# Patient Record
Sex: MALE | Race: WHITE | NOT HISPANIC OR LATINO | Employment: FULL TIME | URBAN - METROPOLITAN AREA
[De-identification: names, ages, dates, MRNs, and addresses within clinical notes are randomized per-mention and may not be internally consistent; named-entity substitution may affect disease eponyms.]

---

## 2017-01-04 DIAGNOSIS — F41.1 GENERALIZED ANXIETY DISORDER: Primary | ICD-10-CM

## 2017-01-04 RX ORDER — CITALOPRAM HYDROBROMIDE 40 MG/1
40 TABLET ORAL DAILY
Qty: 90 TABLET | Refills: 0 | Status: SHIPPED | OUTPATIENT
Start: 2017-01-04 | End: 2017-04-10

## 2017-01-04 NOTE — TELEPHONE ENCOUNTER
Citalopram     Last Written Prescription Date: 07/05/16  Last Fill Quantity: 90, # refills: 1  Last Office Visit with G primary care provider:  01/04/17        Last PHQ-9 score on record=   PHQ-9 SCORE 11/2/2016   Total Score -   Total Score 3           Thank you!  Kenzie Jonas PhT  [facility]  On behalf of [facility]

## 2017-01-04 NOTE — TELEPHONE ENCOUNTER
Due for office visit in May. Last seen 11/2/16.       Melissa Barrera, Pharm.D.  on behalf of [facility] Pharmacist   [email or web address]

## 2017-01-27 ENCOUNTER — OFFICE VISIT (OUTPATIENT)
Dept: FAMILY MEDICINE | Facility: CLINIC | Age: 47
End: 2017-01-27
Payer: OTHER MISCELLANEOUS

## 2017-01-27 ENCOUNTER — MYC MEDICAL ADVICE (OUTPATIENT)
Dept: FAMILY MEDICINE | Facility: CLINIC | Age: 47
End: 2017-01-27

## 2017-01-27 ENCOUNTER — OFFICE VISIT (OUTPATIENT)
Dept: FAMILY MEDICINE | Facility: CLINIC | Age: 47
End: 2017-01-27
Payer: COMMERCIAL

## 2017-01-27 VITALS
TEMPERATURE: 98.3 F | DIASTOLIC BLOOD PRESSURE: 84 MMHG | WEIGHT: 211 LBS | SYSTOLIC BLOOD PRESSURE: 146 MMHG | OXYGEN SATURATION: 97 % | BODY MASS INDEX: 27.08 KG/M2 | HEART RATE: 72 BPM | RESPIRATION RATE: 12 BRPM

## 2017-01-27 VITALS
HEART RATE: 72 BPM | SYSTOLIC BLOOD PRESSURE: 146 MMHG | BODY MASS INDEX: 27.08 KG/M2 | RESPIRATION RATE: 12 BRPM | WEIGHT: 211 LBS | TEMPERATURE: 98.3 F | DIASTOLIC BLOOD PRESSURE: 84 MMHG | OXYGEN SATURATION: 97 %

## 2017-01-27 DIAGNOSIS — R35.0 URINARY FREQUENCY: Primary | ICD-10-CM

## 2017-01-27 DIAGNOSIS — M54.2 NECK PAIN: Primary | ICD-10-CM

## 2017-01-27 DIAGNOSIS — R10.32 ABDOMINAL PAIN, LEFT LOWER QUADRANT: Primary | ICD-10-CM

## 2017-01-27 PROCEDURE — 99213 OFFICE O/P EST LOW 20 MIN: CPT | Performed by: NURSE PRACTITIONER

## 2017-01-27 RX ORDER — TRAMADOL HYDROCHLORIDE 50 MG/1
50-100 TABLET ORAL EVERY 6 HOURS PRN
Qty: 56 TABLET | Refills: 0 | Status: SHIPPED | OUTPATIENT
Start: 2017-01-27 | End: 2017-01-27

## 2017-01-27 RX ORDER — TRAMADOL HYDROCHLORIDE 50 MG/1
50-100 TABLET ORAL EVERY 6 HOURS PRN
Qty: 56 TABLET | Refills: 0 | Status: SHIPPED | OUTPATIENT
Start: 2017-02-24 | End: 2017-01-27

## 2017-01-27 RX ORDER — HYDROCODONE BITARTRATE AND ACETAMINOPHEN 5; 325 MG/1; MG/1
1 TABLET ORAL EVERY 6 HOURS PRN
Qty: 28 TABLET | Refills: 0 | Status: SHIPPED | OUTPATIENT
Start: 2017-02-24 | End: 2017-01-27

## 2017-01-27 RX ORDER — HYDROCODONE BITARTRATE AND ACETAMINOPHEN 5; 325 MG/1; MG/1
1 TABLET ORAL EVERY 6 HOURS PRN
Qty: 28 TABLET | Refills: 0 | Status: SHIPPED | OUTPATIENT
Start: 2017-03-24 | End: 2017-04-21

## 2017-01-27 RX ORDER — HYDROCODONE BITARTRATE AND ACETAMINOPHEN 5; 325 MG/1; MG/1
1 TABLET ORAL EVERY 6 HOURS PRN
Qty: 28 TABLET | Refills: 0 | Status: SHIPPED | OUTPATIENT
Start: 2017-01-27 | End: 2017-01-27

## 2017-01-27 RX ORDER — HYDROCODONE BITARTRATE AND ACETAMINOPHEN 5; 325 MG/1; MG/1
1 TABLET ORAL EVERY 6 HOURS PRN
Qty: 56 TABLET | Refills: 0 | Status: SHIPPED | OUTPATIENT
Start: 2017-01-27 | End: 2017-01-27

## 2017-01-27 RX ORDER — TRAMADOL HYDROCHLORIDE 50 MG/1
50-100 TABLET ORAL EVERY 6 HOURS PRN
Qty: 56 TABLET | Refills: 0 | Status: SHIPPED | OUTPATIENT
Start: 2017-03-24 | End: 2017-04-21

## 2017-01-27 RX ORDER — OXYBUTYNIN CHLORIDE 5 MG/1
5 TABLET, EXTENDED RELEASE ORAL DAILY
Qty: 30 TABLET | Refills: 1 | Status: SHIPPED | OUTPATIENT
Start: 2017-01-27 | End: 2017-05-01

## 2017-01-27 NOTE — TELEPHONE ENCOUNTER
Called patient and scheduled for 4 PM today  Per patient the refills are on a [employer] account so will need two appointments as the hernia would be on private account  Per Lola CLIFTON to schedule both visits for this afternoon.  LOREE Monae RN

## 2017-01-27 NOTE — TELEPHONE ENCOUNTER
Ditropan XL 5mg      Last Written Prescription Date: 11/2/16  Last Fill Quantity: 30,  # refills: 1   Last Office Visit with [facility] prescribing provider: 11/2/16                                         Next 5 appointments (look out 90 days)     Jan 27, 2017  4:00 PM   [facility]   [facility]    [address] [place] [id number] [phone]            Jan 27, 2017  4:20 PM   SHORT with RAFAEL Robles CNP   [facility]    [address] [place] [address] [phone]                      Thank you,  Radhika Casillas CPhT  On behalf of [facility]

## 2017-01-27 NOTE — PATIENT INSTRUCTIONS
1.  i agree symptoms sound like hernia.  US to confirm, if negative will consider CT.  If fever, bloody diarrhea, or severe pain go into ER.  I there is a hernia will need follow up with general surgery.

## 2017-01-27 NOTE — PROGRESS NOTES
SUBJECTIVE:                                                    Jas Patel is a 46 year old male who presents to clinic today for the following health issues:      Chronic Pain Follow-Up       Type / Location of Pain: Neck/ back  Analgesia/pain control:       Recent changes:  same      Overall control: Tolerable with discomfort  Activity level/function:      Daily activities:  Able to do all daily activities    Work:  Pain does not limit any  work  Adverse effects:  No  Adherance    Taking medication as directed?  Yes    Participating in other treatments: yes  Risk Factors:    Sleep:  Poor    Mood/anxiety:  controlled    Recent family or social stressors:  none noted    Other aggravating factors: repetitive activities - reaching, bending, lifting  PHQ-9 SCORE 10/28/2015 4/11/2016 11/2/2016   Total Score - - -   Total Score 2 5 3     DONTE-7 SCORE 10/26/2011 10/28/2015 4/11/2016   Total Score 17 - -   Total Score - 5 9     Encounter-Level CSA - 10/28/15:               Controlled Substance Agreement - Scan on 10/29/2015 11:24 AM : CONTROLLED SUBSTANCE AGREEMENT (below)               Norco at night for chronic neck/back pain as below. Taking ibuprofen 600mg twice a day.  Occasional constipation, resolves with colace. No altered mental status, drowsiness.     Pt injured neck while lifting a pt 9/2011. Feels neck pain is stable on current medication regimen which includes 800mg ibuprofen TID, tramadol BID, and norco usually about 1/d. Has done PT in the past, still doing exercise at home which are helpful. Also seeing chiropractor. Has been told he is not a surgical candidate. Working currently in day surgery pre/postop as a nurse    On amitriptyline for sleep/chronic pain.  Mild dry mouth, no daytime drowsiness or dizziness.      Patient Active Problem List   Diagnosis     Low back pain     CARDIOVASCULAR SCREENING; LDL GOAL LESS THAN 160     Moderate major depression (H)     Generalized anxiety disorder      Gastroesophageal reflux disease without esophagitis     BPH (benign prostatic hyperplasia)     Chronic pain syndrome     Benign essential tremor     Past Surgical History   Procedure Laterality Date     Drain skin abscess complic       left thigh I and D       Social History   Substance Use Topics     Smoking status: Former Smoker -- 10 years     Types: Cigarettes     Start date: 01/28/2015     Smokeless tobacco: Never Used     Alcohol Use: Yes      Comment: 1 drink a day     Family History   Problem Relation Age of Onset     C.A.D. Maternal Grandfather      CEREBROVASCULAR DISEASE Other      great uncle     DIABETES Paternal Uncle      type 2     DIABETES Other      great aunt     Hypertension Father      Hypertension Maternal Grandfather      Prostate Cancer Other      great uncle moms side     Alzheimer Disease Other      great uncle moms side         Current Outpatient Prescriptions   Medication Sig Dispense Refill     oxybutynin (DITROPAN XL) 5 MG 24 hr tablet Take 1 tablet (5 mg) by mouth daily 30 tablet 1     [START ON 3/24/2017] HYDROcodone-acetaminophen (NORCO) 5-325 MG per tablet Take 1 tablet by mouth every 6 hours as needed for pain #28 pills/4 weeks 28 tablet 0     [START ON 3/24/2017] traMADol (ULTRAM) 50 MG tablet Take 1-2 tablets ( mg) by mouth every 6 hours as needed for pain #56 pills/4 weeks 56 tablet 0     citalopram (CELEXA) 40 MG tablet Take 1 tablet (40 mg) by mouth daily 90 tablet 0     Cholecalciferol (VITAMIN D3 PO) Take 5,000 Units by mouth every other day       tamsulosin (FLOMAX) 0.4 MG 24 hr capsule Take 1 capsule (0.4 mg) by mouth daily 90 capsule 1     omeprazole (PRILOSEC) 20 MG capsule Take 1 capsule (20 mg) by mouth daily 90 capsule 3     amitriptyline (ELAVIL) 25 MG tablet Take 1 tablet (25 mg) by mouth At Bedtime 90 tablet 1     propranolol (INDERAL) 10 MG tablet Take 1 tablet (10 mg) by mouth 2 times daily as needed For tremor 90 tablet 1     ibuprofen (ADVIL,MOTRIN) 800  MG tablet Take 1 tablet (800 mg) by mouth 3 times daily 90 tablet 0     loratadine (CLARITIN) 10 MG tablet Take 10 mg by mouth daily. 1 tab daily       MULTI-VITAMIN OR TABS one daily  0       ROS:  MSK, Neuro as above, otherwise negative       OBJECTIVE:                                                    /84 mmHg  Pulse 72  Temp(Src) 98.3  F (36.8  C) (Tympanic)  Resp 12  Wt 211 lb (95.709 kg)  SpO2 97%   GENERAL APPEARANCE: healthy, alert and no distress  EYES: Eyes grossly normal to inspection and conjunctivae and sclerae normal  RESP: respirations nonlabored  PSYCH: mentation appears normal and affect normal/bright        ASSESSMENT/PLAN:                                                    (M54.2) Neck pain  (primary encounter diagnosis)  Comment: controlled on current regimen   Plan: HYDROcodone-acetaminophen (NORCO) 5-325 MG per         tablet, traMADol (ULTRAM) 50 MG tablet,         DISCONTINUED: HYDROcodone-acetaminophen (NORCO)        5-325 MG per tablet, DISCONTINUED: traMADol         (ULTRAM) 50 MG tablet, DISCONTINUED:         HYDROcodone-acetaminophen (NORCO) 5-325 MG per         tablet, DISCONTINUED: traMADol (ULTRAM) 50 MG         tablet, DISCONTINUED: HYDROcodone-acetaminophen        (NORCO) 5-325 MG per tablet        Refilled Rx x 3 months, okay for evisit for next round refills, f2f visit every 6 months          See Patient Instructions    Lola Lamb [facility]    There are no Patient Instructions on file for this visit.

## 2017-01-27 NOTE — TELEPHONE ENCOUNTER
Prescription approved per [facility] Refill Protocol.    Edda Scott, PharmD  [facility]  On behalf of [facility]

## 2017-01-27 NOTE — PROGRESS NOTES
SUBJECTIVE:                                                    Jas Patel is a 46 year old male who presents to clinic today for the following health issues:      Abdominal Pain      Duration: 3 days    Description (location/character/radiation): lower left quadrant, sharp ,crampy       Associated flank pain: starting    Intensity:  moderate    Accompanying signs and symptoms:        Fever/Chills: no        Gas/Bloating: YES       Nausea/vomitting: YES- nausea       Diarrhea: no        Dysuria or Hematuria: no     History (previous similar pain/trauma/previous testing): none    Precipitating or alleviating factors:       Pain worse with eating/BM/urination: worse upon using abdominal muscles, coughing, pulling legs up       Pain relieved by BM: no     Therapies tried and outcome: None    LMP:  not applicable       Soreness 1 week ago, more prominent and constant the last 3 days.  Specific location to LLQ.  Tender to palpation.  Pain worse with use of abd muscles, sitting, coughing.  Constant pain today.  Characterized as dull ache, but certain movements cause it to be sharp pain.  Mild nausea today after lunch.  No diarrhea or constipation.  No fevers, chills, body aches.  Appetite is down today.  No blood in stool or dark tarry stool.  No prior history of hernia.  No hx abd surgery.  Though at some point he may have noticed a bulge but not sure.      Started oxybutinin to help with bladder spasms, it has improved.  Improved urgency and frequency.  Also using flomax.      Patient Active Problem List   Diagnosis     Low back pain     CARDIOVASCULAR SCREENING; LDL GOAL LESS THAN 160     Moderate major depression (H)     Generalized anxiety disorder     Gastroesophageal reflux disease without esophagitis     BPH (benign prostatic hyperplasia)     Chronic pain syndrome     Benign essential tremor     Past Surgical History   Procedure Laterality Date     Drain skin abscess complic       left thigh I and D        Social History   Substance Use Topics     Smoking status: Former Smoker -- 10 years     Types: Cigarettes     Start date: 01/28/2015     Smokeless tobacco: Never Used     Alcohol Use: Yes      Comment: 1 drink a day     Family History   Problem Relation Age of Onset     C.A.D. Maternal Grandfather      CEREBROVASCULAR DISEASE Other      great uncle     DIABETES Paternal Uncle      type 2     DIABETES Other      great aunt     Hypertension Father      Hypertension Maternal Grandfather      Prostate Cancer Other      great uncle moms side     Alzheimer Disease Other      great uncle moms side         Current Outpatient Prescriptions   Medication Sig Dispense Refill     oxybutynin (DITROPAN XL) 5 MG 24 hr tablet Take 1 tablet (5 mg) by mouth daily 30 tablet 1     citalopram (CELEXA) 40 MG tablet Take 1 tablet (40 mg) by mouth daily 90 tablet 0     Cholecalciferol (VITAMIN D3 PO) Take 5,000 Units by mouth every other day       tamsulosin (FLOMAX) 0.4 MG 24 hr capsule Take 1 capsule (0.4 mg) by mouth daily 90 capsule 1     omeprazole (PRILOSEC) 20 MG capsule Take 1 capsule (20 mg) by mouth daily 90 capsule 3     amitriptyline (ELAVIL) 25 MG tablet Take 1 tablet (25 mg) by mouth At Bedtime 90 tablet 1     propranolol (INDERAL) 10 MG tablet Take 1 tablet (10 mg) by mouth 2 times daily as needed For tremor 90 tablet 1     ibuprofen (ADVIL,MOTRIN) 800 MG tablet Take 1 tablet (800 mg) by mouth 3 times daily 90 tablet 0     loratadine (CLARITIN) 10 MG tablet Take 10 mg by mouth daily. 1 tab daily       MULTI-VITAMIN OR TABS one daily  0     [START ON 3/24/2017] HYDROcodone-acetaminophen (NORCO) 5-325 MG per tablet Take 1 tablet by mouth every 6 hours as needed for pain #28 pills/4 weeks 28 tablet 0     [START ON 3/24/2017] traMADol (ULTRAM) 50 MG tablet Take 1-2 tablets ( mg) by mouth every 6 hours as needed for pain #56 pills/4 weeks 56 tablet 0       ROS:  Const, GI, MSK as above, otherwise negative       OBJECTIVE:                                                     /84 mmHg  Pulse 72  Temp(Src) 98.3  F (36.8  C) (Tympanic)  Resp 12  Wt 211 lb (95.709 kg)  SpO2 97%   GENERAL APPEARANCE: healthy, alert and no distress  EYES: Eyes grossly normal to inspection and conjunctivae and sclerae normal  RESP: respirations nonlabored   ABDOMEN: bowel sounds normal.  Pain with palpation of specific area in LLQ, pain worse with flexion of abd wall.  No palpable mass or bulge.  remainder of the abd is nontender, no guarding or rebound tenderness.  PSYCH: mentation appears normal and affect normal/bright          ASSESSMENT/PLAN:                                                    (R10.32) Abdominal pain, left lower quadrant  (primary encounter diagnosis)  Comment: suspicious for hernia given worse with movement, coughing, ad abd wall flexion, no palpable bulge on exam.  No fevers, diarrhea, and pinpoint pain not consistent with diverticulitis   Plan: US Abdomen Limited        US scheduled tomorrow to eval for hernia, if neg will consider CT scan.  If sevre pain, fever, or bloody diarrhea pt will go into ER.          See Patient Instructions    Lola Lamb, HEATHER  [facility]    Patient Instructions   1.  i agree symptoms sound like hernia.  US to confirm, if negative will consider CT.  If fever, bloody diarrhea, or severe pain go into ER.  I there is a hernia will need follow up with general surgery.

## 2017-01-27 NOTE — NURSING NOTE
"Chief Complaint   Patient presents with     Abdominal Pain       Initial /84 mmHg  Pulse 72  Temp(Src) 98.3  F (36.8  C) (Tympanic)  Resp 12  Wt 211 lb (95.709 kg)  SpO2 97% Estimated body mass index is 27.08 kg/(m^2) as calculated from the following:    Height as of 6/9/15: 6' 2\" (1.88 m).    Weight as of this encounter: 211 lb (95.709 kg).  BP completed using cuff size: gerri Zapata CMA    "

## 2017-01-27 NOTE — MR AVS SNAPSHOT
After Visit Summary   1/27/2017    Jas Patel    MRN: [id number]           Patient Information     Date Of Birth          1970        Visit Information        Provider Department      1/27/2017 4:20 PM Lola Lamb APRN CNP [facility]        Today's Diagnoses     Neck pain    -  1        Follow-ups after your visit        Your next 10 appointments already scheduled     Jan 28, 2017  9:00 AM   US ABDOMEN LIMITED with UCUS3   [facility] ([facility])    [address] [address] [phone]           Please bring a list of your medicines (including vitamins, minerals and over-the-counter drugs). Also, tell your doctor about any allergies you may have. Wear comfortable clothes and leave your valuables at home.  Adults: No eating or drinking for 8 hours before the exam. You may take medicine with a small sip of water.  Children: - Children 6+ years: No food or drink for 6 hours before exam. - Children 1-5 years: No food or drink for 4 hours before exam. - Infants, breast-fed: may have breast milk up to 2 hours before exam. - Infants, formula: may have bottle until 4 hours before exam.  Please call the Imaging Department at your exam site with any questions.              Future tests that were ordered for you today     Open Future Orders        Priority Expected Expires Ordered    US Abdomen Limited Routine  1/27/2018 1/27/2017            Who to contact     If you have questions or need follow up information about today's clinic visit or your schedule please contact [facility] directly at [phone].  Normal or non-critical lab and imaging results will be communicated to you by MyChart, letter or phone within 4 business days after the clinic has received the results. If you do not hear from us within 7 days, please contact the clinic through MyChart or phone. If you have a critical  or abnormal lab result, we will notify you by phone as soon as possible.  Submit refill requests through [employer] or call your pharmacy and they will forward the refill request to us. Please allow 3 business days for your refill to be completed.          Additional Information About Your Visit        [employer] Information     [employer] gives you secure access to your electronic health record. If you see a primary care provider, you can also send messages to your care team and make appointments. If you have questions, please call your primary care clinic.  If you do not have a primary care provider, please call [phone] and they will assist you.        Care EveryWhere ID     This is your Care EveryWhere ID. This could be used by other organizations to access your [facility] medical records  [id number]        Your Vitals Were     Pulse Temperature Respirations Pulse Oximetry          72 98.3  F (36.8  C) (Tympanic) 12 97%         Blood Pressure from Last 3 Encounters:   01/27/17 146/84   01/27/17 146/84   11/02/16 122/68    Weight from Last 3 Encounters:   01/27/17 211 lb (95.709 kg)   01/27/17 211 lb (95.709 kg)   11/02/16 209 lb (94.802 kg)              Today, you had the following     No orders found for display         Where to get your medicines      These medications were sent to [facility] [place], [place] - [address]  [address] [facility] [address]     Phone:  [phone]    - oxybutynin 5 MG 24 hr tablet       Primary Care Provider Office Phone # Fax #    LolaRAFAEL [facility] [phone]       [facility] [address]  [facility] [address]        Thank you!     Thank you for choosing [facility]  for your care. Our goal is always to provide you with excellent care. Hearing back from our patients is one way we can continue to improve our services. Please take a few minutes to complete the written survey that you may receive in  the mail after your visit with us. Thank you!             Your Updated Medication List - Protect others around you: Learn how to safely use, store and throw away your medicines at www.disposemymeds.org.          This list is accurate as of: 1/27/17  4:28 PM.  Always use your most recent med list.                   Brand Name Dispense Instructions for use    amitriptyline 25 MG tablet    ELAVIL    90 tablet    Take 1 tablet (25 mg) by mouth At Bedtime       citalopram 40 MG tablet    celeXA    90 tablet    Take 1 tablet (40 mg) by mouth daily       CLARITIN 10 MG tablet   Generic drug:  loratadine      Take 10 mg by mouth daily. 1 tab daily       HYDROcodone-acetaminophen 5-325 MG per tablet    NORCO    28 tablet    Take 1 tablet by mouth every 6 hours as needed for pain #28 pills/4 weeks       ibuprofen 800 MG tablet    ADVIL/MOTRIN    90 tablet    Take 1 tablet (800 mg) by mouth 3 times daily       Multi-vitamin Tabs tablet   Generic drug:  multivitamin, therapeutic with minerals      one daily       omeprazole 20 MG CR capsule    priLOSEC    90 capsule    Take 1 capsule (20 mg) by mouth daily       oxybutynin 5 MG 24 hr tablet    DITROPAN XL    30 tablet    Take 1 tablet (5 mg) by mouth daily       propranolol 10 MG tablet    INDERAL    90 tablet    Take 1 tablet (10 mg) by mouth 2 times daily as needed For tremor       tamsulosin 0.4 MG capsule    FLOMAX    90 capsule    Take 1 capsule (0.4 mg) by mouth daily       traMADol 50 MG tablet    ULTRAM    56 tablet    Take 1-2 tablets ( mg) by mouth every 6 hours as needed for pain #56 pills/4 weeks       VITAMIN D3 PO      Take 5,000 Units by mouth every other day

## 2017-01-27 NOTE — MR AVS SNAPSHOT
After Visit Summary   1/27/2017    Jas Patel    MRN: [id number]           Patient Information     Date Of Birth          1970        Visit Information        Provider Department      1/27/2017 4:00 PM Lola Lamb APRN CNP [facility]        Today's Diagnoses     Abdominal pain, left lower quadrant    -  1       Care Instructions    1.  i agree symptoms sound like hernia.  US to confirm, if negative will consider CT.  If fever, bloody diarrhea, or severe pain go into ER.  I there is a hernia will need follow up with general surgery.        Follow-ups after your visit        Your next 10 appointments already scheduled     Jan 28, 2017  9:00 AM   US ABDOMEN LIMITED with UCUS3   [facility] ([facility])    [address] [address] [phone]           Please bring a list of your medicines (including vitamins, minerals and over-the-counter drugs). Also, tell your doctor about any allergies you may have. Wear comfortable clothes and leave your valuables at home.  Adults: No eating or drinking for 8 hours before the exam. You may take medicine with a small sip of water.  Children: - Children 6+ years: No food or drink for 6 hours before exam. - Children 1-5 years: No food or drink for 4 hours before exam. - Infants, breast-fed: may have breast milk up to 2 hours before exam. - Infants, formula: may have bottle until 4 hours before exam.  Please call the Imaging Department at your exam site with any questions.              Future tests that were ordered for you today     Open Future Orders        Priority Expected Expires Ordered    US Abdomen Limited Routine  1/27/2018 1/27/2017            Who to contact     If you have questions or need follow up information about today's clinic visit or your schedule please contact [facility] directly at [phone].  Normal or non-critical lab  and imaging results will be communicated to you by Smart GPS Backpackhart, letter or phone within 4 business days after the clinic has received the results. If you do not hear from us within 7 days, please contact the clinic through AdultSpace or phone. If you have a critical or abnormal lab result, we will notify you by phone as soon as possible.  Submit refill requests through AdultSpace or call your pharmacy and they will forward the refill request to us. Please allow 3 business days for your refill to be completed.          Additional Information About Your Visit        Smart GPS BackpackharViewpoints Information     AdultSpace gives you secure access to your electronic health record. If you see a primary care provider, you can also send messages to your care team and make appointments. If you have questions, please call your primary care clinic.  If you do not have a primary care provider, please call [phone] and they will assist you.        Care EveryWhere ID     This is your Care EveryWhere ID. This could be used by other organizations to access your [place] medical records  [id number]        Your Vitals Were     Pulse Temperature Respirations Pulse Oximetry          72 98.3  F (36.8  C) (Tympanic) 12 97%         Blood Pressure from Last 3 Encounters:   01/27/17 146/84   01/27/17 146/84   11/02/16 122/68    Weight from Last 3 Encounters:   01/27/17 211 lb (95.709 kg)   01/27/17 211 lb (95.709 kg)   11/02/16 209 lb (94.802 kg)                 Today's Medication Changes          These changes are accurate as of: 1/27/17  4:35 PM.  If you have any questions, ask your nurse or doctor.               Start taking these medicines.        Dose/Directions    HYDROcodone-acetaminophen 5-325 MG per tablet   Commonly known as:  NORCO   Used for:  Neck pain   Started by:  Lola Lamb APRN CNP        Dose:  1 tablet   Start taking on:  3/24/2017   Take 1 tablet by mouth every 6 hours as needed for pain #28 pills/4 weeks   Quantity:  28 tablet    Refills:  0       traMADol 50 MG tablet   Commonly known as:  ULTRAM   Used for:  Neck pain   Started by:  Lola Lamb APRN CNP        Dose:   mg   Start taking on:  3/24/2017   Take 1-2 tablets ( mg) by mouth every 6 hours as needed for pain #56 pills/4 weeks   Quantity:  56 tablet   Refills:  0            Where to get your medicines      These medications were sent to [place] [id number]  [address] [facility] [address]     Phone:  [phone]    - oxybutynin 5 MG 24 hr tablet      Some of these will need a paper prescription and others can be bought over the counter.  Ask your nurse if you have questions.     Bring a paper prescription for each of these medications    - HYDROcodone-acetaminophen 5-325 MG per tablet  - traMADol 50 MG tablet             Primary Care Provider Office Phone # Fax #    RAFAEL Robles [phone]       [facility] [address]  [facility] [address]        Thank you!     Thank you for choosing [facility]  for your care. Our goal is always to provide you with excellent care. Hearing back from our patients is one way we can continue to improve our services. Please take a few minutes to complete the written survey that you may receive in the mail after your visit with us. Thank you!             Your Updated Medication List - Protect others around you: Learn how to safely use, store and throw away your medicines at www.disposemymeds.org.          This list is accurate as of: 1/27/17  4:35 PM.  Always use your most recent med list.                   Brand Name Dispense Instructions for use    amitriptyline 25 MG tablet    ELAVIL    90 tablet    Take 1 tablet (25 mg) by mouth At Bedtime       citalopram 40 MG tablet    celeXA    90 tablet    Take 1 tablet (40 mg) by mouth daily       CLARITIN 10 MG tablet   Generic drug:  loratadine      Take 10 mg by mouth  daily. 1 tab daily       HYDROcodone-acetaminophen 5-325 MG per tablet   Start taking on:  3/24/2017    NORCO    28 tablet    Take 1 tablet by mouth every 6 hours as needed for pain #28 pills/4 weeks       ibuprofen 800 MG tablet    ADVIL/MOTRIN    90 tablet    Take 1 tablet (800 mg) by mouth 3 times daily       Multi-vitamin Tabs tablet   Generic drug:  multivitamin, therapeutic with minerals      one daily       omeprazole 20 MG CR capsule    priLOSEC    90 capsule    Take 1 capsule (20 mg) by mouth daily       oxybutynin 5 MG 24 hr tablet    DITROPAN XL    30 tablet    Take 1 tablet (5 mg) by mouth daily       propranolol 10 MG tablet    INDERAL    90 tablet    Take 1 tablet (10 mg) by mouth 2 times daily as needed For tremor       tamsulosin 0.4 MG capsule    FLOMAX    90 capsule    Take 1 capsule (0.4 mg) by mouth daily       traMADol 50 MG tablet   Start taking on:  3/24/2017    ULTRAM    56 tablet    Take 1-2 tablets ( mg) by mouth every 6 hours as needed for pain #56 pills/4 weeks       VITAMIN D3 PO      Take 5,000 Units by mouth every other day

## 2017-02-08 ENCOUNTER — MYC MEDICAL ADVICE (OUTPATIENT)
Dept: FAMILY MEDICINE | Facility: CLINIC | Age: 47
End: 2017-02-08

## 2017-02-08 NOTE — TELEPHONE ENCOUNTER
Routing to provider - Lilroz - please review and advise as appropriate  1. Patient update:  Symptoms improved    Thank you,  Ragini Cheung RN

## 2017-02-21 DIAGNOSIS — F41.1 GENERALIZED ANXIETY DISORDER: ICD-10-CM

## 2017-02-21 NOTE — TELEPHONE ENCOUNTER
Routing refill request to provider for review/approval because:  Drug not on the [facility] refill protocol for this indication    Griselda Roman, PharmD  [facility]  On behalf of [facility]

## 2017-02-21 NOTE — TELEPHONE ENCOUNTER
Propranolol 10mg      Last Written Prescription Date: 9/21/16  Last Fill Quantity: 90, # refills: 1    Last Office Visit with [facility], P or [facility] prescribing provider:  1/27/17   Future Office Visit:        BP Readings from Last 3 Encounters:   01/27/17 146/84   01/27/17 146/84   11/02/16 122/68       Thank you,  Radhika Casillas CPhT  On behalf of [facility] [place]

## 2017-02-22 RX ORDER — PROPRANOLOL HYDROCHLORIDE 10 MG/1
10 TABLET ORAL 2 TIMES DAILY PRN
Qty: 90 TABLET | Refills: 1 | Status: SHIPPED | OUTPATIENT
Start: 2017-02-22 | End: 2017-07-31

## 2017-04-10 DIAGNOSIS — F41.1 GENERALIZED ANXIETY DISORDER: ICD-10-CM

## 2017-04-11 RX ORDER — CITALOPRAM HYDROBROMIDE 40 MG/1
TABLET ORAL
Qty: 90 TABLET | Refills: 0 | Status: SHIPPED | OUTPATIENT
Start: 2017-04-11 | End: 2017-07-17

## 2017-04-11 NOTE — TELEPHONE ENCOUNTER
Medication Detail      Disp Refills Start End KENDALL   citalopram (CELEXA) 40 MG tablet 90 tablet 0 1/4/2017  No   Sig: Take 1 tablet (40 mg) by mouth daily       Last Office Visit with [facility] primary care provider:  1/27/17        Last PHQ-9 score on record=   PHQ-9 SCORE 11/2/2016   Total Score -   Total Score 3

## 2017-04-20 ENCOUNTER — MYC MEDICAL ADVICE (OUTPATIENT)
Dept: FAMILY MEDICINE | Facility: CLINIC | Age: 47
End: 2017-04-20

## 2017-04-21 ENCOUNTER — E-VISIT (OUTPATIENT)
Dept: FAMILY MEDICINE | Facility: CLINIC | Age: 47
End: 2017-04-21
Payer: COMMERCIAL

## 2017-04-21 ENCOUNTER — MYC MEDICAL ADVICE (OUTPATIENT)
Dept: FAMILY MEDICINE | Facility: CLINIC | Age: 47
End: 2017-04-21

## 2017-04-21 DIAGNOSIS — M54.2 NECK PAIN: ICD-10-CM

## 2017-04-21 PROCEDURE — 98969 ZZC NONPHYSICIAN ONLINE ASSESSMENT AND MANAGEMENT: CPT | Performed by: NURSE PRACTITIONER

## 2017-04-21 RX ORDER — HYDROCODONE BITARTRATE AND ACETAMINOPHEN 5; 325 MG/1; MG/1
1 TABLET ORAL EVERY 6 HOURS PRN
Qty: 28 TABLET | Refills: 0 | Status: SHIPPED | OUTPATIENT
Start: 2017-05-19 | End: 2017-04-21

## 2017-04-21 RX ORDER — TRAMADOL HYDROCHLORIDE 50 MG/1
50-100 TABLET ORAL EVERY 6 HOURS PRN
Qty: 56 TABLET | Refills: 0 | Status: SHIPPED | OUTPATIENT
Start: 2017-04-21 | End: 2017-04-21

## 2017-04-21 RX ORDER — TRAMADOL HYDROCHLORIDE 50 MG/1
50-100 TABLET ORAL EVERY 6 HOURS PRN
Qty: 56 TABLET | Refills: 0 | Status: SHIPPED | OUTPATIENT
Start: 2017-05-19 | End: 2017-04-21

## 2017-04-21 RX ORDER — HYDROCODONE BITARTRATE AND ACETAMINOPHEN 5; 325 MG/1; MG/1
1 TABLET ORAL EVERY 6 HOURS PRN
Qty: 28 TABLET | Refills: 0 | Status: SHIPPED | OUTPATIENT
Start: 2017-04-21 | End: 2017-04-21

## 2017-04-21 RX ORDER — TRAMADOL HYDROCHLORIDE 50 MG/1
50-100 TABLET ORAL EVERY 6 HOURS PRN
Qty: 56 TABLET | Refills: 0 | Status: SHIPPED | OUTPATIENT
Start: 2017-06-16 | End: 2017-07-17

## 2017-04-21 RX ORDER — HYDROCODONE BITARTRATE AND ACETAMINOPHEN 5; 325 MG/1; MG/1
1 TABLET ORAL EVERY 6 HOURS PRN
Qty: 28 TABLET | Refills: 0 | Status: SHIPPED | OUTPATIENT
Start: 2017-06-16 | End: 2017-07-17

## 2017-04-21 NOTE — TELEPHONE ENCOUNTER
Sent patient mychart message as per below - advising E-visit and discuss refill policy    Ragini Cheung RN

## 2017-05-01 DIAGNOSIS — R35.0 URINARY FREQUENCY: ICD-10-CM

## 2017-05-01 DIAGNOSIS — N40.1 BENIGN NON-NODULAR PROSTATIC HYPERPLASIA WITH LOWER URINARY TRACT SYMPTOMS: ICD-10-CM

## 2017-05-02 NOTE — TELEPHONE ENCOUNTER
Medication Detail      Disp Refills Start End KENDALL   tamsulosin (FLOMAX) 0.4 MG 24 hr capsule 90 capsule 1 10/28/2016  No   Sig: Take 1 capsule (0.4 mg) by mouth daily       [place] [facility] [place] [facility] or [facility] prescribing provider:  1/27/17   Future Office Visit:      BP Readings from Last 3 Encounters:   01/27/17 146/84   01/27/17 146/84   11/02/16 122/68     Medication Detail      Disp Refills Start End KENDALL   oxybutynin (DITROPAN XL) 5 MG 24 hr tablet 30 tablet 1 1/27/2017  No   Sig: Take 1 tablet (5 mg) by mouth daily       Last Office Visit with [facility] or [facility] prescribing provider: 1/27/17

## 2017-05-03 RX ORDER — OXYBUTYNIN CHLORIDE 5 MG/1
TABLET, EXTENDED RELEASE ORAL
Qty: 90 TABLET | Refills: 2 | Status: SHIPPED | OUTPATIENT
Start: 2017-05-03 | End: 2017-12-26

## 2017-05-03 RX ORDER — TAMSULOSIN HYDROCHLORIDE 0.4 MG/1
CAPSULE ORAL
Qty: 90 CAPSULE | Refills: 1 | Status: SHIPPED | OUTPATIENT
Start: 2017-05-03 | End: 2017-10-30

## 2017-07-13 NOTE — PROGRESS NOTES
SUBJECTIVE:                                                    Jas Patel is a 47 year old male who presents to clinic today for the following health issues:      Chronic Pain Follow-Up       Type / Location of Pain: neck pain, herniated cervical disc  Analgesia/pain control:       Recent changes:  Worse, more intense and constant      Overall control: Tolerable with discomfort  Activity level/function:      Daily activities:  Can do most things most days, with some rest    Work:  Pain does not limit any  work  Adverse effects:  No  Adherance    Taking medication as directed?  Yes    Participating in other treatments: yes  Risk Factors:    Sleep:  Fair    Mood/anxiety:  Controlled, fair-poor    Recent family or social stressors:  conflict between family members    Other aggravating factors: none  PHQ-9 SCORE 4/11/2016 11/2/2016 7/17/2017   Total Score - - -   Total Score 5 3 11     DONTE-7 SCORE 10/28/2015 4/11/2016 7/17/2017   Total Score - - -   Total Score 5 9 7     Encounter-Level CSA - 10/28/2015:                 Controlled Substance Agreement - Scan on 10/29/2015 11:24 AM : CONTROLLED SUBSTANCE AGREEMENT (below)              Pt injured neck while lifting a pt 9/2011. Feels neck pain is stable on current medication regimen which includes 800mg ibuprofen TID, tramadol BID, and norco usually about 1/d. Has done PT in the past, still doing exercise at home which are helpful. Also seeing chiropractor. Has been told he is not a surgical candidate. Working currently in day surgery pre/postop as a nurse.  Had trigger point injections and BOY injection which helped with numbness/tingling.  Last MRI done 2011 [facility].       pain has been a little more intense the last few weeks, related to being more active this summer.    Patient Active Problem List   Diagnosis     Low back pain     CARDIOVASCULAR SCREENING; LDL GOAL LESS THAN 160     Moderate major depression (H)     Generalized anxiety disorder      Gastroesophageal reflux disease without esophagitis     BPH (benign prostatic hyperplasia)     Chronic pain syndrome     Benign essential tremor     Past Surgical History:   Procedure Laterality Date     DRAIN SKIN ABSCESS COMPLIC      left thigh I and D       Social History   Substance Use Topics     Smoking status: Former Smoker     Years: 10.00     Types: Cigarettes     Start date: 1/28/2015     Smokeless tobacco: Never Used     Alcohol use Yes      Comment: 1 drink a day     Family History   Problem Relation Age of Onset     Hypertension Father      C.A.D. Maternal Grandfather      Hypertension Maternal Grandfather      CEREBROVASCULAR DISEASE Other      great uncle     DIABETES Paternal Uncle      type 2     DIABETES Other      great aunt     Prostate Cancer Other      great uncle moms side     Alzheimer Disease Other      great uncle moms side         Current Outpatient Prescriptions   Medication Sig Dispense Refill     [START ON 9/11/2017] HYDROcodone-acetaminophen (NORCO) 5-325 MG per tablet Take 1 tablet by mouth every 6 hours as needed for pain #28 pills/4 weeks 28 tablet 0     [START ON 9/11/2017] traMADol (ULTRAM) 50 MG tablet Take 1-2 tablets ( mg) by mouth every 6 hours as needed for pain #56 pills/4 weeks 56 tablet 0     ibuprofen (ADVIL/MOTRIN) 800 MG tablet Take 1 tablet (800 mg) by mouth 3 times daily 90 tablet 0     tamsulosin (FLOMAX) 0.4 MG capsule TAKE ONE CAPSULE BY MOUTH DAILY 90 capsule 1     oxybutynin (DITROPAN-XL) 5 MG 24 hr tablet TAKE ONE TABLET BY MOUTH DAILY 90 tablet 2     amitriptyline (ELAVIL) 25 MG tablet TAKE ONE TABLET BY MOUTH AT BEDTIME 90 tablet 2     [DISCONTINUED] citalopram (CELEXA) 40 MG tablet TAKE ONE TABLET BY MOUTH EVERY DAY (DUE FOR OFFICE VISIT IN APRIL OR MAY) 90 tablet 0     propranolol (INDERAL) 10 MG tablet Take 1 tablet (10 mg) by mouth 2 times daily as needed For tremor 90 tablet 1     Cholecalciferol (VITAMIN D3 PO) Take 5,000 Units by mouth every other  day       omeprazole (PRILOSEC) 20 MG capsule Take 1 capsule (20 mg) by mouth daily 90 capsule 3     loratadine (CLARITIN) 10 MG tablet Take 10 mg by mouth daily. 1 tab daily       MULTI-VITAMIN OR TABS one daily  0     citalopram (CELEXA) 40 MG tablet TAKE ONE TABLET BY MOUTH EVERY DAY (DUE FOR OFFICE VISIT IN APRIL OR MAY) 90 tablet 3       ROS:  MSK as above, otherwise negative       OBJECTIVE:                                                    /76 (BP Location: Right arm, Patient Position: Chair, Cuff Size: Adult Regular)  Pulse 85  Temp 98.5  F (36.9  C) (Tympanic)  Resp 16  Wt 210 lb (95.3 kg)  SpO2 98%  BMI 26.96 kg/m2   GENERAL APPEARANCE: healthy, alert and no distress  EYES: Eyes grossly normal to inspection and conjunctivae and sclerae normal  RESP: respirations nonlabored  PSYCH: mentation appears normal and affect normal/bright        ASSESSMENT/PLAN:                                                    (M54.2) Neck pain  Comment: slighlty worse due to increased activity this summer  Plan: HYDROcodone-acetaminophen (NORCO) 5-325 MG per         tablet, traMADol (ULTRAM) 50 MG tablet,         ibuprofen (ADVIL/MOTRIN) 800 MG tablet,         DISCONTINUED: HYDROcodone-acetaminophen (NORCO)        5-325 MG per tablet, DISCONTINUED:         HYDROcodone-acetaminophen (NORCO) 5-325 MG per         tablet, DISCONTINUED: traMADol (ULTRAM) 50 MG         tablet, DISCONTINUED: traMADol (ULTRAM) 50 MG         tablet        Discussed consideration of opioid holiday v. Repeat imaging and follow up with medical spine.  Will cont current dose tramadol and norco and consider this follow up in the future       See Patient Instructions    Lola Perez, [facility]    There are no Patient Instructions on file for this visit.

## 2017-07-17 ENCOUNTER — OFFICE VISIT (OUTPATIENT)
Dept: FAMILY MEDICINE | Facility: CLINIC | Age: 47
End: 2017-07-17
Payer: OTHER MISCELLANEOUS

## 2017-07-17 ENCOUNTER — OFFICE VISIT (OUTPATIENT)
Dept: FAMILY MEDICINE | Facility: CLINIC | Age: 47
End: 2017-07-17
Payer: COMMERCIAL

## 2017-07-17 VITALS
HEART RATE: 85 BPM | SYSTOLIC BLOOD PRESSURE: 124 MMHG | RESPIRATION RATE: 16 BRPM | OXYGEN SATURATION: 98 % | WEIGHT: 210 LBS | DIASTOLIC BLOOD PRESSURE: 76 MMHG | TEMPERATURE: 98.5 F | BODY MASS INDEX: 26.96 KG/M2

## 2017-07-17 DIAGNOSIS — F41.1 GENERALIZED ANXIETY DISORDER: ICD-10-CM

## 2017-07-17 DIAGNOSIS — N40.1 BENIGN PROSTATIC HYPERPLASIA WITH URINARY FREQUENCY: ICD-10-CM

## 2017-07-17 DIAGNOSIS — G25.0 BENIGN ESSENTIAL TREMOR: ICD-10-CM

## 2017-07-17 DIAGNOSIS — F32.1 MODERATE MAJOR DEPRESSION (H): Primary | ICD-10-CM

## 2017-07-17 DIAGNOSIS — R35.0 BENIGN PROSTATIC HYPERPLASIA WITH URINARY FREQUENCY: ICD-10-CM

## 2017-07-17 DIAGNOSIS — M54.2 NECK PAIN: ICD-10-CM

## 2017-07-17 PROCEDURE — 99213 OFFICE O/P EST LOW 20 MIN: CPT | Performed by: NURSE PRACTITIONER

## 2017-07-17 RX ORDER — HYDROCODONE BITARTRATE AND ACETAMINOPHEN 5; 325 MG/1; MG/1
1 TABLET ORAL EVERY 6 HOURS PRN
Qty: 28 TABLET | Refills: 0 | Status: SHIPPED | OUTPATIENT
Start: 2017-07-17 | End: 2017-07-17

## 2017-07-17 RX ORDER — IBUPROFEN 800 MG/1
800 TABLET, FILM COATED ORAL 3 TIMES DAILY
Qty: 90 TABLET | Refills: 0 | Status: SHIPPED | OUTPATIENT
Start: 2017-07-17 | End: 2017-12-18

## 2017-07-17 RX ORDER — TRAMADOL HYDROCHLORIDE 50 MG/1
50-100 TABLET ORAL EVERY 6 HOURS PRN
Qty: 56 TABLET | Refills: 0 | Status: SHIPPED | OUTPATIENT
Start: 2017-09-11 | End: 2017-10-09

## 2017-07-17 RX ORDER — TRAMADOL HYDROCHLORIDE 50 MG/1
50-100 TABLET ORAL EVERY 6 HOURS PRN
Qty: 56 TABLET | Refills: 0 | Status: SHIPPED | OUTPATIENT
Start: 2017-08-14 | End: 2017-07-17

## 2017-07-17 RX ORDER — CITALOPRAM HYDROBROMIDE 40 MG/1
TABLET ORAL
Qty: 90 TABLET | Refills: 3 | Status: SHIPPED | OUTPATIENT
Start: 2017-07-17 | End: 2018-07-16

## 2017-07-17 RX ORDER — HYDROCODONE BITARTRATE AND ACETAMINOPHEN 5; 325 MG/1; MG/1
1 TABLET ORAL EVERY 6 HOURS PRN
Qty: 28 TABLET | Refills: 0 | Status: SHIPPED | OUTPATIENT
Start: 2017-09-11 | End: 2017-10-09

## 2017-07-17 RX ORDER — TRAMADOL HYDROCHLORIDE 50 MG/1
50-100 TABLET ORAL EVERY 6 HOURS PRN
Qty: 56 TABLET | Refills: 0 | Status: SHIPPED | OUTPATIENT
Start: 2017-07-17 | End: 2017-07-17

## 2017-07-17 RX ORDER — HYDROCODONE BITARTRATE AND ACETAMINOPHEN 5; 325 MG/1; MG/1
1 TABLET ORAL EVERY 6 HOURS PRN
Qty: 28 TABLET | Refills: 0 | Status: SHIPPED | OUTPATIENT
Start: 2017-08-14 | End: 2017-07-17

## 2017-07-17 ASSESSMENT — PATIENT HEALTH QUESTIONNAIRE - PHQ9: 5. POOR APPETITE OR OVEREATING: SEVERAL DAYS

## 2017-07-17 ASSESSMENT — ANXIETY QUESTIONNAIRES
7. FEELING AFRAID AS IF SOMETHING AWFUL MIGHT HAPPEN: SEVERAL DAYS
1. FEELING NERVOUS, ANXIOUS, OR ON EDGE: MORE THAN HALF THE DAYS
6. BECOMING EASILY ANNOYED OR IRRITABLE: SEVERAL DAYS
5. BEING SO RESTLESS THAT IT IS HARD TO SIT STILL: NOT AT ALL
GAD7 TOTAL SCORE: 7
IF YOU CHECKED OFF ANY PROBLEMS ON THIS QUESTIONNAIRE, HOW DIFFICULT HAVE THESE PROBLEMS MADE IT FOR YOU TO DO YOUR WORK, TAKE CARE OF THINGS AT HOME, OR GET ALONG WITH OTHER PEOPLE: SOMEWHAT DIFFICULT
2. NOT BEING ABLE TO STOP OR CONTROL WORRYING: SEVERAL DAYS
3. WORRYING TOO MUCH ABOUT DIFFERENT THINGS: SEVERAL DAYS

## 2017-07-17 NOTE — PATIENT INSTRUCTIONS
1.  Continue tramadol, norco, and ibu.  Consider repeating MRI and follow up with spine specialty again if ongoing worsening symptoms  2.  Cont celexa, could consider adding wellbutrin or changing Cymbalta (SNRI) or zoloft.  3.  Cont oxybutynin and flomax for urinary symptoms.  Propranolol for tremor.    4.  Follow up online in 3 months, 6 months in person.

## 2017-07-17 NOTE — NURSING NOTE
"Chief Complaint   Patient presents with     Work Comp     Pain       Initial /76 (BP Location: Right arm, Patient Position: Chair, Cuff Size: Adult Regular)  Pulse 85  Temp 98.5  F (36.9  C) (Tympanic)  Resp 16  Wt 210 lb (95.3 kg)  SpO2 98%  BMI 26.96 kg/m2 Estimated body mass index is 26.96 kg/(m^2) as calculated from the following:    Height as of 6/9/15: 6' 2\" (1.88 m).    Weight as of this encounter: 210 lb (95.3 kg).  Medication Reconciliation: complete     Christine Zapata, CMA    "

## 2017-07-17 NOTE — MR AVS SNAPSHOT
After Visit Summary   7/17/2017    Jas Patel    MRN: [id number]           Patient Information     Date Of Birth          1970        Visit Information        Provider Department      7/17/2017 2:40 PM Lola Perez APRN CNP [facility]        Today's Diagnoses     Generalized anxiety disorder          Care Instructions    1.  Continue tramadol, norco, and ibu.  Consider repeating MRI and follow up with spine specialty again if ongoing worsening symptoms  2.  Cont celexa, could consider adding wellbutrin or changing Cymbalta (SNRI) or zoloft.  3.  Cont oxybutynin and flomax for urinary symptoms.  Propranolol for tremor.    4.  Follow up online in 3 months, 6 months in person.            Follow-ups after your visit        Who to contact     If you have questions or need follow up information about today's clinic visit or your schedule please contact [facility] directly at [phone].  Normal or non-critical lab and imaging results will be communicated to you by kissnofroghart, letter or phone within 4 business days after the clinic has received the results. If you do not hear from us within 7 days, please contact the clinic through Miaopait or phone. If you have a critical or abnormal lab result, we will notify you by phone as soon as possible.  Submit refill requests through Interactivo or call your pharmacy and they will forward the refill request to us. Please allow 3 business days for your refill to be completed.          Additional Information About Your Visit        MyChart Information     Interactivo gives you secure access to your electronic health record. If you see a primary care provider, you can also send messages to your care team and make appointments. If you have questions, please call your primary care clinic.  If you do not have a primary care provider, please call [phone] and they will assist you.        Care EveryWhere ID     This is your Care  EveryWhere ID. This could be used by other organizations to access your [place] medical records  [id number]         Blood Pressure from Last 3 Encounters:   07/17/17 124/76   01/27/17 146/84   01/27/17 146/84    Weight from Last 3 Encounters:   07/17/17 210 lb (95.3 kg)   01/27/17 211 lb (95.7 kg)   01/27/17 211 lb (95.7 kg)              Today, you had the following     No orders found for display         Today's Medication Changes          These changes are accurate as of: 7/17/17  2:44 PM.  If you have any questions, ask your nurse or doctor.               Start taking these medicines.        Dose/Directions    HYDROcodone-acetaminophen 5-325 MG per tablet   Commonly known as:  NORCO   Used for:  Neck pain   Started by:  Lola Perez APRN CNP        Dose:  1 tablet   Start taking on:  9/11/2017   Take 1 tablet by mouth every 6 hours as needed for pain #28 pills/4 weeks   Quantity:  28 tablet   Refills:  0       traMADol 50 MG tablet   Commonly known as:  ULTRAM   Used for:  Neck pain   Started by:  Lola Perez APRN CNP        Dose:   mg   Start taking on:  9/11/2017   Take 1-2 tablets ( mg) by mouth every 6 hours as needed for pain #56 pills/4 weeks   Quantity:  56 tablet   Refills:  0         These medicines have changed or have updated prescriptions.        Dose/Directions    citalopram 40 MG tablet   Commonly known as:  celeXA   This may have changed:  See the new instructions.   Used for:  Generalized anxiety disorder   Changed by:  Loal Perez APRN CNP        TAKE ONE TABLET BY MOUTH EVERY DAY (DUE FOR OFFICE VISIT IN APRIL OR MAY)   Quantity:  90 tablet   Refills:  3            Where to get your medicines      These medications were sent to [facility] [place], [place] - [address]  [address], [facility] [address]     Phone:  [phone]     citalopram 40 MG tablet    ibuprofen 800 MG tablet         Some of these will need a paper  prescription and others can be bought over the counter.  Ask your nurse if you have questions.     Bring a paper prescription for each of these medications     HYDROcodone-acetaminophen 5-325 MG per tablet    traMADol 50 MG tablet                Primary Care Provider Office Phone # Fax #    RAFAEL Fournier HEATHER [phone]       [facility] [address]  [facility] [address]        Equal Access to Services     GONZÁLEZ GERMAIN : Hadii aad ku hadasho Soomaali, waaxda luqadaha, qaybta kaalmada adeegyada, waxay idiin hayaan adeeg kharash lapriyanka . So [facility] [phone].    ATENCIÓN: Si habla español, tiene a tesfaye disposición servicios gratuitos de asistencia lingüística. Diane al [phone].    We comply with applicable federal civil rights laws and [place] laws. We do not discriminate on the basis of race, color, national origin, age, disability sex, sexual orientation or gender identity.            Thank you!     Thank you for choosing [facility]  for your care. Our goal is always to provide you with excellent care. Hearing back from our patients is one way we can continue to improve our services. Please take a few minutes to complete the written survey that you may receive in the mail after your visit with us. Thank you!             Your Updated Medication List - Protect others around you: Learn how to safely use, store and throw away your medicines at www.disposemymeds.org.          This list is accurate as of: 7/17/17  2:44 PM.  Always use your most recent med list.                   Brand Name Dispense Instructions for use Diagnosis    amitriptyline 25 MG tablet    ELAVIL    90 tablet    TAKE ONE TABLET BY MOUTH AT BEDTIME    Insomnia, unspecified type       citalopram 40 MG tablet    celeXA    90 tablet    TAKE ONE TABLET BY MOUTH EVERY DAY (DUE FOR OFFICE VISIT IN APRIL OR MAY)    Generalized anxiety disorder       CLARITIN 10 MG tablet   Generic drug:   loratadine      Take 10 mg by mouth daily. 1 tab daily    Generalized anxiety disorder       HYDROcodone-acetaminophen 5-325 MG per tablet   Start taking on:  9/11/2017    NORCO    28 tablet    Take 1 tablet by mouth every 6 hours as needed for pain #28 pills/4 weeks    Neck pain       ibuprofen 800 MG tablet    ADVIL/MOTRIN    90 tablet    Take 1 tablet (800 mg) by mouth 3 times daily    Neck pain       Multi-vitamin Tabs tablet   Generic drug:  multivitamin, therapeutic with minerals      one daily    Other specified counseling       omeprazole 20 MG CR capsule    priLOSEC    90 capsule    Take 1 capsule (20 mg) by mouth daily    Gastroesophageal reflux disease with esophagitis       oxybutynin 5 MG 24 hr tablet    DITROPAN-XL    90 tablet    TAKE ONE TABLET BY MOUTH DAILY    Urinary frequency       propranolol 10 MG tablet    INDERAL    90 tablet    Take 1 tablet (10 mg) by mouth 2 times daily as needed For tremor    Generalized anxiety disorder       tamsulosin 0.4 MG capsule    FLOMAX    90 capsule    TAKE ONE CAPSULE BY MOUTH DAILY    Benign non-nodular prostatic hyperplasia with lower urinary tract symptoms       traMADol 50 MG tablet   Start taking on:  9/11/2017    ULTRAM    56 tablet    Take 1-2 tablets ( mg) by mouth every 6 hours as needed for pain #56 pills/4 weeks    Neck pain       VITAMIN D3 PO      Take 5,000 Units by mouth every other day

## 2017-07-17 NOTE — NURSING NOTE
"Chief Complaint   Patient presents with     RECHECK       Initial There were no vitals taken for this visit. Estimated body mass index is 26.96 kg/(m^2) as calculated from the following:    Height as of 6/9/15: 6' 2\" (1.88 m).    Weight as of an earlier encounter on 7/17/17: 210 lb (95.3 kg).  Medication Reconciliation: complete     Christine Zapata, JERRY      "

## 2017-07-17 NOTE — PROGRESS NOTES
SUBJECTIVE:                                                    Jas Patel is a 47 year old male who presents to clinic today for the following health issues:        On amitriptyline for sleep/chronic pain.  Mild dry mouth, no daytime drowsiness or dizziness.      Benign essential tremor - using propranolol as needed, very effective.  Also helping with anxiety.      On celexa for anxiety/depression, feels mood is well controlled, denies side effects.  on prozac [id number], lexapro 2010.   Mood is worse related to stressor with his parents and sister.    Taking flomax and oxybutinyn for BPH.  Symptoms come and go, would like to continue current meds.      Elderly parents, adopted sister and her 4 kids living with them off and on x 14 years.  Doesn't work or help around the house, huge drain, mentally ill, chemically dependent.  They live in [place].      Patient Active Problem List   Diagnosis     Low back pain     CARDIOVASCULAR SCREENING; LDL GOAL LESS THAN 160     Moderate major depression (H)     Generalized anxiety disorder     Gastroesophageal reflux disease without esophagitis     BPH (benign prostatic hyperplasia)     Chronic pain syndrome     Benign essential tremor     Past Surgical History:   Procedure Laterality Date     DRAIN SKIN ABSCESS COMPLIC      left thigh I and D       Social History   Substance Use Topics     Smoking status: Former Smoker     Years: 10.00     Types: Cigarettes     Start date: 1/28/2015     Smokeless tobacco: Never Used     Alcohol use Yes      Comment: 1 drink a day     Family History   Problem Relation Age of Onset     Hypertension Father      C.A.D. Maternal Grandfather      Hypertension Maternal Grandfather      CEREBROVASCULAR DISEASE Other      great uncle     DIABETES Paternal Uncle      type 2     DIABETES Other      great aunt     Prostate Cancer Other      great uncle moms side     Alzheimer Disease Other      great uncle moms side         Current Outpatient  Prescriptions   Medication Sig Dispense Refill     [START ON 9/11/2017] HYDROcodone-acetaminophen (NORCO) 5-325 MG per tablet Take 1 tablet by mouth every 6 hours as needed for pain #28 pills/4 weeks 28 tablet 0     [START ON 9/11/2017] traMADol (ULTRAM) 50 MG tablet Take 1-2 tablets ( mg) by mouth every 6 hours as needed for pain #56 pills/4 weeks 56 tablet 0     ibuprofen (ADVIL/MOTRIN) 800 MG tablet Take 1 tablet (800 mg) by mouth 3 times daily 90 tablet 0     citalopram (CELEXA) 40 MG tablet TAKE ONE TABLET BY MOUTH EVERY DAY (DUE FOR OFFICE VISIT IN APRIL OR MAY) 90 tablet 3     tamsulosin (FLOMAX) 0.4 MG capsule TAKE ONE CAPSULE BY MOUTH DAILY 90 capsule 1     oxybutynin (DITROPAN-XL) 5 MG 24 hr tablet TAKE ONE TABLET BY MOUTH DAILY 90 tablet 2     amitriptyline (ELAVIL) 25 MG tablet TAKE ONE TABLET BY MOUTH AT BEDTIME 90 tablet 2     propranolol (INDERAL) 10 MG tablet Take 1 tablet (10 mg) by mouth 2 times daily as needed For tremor 90 tablet 1     Cholecalciferol (VITAMIN D3 PO) Take 5,000 Units by mouth every other day       omeprazole (PRILOSEC) 20 MG capsule Take 1 capsule (20 mg) by mouth daily 90 capsule 3     loratadine (CLARITIN) 10 MG tablet Take 10 mg by mouth daily. 1 tab daily       MULTI-VITAMIN OR TABS one daily  0     [DISCONTINUED] citalopram (CELEXA) 40 MG tablet TAKE ONE TABLET BY MOUTH EVERY DAY (DUE FOR OFFICE VISIT IN APRIL OR MAY) 90 tablet 0       ROS:  , Psych as above, otherwise negative       OBJECTIVE:                                                    There were no vitals taken for this visit.   GENERAL APPEARANCE: healthy, alert and no distress  EYES: Eyes grossly normal to inspection and conjunctivae and sclerae normal  RESP: respirations nonlabored  PSYCH: mentation appears normal and affect normal/bright          ASSESSMENT/PLAN:                                                    (F32.1) Moderate major depression (H)  (primary encounter diagnosis)  (F41.1) Generalized  anxiety disorder  Comment: slight worsening due to acute stressor with parents/adopted sister.  On lexapro in the past with elevated blood pressure.  prozac caused anxiety  Plan: citalopram (CELEXA) 40 MG tablet        Discussed trial of SNRI v. zooft v. Addition of wellbutrin.  Also discussed counseling.  Pt feels symptoms are very situation and would like to monitor and reevaluate next visit.      (N40.1,  R35.0) Benign prostatic hyperplasia with urinary frequency  Comment: symptoms stable  Plan: no change    (G25.0) Benign essential tremor  Comment: resolved with proranolol  Plan: no change         See Patient Instructions    Lola Perez, CNP  [facility]    Patient Instructions   1.  Continue tramadol, norco, and ibu.  Consider repeating MRI and follow up with spine specialty again if ongoing worsening symptoms  2.  Cont celexa, could consider adding wellbutrin or changing Cymbalta (SNRI) or zoloft.  3.  Cont oxybutynin and flomax for urinary symptoms.  Propranolol for tremor.    4.  Follow up online in 3 months, 6 months in person.

## 2017-07-17 NOTE — MR AVS SNAPSHOT
After Visit Summary   7/17/2017    Jas Patel    MRN: [id number]           Patient Information     Date Of Birth          1970        Visit Information        Provider Department      7/17/2017 2:20 PM Lola Perez APRN CNP [facility]        Today's Diagnoses     Neck pain           Follow-ups after your visit        Who to contact     If you have questions or need follow up information about today's clinic visit or your schedule please contact [facility] directly at [phone].  Normal or non-critical lab and imaging results will be communicated to you by SiphonLabshart, letter or phone within 4 business days after the clinic has received the results. If you do not hear from us within 7 days, please contact the clinic through Slackert or phone. If you have a critical or abnormal lab result, we will notify you by phone as soon as possible.  Submit refill requests through Printed Piece or call your pharmacy and they will forward the refill request to us. Please allow 3 business days for your refill to be completed.          Additional Information About Your Visit        MyChart Information     Printed Piece gives you secure access to your electronic health record. If you see a primary care provider, you can also send messages to your care team and make appointments. If you have questions, please call your primary care clinic.  If you do not have a primary care provider, please call [phone] and they will assist you.        Care EveryWhere ID     This is your Care EveryWhere ID. This could be used by other organizations to access your [facility] medical records  [id number]        Your Vitals Were     Pulse Temperature Respirations Pulse Oximetry BMI (Body Mass Index)       85 98.5  F (36.9  C) (Tympanic) 16 98% 26.96 kg/m2        Blood Pressure from Last 3 Encounters:   07/17/17 124/76   01/27/17 146/84   01/27/17 146/84    Weight from Last 3 Encounters:   07/17/17 210  lb (95.3 kg)   01/27/17 211 lb (95.7 kg)   01/27/17 211 lb (95.7 kg)              Today, you had the following     No orders found for display         Today's Medication Changes          These changes are accurate as of: 7/17/17  3:02 PM.  If you have any questions, ask your nurse or doctor.               Start taking these medicines.        Dose/Directions    HYDROcodone-acetaminophen 5-325 MG per tablet   Commonly known as:  NORCO   Used for:  Neck pain   Started by:  Lola Perez APRN CNP        Dose:  1 tablet   Start taking on:  9/11/2017   Take 1 tablet by mouth every 6 hours as needed for pain #28 pills/4 weeks   Quantity:  28 tablet   Refills:  0       traMADol 50 MG tablet   Commonly known as:  ULTRAM   Used for:  Neck pain   Started by:  Lola Perez APRN CNP        Dose:   mg   Start taking on:  9/11/2017   Take 1-2 tablets ( mg) by mouth every 6 hours as needed for pain #56 pills/4 weeks   Quantity:  56 tablet   Refills:  0         These medicines have changed or have updated prescriptions.        Dose/Directions    citalopram 40 MG tablet   Commonly known as:  celeXA   This may have changed:  See the new instructions.   Used for:  Generalized anxiety disorder   Changed by:  Lola Perez APRN CNP        TAKE ONE TABLET BY MOUTH EVERY DAY (DUE FOR OFFICE VISIT IN APRIL OR MAY)   Quantity:  90 tablet   Refills:  3            Where to get your medicines      These medications were sent to [facility] [address]  [address] [facility] [address]     Phone:  [phone]     citalopram 40 MG tablet    ibuprofen 800 MG tablet         Some of these will need a paper prescription and others can be bought over the counter.  Ask your nurse if you have questions.     Bring a paper prescription for each of these medications     HYDROcodone-acetaminophen 5-325 MG per tablet    traMADol 50 MG tablet                Primary Care  Provider Office Phone # Fax #    RAFAEL Fournier [phone]       [facility] [address]  [facility] [address]        Equal Access to Services     GONZÁLEZ GERMAIN : Hadii bob ku hadbenjieo Soomaali, waaxda luqadaha, qaybta kaalmada adeegyada, waxay idiin haybjn adeshawn gonsalves portia mas. So [facility] [phone].    ATENCIÓN: Si habla español, tiene a tesfaye disposición servicios gratuitos de asistencia lingüística. Llame al [phone].    We comply with applicable federal civil rights laws and [place] laws. We do not discriminate on the basis of race, color, national origin, age, disability sex, sexual orientation or gender identity.            Thank you!     Thank you for choosing [facility]  for your care. Our goal is always to provide you with excellent care. Hearing back from our patients is one way we can continue to improve our services. Please take a few minutes to complete the written survey that you may receive in the mail after your visit with us. Thank you!             Your Updated Medication List - Protect others around you: Learn how to safely use, store and throw away your medicines at www.disposemymeds.org.          This list is accurate as of: 7/17/17  3:02 PM.  Always use your most recent med list.                   Brand Name Dispense Instructions for use Diagnosis    amitriptyline 25 MG tablet    ELAVIL    90 tablet    TAKE ONE TABLET BY MOUTH AT BEDTIME    Insomnia, unspecified type       citalopram 40 MG tablet    celeXA    90 tablet    TAKE ONE TABLET BY MOUTH EVERY DAY (DUE FOR OFFICE VISIT IN APRIL OR MAY)    Generalized anxiety disorder       CLARITIN 10 MG tablet   Generic drug:  loratadine      Take 10 mg by mouth daily. 1 tab daily    Generalized anxiety disorder       HYDROcodone-acetaminophen 5-325 MG per tablet   Start taking on:  9/11/2017    NORCO    28 tablet    Take 1 tablet by mouth every 6 hours as needed for pain #28 pills/4  weeks    Neck pain       ibuprofen 800 MG tablet    ADVIL/MOTRIN    90 tablet    Take 1 tablet (800 mg) by mouth 3 times daily    Neck pain       Multi-vitamin Tabs tablet   Generic drug:  multivitamin, therapeutic with minerals      one daily    Other specified counseling       omeprazole 20 MG CR capsule    priLOSEC    90 capsule    Take 1 capsule (20 mg) by mouth daily    Gastroesophageal reflux disease with esophagitis       oxybutynin 5 MG 24 hr tablet    DITROPAN-XL    90 tablet    TAKE ONE TABLET BY MOUTH DAILY    Urinary frequency       propranolol 10 MG tablet    INDERAL    90 tablet    Take 1 tablet (10 mg) by mouth 2 times daily as needed For tremor    Generalized anxiety disorder       tamsulosin 0.4 MG capsule    FLOMAX    90 capsule    TAKE ONE CAPSULE BY MOUTH DAILY    Benign non-nodular prostatic hyperplasia with lower urinary tract symptoms       traMADol 50 MG tablet   Start taking on:  9/11/2017    ULTRAM    56 tablet    Take 1-2 tablets ( mg) by mouth every 6 hours as needed for pain #56 pills/4 weeks    Neck pain       VITAMIN D3 PO      Take 5,000 Units by mouth every other day

## 2017-07-18 ASSESSMENT — ANXIETY QUESTIONNAIRES: GAD7 TOTAL SCORE: 7

## 2017-07-18 ASSESSMENT — PATIENT HEALTH QUESTIONNAIRE - PHQ9: SUM OF ALL RESPONSES TO PHQ QUESTIONS 1-9: 11

## 2017-07-31 ENCOUNTER — TELEPHONE (OUTPATIENT)
Dept: FAMILY MEDICINE | Facility: CLINIC | Age: 47
End: 2017-07-31

## 2017-07-31 DIAGNOSIS — K21.00 GASTROESOPHAGEAL REFLUX DISEASE WITH ESOPHAGITIS: ICD-10-CM

## 2017-07-31 DIAGNOSIS — F41.1 GENERALIZED ANXIETY DISORDER: ICD-10-CM

## 2017-07-31 NOTE — TELEPHONE ENCOUNTER
PA is needed for more than 90 tablets a year. Would you like to send in a new RX with a different quantity or do a PA?     Teresa Montemayor MA

## 2017-07-31 NOTE — TELEPHONE ENCOUNTER
Rx for 30d quantity sent in.  If not covered please notify pt can be purchased over the counter or we can submit HEIKE Perez, CNP

## 2017-08-02 RX ORDER — PROPRANOLOL HYDROCHLORIDE 10 MG/1
TABLET ORAL
Qty: 90 TABLET | Refills: 1 | Status: SHIPPED | OUTPATIENT
Start: 2017-08-02 | End: 2017-12-26

## 2017-08-02 NOTE — TELEPHONE ENCOUNTER
Routing refill request to provider for review/approval because:  Associated diagnosis of DONTE not on [facility] refill protocol for this medication    Covering providers-please sign if agree.    Thank you!  KATHY MendezN, RN    propranolol (INDERAL) 10 MG tablet      Last Written Prescription Date: 2/22/17  Last Fill Quantity: 90, # refills: 1    Last Office Visit with [facility], [facility] or [facility] prescribing provider:  7/17/17 with DAVION Perez CNP   Future Office Visit:        BP Readings from Last 3 Encounters:   07/17/17 124/76   01/27/17 146/84   01/27/17 146/84

## 2017-08-25 ENCOUNTER — OFFICE VISIT (OUTPATIENT)
Dept: FAMILY MEDICINE | Facility: CLINIC | Age: 47
End: 2017-08-25
Payer: COMMERCIAL

## 2017-08-25 VITALS
BODY MASS INDEX: 26.95 KG/M2 | TEMPERATURE: 98.1 F | SYSTOLIC BLOOD PRESSURE: 112 MMHG | DIASTOLIC BLOOD PRESSURE: 72 MMHG | RESPIRATION RATE: 14 BRPM | HEART RATE: 80 BPM | WEIGHT: 210 LBS | HEIGHT: 74 IN

## 2017-08-25 DIAGNOSIS — H61.21 IMPACTED CERUMEN OF RIGHT EAR: Primary | ICD-10-CM

## 2017-08-25 PROCEDURE — 69210 REMOVE IMPACTED EAR WAX UNI: CPT | Performed by: FAMILY MEDICINE

## 2017-08-25 NOTE — MR AVS SNAPSHOT
"              After Visit Summary   8/25/2017    Jas Patel    MRN: [id number]           Patient Information     Date Of Birth          1970        Visit Information        Provider Department      8/25/2017 3:00 PM Patricia Cervantes MD [facility]        Today's Diagnoses     Impacted cerumen of right ear    -  1       Follow-ups after your visit        Who to contact     If you have questions or need follow up information about today's clinic visit or your schedule please contact [facility] directly at [phone].  Normal or non-critical lab and imaging results will be communicated to you by Delta Plant Technologieshart, letter or phone within 4 business days after the clinic has received the results. If you do not hear from us within 7 days, please contact the clinic through [employer] or phone. If you have a critical or abnormal lab result, we will notify you by phone as soon as possible.  Submit refill requests through EyeJot or call your pharmacy and they will forward the refill request to us. Please allow 3 business days for your refill to be completed.          Additional Information About Your Visit        MyChart Information     EyeJot gives you secure access to your electronic health record. If you see a primary care provider, you can also send messages to your care team and make appointments. If you have questions, please call your primary care clinic.  If you do not have a primary care provider, please call [phone] and they will assist you.        Care EveryWhere ID     This is your Care EveryWhere ID. This could be used by other organizations to access your [facility] medical records  [id number]        Your Vitals Were     Pulse Temperature Respirations Height BMI (Body Mass Index)       80 98.1  F (36.7  C) (Oral) 14 6' 2\" (1.88 m) 26.96 kg/m2        Blood Pressure from Last 3 Encounters:   08/25/17 112/72   07/17/17 124/76   01/27/17 146/84    Weight from Last 3 " Encounters:   08/25/17 210 lb (95.3 kg)   07/17/17 210 lb (95.3 kg)   01/27/17 211 lb (95.7 kg)              We Performed the Following     REMOVE IMPACTED CERUMEN        Primary Care Provider Office Phone # Fax #    RAFAEL Fournier [facility] [phone]       [address]  [facility] [address]        Equal Access to Services     GONZÁLEZ GERMAIN : Hadii aad ku hadasho Soomaali, waaxda luqadaha, qaybta kaalmada adeegyada, waxay idiin hayaan adeeg kharash lapriyanka . So [facility] [phone].    ATENCIÓN: Si habla español, tiene a tesfaye disposición servicios gratuitos de asistencia lingüística. Llame al [phone].    We comply with applicable federal civil rights laws and [place] laws. We do not discriminate on the basis of race, color, national origin, age, disability sex, sexual orientation or gender identity.            Thank you!     Thank you for choosing [facility]  for your care. Our goal is always to provide you with excellent care. Hearing back from our patients is one way we can continue to improve our services. Please take a few minutes to complete the written survey that you may receive in the mail after your visit with us. Thank you!             Your Updated Medication List - Protect others around you: Learn how to safely use, store and throw away your medicines at www.disposemymeds.org.          This list is accurate as of: 8/25/17  3:08 PM.  Always use your most recent med list.                   Brand Name Dispense Instructions for use Diagnosis    amitriptyline 25 MG tablet    ELAVIL    90 tablet    TAKE ONE TABLET BY MOUTH AT BEDTIME    Insomnia, unspecified type       citalopram 40 MG tablet    celeXA    90 tablet    TAKE ONE TABLET BY MOUTH EVERY DAY (DUE FOR OFFICE VISIT IN APRIL OR MAY)    Generalized anxiety disorder       CLARITIN 10 MG tablet   Generic drug:  loratadine      Take 10 mg by mouth daily. 1 tab daily    Generalized anxiety disorder        HYDROcodone-acetaminophen 5-325 MG per tablet   Start taking on:  9/11/2017    NORCO    28 tablet    Take 1 tablet by mouth every 6 hours as needed for pain #28 pills/4 weeks    Neck pain       ibuprofen 800 MG tablet    ADVIL/MOTRIN    90 tablet    Take 1 tablet (800 mg) by mouth 3 times daily    Neck pain       Multi-vitamin Tabs tablet   Generic drug:  multivitamin, therapeutic with minerals      one daily    Other specified counseling       omeprazole 20 MG CR capsule    priLOSEC    30 capsule    Take 1 capsule (20 mg) by mouth daily    Gastroesophageal reflux disease with esophagitis       oxybutynin 5 MG 24 hr tablet    DITROPAN-XL    90 tablet    TAKE ONE TABLET BY MOUTH DAILY    Urinary frequency       propranolol 10 MG tablet    INDERAL    90 tablet    TAKE ONE TABLET BY MOUTH TWICE A DAY AS NEEDED FOR TREMOR    Generalized anxiety disorder       tamsulosin 0.4 MG capsule    FLOMAX    90 capsule    TAKE ONE CAPSULE BY MOUTH DAILY    Benign non-nodular prostatic hyperplasia with lower urinary tract symptoms       traMADol 50 MG tablet   Start taking on:  9/11/2017    ULTRAM    56 tablet    Take 1-2 tablets ( mg) by mouth every 6 hours as needed for pain #56 pills/4 weeks    Neck pain       VITAMIN D3 PO      Take 5,000 Units by mouth every other day

## 2017-08-25 NOTE — PROGRESS NOTES
Chief Complaint   Patient presents with     Ear Problem     Can't hear out of right ear since Sunday.        S: Jas Patel is a 47 year old male who presents to the clinic today for recent difficulty with hearing and a possible cerumen impaction.  A plugged sensation is felt on right. Symptoms have been ongoing for 5 day(s).  The patient reports no pain or injury.    Past Medical History:   Diagnosis Date     Herniated disc     in neck     Moderate major depression (H) 11/22/2010       ALLERGIES:  No known drug allergies        Current Outpatient Prescriptions on File Prior to Visit:  propranolol (INDERAL) 10 MG tablet TAKE ONE TABLET BY MOUTH TWICE A DAY AS NEEDED FOR TREMOR   omeprazole (PRILOSEC) 20 MG CR capsule Take 1 capsule (20 mg) by mouth daily   [START ON 9/11/2017] HYDROcodone-acetaminophen (NORCO) 5-325 MG per tablet Take 1 tablet by mouth every 6 hours as needed for pain #28 pills/4 weeks   [START ON 9/11/2017] traMADol (ULTRAM) 50 MG tablet Take 1-2 tablets ( mg) by mouth every 6 hours as needed for pain #56 pills/4 weeks   ibuprofen (ADVIL/MOTRIN) 800 MG tablet Take 1 tablet (800 mg) by mouth 3 times daily   citalopram (CELEXA) 40 MG tablet TAKE ONE TABLET BY MOUTH EVERY DAY (DUE FOR OFFICE VISIT IN APRIL OR MAY)   tamsulosin (FLOMAX) 0.4 MG capsule TAKE ONE CAPSULE BY MOUTH DAILY   oxybutynin (DITROPAN-XL) 5 MG 24 hr tablet TAKE ONE TABLET BY MOUTH DAILY   amitriptyline (ELAVIL) 25 MG tablet TAKE ONE TABLET BY MOUTH AT BEDTIME   Cholecalciferol (VITAMIN D3 PO) Take 5,000 Units by mouth every other day   loratadine (CLARITIN) 10 MG tablet Take 10 mg by mouth daily. 1 tab daily   MULTI-VITAMIN OR TABS one daily     No current facility-administered medications on file prior to visit.     Social History   Substance Use Topics     Smoking status: Former Smoker     Years: 10.00     Types: Cigarettes     Start date: 1/28/2015     Smokeless tobacco: Never Used     Alcohol use Yes      Comment: 1  "drink a day       Family History   Problem Relation Age of Onset     Hypertension Father      C.A.D. Maternal Grandfather      Hypertension Maternal Grandfather      CEREBROVASCULAR DISEASE Other      great uncle     DIABETES Paternal Uncle      type 2     DIABETES Other      great aunt     Prostate Cancer Other      great uncle moms side     Alzheimer Disease Other      great uncle moms side         ROS:    CONSTITUTIONAL:NEGATIVE for fever, chills, change in weight  INTEGUMENTARY/SKIN: NEGATIVE for worrisome rashes, moles or lesions  EYES: NEGATIVE for vision changes or irritation  RESP:NEGATIVE for significant cough or SOB  GI: NEGATIVE for nausea, abdominal pain, heartburn, or change in bowel habits    O:  /72  Pulse 80  Temp 98.1  F (36.7  C) (Oral)  Resp 14  Ht 6' 2\" (1.88 m)  Wt 210 lb (95.3 kg)  BMI 26.96 kg/m2  General appearance:  Alert, no distress  Eyes:  KATHARINE,  EOMI,  No erythema, no discharge  Ears:   A cerumen impaction is seen on right  Following removal, both tympanic membranes are clear       TM Right with no erythema, purulence   TM Left with no erythema, purulence   Ear canals:  right  with  no swelling, erythema, tenderness       Left with   no swelling, erythema, tenderness  Throat:  No erythema, no swelling  Neck:  No adenopathy, supple, good ROM      ASSESS:  Impacted cerumen of right ear     - REMOVE IMPACTED CERUMEN        OTC  cerumen drops were recommended to reduce the need for future removal of impacted cerumen.       Procedure note:   The cerumen was easily removed in clinic manually with  a curette and with tap water irrigation.     "

## 2017-08-25 NOTE — NURSING NOTE
"Chief Complaint   Patient presents with     Ear Problem     Can't hear out of right ear since Sunday.        Initial /72  Pulse 80  Temp 98.1  F (36.7  C) (Oral)  Resp 14  Ht 6' 2\" (1.88 m)  Wt 210 lb (95.3 kg)  BMI 26.96 kg/m2 Estimated body mass index is 26.96 kg/(m^2) as calculated from the following:    Height as of this encounter: 6' 2\" (1.88 m).    Weight as of this encounter: 210 lb (95.3 kg).  Medication Reconciliation: complete  "

## 2017-10-09 ENCOUNTER — E-VISIT (OUTPATIENT)
Dept: FAMILY MEDICINE | Facility: CLINIC | Age: 47
End: 2017-10-09
Payer: COMMERCIAL

## 2017-10-09 ENCOUNTER — MYC MEDICAL ADVICE (OUTPATIENT)
Dept: FAMILY MEDICINE | Facility: CLINIC | Age: 47
End: 2017-10-09

## 2017-10-09 DIAGNOSIS — M54.2 NECK PAIN: ICD-10-CM

## 2017-10-09 DIAGNOSIS — G89.4 CHRONIC PAIN SYNDROME: Primary | ICD-10-CM

## 2017-10-09 PROCEDURE — 98969 ZZC NONPHYSICIAN ONLINE ASSESSMENT AND MANAGEMENT: CPT | Performed by: NURSE PRACTITIONER

## 2017-10-09 RX ORDER — TRAMADOL HYDROCHLORIDE 50 MG/1
50-100 TABLET ORAL EVERY 6 HOURS PRN
Qty: 56 TABLET | Refills: 0 | Status: SHIPPED | OUTPATIENT
Start: 2017-12-04 | End: 2017-12-26

## 2017-10-09 RX ORDER — TRAMADOL HYDROCHLORIDE 50 MG/1
50-100 TABLET ORAL EVERY 6 HOURS PRN
Qty: 56 TABLET | Refills: 0 | Status: SHIPPED | OUTPATIENT
Start: 2017-10-09 | End: 2017-10-09

## 2017-10-09 RX ORDER — TRAMADOL HYDROCHLORIDE 50 MG/1
50-100 TABLET ORAL EVERY 6 HOURS PRN
Qty: 56 TABLET | Refills: 0 | Status: SHIPPED | OUTPATIENT
Start: 2017-11-06 | End: 2017-10-09

## 2017-10-09 RX ORDER — HYDROCODONE BITARTRATE AND ACETAMINOPHEN 5; 325 MG/1; MG/1
1 TABLET ORAL EVERY 6 HOURS PRN
Qty: 28 TABLET | Refills: 0 | Status: SHIPPED | OUTPATIENT
Start: 2017-11-06 | End: 2017-10-09

## 2017-10-09 RX ORDER — HYDROCODONE BITARTRATE AND ACETAMINOPHEN 5; 325 MG/1; MG/1
1 TABLET ORAL EVERY 6 HOURS PRN
Qty: 28 TABLET | Refills: 0 | Status: SHIPPED | OUTPATIENT
Start: 2017-12-04 | End: 2017-12-26

## 2017-10-09 RX ORDER — HYDROCODONE BITARTRATE AND ACETAMINOPHEN 5; 325 MG/1; MG/1
1 TABLET ORAL EVERY 6 HOURS PRN
Qty: 28 TABLET | Refills: 0 | Status: SHIPPED | OUTPATIENT
Start: 2017-10-09 | End: 2017-10-09

## 2017-10-09 NOTE — MR AVS SNAPSHOT
After Visit Summary   10/9/2017    Jas Patel    MRN: [id number]           Patient Information     Date Of Birth          1970        Visit Information        Provider Department      10/9/2017 8:50 AM Lola Perez APRN CNP [facility]        Today's Diagnoses     Chronic pain syndrome    -  1       Follow-ups after your visit        Who to contact     If you have questions or need follow up information about today's clinic visit or your schedule please contact [facility] directly at [phone].  Normal or non-critical lab and imaging results will be communicated to you by FTRANShart, letter or phone within 4 business days after the clinic has received the results. If you do not hear from us within 7 days, please contact the clinic through FTRANShart or phone. If you have a critical or abnormal lab result, we will notify you by phone as soon as possible.  Submit refill requests through ZanAqua or call your pharmacy and they will forward the refill request to us. Please allow 3 business days for your refill to be completed.          Additional Information About Your Visit        MyChart Information     ZanAqua gives you secure access to your electronic health record. If you see a primary care provider, you can also send messages to your care team and make appointments. If you have questions, please call your primary care clinic.  If you do not have a primary care provider, please call [phone] and they will assist you.        Care EveryWhere ID     This is your Care EveryWhere ID. This could be used by other organizations to access your [facility] medical records  [id number]         Blood Pressure from Last 3 Encounters:   08/25/17 112/72   07/17/17 124/76   01/27/17 146/84    Weight from Last 3 Encounters:   08/25/17 210 lb (95.3 kg)   07/17/17 210 lb (95.3 kg)   01/27/17 211 lb (95.7 kg)              Today, you had the following     No orders found for  display         Today's Medication Changes          These changes are accurate as of: 10/9/17  9:00 AM.  If you have any questions, ask your nurse or doctor.               Start taking these medicines.        Dose/Directions    HYDROcodone-acetaminophen 5-325 MG per tablet   Commonly known as:  NORCO   Used for:  Neck pain   Started by:  Lola Perez APRN CNP        Dose:  1 tablet   Start taking on:  12/4/2017   Take 1 tablet by mouth every 6 hours as needed for pain #28 pills/4 weeks   Quantity:  28 tablet   Refills:  0       traMADol 50 MG tablet   Commonly known as:  ULTRAM   Used for:  Neck pain   Started by:  Lola Perez APRN CNP        Dose:   mg   Start taking on:  12/4/2017   Take 1-2 tablets ( mg) by mouth every 6 hours as needed for pain #56 pills/4 weeks   Quantity:  56 tablet   Refills:  0            Where to get your medicines      Some of these will need a paper prescription and others can be bought over the counter.  Ask your nurse if you have questions.     Bring a paper prescription for each of these medications     HYDROcodone-acetaminophen 5-325 MG per tablet    traMADol 50 MG tablet                Primary Care Provider Office Phone # Fax #    RAFAEL Fournier [phone] [address] [place] [id number]        Equal Access to Services     GONZÁLEZ GERMAIN AH: Hadii obb chin hadasho Soomaali, waaxda luqadaha, qaybta kaalmada adeegyada, josie vigil hayelise mas. So [facility] [phone].    ATENCIÓN: Si habla español, tiene a tesfaye disposición servicios gratuitos de asistencia lingüística. Llame al [phone].    We comply with applicable federal civil rights laws and [place] laws. We do not discriminate on the basis of race, color, national origin, age, disability, sex, sexual orientation, or gender identity.            Thank you!     Thank you for choosing [facility]  for your care. Our goal is always  to provide you with excellent care. Hearing back from our patients is one way we can continue to improve our services. Please take a few minutes to complete the written survey that you may receive in the mail after your visit with us. Thank you!             Your Updated Medication List - Protect others around you: Learn how to safely use, store and throw away your medicines at www.disposemymeds.org.          This list is accurate as of: 10/9/17  9:00 AM.  Always use your most recent med list.                   Brand Name Dispense Instructions for use Diagnosis    amitriptyline 25 MG tablet    ELAVIL    90 tablet    TAKE ONE TABLET BY MOUTH AT BEDTIME    Insomnia, unspecified type       citalopram 40 MG tablet    celeXA    90 tablet    TAKE ONE TABLET BY MOUTH EVERY DAY (DUE FOR OFFICE VISIT IN APRIL OR MAY)    Generalized anxiety disorder       CLARITIN 10 MG tablet   Generic drug:  loratadine      Take 10 mg by mouth daily. 1 tab daily    Generalized anxiety disorder       HYDROcodone-acetaminophen 5-325 MG per tablet   Start taking on:  12/4/2017    NORCO    28 tablet    Take 1 tablet by mouth every 6 hours as needed for pain #28 pills/4 weeks    Neck pain       ibuprofen 800 MG tablet    ADVIL/MOTRIN    90 tablet    Take 1 tablet (800 mg) by mouth 3 times daily    Neck pain       Multi-vitamin Tabs tablet   Generic drug:  multivitamin, therapeutic with minerals      one daily    Other specified counseling       omeprazole 20 MG CR capsule    priLOSEC    30 capsule    Take 1 capsule (20 mg) by mouth daily    Gastroesophageal reflux disease with esophagitis       oxybutynin 5 MG 24 hr tablet    DITROPAN-XL    90 tablet    TAKE ONE TABLET BY MOUTH DAILY    Urinary frequency       propranolol 10 MG tablet    INDERAL    90 tablet    TAKE ONE TABLET BY MOUTH TWICE A DAY AS NEEDED FOR TREMOR    Generalized anxiety disorder       tamsulosin 0.4 MG capsule    FLOMAX    90 capsule    TAKE ONE CAPSULE BY MOUTH DAILY     Benign non-nodular prostatic hyperplasia with lower urinary tract symptoms       traMADol 50 MG tablet   Start taking on:  12/4/2017    ULTRAM    56 tablet    Take 1-2 tablets ( mg) by mouth every 6 hours as needed for pain #56 pills/4 weeks    Neck pain       VITAMIN D3 PO      Take 5,000 Units by mouth every other day

## 2017-10-30 DIAGNOSIS — N40.1 BENIGN NON-NODULAR PROSTATIC HYPERPLASIA WITH LOWER URINARY TRACT SYMPTOMS: ICD-10-CM

## 2017-11-02 RX ORDER — TAMSULOSIN HYDROCHLORIDE 0.4 MG/1
CAPSULE ORAL
Qty: 90 CAPSULE | Refills: 1 | Status: SHIPPED | OUTPATIENT
Start: 2017-11-02 | End: 2018-05-06

## 2017-11-02 NOTE — TELEPHONE ENCOUNTER
BP Readings from Last 3 Encounters:   08/25/17 112/72   07/17/17 124/76   01/27/17 146/84     Prescription approved per [facility].  Thanks! Kenzie Ahn RN

## 2017-12-18 ENCOUNTER — OFFICE VISIT (OUTPATIENT)
Dept: FAMILY MEDICINE | Facility: CLINIC | Age: 47
End: 2017-12-18
Payer: COMMERCIAL

## 2017-12-18 ENCOUNTER — OFFICE VISIT (OUTPATIENT)
Dept: FAMILY MEDICINE | Facility: CLINIC | Age: 47
End: 2017-12-18
Payer: OTHER MISCELLANEOUS

## 2017-12-18 VITALS
TEMPERATURE: 98.1 F | OXYGEN SATURATION: 95 % | HEART RATE: 87 BPM | RESPIRATION RATE: 22 BRPM | DIASTOLIC BLOOD PRESSURE: 95 MMHG | SYSTOLIC BLOOD PRESSURE: 133 MMHG | WEIGHT: 209 LBS | BODY MASS INDEX: 26.83 KG/M2

## 2017-12-18 DIAGNOSIS — S39.012A STRAIN OF LUMBAR REGION, INITIAL ENCOUNTER: Primary | ICD-10-CM

## 2017-12-18 DIAGNOSIS — Z53.9 ERRONEOUS ENCOUNTER--DISREGARD: Primary | ICD-10-CM

## 2017-12-18 PROCEDURE — 99213 OFFICE O/P EST LOW 20 MIN: CPT | Performed by: NURSE PRACTITIONER

## 2017-12-18 NOTE — PATIENT INSTRUCTIONS
1.  For back strain see physical therapy, continue naproxen, ice/heat, gentle exercise.  Meet again in 1 week for recheck.  Send me [place] paperwork if needed

## 2017-12-18 NOTE — MR AVS SNAPSHOT
After Visit Summary   12/18/2017    Jas Patel    MRN: [id number]           Patient Information     Date Of Birth          1970        Visit Information        Provider Department      12/18/2017 4:40 PM Lola Perez APRN CNP [facility]        Today's Diagnoses     ERRONEOUS ENCOUNTER--DISREGARD    -  1       Follow-ups after your visit        Who to contact     If you have questions or need follow up information about today's clinic visit or your schedule please contact [facility] directly at [phone].  Normal or non-critical lab and imaging results will be communicated to you by Tufinhart, letter or phone within 4 business days after the clinic has received the results. If you do not hear from us within 7 days, please contact the clinic through Tufinhart or phone. If you have a critical or abnormal lab result, we will notify you by phone as soon as possible.  Submit refill requests through [facility] or call your pharmacy and they will forward the refill request to us. Please allow 3 business days for your refill to be completed.          Additional Information About Your Visit        MyChart Information     [facility] gives you secure access to your electronic health record. If you see a primary care provider, you can also send messages to your care team and make appointments. If you have questions, please call your primary care clinic.  If you do not have a primary care provider, please call [phone] and they will assist you.        Care EveryWhere ID     This is your Care EveryWhere ID. This could be used by other organizations to access your [facility] medical records  [id number]         Blood Pressure from Last 3 Encounters:   12/18/17 (!) 133/95   08/25/17 112/72   07/17/17 124/76    Weight from Last 3 Encounters:   12/18/17 209 lb (94.8 kg)   08/25/17 210 lb (95.3 kg)   07/17/17 210 lb (95.3 kg)              Today, you had the following     No  orders found for display         Today's Medication Changes          These changes are accurate as of: 12/18/17  4:46 PM.  If you have any questions, ask your nurse or doctor.               Start taking these medicines.        Dose/Directions    tiZANidine 4 MG tablet   Commonly known as:  ZANAFLEX   Used for:  Strain of lumbar region, initial encounter   Started by:  Lola Perez APRN CNP        Dose:  4-8 mg   Take 1-2 tablets (4-8 mg) by mouth 3 times daily as needed for muscle spasms   Quantity:  30 tablet   Refills:  1            Where to get your medicines      These medications were sent to [facility] [place], [place] - [address]  [address], [facility] [address]     Phone:  [phone]     tiZANidine 4 MG tablet                Primary Care Provider Office Phone # Fax #    RAFAEL Fournier [phone]       [address]  [facility] [address]        Equal Access to Services     GONZÁLEZ GERMAIN : Hadii bob ku hadasho Soomaali, waaxda luqadaha, qaybta kaalmada adeegyada, waxay idiin hayelise pearce . So [facility] [phone].    ATENCIÓN: Si habla español, tiene a tesfaye disposición servicios gratuitos de asistencia lingüística. Llame al [phone].    We comply with applicable federal civil rights laws and [place] laws. We do not discriminate on the basis of race, color, national origin, age, disability, sex, sexual orientation, or gender identity.            Thank you!     Thank you for choosing [facility]  for your care. Our goal is always to provide you with excellent care. Hearing back from our patients is one way we can continue to improve our services. Please take a few minutes to complete the written survey that you may receive in the mail after your visit with us. Thank you!             Your Updated Medication List - Protect others around you: Learn how to safely use, store and throw away your medicines at  www.disposemymeds.org.          This list is accurate as of: 12/18/17  4:46 PM.  Always use your most recent med list.                   Brand Name Dispense Instructions for use Diagnosis    amitriptyline 25 MG tablet    ELAVIL    90 tablet    TAKE ONE TABLET BY MOUTH AT BEDTIME    Insomnia, unspecified type       citalopram 40 MG tablet    celeXA    90 tablet    TAKE ONE TABLET BY MOUTH EVERY DAY (DUE FOR OFFICE VISIT IN APRIL OR MAY)    Generalized anxiety disorder       CLARITIN 10 MG tablet   Generic drug:  loratadine      Take 10 mg by mouth daily. 1 tab daily    Generalized anxiety disorder       HYDROcodone-acetaminophen 5-325 MG per tablet    NORCO    28 tablet    Take 1 tablet by mouth every 6 hours as needed for pain #28 pills/4 weeks    Neck pain       Multi-vitamin Tabs tablet   Generic drug:  multivitamin, therapeutic with minerals      one daily    Other specified counseling       NAPROXEN PO      Take 220 mg by mouth 2 times daily as needed for moderate pain        omeprazole 20 MG CR capsule    priLOSEC    30 capsule    Take 1 capsule (20 mg) by mouth daily    Gastroesophageal reflux disease with esophagitis       oxybutynin 5 MG 24 hr tablet    DITROPAN-XL    90 tablet    TAKE ONE TABLET BY MOUTH DAILY    Urinary frequency       propranolol 10 MG tablet    INDERAL    90 tablet    TAKE ONE TABLET BY MOUTH TWICE A DAY AS NEEDED FOR TREMOR    Generalized anxiety disorder       tamsulosin 0.4 MG capsule    FLOMAX    90 capsule    TAKE ONE CAPSULE BY MOUTH DAILY    Benign non-nodular prostatic hyperplasia with lower urinary tract symptoms       tiZANidine 4 MG tablet    ZANAFLEX    30 tablet    Take 1-2 tablets (4-8 mg) by mouth 3 times daily as needed for muscle spasms    Strain of lumbar region, initial encounter       traMADol 50 MG tablet    ULTRAM    56 tablet    Take 1-2 tablets ( mg) by mouth every 6 hours as needed for pain #56 pills/4 weeks    Neck pain       VITAMIN D3 PO      Take 5,000  Units by mouth every other day

## 2017-12-18 NOTE — LETTER
[address] [address]  Phone: [phone]      REPORT OF WORK ABILITY    NOTE TO EMPLOYEE: You must promptly provide a copy of this report to your  employer or worker's compensation insurer, and Qualified Rehabilitation Consultant.    Date: 12/18/2017                     Employee Name: Jas Patel         YOB: 1970  Medical Record Number: [id number]   Soc.Sec.No: [id number]  Employer: None                Date of Injury: 12/8/17  Managed Care Organization / Insurance Company Name: UNKNOWN    Diagnosis: acute lumbar strain   Work Related: yes     MMI: UNKNOWN   Permanent Partial Disability(PPD) likely: UNKNOWN    EMPLOYEE IS ABLE TO WORK: with restrictions from 12/19/17 to 12/27/17 -  Full shift     RESTRICTIONS IF ANY:    Stand:  Occasionally (2-4 hours)  Sit:  Occasionally (2-4 hours)  Walk: Occasionally (2-4 hours)  Kneel/[place]:  Not at all (0 hours)  Lift, carry no more than:  10 lb. or lessOccasionally (2-4 hours)  Push/Pull:  10 lb. or lessOccasionally (2-4 hours)  Bend:  Not at all (0 hours)  Stoop:  Not at all (0 hours)  Twist:  Not at all (0 hours)    OTHER RESTRICTIONS: None    TREATMENT PLAN/NOTES: physical therapy.      Lola Perez, CNP

## 2017-12-18 NOTE — MR AVS SNAPSHOT
After Visit Summary   12/18/2017    Jas Patel    MRN: [id number]           Patient Information     Date Of Birth          1970        Visit Information        Provider Department      12/18/2017 4:20 PM Lola Perez APRN CNP [facility]        Today's Diagnoses     Strain of lumbar region, initial encounter    -  1      Care Instructions    1.  For back strain see physical therapy, continue naproxen, ice/heat, gentle exercise.  Meet again in 1 week for recheck.  Send me FMLA paperwork if needed          Follow-ups after your visit        Additional Services     [facility]FA PT, HAND, AND CHIROPRACTIC REFERRAL       **This order will print in the [facility] Scheduling Office**    Physical Therapy, Hand Therapy and Chiropractic Care are available through:    *[facility]  *[facility]  *[facility]    Call one number to schedule at any of the above locations: [phone].    Your provider has referred you to: Physical Therapy at [facility] or [facility]    Indication/Reason for Referral: low back pain  Onset of Illness: 10 days  Therapy Orders: Evaluate and Treat  Special Programs: None  Special Request: None    Monica Anderson      Additional Comments for the Therapist or Chiropractor:     Please be aware that coverage of these services is subject to the terms and limitations of your health insurance plan.  Call member services at your health plan with any benefit or coverage questions.      Please bring the following to your appointment:    *Your personal calendar for scheduling future appointments  *Comfortable clothing                  Your next 10 appointments already scheduled     Dec 18, 2017  4:40 PM CST   SHORT with RAFAEL Fournier CNP   [facility] ([facility]    [address] [place] [address] [phone]              Who to contact     If you have questions or need  follow up information about today's clinic visit or your schedule please contact [facility] directly at [phone].  Normal or non-critical lab and imaging results will be communicated to you by [employer] Lab[employer], letter or phone within 4 business days after the clinic has received the results. If you do not hear from us within 7 days, please contact the clinic through Zipanot or phone. If you have a critical or abnormal lab result, we will notify you by phone as soon as possible.  Submit refill requests through [employer] or call your pharmacy and they will forward the refill request to us. Please allow 3 business days for your refill to be completed.          Additional Information About Your Visit        [employer] Information     [employer] gives you secure access to your electronic health record. If you see a primary care provider, you can also send messages to your care team and make appointments. If you have questions, please call your primary care clinic.  If you do not have a primary care provider, please call [phone] and they will assist you.        Care EveryWhere ID     This is your Care EveryWhere ID. This could be used by other organizations to access your [facility] medical records  [id number]        Your Vitals Were     Pulse Temperature Respirations Pulse Oximetry BMI (Body Mass Index)       87 98.1  F (36.7  C) (Oral) 22 95% 26.83 kg/m2        Blood Pressure from Last 3 Encounters:   12/18/17 (!) 133/95   08/25/17 112/72   07/17/17 124/76    Weight from Last 3 Encounters:   12/18/17 209 lb (94.8 kg)   08/25/17 210 lb (95.3 kg)   07/17/17 210 lb (95.3 kg)              We Performed the Following     ALFA PT, HAND, AND CHIROPRACTIC REFERRAL          Today's Medication Changes          These changes are accurate as of: 12/18/17  4:37 PM.  If you have any questions, ask your nurse or doctor.               Start taking these medicines.        Dose/Directions    tiZANidine 4 MG tablet   Commonly known  as:  ZANAFLEX   Used for:  Strain of lumbar region, initial encounter   Started by:  Lola Perez APRN CNP        Dose:  4-8 mg   Take 1-2 tablets (4-8 mg) by mouth 3 times daily as needed for muscle spasms   Quantity:  30 tablet   Refills:  1            Where to get your medicines      These medications were sent to [facility] [address]  [address], [facility] [address]     Phone:  [phone]     tiZANidine 4 MG tablet                Primary Care Provider Office Phone # Fax #    RAFAEL oFurnier -[phone]       [address]  [facility] [address]        Equal Access to Services     GONZÁLEZ GERMAIN : Allen damono Sorellali, waaxda luqadaha, qaybta kaalmada adeegyada, josie pearce . So [facility] [phone].    ATENCIÓN: Si habla español, tiene a tesfaye disposición servicios gratuitos de asistencia lingüística. [facility] [phone].    We comply with applicable federal civil rights laws and [place] laws. We do not discriminate on the basis of race, color, national origin, age, disability, sex, sexual orientation, or gender identity.            Thank you!     Thank you for choosing [facility]  for your care. Our goal is always to provide you with excellent care. Hearing back from our patients is one way we can continue to improve our services. Please take a few minutes to complete the written survey that you may receive in the mail after your visit with us. Thank you!             Your Updated Medication List - Protect others around you: Learn how to safely use, store and throw away your medicines at www.disposemymeds.org.          This list is accurate as of: 12/18/17  4:37 PM.  Always use your most recent med list.                   Brand Name Dispense Instructions for use Diagnosis    amitriptyline 25 MG tablet    ELAVIL    90 tablet    TAKE ONE TABLET BY MOUTH AT BEDTIME     Insomnia, unspecified type       citalopram 40 MG tablet    celeXA    90 tablet    TAKE ONE TABLET BY MOUTH EVERY DAY (DUE FOR OFFICE VISIT IN APRIL OR MAY)    Generalized anxiety disorder       CLARITIN 10 MG tablet   Generic drug:  loratadine      Take 10 mg by mouth daily. 1 tab daily    Generalized anxiety disorder       HYDROcodone-acetaminophen 5-325 MG per tablet    NORCO    28 tablet    Take 1 tablet by mouth every 6 hours as needed for pain #28 pills/4 weeks    Neck pain       Multi-vitamin Tabs tablet   Generic drug:  multivitamin, therapeutic with minerals      one daily    Other specified counseling       NAPROXEN PO      Take 220 mg by mouth 2 times daily as needed for moderate pain        omeprazole 20 MG CR capsule    priLOSEC    30 capsule    Take 1 capsule (20 mg) by mouth daily    Gastroesophageal reflux disease with esophagitis       oxybutynin 5 MG 24 hr tablet    DITROPAN-XL    90 tablet    TAKE ONE TABLET BY MOUTH DAILY    Urinary frequency       propranolol 10 MG tablet    INDERAL    90 tablet    TAKE ONE TABLET BY MOUTH TWICE A DAY AS NEEDED FOR TREMOR    Generalized anxiety disorder       tamsulosin 0.4 MG capsule    FLOMAX    90 capsule    TAKE ONE CAPSULE BY MOUTH DAILY    Benign non-nodular prostatic hyperplasia with lower urinary tract symptoms       tiZANidine 4 MG tablet    ZANAFLEX    30 tablet    Take 1-2 tablets (4-8 mg) by mouth 3 times daily as needed for muscle spasms    Strain of lumbar region, initial encounter       traMADol 50 MG tablet    ULTRAM    56 tablet    Take 1-2 tablets ( mg) by mouth every 6 hours as needed for pain #56 pills/4 weeks    Neck pain       VITAMIN D3 PO      Take 5,000 Units by mouth every other day

## 2017-12-18 NOTE — PROGRESS NOTES
SUBJECTIVE:   Jas Patel is a 47 year old male who presents to clinic today for the following health issues:    Back Pain       Duration: X10 DAYS        Specific cause: Helping patient off a cart/gurney patient went down and since Jas was holding him it injured his back.     Description:   Location of pain: low back both  Character of pain: dull ache, gnawing, throbbing and constant. Also hamstring are cramping  Pain radiation: radiates around almost to groin.   New numbness or weakness in legs, not attributed to pain:  no     Intensity: Currently 5-6/10, At its worst 8/10    History:   Pain interferes with job: YES  History of back problems: no prior back problems  Any previous MRI or X-rays: None  Sees a specialist for back pain:  Saw a chiropractor today.  Therapies tried without relief: naproxin and tramadol. Did not help    Alleviating factors:   Improved by: none      Precipitating factors:  Worsened by: Movement    Date of injury 12/8/17.  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      Did not develop pain immediately but did have painain next morning and has been constant since.  Pain midline sacrum, wrapping around belt line, left worse than right.  Pain is constant, worse with prolonged sitting or standing.  hasnt done much walking, stiff at first but improves.  Pain does not radiate down legs.  No change in bowel or bladder habits.    Back strain in the past but nothing serious, no past imaging.  Tramadol and naproxen not helping.  Did not work last Friday.  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      Patient Active Problem List   Diagnosis     Low back pain     CARDIOVASCULAR SCREENING; LDL GOAL LESS THAN 160     Moderate major depression (H)     Generalized anxiety disorder     Gastroesophageal reflux disease without esophagitis     BPH (benign prostatic hyperplasia)     Chronic pain syndrome      Benign essential tremor     Past Surgical History:   Procedure Laterality Date     DRAIN SKIN ABSCESS COMPLIC      left thigh I and D       Social History   Substance Use Topics     Smoking status: Former Smoker     Packs/day: 0.20     Years: 10.00     Types: Cigarettes     Start date: 1/28/2015     Smokeless tobacco: Never Used     Alcohol use Yes      Comment: 1 drink a day     Family History   Problem Relation Age of Onset     Hypertension Father      C.A.D. Maternal Grandfather      Hypertension Maternal Grandfather      CEREBROVASCULAR DISEASE Other      great uncle     DIABETES Paternal Uncle      type 2     DIABETES Other      great aunt     Prostate Cancer Other      great uncle moms side     Alzheimer Disease Other      great uncle moms side         Current Outpatient Prescriptions   Medication Sig Dispense Refill     NAPROXEN PO Take 220 mg by mouth 2 times daily as needed for moderate pain       tiZANidine (ZANAFLEX) 4 MG tablet Take 1-2 tablets (4-8 mg) by mouth 3 times daily as needed for muscle spasms 30 tablet 1     tamsulosin (FLOMAX) 0.4 MG capsule TAKE ONE CAPSULE BY MOUTH DAILY 90 capsule 1     HYDROcodone-acetaminophen (NORCO) 5-325 MG per tablet Take 1 tablet by mouth every 6 hours as needed for pain #28 pills/4 weeks 28 tablet 0     traMADol (ULTRAM) 50 MG tablet Take 1-2 tablets ( mg) by mouth every 6 hours as needed for pain #56 pills/4 weeks 56 tablet 0     propranolol (INDERAL) 10 MG tablet TAKE ONE TABLET BY MOUTH TWICE A DAY AS NEEDED FOR TREMOR 90 tablet 1     omeprazole (PRILOSEC) 20 MG CR capsule Take 1 capsule (20 mg) by mouth daily 30 capsule 11     citalopram (CELEXA) 40 MG tablet TAKE ONE TABLET BY MOUTH EVERY DAY (DUE FOR OFFICE VISIT IN APRIL OR MAY) 90 tablet 3     oxybutynin (DITROPAN-XL) 5 MG 24 hr tablet TAKE ONE TABLET BY MOUTH DAILY 90 tablet 2     amitriptyline (ELAVIL) 25 MG tablet TAKE ONE TABLET BY MOUTH AT BEDTIME 90 tablet 2     Cholecalciferol (VITAMIN D3  PO) Take 5,000 Units by mouth every other day       loratadine (CLARITIN) 10 MG tablet Take 10 mg by mouth daily. 1 tab daily       MULTI-VITAMIN OR TABS one daily  0       ROS:  MSK, Neuro as above, otherwise negative       OBJECTIVE:                                                    BP (!) 133/95  Pulse 87  Temp 98.1  F (36.7  C) (Oral)  Resp 22  Wt 209 lb (94.8 kg)  SpO2 95%  BMI 26.83 kg/m2   GENERAL APPEARANCE: healthy, alert and no distress  EYES: Eyes grossly normal to inspection and conjunctivae and sclerae normal  RESP: respirations nonlabored   ORTHO: Lumber/Thoracic Spine Exam: Tender:  none  Non-tender:  lumbar spinous processes, left para lumbar muscles, right para lumbar muscles  Range of Motion:  Flexion limited by pain, otherwise normal   Strength:  able to heel walk, able to toe walk  Special tests:  negative straight leg raises  PSYCH: mentation appears normal and affect normal/bright        ASSESSMENT/PLAN:                                                    (S39.012A) Strain of lumbar region, initial encounter  (primary encounter diagnosis)  Comment: suspect acute lumbar strain with good prognosis  Plan: [facility] PT, HAND, AND CHIROPRACTIC REFERRAL,         tiZANidine (ZANAFLEX) 4 MG tablet        Recommend conservative mgmt with physical therapy, NSAIDs, heat/ice, rest, and muscle relaxants (reviewed drowsiness).  Letter written for light duty at work x 1 week.  Follow up in 1 week to reevaluate         See Patient Instructions    Lola Perez, [facility]    Patient Instructions   1.  For back strain see physical therapy, continue naproxen, ice/heat, gentle exercise.  Meet again in 1 week for recheck.  Send me [place] paperwork if needed

## 2017-12-21 ENCOUNTER — TELEPHONE (OUTPATIENT)
Dept: FAMILY MEDICINE | Facility: CLINIC | Age: 47
End: 2017-12-21

## 2017-12-21 NOTE — TELEPHONE ENCOUNTER
Please call pt to inform him I have completed FMLA paperwork, looks like he needs to sign front page,  Does he want to  at  tomorrow when he comes in for physical therapy appt?  Placed in MA basket at this time.    Lola Perez, CNP

## 2017-12-22 ENCOUNTER — THERAPY VISIT (OUTPATIENT)
Dept: PHYSICAL THERAPY | Facility: CLINIC | Age: 47
End: 2017-12-22
Payer: OTHER MISCELLANEOUS

## 2017-12-22 DIAGNOSIS — M54.50 LUMBAGO: Primary | ICD-10-CM

## 2017-12-22 PROCEDURE — 97110 THERAPEUTIC EXERCISES: CPT | Mod: GP | Performed by: PHYSICAL THERAPIST

## 2017-12-22 PROCEDURE — 97530 THERAPEUTIC ACTIVITIES: CPT | Mod: GP | Performed by: PHYSICAL THERAPIST

## 2017-12-22 PROCEDURE — 97161 PT EVAL LOW COMPLEX 20 MIN: CPT | Mod: GP | Performed by: PHYSICAL THERAPIST

## 2017-12-22 NOTE — MR AVS SNAPSHOT
After Visit Summary   12/22/2017    Jas Patel    MRN: [id number]           Patient Information     Date Of Birth          1970        Visit Information        Provider Department      12/22/2017 9:00 AM [facility]        Today's Diagnoses     Lumbago    -  1       Follow-ups after your visit        Your next 10 appointments already scheduled     Dec 26, 2017 11:20 AM [facility]   Nimisha Injury Follow Up with RAFAEL Fournier CNP   [facility]    [address] [place] [id number] [phone]              Who to contact     If you have questions or need follow up information about today's clinic visit or your schedule please contact [facility] directly at [phone].  Normal or non-critical lab and imaging results will be communicated to you by MyChart, letter or phone within 4 business days after the clinic has received the results. If you do not hear from us within 7 days, please contact the clinic through [employer] or phone. If you have a critical or abnormal lab result, we will notify you by phone as soon as possible.  Submit refill requests through [employer] or call your pharmacy and they will forward the refill request to us. Please allow 3 business days for your refill to be completed.          Additional Information About Your Visit        MyChart Information     [employer] gives you secure access to your electronic health record. If you see a primary care provider, you can also send messages to your care team and make appointments. If you have questions, please call your primary care clinic.  If you do not have a primary care provider, please call [phone] and they will assist you.        Care EveryWhere ID     This is your Care EveryWhere ID. This could be used by other organizations to access your [place] medical records  [id number]          Blood Pressure from Last 3 Encounters:   12/18/17 (!) 133/95   08/25/17 112/72   07/17/17 124/76    Weight from Last 3 Encounters:   12/18/17 94.8 kg (209 lb)   08/25/17 95.3 kg (210 lb)   07/17/17 95.3 kg (210 lb)              We Performed the Following     [facility] Inital Eval Report     PT Eval, Low Complexity ([id number])     Therapeutic Activities     Therapeutic Exercises        Primary Care Provider Office Phone # Fax #    Lola Meredith Perez, APRN [facility] [phone]       [address]  [facility] [address]        Equal Access to Services     [facility] Coast HospitalEDGARDO : Hadii aad ku hadasho Sorellali, waaxda luqadaha, qaybta kaalmada adeegyada, waxbharat pearce . So [facility] [phone].    ATENCIÓN: Si habla español, tiene a tesfaye disposición servicios gratuitos de asistencia lingüística. Llame al [phone].    We comply with applicable federal civil rights laws and [place] laws. We do not discriminate on the basis of race, color, national origin, age, disability, sex, sexual orientation, or gender identity.            Thank you!     Thank you for choosing [facility]  for your care. Our goal is always to provide you with excellent care. Hearing back from our patients is one way we can continue to improve our services. Please take a few minutes to complete the written survey that you may receive in the mail after your visit with us. Thank you!             Your Updated Medication List - Protect others around you: Learn how to safely use, store and throw away your medicines at www.disposemymeds.org.          This list is accurate as of: 12/22/17 11:37 AM.  Always use your most recent med list.                   Brand Name Dispense Instructions for use Diagnosis    amitriptyline 25 MG tablet    ELAVIL    90 tablet    TAKE ONE TABLET BY MOUTH AT BEDTIME    Insomnia, unspecified type       citalopram 40 MG tablet    celeXA    90 tablet    TAKE ONE TABLET BY MOUTH  EVERY DAY (DUE FOR OFFICE VISIT IN APRIL OR MAY)    Generalized anxiety disorder       CLARITIN 10 MG tablet   Generic drug:  loratadine      Take 10 mg by mouth daily. 1 tab daily    Generalized anxiety disorder       HYDROcodone-acetaminophen 5-325 MG per tablet    NORCO    28 tablet    Take 1 tablet by mouth every 6 hours as needed for pain #28 pills/4 weeks    Neck pain       Multi-vitamin Tabs tablet   Generic drug:  multivitamin, therapeutic with minerals      one daily    Other specified counseling       NAPROXEN PO      Take 220 mg by mouth 2 times daily as needed for moderate pain        omeprazole 20 MG CR capsule    priLOSEC    30 capsule    Take 1 capsule (20 mg) by mouth daily    Gastroesophageal reflux disease with esophagitis       oxybutynin 5 MG 24 hr tablet    DITROPAN-XL    90 tablet    TAKE ONE TABLET BY MOUTH DAILY    Urinary frequency       propranolol 10 MG tablet    INDERAL    90 tablet    TAKE ONE TABLET BY MOUTH TWICE A DAY AS NEEDED FOR TREMOR    Generalized anxiety disorder       tamsulosin 0.4 MG capsule    FLOMAX    90 capsule    TAKE ONE CAPSULE BY MOUTH DAILY    Benign non-nodular prostatic hyperplasia with lower urinary tract symptoms       tiZANidine 4 MG tablet    ZANAFLEX    30 tablet    Take 1-2 tablets (4-8 mg) by mouth 3 times daily as needed for muscle spasms    Strain of lumbar region, initial encounter       traMADol 50 MG tablet    ULTRAM    56 tablet    Take 1-2 tablets ( mg) by mouth every 6 hours as needed for pain #56 pills/4 weeks    Neck pain       VITAMIN D3 PO      Take 5,000 Units by mouth every other day

## 2017-12-22 NOTE — TELEPHONE ENCOUNTER
Looks like Christine Forman MA have already have his form available up front office.     Closing Encounter.     Juanito Ortiz MA

## 2017-12-22 NOTE — PROGRESS NOTES
[facility] Evaluation  December 22, 2017    Jas Patel  is a 47 year old  male referred to physical therapy by Odessa Perez CNP for treatment of LBP.      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  Forced flexion.  No pain immediately, worsening the next day  DOS none  Prior treatment Ice, Rx, chiropractic care.. Effect of prior treatment effective.     Chief Complaint:   Bending forward.   Pain location: Lumbar, lumbosacral junction.  Pain has become more centralized.  Pain is bilateral, equal.  Denies numbness, tingling or changes in sensation.    Quality: stiff  Constant/Intermittent: constant  Time of day: evening  Symptoms have improved since onset.    Current pain 1/10.  Pain at best 1/10.  Pain at worst 8/10.    Symptoms aggravated by bending, sitting (30-60 min)    Symptoms improved with rest.     Social history:  Pt is , 2 kids.  11 and 17 years.  Recreationally pt enjoys hunting, fishing and golf.  Does not go to the gym, no longer lifting weights.  Walking for exercise.      Occupation: RN.  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  Post op care  Patient having difficulty with ADLs: sitting.    Patient's goals are improve pain.    Patient reports general health as good.  Related medical history depression, pain at night/rest.    Surgical History:  Tonsils, adenoids.    Imaging: none.    Medications:  Sleep, anti-inflammatory, pain, muscle relaxants, anti-depressants.       Outcome measure:   vidal Chicas  Return to MD:  12/26/2017      Clinical Impression: Jas presents to [facility] with primary complaint of central lower back pain.  Per clinical examination pt demonstrates symptoms consistent with discogenic pathology.  Pt with hypomobility L4 segmentally, postural dysfunction and decreased lower extremity functional strength.  Pt will  benefit from skilled physical therapy to improve overall pain and function for optimal return to work duties.   See plan of care outlined below.           HPI                    Objective:    Observation:     Gait: normal mechanics, slight trunk stiffness  Squat: slightly decr Base of support.  Mild femoral IR/ER, focus on gluteal stab  Screen: (-) heel to toe walk.    Standing posture:    Fair, slightly rounded shoulders.  Increased lumbar lordosis; L3-4 with slight edema/loss lordosis L5-S1.     AROM:   Flexion: 80% normal lumbopelvic rhythm.  Pain reproduced second repetition.    Extension: 50%, upon repeated motion pt demonstrates no worsening of pain  SB: stretch B end range 75%  Rotation: 50% R pain, 75% left    Strength:   LE WFL seated.  Femoral compensatory strategies with squat, transfers.           System         Lumbar/SI Evaluation  ROM:      Strength: WFL  Lumbar Myotomes:  normal            Lumbar DTR's:  not assessed      Cord Signs:  not assessed    Lumbar Dermtomes:  normal                Neural Tension/Mobility:  Neural tension wnl lumbar: tightness R with SLR, (-) for LBP.      Left side:SLR or SLR w/DF  negative.     Right side:   SLR w/DF or SLR  negative.   Lumbar Palpation:  Palpation (lumbar): (+) L sacrak.    Tenderness not present at Left:    Quadratus Lumborum; Erector Spinae; Piriformis or PSIS    Tenderness not present at Right:  Quadratus Lumborum; Erector Spinae; Piriformis or PSIS    Lumbar Provocation:  Lumbar provocation: (-) hip, SIJ screen.    Left negative with:  PROM hip    Right negative with:  PROM hip  Spinal Segmental Conclusions:     Level: Hypo noted at L4                                        Hip Evaluation    Hip Strength:    Flexion:   Left: 4/5   Pain:  Right: 4/5   Pain:                          Internal Rotation:  Left: 5/5    Pain:Right: 5/5   Pain:  External Rotation:  Left: 4-/5   Pain:  Right: 4-/5   Pain:  Knee Flexion:  Left: 5-/5   Pain:Right: 5-/5    Pain:  Knee Extension:  Left: 5/5   Pain:Right: 5/5    Pain:        Hip Special Testing:      Left hip negative for the following special tests:  Kim or Fadir/Labrum   Right hip positive for the following special tests:  FabreRight hip negative for the following special tests:  Fadir/Labrum                 General     ROS    Assessment/Plan:      Patient is a 47 year old male with lumbar complaints.    Patient has the following significant findings with corresponding treatment plan.                Diagnosis 1:  LBP    Pain -  hot/cold therapy, US, manual therapy, self management, education and home program  Decreased ROM/flexibility - manual therapy and therapeutic exercise  Decreased joint mobility - manual therapy and therapeutic exercise  Decreased strength - therapeutic exercise and therapeutic activities  Impaired balance - neuro re-education and therapeutic activities  Impaired gait - gait training  Impaired muscle performance - neuro re-education  Decreased function - therapeutic activities    Therapy Evaluation Codes:   1) History comprised of:   Personal factors that impact the plan of care:      Past/current experiences and Profession.    Comorbidity factors that impact the plan of care are:      Depression and Pain at night/rest.     Medications impacting care: Anti-depressant, Anti-inflammatory, Muscle relaxant, Pain and Sleep.  2) Examination of Body Systems comprised of:   Body structures and functions that impact the plan of care:      Lumbar spine.   Activity limitations that impact the plan of care are:      Bathing, Bending, Sitting, Squatting/kneeling and Standing.  3) Clinical presentation characteristics are:   Stable/Uncomplicated.  4) Decision-Making    Low complexity using standardized patient assessment instrument and/or measureable assessment of functional outcome.  Cumulative Therapy Evaluation is: Low complexity.    Previous and current functional limitations:  (See Goal Flow Sheet for  this information)    Short term and Long term goals: (See Goal Flow Sheet for this information)     Communication ability:  Patient appears to be able to clearly communicate and understand verbal and written communication and follow directions correctly.  Treatment Explanation - The following has been discussed with the patient:   RX ordered/plan of care  Anticipated outcomes  Possible risks and side effects  This patient would benefit from PT intervention to resume normal activities.   Rehab potential is good.    Frequency:  1 X week, once daily  Duration:  for 4 weeks tapering to 2 X a month over 1 month   Discharge Plan:  Achieve all LTG.  Independent in home treatment program.  Reach maximal therapeutic benefit.    Please refer to the daily flowsheet for treatment today, total treatment time and time spent performing 1:1 timed codes.

## 2017-12-22 NOTE — PROGRESS NOTES
SUBJECTIVE:   Jas Patel is a 47 year old male who presents to clinic today for the following health issues:    Follow up on new low back injury    Since last visit: Much better. PT is helping. Sx include just some stiffness.     Interval history:  Evaluated last week for low back pain following injury at work when moving a patient.  Was advised on NSAIDs, tizanidine, physical therapy. FMLA completed for light duty restriction.    Update today:  Had 1 physical therapy visit last week.  Pt helping a lot.  tizanidine was helpful at night for pain, helps with sleep.  Feeling ready to return to work.    Patient Active Problem List   Diagnosis     Low back pain     CARDIOVASCULAR SCREENING; LDL GOAL LESS THAN 160     Moderate major depression (H)     Generalized anxiety disorder     Gastroesophageal reflux disease without esophagitis     BPH (benign prostatic hyperplasia)     Chronic pain syndrome     Benign essential tremor     Lumbago     Past Surgical History:   Procedure Laterality Date     DRAIN SKIN ABSCESS COMPLIC      left thigh I and D       Social History   Substance Use Topics     Smoking status: Former Smoker     Packs/day: 0.20     Years: 10.00     Types: Cigarettes     Start date: 1/28/2015     Smokeless tobacco: Never Used     Alcohol use Yes      Comment: 1 drink a day     Family History   Problem Relation Age of Onset     Hypertension Father      C.A.D. Maternal Grandfather      Hypertension Maternal Grandfather      CEREBROVASCULAR DISEASE Other      great uncle     DIABETES Paternal Uncle      type 2     DIABETES Other      great aunt     Prostate Cancer Other      great uncle moms side     Alzheimer Disease Other      great uncle moms side         Current Outpatient Prescriptions   Medication Sig Dispense Refill     NAPROXEN PO Take 220 mg by mouth 2 times daily as needed for moderate pain       tiZANidine (ZANAFLEX) 4 MG tablet Take 1-2 tablets (4-8 mg) by mouth 3 times daily as needed for  muscle spasms 30 tablet 1     tamsulosin (FLOMAX) 0.4 MG capsule TAKE ONE CAPSULE BY MOUTH DAILY 90 capsule 1     HYDROcodone-acetaminophen (NORCO) 5-325 MG per tablet Take 1 tablet by mouth every 6 hours as needed for pain #28 pills/4 weeks 28 tablet 0     traMADol (ULTRAM) 50 MG tablet Take 1-2 tablets ( mg) by mouth every 6 hours as needed for pain #56 pills/4 weeks 56 tablet 0     propranolol (INDERAL) 10 MG tablet TAKE ONE TABLET BY MOUTH TWICE A DAY AS NEEDED FOR TREMOR 90 tablet 1     omeprazole (PRILOSEC) 20 MG CR capsule Take 1 capsule (20 mg) by mouth daily 30 capsule 11     citalopram (CELEXA) 40 MG tablet TAKE ONE TABLET BY MOUTH EVERY DAY (DUE FOR OFFICE VISIT IN APRIL OR MAY) 90 tablet 3     oxybutynin (DITROPAN-XL) 5 MG 24 hr tablet TAKE ONE TABLET BY MOUTH DAILY 90 tablet 2     amitriptyline (ELAVIL) 25 MG tablet TAKE ONE TABLET BY MOUTH AT BEDTIME 90 tablet 2     Cholecalciferol (VITAMIN D3 PO) Take 5,000 Units by mouth every other day       loratadine (CLARITIN) 10 MG tablet Take 10 mg by mouth daily. 1 tab daily       MULTI-VITAMIN OR TABS one daily  0       ROS:  MSK as above, otherwise negative       OBJECTIVE:                                                    /90  Pulse 71  Temp 98.2  F (36.8  C) (Oral)  Resp 18  Wt 206 lb 8 oz (93.7 kg)  SpO2 99%  BMI 26.51 kg/m2   GENERAL APPEARANCE: healthy, alert and no distress  EYES: Eyes grossly normal to inspection and conjunctivae and sclerae normal  RESP: respirations nonlabored  PSYCH: mentation appears normal and affect normal/bright        ASSESSMENT/PLAN:                                                    (S33.5XXD) Lumbar sprain, subsequent encounter  (primary encounter diagnosis)  Comment: work related injury, improved with physical therapy and conservative management, ready to go back to work  Plan: letter for work ok to go back tomorrow without restrictions, plan to cont physical therapy and daily home exercise  program        See Patient Instructions    Lola Perez, [facility]    There are no Patient Instructions on file for this visit.

## 2017-12-26 ENCOUNTER — OFFICE VISIT (OUTPATIENT)
Dept: FAMILY MEDICINE | Facility: CLINIC | Age: 47
End: 2017-12-26
Payer: OTHER MISCELLANEOUS

## 2017-12-26 VITALS
WEIGHT: 206.5 LBS | SYSTOLIC BLOOD PRESSURE: 136 MMHG | RESPIRATION RATE: 18 BRPM | BODY MASS INDEX: 26.51 KG/M2 | HEART RATE: 71 BPM | TEMPERATURE: 98.2 F | OXYGEN SATURATION: 99 % | DIASTOLIC BLOOD PRESSURE: 90 MMHG

## 2017-12-26 DIAGNOSIS — M54.2 NECK PAIN: ICD-10-CM

## 2017-12-26 DIAGNOSIS — R35.0 URINARY FREQUENCY: ICD-10-CM

## 2017-12-26 DIAGNOSIS — F41.1 GENERALIZED ANXIETY DISORDER: ICD-10-CM

## 2017-12-26 DIAGNOSIS — S33.5XXD LUMBAR SPRAIN, SUBSEQUENT ENCOUNTER: Primary | ICD-10-CM

## 2017-12-26 PROCEDURE — 99213 OFFICE O/P EST LOW 20 MIN: CPT | Performed by: NURSE PRACTITIONER

## 2017-12-26 RX ORDER — HYDROCODONE BITARTRATE AND ACETAMINOPHEN 5; 325 MG/1; MG/1
1 TABLET ORAL EVERY 6 HOURS PRN
Qty: 28 TABLET | Refills: 0 | Status: SHIPPED | OUTPATIENT
Start: 2017-12-29 | End: 2017-12-26

## 2017-12-26 RX ORDER — PROPRANOLOL HYDROCHLORIDE 10 MG/1
TABLET ORAL
Qty: 180 TABLET | Refills: 3 | Status: SHIPPED | OUTPATIENT
Start: 2017-12-26 | End: 2019-03-05

## 2017-12-26 RX ORDER — HYDROCODONE BITARTRATE AND ACETAMINOPHEN 5; 325 MG/1; MG/1
1 TABLET ORAL EVERY 6 HOURS PRN
Qty: 28 TABLET | Refills: 0 | Status: SHIPPED | OUTPATIENT
Start: 2018-01-29 | End: 2017-12-26

## 2017-12-26 RX ORDER — OXYBUTYNIN CHLORIDE 5 MG/1
5 TABLET, EXTENDED RELEASE ORAL DAILY
Qty: 90 TABLET | Refills: 3 | Status: SHIPPED | OUTPATIENT
Start: 2017-12-26 | End: 2019-03-05

## 2017-12-26 RX ORDER — TRAMADOL HYDROCHLORIDE 50 MG/1
50-100 TABLET ORAL EVERY 6 HOURS PRN
Qty: 56 TABLET | Refills: 0 | Status: SHIPPED | OUTPATIENT
Start: 2018-02-26 | End: 2018-03-28

## 2017-12-26 RX ORDER — TRAMADOL HYDROCHLORIDE 50 MG/1
50-100 TABLET ORAL EVERY 6 HOURS PRN
Qty: 56 TABLET | Refills: 0 | Status: SHIPPED | OUTPATIENT
Start: 2018-01-29 | End: 2017-12-26

## 2017-12-26 RX ORDER — HYDROCODONE BITARTRATE AND ACETAMINOPHEN 5; 325 MG/1; MG/1
1 TABLET ORAL EVERY 6 HOURS PRN
Qty: 28 TABLET | Refills: 0 | Status: SHIPPED | OUTPATIENT
Start: 2018-02-26 | End: 2018-03-28

## 2017-12-26 RX ORDER — TRAMADOL HYDROCHLORIDE 50 MG/1
50-100 TABLET ORAL EVERY 6 HOURS PRN
Qty: 56 TABLET | Refills: 0 | Status: SHIPPED | OUTPATIENT
Start: 2017-12-29 | End: 2017-12-26

## 2017-12-26 NOTE — MR AVS SNAPSHOT
After Visit Summary   12/26/2017    Jas Patel    MRN: [id number]           Patient Information     Date Of Birth          1970        Visit Information        Provider Department      12/26/2017 11:40 AM oLla Perez APRN CNP [facility]        Today's Diagnoses     Neck pain           Follow-ups after your visit        Who to contact     If you have questions or need follow up information about today's clinic visit or your schedule please contact [facility] directly at [phone].  Normal or non-critical lab and imaging results will be communicated to you by LeTVhart, letter or phone within 4 business days after the clinic has received the results. If you do not hear from us within 7 days, please contact the clinic through skyrockitt or phone. If you have a critical or abnormal lab result, we will notify you by phone as soon as possible.  Submit refill requests through Grability or call your pharmacy and they will forward the refill request to us. Please allow 3 business days for your refill to be completed.          Additional Information About Your Visit        MyChart Information     Grability gives you secure access to your electronic health record. If you see a primary care provider, you can also send messages to your care team and make appointments. If you have questions, please call your primary care clinic.  If you do not have a primary care provider, please call [phone] and they will assist you.        Care EveryWhere ID     This is your Care EveryWhere ID. This could be used by other organizations to access your [place] medical records  [id number]         Blood Pressure from Last 3 Encounters:   12/26/17 136/90   12/18/17 (!) 133/95   08/25/17 112/72    Weight from Last 3 Encounters:   12/26/17 206 lb 8 oz (93.7 kg)   12/18/17 209 lb (94.8 kg)   08/25/17 210 lb (95.3 kg)              Today, you had the following     No orders found for  display         Today's Medication Changes          These changes are accurate as of: 12/26/17 11:41 AM.  If you have any questions, ask your nurse or doctor.               Start taking these medicines.        Dose/Directions    HYDROcodone-acetaminophen 5-325 MG per tablet   Commonly known as:  NORCO   Used for:  Neck pain   Started by:  Lola Perez APRN CNP        Dose:  1 tablet   Start taking on:  2/26/2018   Take 1 tablet by mouth every 6 hours as needed for pain #28 pills/4 weeks   Quantity:  28 tablet   Refills:  0       traMADol 50 MG tablet   Commonly known as:  ULTRAM   Used for:  Neck pain   Started by:  Lola Perez APRN CNP        Dose:   mg   Start taking on:  2/26/2018   Take 1-2 tablets ( mg) by mouth every 6 hours as needed for pain #56 pills/4 weeks.   Quantity:  56 tablet   Refills:  0         These medicines have changed or have updated prescriptions.        Dose/Directions    oxybutynin 5 MG 24 hr tablet   Commonly known as:  DITROPAN-XL   This may have changed:  See the new instructions.   Used for:  Urinary frequency   Changed by:  Lola Perez APRN CNP        Dose:  5 mg   Take 1 tablet (5 mg) by mouth daily   Quantity:  90 tablet   Refills:  3       propranolol 10 MG tablet   Commonly known as:  INDERAL   This may have changed:  See the new instructions.   Used for:  Generalized anxiety disorder   Changed by:  Lola Perez APRN CNP        TAKE ONE TABLET BY MOUTH TWICE A DAY AS NEEDED FOR TREMOR   Quantity:  180 tablet   Refills:  3            Where to get your medicines      These medications were sent to [facility] [place], [place] - [address]  [address] [facility] [address]     Phone:  [phone]     oxybutynin 5 MG 24 hr tablet    propranolol 10 MG tablet         Some of these will need a paper prescription and others can be bought over the counter.  Ask your nurse if you have questions.     Bring a  paper prescription for each of these medications     HYDROcodone-acetaminophen 5-325 MG per tablet    traMADol 50 MG tablet                Primary Care Provider Office Phone # Fax #    Lola Perez, APRN [facility] [phone] [address]  [facility] [address]        Equal Access to Services     GONZÁLEZ GERMAIN : Hadii aad ku hadasho Soomaali, waaxda luqadaha, qaybta kaalmada adeegyada, waxay idiin hayaan adeshawn khheavenlycharly lapriyanka mas. So [facility] [phone].    ATENCIÓN: Si habla español, tiene a tesfaye disposición servicios gratuitos de asistencia lingüística. [facility] [phone].    We comply with applicable federal civil rights laws and [place] laws. We do not discriminate on the basis of race, color, national origin, age, disability, sex, sexual orientation, or gender identity.            Thank you!     Thank you for choosing [facility]  for your care. Our goal is always to provide you with excellent care. Hearing back from our patients is one way we can continue to improve our services. Please take a few minutes to complete the written survey that you may receive in the mail after your visit with us. Thank you!             Your Updated Medication List - Protect others around you: Learn how to safely use, store and throw away your medicines at www.disposemymeds.org.          This list is accurate as of: 12/26/17 11:41 AM.  Always use your most recent med list.                   Brand Name Dispense Instructions for use Diagnosis    amitriptyline 25 MG tablet    ELAVIL    90 tablet    TAKE ONE TABLET BY MOUTH AT BEDTIME    Insomnia, unspecified type       citalopram 40 MG tablet    celeXA    90 tablet    TAKE ONE TABLET BY MOUTH EVERY DAY (DUE FOR OFFICE VISIT IN APRIL OR MAY)    Generalized anxiety disorder       CLARITIN 10 MG tablet   Generic drug:  loratadine      Take 10 mg by mouth daily. 1 tab daily    Generalized anxiety disorder       HYDROcodone-acetaminophen 5-325 MG per  tablet   Start taking on:  2/26/2018    NORCO    28 tablet    Take 1 tablet by mouth every 6 hours as needed for pain #28 pills/4 weeks    Neck pain       Multi-vitamin Tabs tablet   Generic drug:  multivitamin, therapeutic with minerals      one daily    Other specified counseling       NAPROXEN PO      Take 220 mg by mouth 2 times daily as needed for moderate pain        omeprazole 20 MG CR capsule    priLOSEC    30 capsule    Take 1 capsule (20 mg) by mouth daily    Gastroesophageal reflux disease with esophagitis       oxybutynin 5 MG 24 hr tablet    DITROPAN-XL    90 tablet    Take 1 tablet (5 mg) by mouth daily    Urinary frequency       propranolol 10 MG tablet    INDERAL    180 tablet    TAKE ONE TABLET BY MOUTH TWICE A DAY AS NEEDED FOR TREMOR    Generalized anxiety disorder       tamsulosin 0.4 MG capsule    FLOMAX    90 capsule    TAKE ONE CAPSULE BY MOUTH DAILY    Benign non-nodular prostatic hyperplasia with lower urinary tract symptoms       tiZANidine 4 MG tablet    ZANAFLEX    30 tablet    Take 1-2 tablets (4-8 mg) by mouth 3 times daily as needed for muscle spasms    Strain of lumbar region, initial encounter       traMADol 50 MG tablet   Start taking on:  2/26/2018    ULTRAM    56 tablet    Take 1-2 tablets ( mg) by mouth every 6 hours as needed for pain #56 pills/4 weeks.    Neck pain       VITAMIN D3 PO      Take 5,000 Units by mouth every other day

## 2017-12-26 NOTE — PROGRESS NOTES
SUBJECTIVE:   Jas Patel is a 47 year old male who presents to clinic today for the following health issues:      Pt injured neck while lifting a pt 9/2011. Feels neck pain is stable on current medication regimen which includes 800mg ibuprofen TID, tramadol BID, and norco usually about 1/d. Has done PT in the past, still doing exercise at home which are helpful. Also seeing chiropractor. Has been told he is not a surgical candidate. Working currently in day surgery pre/postop as a nurse.  Had trigger point injections and BOY injection which helped with numbness/tingling.  Last MRI done 2011 at [facility].    Today states pain continues to be manageable on current medication, no new side effects.    Patient Active Problem List   Diagnosis     Low back pain     CARDIOVASCULAR SCREENING; LDL GOAL LESS THAN 160     Moderate major depression (H)     Generalized anxiety disorder     Gastroesophageal reflux disease without esophagitis     BPH (benign prostatic hyperplasia)     Chronic pain syndrome     Benign essential tremor     Lumbago     Past Surgical History:   Procedure Laterality Date     DRAIN SKIN ABSCESS COMPLIC      left thigh I and D       Social History   Substance Use Topics     Smoking status: Former Smoker     Packs/day: 0.20     Years: 10.00     Types: Cigarettes     Start date: 1/28/2015     Smokeless tobacco: Never Used     Alcohol use Yes      Comment: 1 drink a day     Family History   Problem Relation Age of Onset     Hypertension Father      C.A.D. Maternal Grandfather      Hypertension Maternal Grandfather      CEREBROVASCULAR DISEASE Other      great uncle     DIABETES Paternal Uncle      type 2     DIABETES Other      great aunt     Prostate Cancer Other      great uncle moms side     Alzheimer Disease Other      great uncle moms side         Current Outpatient Prescriptions   Medication Sig Dispense Refill     [START ON 2/26/2018] HYDROcodone-acetaminophen (NORCO) 5-325 MG per tablet Take 1 tablet  by mouth every 6 hours as needed for pain #28 pills/4 weeks 28 tablet 0     [START ON 2/26/2018] traMADol (ULTRAM) 50 MG tablet Take 1-2 tablets ( mg) by mouth every 6 hours as needed for pain #56 pills/4 weeks. 56 tablet 0     propranolol (INDERAL) 10 MG tablet TAKE ONE TABLET BY MOUTH TWICE A DAY AS NEEDED FOR TREMOR 180 tablet 3     oxybutynin (DITROPAN-XL) 5 MG 24 hr tablet Take 1 tablet (5 mg) by mouth daily 90 tablet 3     NAPROXEN PO Take 220 mg by mouth 2 times daily as needed for moderate pain       tiZANidine (ZANAFLEX) 4 MG tablet Take 1-2 tablets (4-8 mg) by mouth 3 times daily as needed for muscle spasms 30 tablet 1     tamsulosin (FLOMAX) 0.4 MG capsule TAKE ONE CAPSULE BY MOUTH DAILY 90 capsule 1     [DISCONTINUED] propranolol (INDERAL) 10 MG tablet TAKE ONE TABLET BY MOUTH TWICE A DAY AS NEEDED FOR TREMOR 90 tablet 1     omeprazole (PRILOSEC) 20 MG CR capsule Take 1 capsule (20 mg) by mouth daily 30 capsule 11     citalopram (CELEXA) 40 MG tablet TAKE ONE TABLET BY MOUTH EVERY DAY (DUE FOR OFFICE VISIT IN APRIL OR MAY) 90 tablet 3     amitriptyline (ELAVIL) 25 MG tablet TAKE ONE TABLET BY MOUTH AT BEDTIME 90 tablet 2     Cholecalciferol (VITAMIN D3 PO) Take 5,000 Units by mouth every other day       loratadine (CLARITIN) 10 MG tablet Take 10 mg by mouth daily. 1 tab daily       MULTI-VITAMIN OR TABS one daily  0       ROS:  MSK, neuro as above, otherwise negative       OBJECTIVE:                                                    There were no vitals taken for this visit.   GENERAL APPEARANCE: healthy, alert and no distress  EYES: Eyes grossly normal to inspection and conjunctivae and sclerae normal  RESP: respirations nonlabored  PSYCH: mentation appears normal and affect normal/bright        ASSESSMENT/PLAN:                                                    (M54.2) Neck pain  Comment: stable on current regimen  Plan: HYDROcodone-acetaminophen (NORCO) 5-325 MG per         tablet, traMADol  (ULTRAM) 50 MG tablet,         DISCONTINUED: HYDROcodone-acetaminophen (NORCO)        5-325 MG per tablet, DISCONTINUED: traMADol         (ULTRAM) 50 MG tablet, DISCONTINUED:         HYDROcodone-acetaminophen (NORCO) 5-325 MG per         tablet, DISCONTINUED: traMADol (ULTRAM) 50 MG         tablet        Refilled x 3 months, follow up in 3 months with evisit.        See Patient Instructions    Lola Perez, [facility]    There are no Patient Instructions on file for this visit.

## 2017-12-26 NOTE — MR AVS SNAPSHOT
After Visit Summary   12/26/2017    Jas Patel    MRN: [id number]           Patient Information     Date Of Birth          1970        Visit Information        Provider Department      12/26/2017 11:20 AM Lola Perez APRN CNP [facility]        Today's Diagnoses     Lumbar sprain, subsequent encounter    -  1       Follow-ups after your visit        Who to contact     If you have questions or need follow up information about today's clinic visit or your schedule please contact [facility] directly at [phone].  Normal or non-critical lab and imaging results will be communicated to you by Gweepi Medicalhart, letter or phone within 4 business days after the clinic has received the results. If you do not hear from us within 7 days, please contact the clinic through Gweepi Medicalhart or phone. If you have a critical or abnormal lab result, we will notify you by phone as soon as possible.  Submit refill requests through GroupPrice or call your pharmacy and they will forward the refill request to us. Please allow 3 business days for your refill to be completed.          Additional Information About Your Visit        MyChart Information     GroupPrice gives you secure access to your electronic health record. If you see a primary care provider, you can also send messages to your care team and make appointments. If you have questions, please call your primary care clinic.  If you do not have a primary care provider, please call [phone] and they will assist you.        Care EveryWhere ID     This is your Care EveryWhere ID. This could be used by other organizations to access your [place] medical records  [id number]        Your Vitals Were     Pulse Temperature Respirations Pulse Oximetry BMI (Body Mass Index)       71 98.2  F (36.8  C) (Oral) 18 99% 26.51 kg/m2        Blood Pressure from Last 3 Encounters:   12/26/17 136/90   12/18/17 (!) 133/95   08/25/17 112/72    Weight from  Last 3 Encounters:   12/26/17 206 lb 8 oz (93.7 kg)   12/18/17 209 lb (94.8 kg)   08/25/17 210 lb (95.3 kg)              Today, you had the following     No orders found for display       Primary Care Provider Office Phone # Fax #    RAFAEL Fournier [facility] [phone]       [address]  [facility] [address]        Equal Access to Services     GONZÁLEZ GERMAIN : Hadii aad ku hadasho Soomaali, waaxda luqadaha, qaybta kaalmada adeegyada, waxay idiin hayaan adeeg kharash la'aan . So [facility] [phone].    ATENCIÓN: Si habla español, tiene a tesfaye disposición servicios gratuitos de asistencia lingüística. [facility] [phone].    We comply with applicable federal civil rights laws and [place] laws. We do not discriminate on the basis of race, color, national origin, age, disability, sex, sexual orientation, or gender identity.            Thank you!     Thank you for choosing [facility]  for your care. Our goal is always to provide you with excellent care. Hearing back from our patients is one way we can continue to improve our services. Please take a few minutes to complete the written survey that you may receive in the mail after your visit with us. Thank you!             Your Updated Medication List - Protect others around you: Learn how to safely use, store and throw away your medicines at www.disposemymeds.org.          This list is accurate as of: 12/26/17 11:30 AM.  Always use your most recent med list.                   Brand Name Dispense Instructions for use Diagnosis    amitriptyline 25 MG tablet    ELAVIL    90 tablet    TAKE ONE TABLET BY MOUTH AT BEDTIME    Insomnia, unspecified type       citalopram 40 MG tablet    celeXA    90 tablet    TAKE ONE TABLET BY MOUTH EVERY DAY (DUE FOR OFFICE VISIT IN APRIL OR MAY)    Generalized anxiety disorder       CLARITIN 10 MG tablet   Generic drug:  loratadine      Take 10 mg by mouth daily. 1 tab daily    Generalized anxiety  disorder       HYDROcodone-acetaminophen 5-325 MG per tablet    NORCO    28 tablet    Take 1 tablet by mouth every 6 hours as needed for pain #28 pills/4 weeks    Neck pain       Multi-vitamin Tabs tablet   Generic drug:  multivitamin, therapeutic with minerals      one daily    Other specified counseling       NAPROXEN PO      Take 220 mg by mouth 2 times daily as needed for moderate pain        omeprazole 20 MG CR capsule    priLOSEC    30 capsule    Take 1 capsule (20 mg) by mouth daily    Gastroesophageal reflux disease with esophagitis       oxybutynin 5 MG 24 hr tablet    DITROPAN-XL    90 tablet    TAKE ONE TABLET BY MOUTH DAILY    Urinary frequency       propranolol 10 MG tablet    INDERAL    90 tablet    TAKE ONE TABLET BY MOUTH TWICE A DAY AS NEEDED FOR TREMOR    Generalized anxiety disorder       tamsulosin 0.4 MG capsule    FLOMAX    90 capsule    TAKE ONE CAPSULE BY MOUTH DAILY    Benign non-nodular prostatic hyperplasia with lower urinary tract symptoms       tiZANidine 4 MG tablet    ZANAFLEX    30 tablet    Take 1-2 tablets (4-8 mg) by mouth 3 times daily as needed for muscle spasms    Strain of lumbar region, initial encounter       traMADol 50 MG tablet    ULTRAM    56 tablet    Take 1-2 tablets ( mg) by mouth every 6 hours as needed for pain #56 pills/4 weeks    Neck pain       VITAMIN D3 PO      Take 5,000 Units by mouth every other day

## 2017-12-26 NOTE — LETTER
[facility]  [address] [facility] [address]  Phone: [phone]    December 26, 2017        Jas Patel  [address] [facility] [address]          To whom it may concern:    RE: Jas EDGARDO Jorge    Patient may return to work 12/27/17 with the following:  No working or lifting restrictions    Please contact me for questions or concerns.      Sincerely,        RAFAEL Fournier CNP

## 2018-01-29 DIAGNOSIS — G47.00 INSOMNIA, UNSPECIFIED TYPE: ICD-10-CM

## 2018-01-29 NOTE — TELEPHONE ENCOUNTER
"Requested Prescriptions   Pending Prescriptions Disp Refills     amitriptyline (ELAVIL) 25 MG tablet [Pharmacy Med Name: AMITRIPTYLINE HCL 25MG TABS]  Last Written Prescription Date:  4/27/2017  Last Fill Quantity: 90 tablet,  # refills: 2   Last Office Visit: 12/26/2017   Future Office Visit:      90 tablet 2     Sig: TAKE ONE TABLET BY MOUTH AT BEDTIME    Tricyclic Antidepressants Protocol Failed    1/29/2018  9:47 AM       Failed - Blood pressure under 140/90    BP Readings from Last 3 Encounters:   12/26/17 136/90   12/18/17 (!) 133/95   08/25/17 112/72          Passed - Recent (12 mo) or future (30 d) visit with authorizing provider's specialty     Patient had office visit in the last year or has a visit in the next 30 days with authorizing provider.  See \"Patient Info\" tab in inbasket, or \"Choose Columns\" in Meds & Orders section of the refill encounter.          Passed - Patient is age 18 or older          "

## 2018-03-26 NOTE — PROGRESS NOTES
SUBJECTIVE:   Jas Patel is a 47 year old male who presents to clinic today for the following health issues:    Chronic Pain Follow-Up       Type / Location of Pain: Neck pain  Analgesia/pain control:       Recent changes:  same      Overall control: Tolerable with discomfort  Activity level/function:      Daily activities:  Able to do all daily activities    Work:  Pain does not limit any  work  Adverse effects:  No  Adherance    Taking medication as directed?  Yes    Participating in other treatments: yes  Risk Factors:    Sleep:  Fair    Mood/anxiety:  controlled    Recent family or social stressors:  Opened new surgery center with work    Other aggravating factors: none  PHQ-9 SCORE 4/11/2016 11/2/2016 7/17/2017   Total Score - - -   Total Score 5 3 11     DONTE-7 SCORE 10/28/2015 4/11/2016 7/17/2017   Total Score - - -   Total Score 5 9 7     Encounter-Level CSA - 10/28/2015:          Controlled Substance Agreement - Scan on 10/29/2015 11:24 AM : CONTROLLED SUBSTANCE AGREEMENT (below)                  Here today for follow up of chronic neck pain.      Pt injured neck while lifting a pt 9/2011. Feels neck pain is stable on current medication regimen which includes 800mg ibuprofen TID, tramadol BID, and norco usually about 1/d. Has done PT in the past, still doing exercise at home which are helpful. Also seeing chiropractor. Has been told he is not a surgical candidate. Working currently in day surgery pre/postop as a nurse.  Had trigger point injections and BOY injection which helped with numbness/tingling.  Last MRI done 2011 at [facility].     Today states pain continues to be manageable on current medication, no new side effects.    Just opened new surgery center, this has been stressful, managing more cases with less staff.      Patient Active Problem List   Diagnosis     Low back pain     CARDIOVASCULAR SCREENING; LDL GOAL LESS THAN 160     Moderate major depression (H)     Generalized anxiety disorder      Gastroesophageal reflux disease without esophagitis     BPH (benign prostatic hyperplasia)     Chronic pain syndrome     Benign essential tremor     Lumbago     Past Surgical History:   Procedure Laterality Date     DRAIN SKIN ABSCESS COMPLIC      left thigh I and D       Social History   Substance Use Topics     Smoking status: Former Smoker     Packs/day: 0.20     Years: 10.00     Types: Cigarettes     Start date: 1/28/2015     Smokeless tobacco: Never Used     Alcohol use Yes      Comment: 1 drink a day     Family History   Problem Relation Age of Onset     Hypertension Father      C.A.D. Maternal Grandfather      Hypertension Maternal Grandfather      CEREBROVASCULAR DISEASE Other      great uncle     DIABETES Paternal Uncle      type 2     DIABETES Other      great aunt     Prostate Cancer Other      great uncle moms side     Alzheimer Disease Other      great uncle moms side         Current Outpatient Prescriptions   Medication Sig Dispense Refill     MELATONIN PO Take 5 mg by mouth At Bedtime       [START ON 5/23/2018] HYDROcodone-acetaminophen (NORCO) 5-325 MG per tablet Take 1 tablet by mouth every 6 hours as needed for pain #28 pills/4 weeks 28 tablet 0     [START ON 5/23/2018] traMADol (ULTRAM) 50 MG tablet Take 1-2 tablets ( mg) by mouth every 6 hours as needed for pain #56 pills/4 weeks. 56 tablet 0     amitriptyline (ELAVIL) 25 MG tablet TAKE ONE TABLET BY MOUTH AT BEDTIME 90 tablet 2     propranolol (INDERAL) 10 MG tablet TAKE ONE TABLET BY MOUTH TWICE A DAY AS NEEDED FOR TREMOR 180 tablet 3     oxybutynin (DITROPAN-XL) 5 MG 24 hr tablet Take 1 tablet (5 mg) by mouth daily 90 tablet 3     NAPROXEN PO Take 220 mg by mouth 2 times daily as needed for moderate pain       tiZANidine (ZANAFLEX) 4 MG tablet Take 1-2 tablets (4-8 mg) by mouth 3 times daily as needed for muscle spasms 30 tablet 1     tamsulosin (FLOMAX) 0.4 MG capsule TAKE ONE CAPSULE BY MOUTH DAILY 90 capsule 1     omeprazole  (PRILOSEC) 20 MG CR capsule Take 1 capsule (20 mg) by mouth daily 30 capsule 11     citalopram (CELEXA) 40 MG tablet TAKE ONE TABLET BY MOUTH EVERY DAY (DUE FOR OFFICE VISIT IN APRIL OR MAY) 90 tablet 3     Cholecalciferol (VITAMIN D3 PO) Take 5,000 Units by mouth every other day       loratadine (CLARITIN) 10 MG tablet Take 10 mg by mouth daily. 1 tab daily       MULTI-VITAMIN OR TABS one daily  0       ROS:  MSK, neuro as above, otherwise negative       OBJECTIVE:                                                    /77 (BP Location: Right arm, Patient Position: Chair, Cuff Size: Adult Regular)  Pulse 83  Temp 98.1  F (36.7  C) (Oral)  Resp 12  Wt 205 lb (93 kg)  SpO2 98%  BMI 26.32 kg/m2   GENERAL APPEARANCE: healthy, alert and no distress  EYES: Eyes grossly normal to inspection and conjunctivae and sclerae normal  RESP: respirations nonlabored  PSYCH: mentation appears normal and affect normal/bright        ASSESSMENT/PLAN:                                                    (M54.2) Neck pain  Comment: symptoms stable on current regimen  Plan: MELATONIN PO, DEPRESSION ACTION PLAN (DAP),         HYDROcodone-acetaminophen (NORCO) 5-325 MG per         tablet, traMADol (ULTRAM) 50 MG tablet, Drug         Abuse Screen Panel 13, Urine (Pain Care         Package), DISCONTINUED:         HYDROcodone-acetaminophen (NORCO) 5-325 MG per         tablet, DISCONTINUED: traMADol (ULTRAM) 50 MG         tablet, DISCONTINUED: HYDROcodone-acetaminophen        (NORCO) 5-325 MG per tablet, DISCONTINUED:         traMADol (ULTRAM) 50 MG tablet        Renewed CSA today, due for urine screen.  Refilled x 3mo, follow up in 3 months with evisit and face to face every 6 months        See Patient Instructions    Lola Perez, [facility]    There are no Patient Instructions on file for this visit.

## 2018-03-28 ENCOUNTER — OFFICE VISIT (OUTPATIENT)
Dept: FAMILY MEDICINE | Facility: CLINIC | Age: 48
End: 2018-03-28
Payer: OTHER MISCELLANEOUS

## 2018-03-28 VITALS
BODY MASS INDEX: 26.32 KG/M2 | DIASTOLIC BLOOD PRESSURE: 77 MMHG | WEIGHT: 205 LBS | RESPIRATION RATE: 12 BRPM | TEMPERATURE: 98.1 F | SYSTOLIC BLOOD PRESSURE: 113 MMHG | HEART RATE: 83 BPM | OXYGEN SATURATION: 98 %

## 2018-03-28 DIAGNOSIS — M54.2 NECK PAIN: ICD-10-CM

## 2018-03-28 PROCEDURE — 99213 OFFICE O/P EST LOW 20 MIN: CPT | Performed by: NURSE PRACTITIONER

## 2018-03-28 RX ORDER — HYDROCODONE BITARTRATE AND ACETAMINOPHEN 5; 325 MG/1; MG/1
1 TABLET ORAL EVERY 6 HOURS PRN
Qty: 28 TABLET | Refills: 0 | Status: SHIPPED | OUTPATIENT
Start: 2018-04-25 | End: 2018-03-28

## 2018-03-28 RX ORDER — HYDROCODONE BITARTRATE AND ACETAMINOPHEN 5; 325 MG/1; MG/1
1 TABLET ORAL EVERY 6 HOURS PRN
Qty: 28 TABLET | Refills: 0 | Status: SHIPPED | OUTPATIENT
Start: 2018-03-28 | End: 2018-03-28

## 2018-03-28 RX ORDER — TRAMADOL HYDROCHLORIDE 50 MG/1
50-100 TABLET ORAL EVERY 6 HOURS PRN
Qty: 56 TABLET | Refills: 0 | Status: SHIPPED | OUTPATIENT
Start: 2018-05-23 | End: 2018-06-20

## 2018-03-28 RX ORDER — HYDROCODONE BITARTRATE AND ACETAMINOPHEN 5; 325 MG/1; MG/1
1 TABLET ORAL EVERY 6 HOURS PRN
Qty: 28 TABLET | Refills: 0 | Status: SHIPPED | OUTPATIENT
Start: 2018-05-23 | End: 2018-06-20

## 2018-03-28 RX ORDER — TRAMADOL HYDROCHLORIDE 50 MG/1
50-100 TABLET ORAL EVERY 6 HOURS PRN
Qty: 56 TABLET | Refills: 0 | Status: SHIPPED | OUTPATIENT
Start: 2018-03-28 | End: 2018-03-28

## 2018-03-28 RX ORDER — TRAMADOL HYDROCHLORIDE 50 MG/1
50-100 TABLET ORAL EVERY 6 HOURS PRN
Qty: 56 TABLET | Refills: 0 | Status: SHIPPED | OUTPATIENT
Start: 2018-04-25 | End: 2018-03-28

## 2018-03-28 ASSESSMENT — ANXIETY QUESTIONNAIRES
1. FEELING NERVOUS, ANXIOUS, OR ON EDGE: SEVERAL DAYS
6. BECOMING EASILY ANNOYED OR IRRITABLE: SEVERAL DAYS
GAD7 TOTAL SCORE: 4
3. WORRYING TOO MUCH ABOUT DIFFERENT THINGS: NOT AT ALL
IF YOU CHECKED OFF ANY PROBLEMS ON THIS QUESTIONNAIRE, HOW DIFFICULT HAVE THESE PROBLEMS MADE IT FOR YOU TO DO YOUR WORK, TAKE CARE OF THINGS AT HOME, OR GET ALONG WITH OTHER PEOPLE: NOT DIFFICULT AT ALL
2. NOT BEING ABLE TO STOP OR CONTROL WORRYING: NOT AT ALL
7. FEELING AFRAID AS IF SOMETHING AWFUL MIGHT HAPPEN: SEVERAL DAYS
5. BEING SO RESTLESS THAT IT IS HARD TO SIT STILL: NOT AT ALL

## 2018-03-28 ASSESSMENT — PATIENT HEALTH QUESTIONNAIRE - PHQ9: 5. POOR APPETITE OR OVEREATING: SEVERAL DAYS

## 2018-03-28 NOTE — MR AVS SNAPSHOT
After Visit Summary   3/28/2018    Jas Patel    MRN: [id number]           Patient Information     Date Of Birth          1970        Visit Information        Provider Department      3/28/2018 3:20 PM Lola Perez APRN CNP [facility]        Today's Diagnoses     Neck pain           Follow-ups after your visit        Who to contact     If you have questions or need follow up information about today's clinic visit or your schedule please contact [facility] directly at [phone].  Normal or non-critical lab and imaging results will be communicated to you by "[employer]hart, letter or phone within 4 business days after the clinic has received the results. If you do not hear from us within 7 days, please contact the clinic through [employer] or phone. If you have a critical or abnormal lab result, we will notify you by phone as soon as possible.  Submit refill requests through [employer] or call your pharmacy and they will forward the refill request to us. Please allow 3 business days for your refill to be completed.          Additional Information About Your Visit        MyChart Information     [employer] gives you secure access to your electronic health record. If you see a primary care provider, you can also send messages to your care team and make appointments. If you have questions, please call your primary care clinic.  If you do not have a primary care provider, please call [phone] and they will assist you.        Care EveryWhere ID     This is your Care EveryWhere ID. This could be used by other organizations to access your [place] medical records  [id number]        Your Vitals Were     Pulse Temperature Respirations Pulse Oximetry BMI (Body Mass Index)       83 98.1  F (36.7  C) (Oral) 12 98% 26.32 kg/m2        Blood Pressure from Last 3 Encounters:   03/28/18 113/77   12/26/17 136/90   12/18/17 (!) 133/95    Weight from Last 3 Encounters:   03/28/18 205  lb (93 kg)   12/26/17 206 lb 8 oz (93.7 kg)   12/18/17 209 lb (94.8 kg)              We Performed the Following     DEPRESSION ACTION PLAN (DAP)     Drug Abuse Screen Panel 13, Urine (Pain Care Package)          Today's Medication Changes          These changes are accurate as of 3/28/18  4:04 PM.  If you have any questions, ask your nurse or doctor.               Start taking these medicines.        Dose/Directions    HYDROcodone-acetaminophen 5-325 MG per tablet   Commonly known as:  NORCO   Used for:  Neck pain   Started by:  Lola Preez APRN CNP        Dose:  1 tablet   Start taking on:  5/23/2018   Take 1 tablet by mouth every 6 hours as needed for pain #28 pills/4 weeks   Quantity:  28 tablet   Refills:  0       traMADol 50 MG tablet   Commonly known as:  ULTRAM   Used for:  Neck pain   Started by:  Lola Perez APRN CNP        Dose:   mg   Start taking on:  5/23/2018   Take 1-2 tablets ( mg) by mouth every 6 hours as needed for pain #56 pills/4 weeks.   Quantity:  56 tablet   Refills:  0            Where to get your medicines      Some of these will need a paper prescription and others can be bought over the counter.  Ask your nurse if you have questions.     Bring a paper prescription for each of these medications     HYDROcodone-acetaminophen 5-325 MG per tablet    traMADol 50 MG tablet                Primary Care Provider Office Phone # Fax #    RAFAEL Fournier [phone]       [address]  [facility] [address]        Equal Access to Services     JIMMIE GERMAIN AH: Hadii aad ku hadasho Soomaali, waaxda luqadaha, qaybta kaalmada adeegyada, waxay allysonin hayelise mas. So [facility] [phone].    ATENCIÓN: Si habla español, tiene a tesfaye disposición servicios gratuitos de asistencia lingüística. Llame al [phone].    We comply with applicable federal civil rights laws and [place] laws. We do not discriminate on the basis of race,  color, national origin, age, disability, sex, sexual orientation, or gender identity.            Thank you!     Thank you for choosing [facility]  for your care. Our goal is always to provide you with excellent care. Hearing back from our patients is one way we can continue to improve our services. Please take a few minutes to complete the written survey that you may receive in the mail after your visit with us. Thank you!             Your Updated Medication List - Protect others around you: Learn how to safely use, store and throw away your medicines at www.disposemymeds.org.          This list is accurate as of 3/28/18  4:04 PM.  Always use your most recent med list.                   Brand Name Dispense Instructions for use Diagnosis    amitriptyline 25 MG tablet    ELAVIL    90 tablet    TAKE ONE TABLET BY MOUTH AT BEDTIME    Insomnia, unspecified type       citalopram 40 MG tablet    celeXA    90 tablet    TAKE ONE TABLET BY MOUTH EVERY DAY (DUE FOR OFFICE VISIT IN APRIL OR MAY)    Generalized anxiety disorder       CLARITIN 10 MG tablet   Generic drug:  loratadine      Take 10 mg by mouth daily. 1 tab daily    Generalized anxiety disorder       HYDROcodone-acetaminophen 5-325 MG per tablet   Start taking on:  5/23/2018    NORCO    28 tablet    Take 1 tablet by mouth every 6 hours as needed for pain #28 pills/4 weeks    Neck pain       MELATONIN PO      Take 5 mg by mouth At Bedtime    Neck pain       Multi-vitamin Tabs tablet   Generic drug:  multivitamin, therapeutic with minerals      one daily    Other specified counseling       NAPROXEN PO      Take 220 mg by mouth 2 times daily as needed for moderate pain        omeprazole 20 MG CR capsule    priLOSEC    30 capsule    Take 1 capsule (20 mg) by mouth daily    Gastroesophageal reflux disease with esophagitis       oxybutynin 5 MG 24 hr tablet    DITROPAN-XL    90 tablet    Take 1 tablet (5 mg) by mouth daily    Urinary frequency        propranolol 10 MG tablet    INDERAL    180 tablet    TAKE ONE TABLET BY MOUTH TWICE A DAY AS NEEDED FOR TREMOR    Generalized anxiety disorder       tamsulosin 0.4 MG capsule    FLOMAX    90 capsule    TAKE ONE CAPSULE BY MOUTH DAILY    Benign non-nodular prostatic hyperplasia with lower urinary tract symptoms       tiZANidine 4 MG tablet    ZANAFLEX    30 tablet    Take 1-2 tablets (4-8 mg) by mouth 3 times daily as needed for muscle spasms    Strain of lumbar region, initial encounter       traMADol 50 MG tablet   Start taking on:  5/23/2018    ULTRAM    56 tablet    Take 1-2 tablets ( mg) by mouth every 6 hours as needed for pain #56 pills/4 weeks.    Neck pain       VITAMIN D3 PO      Take 5,000 Units by mouth every other day

## 2018-03-28 NOTE — LETTER
My Depression Action Plan  Name: Jas Patel   Date of Birth 1970  Date: 3/28/2018    My doctor: Lola Peerz   My clinic: [address] [address] [phone]          GREEN    ZONE   Good Control    What it looks like:     Things are going generally well. You have normal up s and down s. You may even feel depressed from time to time, but bad moods usually last less than a day.   What you need to do:  1. Continue to care for yourself (see self care plan)  2. Check your depression survival kit and update it as needed  3. Follow your physician s recommendations including any medication.  4. Do not stop taking medication unless you consult with your physician first.           YELLOW         ZONE Getting Worse    What it looks like:     Depression is starting to interfere with your life.     It may be hard to get out of bed; you may be starting to isolate yourself from others.    Symptoms of depression are starting to last most all day and this has happened for several days.     You may have suicidal thoughts but they are not constant.   What you need to do:     1. Call your care team, your response to treatment will improve if you keep your care team informed of your progress. Yellow periods are signs an adjustment may need to be made.     2. Continue your self-care, even if you have to fake it!    3. Talk to someone in your support network    4. Open up your depression survival kit           RED    ZONE Medical Alert - Get Help    What it looks like:     Depression is seriously interfering with your life.     You may experience these or other symptoms: You can t get out of bed most days, can t work or engage in other necessary activities, you have trouble taking care of basic hygiene, or basic responsibilities, thoughts of suicide or death that will not go away, self-injurious behavior.     What you need to do:  1. Call your care  team and request a same-day appointment. If they are not available (weekends or after hours) call your local crisis line, emergency room or 911.            Depression Self Care Plan / Survival Kit    Self-Care for Depression  Here s the deal. Your body and mind are really not as separate as most people think.  What you do and think affects how you feel and how you feel influences what you do and think. This means if you do things that people who feel good do, it will help you feel better.  Sometimes this is all it takes.  There is also a place for medication and therapy depending on how severe your depression is, so be sure to consult with your medical provider and/ or Behavioral Health Consultant if your symptoms are worsening or not improving.     In order to better manage my stress, I will:    Exercise  Get some form of exercise, every day. This will help reduce pain and release endorphins, the  feel good  chemicals in your brain. This is almost as good as taking antidepressants!  This is not the same as joining a gym and then never going! (they count on that by the way ) It can be as simple as just going for a walk or doing some gardening, anything that will get you moving.      Hygiene   Maintain good hygiene (Get out of bed in the morning, Make your bed, Brush your teeth, Take a shower, and Get dressed like you were going to work, even if you are unemployed).  If your clothes don't fit try to get ones that do.    Diet  I will strive to eat foods that are good for me, drink plenty of water, and avoid excessive sugar, caffeine, alcohol, and other mood-altering substances.  Some foods that are helpful in depression are: complex carbohydrates, B vitamins, flaxseed, fish or fish oil, fresh fruits and vegetables.    Psychotherapy  I agree to participate in Individual Therapy (if recommended).    Medication  If prescribed medications, I agree to take them.  Missing doses can result in serious side effects.  I  understand that drinking alcohol, or other illicit drug use, may cause potential side effects.  I will not stop my medication abruptly without first discussing it with my provider.    Staying Connected With Others  I will stay in touch with my friends, family members, and my primary care provider/team.    Use your imagination  Be creative.  We all have a creative side; it doesn t matter if it s oil painting, sand castles, or mud pies! This will also kick up the endorphins.    Witness Beauty  (AKA stop and smell the roses) Take a look outside, even in mid-winter. Notice colors, textures. Watch the squirrels and birds.     Service to others  Be of service to others.  There is always someone else in need.  By helping others we can  get out of ourselves  and remember the really important things.  This also provides opportunities for practicing all the other parts of the program.    Humor  Laugh and be silly!  Adjust your TV habits for less news and crime-drama and more comedy.    Control your stress  Try breathing deep, massage therapy, biofeedback, and meditation. Find time to relax each day.     My support system    Clinic Contact:  Phone number:    Contact 1:  Phone number:    Contact 2:  Phone number:    Zoroastrian/:  Phone number:    Therapist:  Phone number:    Local crisis center:    Phone number:    Other community support:  Phone number:

## 2018-03-28 NOTE — LETTER
[facility]    03/28/18    Patient: Jas Patel  YOB: 1970  Medical Record Number: [id number]                                                                  Controlled Substance Agreement  I understand that my care provider has prescribed controlled substances (narcotics, tranquilizers, and/or stimulants) to help manage my condition(s).  I am taking this medicine to help me function or work.  I know that this is strong medicine.  It could have serious side effects and even cause a dependency on the drug.  If I stop these medicines suddenly, I could have severe withdrawal symptoms.    The risks, benefits, and side effects of these medication(s) were explained to me.  I agree that:  1. I will take part in other treatments as advised by my provider.  This may be psychiatry or counseling, physical therapy, behavioral therapy, group treatment, or a referral to a pain clinic.  I will reduce or stop my medicine when my provider tells me to do so.   2. I will take my medicines as prescribed.  I will not change the dose or schedule unless my provider tells me to.  There will be no refills if I  run out early.   I may be contacted at any time without warning and asked to complete a drug test or pill count.   3. I will keep all my appointments at the clinic.  If I miss appointments or fail to follow instructions, my provider may stop my medicine.  4. I will not ask other providers to prescribe controlled substances. And I will not accept controlled substances from other people. If I need another prescribed controlled substance for a new reason, I will notify my provider within one business day.  5. If I enroll in the [place] Medical Marijuana program, I will tell my provider.  I will also sign an agreement to share my medical records with my provider.  6. I will use one pharmacy to fill all of my controlled substance prescriptions.  If my prescription is mailed to my pharmacy, it may take 5  to 7 days for my medicine to be ready.  7. I understand that my provider, clinic care team, and pharmacy can track controlled substance prescriptions from other providers through a central database (prescription monitoring program).  8. I will bring in my list of medications (or my medicine bottles) each time I come to the clinic.  REV- 04/2016                                                                                                                                            Page 1 of 2      [facility]    03/28/18    Patient: Jas Patel  YOB: 1970  Medical Record Number: [id number]    9. Refills of controlled substances will be made only during office hours.  It is up to me to make sure that I do not run out of my medicines on weekends or holidays.    10. I am responsible for my prescriptions.  If the medicine is lost or stolen, it will not be replaced.   I also agree not to share these medicines with anyone.  11. I agree to not use ANY illegal or recreational drugs.  This includes marijuana, cocaine, bath salts or other drugs.  I agree not to use alcohol unless my provider says I may.  I agree to give urine samples whenever asked.  If I fail to give a urine sample, the provider may stop my medicine.     12. I will tell my nurse or provider right away if I become pregnant or have a new medical problem treated outside of [facility].  13. I understand that this medicine can affect my thinking and judgment.  It may be unsafe for me to drive, use machinery and do dangerous tasks.  I will not do any of these things until I know how the medicine affects me.  If my dose changes, I will wait to see how it affects me.  I will contact my provider if I have concerns about medicine side effects.  I understand that if I do not follow any of the conditions above, my prescriptions or treatment may be stopped.    I agree that my provider, clinic care team, and pharmacy may work with any  city, state or federal law enforcement agency that investigates the misuse, sale, or other diversion of my controlled medicine. I will allow my provider to discuss my care with or share a copy of this agreement with any other treating provider, pharmacy or emergency room where I receive care.  I agree to give up (waive) any right of privacy or confidentiality with respect to these authorizations.   I have read this agreement and have asked questions about anything I did not understand.   ___________________________________    ___________________________  Patient Signature                                                           Date and Time  ___________________________________     ____________________________  Witness                                                                            Date and Time  ___________________________________  RAFAEL Fournier CNP  REV-  04/2016                                                                                                                                                                 Page 2 of 2

## 2018-03-29 ASSESSMENT — PATIENT HEALTH QUESTIONNAIRE - PHQ9: SUM OF ALL RESPONSES TO PHQ QUESTIONS 1-9: 2

## 2018-03-29 ASSESSMENT — ANXIETY QUESTIONNAIRES: GAD7 TOTAL SCORE: 4

## 2018-04-02 DIAGNOSIS — M54.2 NECK PAIN: ICD-10-CM

## 2018-04-02 LAB
AMPHETAMINES UR QL: NOT DETECTED NG/ML
BARBITURATES UR QL SCN: NOT DETECTED NG/ML
BENZODIAZ UR QL SCN: NOT DETECTED NG/ML
BUPRENORPHINE UR QL: NOT DETECTED NG/ML
CANNABINOIDS UR QL: ABNORMAL NG/ML
COCAINE UR QL SCN: NOT DETECTED NG/ML
D-METHAMPHET UR QL: NOT DETECTED NG/ML
METHADONE UR QL SCN: NOT DETECTED NG/ML
OPIATES UR QL SCN: NOT DETECTED NG/ML
OXYCODONE UR QL SCN: NOT DETECTED NG/ML
PCP UR QL SCN: NOT DETECTED NG/ML
PROPOXYPH UR QL: NOT DETECTED NG/ML
TRICYCLICS UR QL SCN: ABNORMAL NG/ML

## 2018-04-02 PROCEDURE — 80306 DRUG TEST PRSMV INSTRMNT: CPT | Performed by: NURSE PRACTITIONER

## 2018-04-03 NOTE — PROGRESS NOTES
Arsenio Mark,    Your urine tested positive for cannabis (marijuana).  Because this is an illegal substance currently in [place] it does violate your controlled substance agreement.  Please stop using the cannabis, and next urine test if you are positive again we should talk about alternative options to the tramadol and norco.  You cannot continue to use the marijuana and these substances.    Let me know if any questions.    Lola Perez, CNP

## 2018-05-06 DIAGNOSIS — N40.1 BENIGN NON-NODULAR PROSTATIC HYPERPLASIA WITH LOWER URINARY TRACT SYMPTOMS: ICD-10-CM

## 2018-05-07 NOTE — TELEPHONE ENCOUNTER
"Requested Prescriptions   Pending Prescriptions Disp Refills     tamsulosin (FLOMAX) 0.4 MG capsule [Pharmacy Med Name: TAMSULOSIN HCL 0.4MG CAPS]  Last Written Prescription Date:  11/2/2017  Last Fill Quantity: 90 CAPSULE,  # refills: 1   Last Office Visit: 3/28/2018   Future Office Visit:      90 capsule 1     Sig: TAKE ONE CAPSULE BY MOUTH DAILY    Alpha Blockers Passed    5/6/2018 11:47 PM       Passed - Blood pressure under 140/90 in past 12 months    BP Readings from Last 3 Encounters:   03/28/18 113/77   12/26/17 136/90   12/18/17 (!) 133/95          Passed - Recent (12 mo) or future (30 days) visit within the authorizing provider's specialty    Patient had office visit in the last 12 months or has a visit in the next 30 days with authorizing provider or within the authorizing provider's specialty.  See \"Patient Info\" tab in inbasket, or \"Choose Columns\" in Meds & Orders section of the refill encounter.           Passed - Patient does not have Tadalafil, Vardenafil, or Sildenafil on their medication list       Passed - Patient is 18 years of age or older          "

## 2018-05-10 RX ORDER — TAMSULOSIN HYDROCHLORIDE 0.4 MG/1
CAPSULE ORAL
Qty: 90 CAPSULE | Refills: 2 | Status: SHIPPED | OUTPATIENT
Start: 2018-05-10 | End: 2019-03-05

## 2018-06-20 ENCOUNTER — E-VISIT (OUTPATIENT)
Dept: FAMILY MEDICINE | Facility: CLINIC | Age: 48
End: 2018-06-20
Payer: COMMERCIAL

## 2018-06-20 ENCOUNTER — MYC MEDICAL ADVICE (OUTPATIENT)
Dept: FAMILY MEDICINE | Facility: CLINIC | Age: 48
End: 2018-06-20

## 2018-06-20 DIAGNOSIS — M54.2 NECK PAIN: ICD-10-CM

## 2018-06-20 PROCEDURE — 98969 ZZC NONPHYSICIAN ONLINE ASSESSMENT AND MANAGEMENT: CPT | Performed by: NURSE PRACTITIONER

## 2018-06-20 RX ORDER — HYDROCODONE BITARTRATE AND ACETAMINOPHEN 5; 325 MG/1; MG/1
1 TABLET ORAL EVERY 6 HOURS PRN
Qty: 28 TABLET | Refills: 0 | Status: SHIPPED | OUTPATIENT
Start: 2018-07-18 | End: 2018-06-20

## 2018-06-20 RX ORDER — HYDROCODONE BITARTRATE AND ACETAMINOPHEN 5; 325 MG/1; MG/1
1 TABLET ORAL EVERY 6 HOURS PRN
Qty: 28 TABLET | Refills: 0 | Status: SHIPPED | OUTPATIENT
Start: 2018-08-15 | End: 2018-09-12

## 2018-06-20 RX ORDER — HYDROCODONE BITARTRATE AND ACETAMINOPHEN 5; 325 MG/1; MG/1
1 TABLET ORAL EVERY 6 HOURS PRN
Qty: 28 TABLET | Refills: 0 | Status: SHIPPED | OUTPATIENT
Start: 2018-06-20 | End: 2018-06-20

## 2018-06-20 RX ORDER — TRAMADOL HYDROCHLORIDE 50 MG/1
50-100 TABLET ORAL EVERY 6 HOURS PRN
Qty: 56 TABLET | Refills: 0 | Status: SHIPPED | OUTPATIENT
Start: 2018-07-18 | End: 2018-06-20

## 2018-06-20 RX ORDER — TRAMADOL HYDROCHLORIDE 50 MG/1
50-100 TABLET ORAL EVERY 6 HOURS PRN
Qty: 56 TABLET | Refills: 0 | Status: SHIPPED | OUTPATIENT
Start: 2018-08-15 | End: 2018-09-12

## 2018-06-20 RX ORDER — TRAMADOL HYDROCHLORIDE 50 MG/1
50-100 TABLET ORAL EVERY 6 HOURS PRN
Qty: 56 TABLET | Refills: 0 | Status: SHIPPED | OUTPATIENT
Start: 2018-06-20 | End: 2018-06-20

## 2018-06-20 NOTE — TELEPHONE ENCOUNTER
Writer notes patient response to E-visit    Closing encounter - no further actions needed at this time    Ragini Cheung RN

## 2018-07-16 DIAGNOSIS — F41.1 GENERALIZED ANXIETY DISORDER: ICD-10-CM

## 2018-07-16 NOTE — TELEPHONE ENCOUNTER
"Requested Prescriptions   Pending Prescriptions Disp Refills     citalopram (CELEXA) 40 MG tablet [Pharmacy Med Name: CITALOPRAM HYDROBROMIDE 40MG TABS]  Last Written Prescription Date:  7/17/17  Last Fill Quantity: 90 tablet,  # refills: 3   Last Office Visit: 3/28/2018   Future Office Visit:      90 tablet 3     Sig: TAKE ONE TABLET BY MOUTH EVERY DAY    SSRIs Protocol Passed    7/16/2018  8:09 AM   PHQ-9 SCORE 11/2/2016 7/17/2017 3/28/2018   Total Score - - -   Total Score 3 11 2     DONTE-7 SCORE 4/11/2016 7/17/2017 3/28/2018   Total Score - - -   Total Score 9 7 4         Passed - Recent (12 mo) or future (30 days) visit within the authorizing provider's specialty    Patient had office visit in the last 12 months or has a visit in the next 30 days with authorizing provider or within the authorizing provider's specialty.  See \"Patient Info\" tab in inbasket, or \"Choose Columns\" in Meds & Orders section of the refill encounter.           Passed - Patient is age 18 or older          "

## 2018-07-20 RX ORDER — CITALOPRAM HYDROBROMIDE 40 MG/1
TABLET ORAL
Qty: 90 TABLET | Refills: 2 | Status: SHIPPED | OUTPATIENT
Start: 2018-07-20 | End: 2019-03-05

## 2018-09-12 ENCOUNTER — OFFICE VISIT (OUTPATIENT)
Dept: FAMILY MEDICINE | Facility: CLINIC | Age: 48
End: 2018-09-12
Payer: OTHER MISCELLANEOUS

## 2018-09-12 VITALS
RESPIRATION RATE: 16 BRPM | TEMPERATURE: 97.6 F | OXYGEN SATURATION: 98 % | BODY MASS INDEX: 26.45 KG/M2 | HEART RATE: 93 BPM | SYSTOLIC BLOOD PRESSURE: 124 MMHG | WEIGHT: 206 LBS | DIASTOLIC BLOOD PRESSURE: 76 MMHG

## 2018-09-12 DIAGNOSIS — M54.2 NECK PAIN: ICD-10-CM

## 2018-09-12 PROCEDURE — 99213 OFFICE O/P EST LOW 20 MIN: CPT | Performed by: FAMILY MEDICINE

## 2018-09-12 RX ORDER — HYDROCODONE BITARTRATE AND ACETAMINOPHEN 5; 325 MG/1; MG/1
1 TABLET ORAL EVERY 6 HOURS PRN
Qty: 28 TABLET | Refills: 0 | Status: SHIPPED | OUTPATIENT
Start: 2018-09-12 | End: 2018-10-12

## 2018-09-12 RX ORDER — TRAMADOL HYDROCHLORIDE 50 MG/1
50-100 TABLET ORAL EVERY 6 HOURS PRN
Qty: 56 TABLET | Refills: 0 | Status: SHIPPED | OUTPATIENT
Start: 2018-09-12 | End: 2018-10-12

## 2018-09-12 RX ORDER — TRAMADOL HYDROCHLORIDE 50 MG/1
50-100 TABLET ORAL EVERY 6 HOURS PRN
Qty: 56 TABLET | Refills: 0 | Status: SHIPPED | OUTPATIENT
Start: 2018-11-13 | End: 2018-12-05

## 2018-09-12 RX ORDER — HYDROCODONE BITARTRATE AND ACETAMINOPHEN 5; 325 MG/1; MG/1
1 TABLET ORAL EVERY 6 HOURS PRN
Qty: 28 TABLET | Refills: 0 | Status: SHIPPED | OUTPATIENT
Start: 2018-11-13 | End: 2018-12-05

## 2018-09-12 RX ORDER — HYDROCODONE BITARTRATE AND ACETAMINOPHEN 5; 325 MG/1; MG/1
1 TABLET ORAL EVERY 6 HOURS PRN
Qty: 28 TABLET | Refills: 0 | Status: SHIPPED | OUTPATIENT
Start: 2018-10-13 | End: 2018-11-12

## 2018-09-12 RX ORDER — TRAMADOL HYDROCHLORIDE 50 MG/1
50-100 TABLET ORAL EVERY 6 HOURS PRN
Qty: 56 TABLET | Refills: 0 | Status: SHIPPED | OUTPATIENT
Start: 2018-10-13 | End: 2018-11-12

## 2018-09-12 NOTE — MR AVS SNAPSHOT
After Visit Summary   9/12/2018    Jas Patel    MRN: [id number]           Patient Information     Date Of Birth          1970        Visit Information        Provider Department      9/12/2018 2:40 PM Yadiel Britton MD [facility]        Today's Diagnoses     Neck pain           Follow-ups after your visit        Who to contact     If you have questions or need follow up information about today's clinic visit or your schedule please contact [facility] directly at [phone].  Normal or non-critical lab and imaging results will be communicated to you by [employer], letter or phone within 4 business days after the clinic has received the results. If you do not hear from us within 7 days, please contact the clinic through [employer]t or phone. If you have a critical or abnormal lab result, we will notify you by phone as soon as possible.  Submit refill requests through [employer] or call your pharmacy and they will forward the refill request to us. Please allow 3 business days for your refill to be completed.          Additional Information About Your Visit        MyChart Information     [employer] gives you secure access to your electronic health record. If you see a primary care provider, you can also send messages to your care team and make appointments. If you have questions, please call your primary care clinic.  If you do not have a primary care provider, please call [phone] and they will assist you.        Care EveryWhere ID     This is your Care EveryWhere ID. This could be used by other organizations to access your [place] medical records  [id number]        Your Vitals Were     Pulse Temperature Respirations Pulse Oximetry BMI (Body Mass Index)       93 97.6  F (36.4  C) 16 98% 26.45 kg/m2        Blood Pressure from Last 3 Encounters:   09/12/18 124/76   03/28/18 113/77   12/26/17 136/90    Weight from Last 3 Encounters:   09/12/18 206 lb (93.4 kg)    03/28/18 205 lb (93 kg)   12/26/17 206 lb 8 oz (93.7 kg)              Today, you had the following     No orders found for display         Today's Medication Changes          These changes are accurate as of 9/12/18 11:59 PM.  If you have any questions, ask your nurse or doctor.               These medicines have changed or have updated prescriptions.        Dose/Directions    * HYDROcodone-acetaminophen 5-325 MG per tablet   Commonly known as:  NORCO   This may have changed:  You were already taking a medication with the same name, and this prescription was added. Make sure you understand how and when to take each.   Used for:  Neck pain   Changed by:  Yadiel Britton MD        Dose:  1 tablet   Take 1 tablet by mouth every 6 hours as needed for pain #28 pills/4 weeks   Quantity:  28 tablet   Refills:  0       * HYDROcodone-acetaminophen 5-325 MG per tablet   Commonly known as:  NORCO   This may have changed:  You were already taking a medication with the same name, and this prescription was added. Make sure you understand how and when to take each.   Used for:  Neck pain   Changed by:  Yadiel Britton MD        Dose:  1 tablet   Start taking on:  10/13/2018   Take 1 tablet by mouth every 6 hours as needed for pain #28 pills/4 weeks   Quantity:  28 tablet   Refills:  0       * HYDROcodone-acetaminophen 5-325 MG per tablet   Commonly known as:  NORCO   This may have changed:  These instructions start on 11/13/2018. If you are unsure what to do until then, ask your doctor or other care provider.   Used for:  Neck pain   Changed by:  Yadiel Britton MD        Dose:  1 tablet   Start taking on:  11/13/2018   Take 1 tablet by mouth every 6 hours as needed for pain #28 pills/4 weeks   Quantity:  28 tablet   Refills:  0       * traMADol 50 MG tablet   Commonly known as:  ULTRAM   This may have changed:  You were already taking a medication with the same name, and this prescription was added. Make sure  you understand how and when to take each.   Used for:  Neck pain   Changed by:  Yadiel Britton MD        Dose:   mg   Take 1-2 tablets ( mg) by mouth every 6 hours as needed for severe pain #56 pills/4 weeks.   Quantity:  56 tablet   Refills:  0       * traMADol 50 MG tablet   Commonly known as:  ULTRAM   This may have changed:  You were already taking a medication with the same name, and this prescription was added. Make sure you understand how and when to take each.   Used for:  Neck pain   Changed by:  Yadiel Britton MD        Dose:   mg   Start taking on:  10/13/2018   Take 1-2 tablets ( mg) by mouth every 6 hours as needed for severe pain #56 pills/4 weeks.   Quantity:  56 tablet   Refills:  0       * traMADol 50 MG tablet   Commonly known as:  ULTRAM   This may have changed:  These instructions start on 11/13/2018. If you are unsure what to do until then, ask your doctor or other care provider.   Used for:  Neck pain   Changed by:  Yadiel Britton MD        Dose:   mg   Start taking on:  11/13/2018   Take 1-2 tablets ( mg) by mouth every 6 hours as needed for pain #56 pills/4 weeks.   Quantity:  56 tablet   Refills:  0       * Notice:  This list has 6 medication(s) that are the same as other medications prescribed for you. Read the directions carefully, and ask your doctor or other care provider to review them with you.         Where to get your medicines      Some of these will need a paper prescription and others can be bought over the counter.  Ask your nurse if you have questions.     Bring a paper prescription for each of these medications     HYDROcodone-acetaminophen 5-325 MG per tablet    HYDROcodone-acetaminophen 5-325 MG per tablet    HYDROcodone-acetaminophen 5-325 MG per tablet    traMADol 50 MG tablet    traMADol 50 MG tablet    traMADol 50 MG tablet               Information about OPIOIDS     PRESCRIPTION OPIOIDS: WHAT YOU NEED TO KNOW   We  gave you an opioid (narcotic) pain medicine. It is important to manage your pain, but opioids are not always the best choice. You should first try all the other options your care team gave you. Take this medicine for as short a time (and as few doses) as possible.    Some activities can increase your pain, such as bandage changes or therapy sessions. It may help to take your pain medicine 30 to 60 minutes before these activities. Reduce your stress by getting enough sleep, working on hobbies you enjoy and practicing relaxation or meditation. Talk to your care team about ways to manage your pain beyond prescription opioids.    These medicines have risks:    DO NOT drive when on new or higher doses of pain medicine. These medicines can affect your alertness and reaction times, and you could be arrested for driving under the influence (DUI). If you need to use opioids long-term, talk to your care team about driving.    DO NOT operate heavy machinery    DO NOT do any other dangerous activities while taking these medicines.    DO NOT drink any alcohol while taking these medicines.     If the opioid prescribed includes acetaminophen, DO NOT take with any other medicines that contain acetaminophen. Read all labels carefully. Look for the word  acetaminophen  or  Tylenol.  Ask your pharmacist if you have questions or are unsure.    You can get addicted to pain medicines, especially if you have a history of addiction (chemical, alcohol or substance dependence). Talk to your care team about ways to reduce this risk.    All opioids tend to cause constipation. Drink plenty of water and eat foods that have a lot of fiber, such as fruits, vegetables, prune juice, apple juice and high-fiber cereal. Take a laxative (Miralax, milk of magnesia, Colace, Senna) if you don t move your bowels at least every other day. Other side effects include upset stomach, sleepiness, dizziness, throwing up, tolerance (needing more of the medicine to  have the same effect), physical dependence and slowed breathing.    Store your pills in a secure place, locked if possible. We will not replace any lost or stolen medicine. If you don t finish your medicine, please throw away (dispose) as directed by your pharmacist. The [place] Pollution Control Agency has more information about safe disposal: https://www.pca.[facility].[place]/living-green/managing-unwanted-medications         Primary Care Provider Office Phone # Fax #    Lola Perez, RAFAEL [facility] [phone]       [address]  [facility] [address]        Equal Access to Services     JIMMIE [facility] CenterEDGARDO : Hadii aad ku hadasho Soomaali, waaxda luqadaha, qaybta kaalmada adeegyada, josie pearce . So [facility] [phone].    ATENCIÓN: Si habla español, tiene a tesfaye disposición servicios gratuitos de asistencia lingüística. Llame al [phone].    We comply with applicable federal civil rights laws and [place] laws. We do not discriminate on the basis of race, color, national origin, age, disability, sex, sexual orientation, or gender identity.            Thank you!     Thank you for choosing [facility]  for your care. Our goal is always to provide you with excellent care. Hearing back from our patients is one way we can continue to improve our services. Please take a few minutes to complete the written survey that you may receive in the mail after your visit with us. Thank you!             Your Updated Medication List - Protect others around you: Learn how to safely use, store and throw away your medicines at www.disposemymeds.org.          This list is accurate as of 9/12/18 11:59 PM.  Always use your most recent med list.                   Brand Name Dispense Instructions for use Diagnosis    amitriptyline 25 MG tablet    ELAVIL    90 tablet    TAKE ONE TABLET BY MOUTH AT BEDTIME    Insomnia, unspecified type       citalopram 40 MG tablet    celeXA    90 tablet     TAKE ONE TABLET BY MOUTH EVERY DAY    Generalized anxiety disorder       CLARITIN 10 MG tablet   Generic drug:  loratadine      Take 10 mg by mouth daily. 1 tab daily    Generalized anxiety disorder       * HYDROcodone-acetaminophen 5-325 MG per tablet    NORCO    28 tablet    Take 1 tablet by mouth every 6 hours as needed for pain #28 pills/4 weeks    Neck pain       * HYDROcodone-acetaminophen 5-325 MG per tablet   Start taking on:  10/13/2018    NORCO    28 tablet    Take 1 tablet by mouth every 6 hours as needed for pain #28 pills/4 weeks    Neck pain       * HYDROcodone-acetaminophen 5-325 MG per tablet   Start taking on:  11/13/2018    NORCO    28 tablet    Take 1 tablet by mouth every 6 hours as needed for pain #28 pills/4 weeks    Neck pain       IBUPROFEN PO           MELATONIN PO      Take 5 mg by mouth At Bedtime    Neck pain       Multi-vitamin Tabs tablet   Generic drug:  multivitamin, therapeutic with minerals      one daily    Other specified counseling       omeprazole 20 MG CR capsule    priLOSEC    30 capsule    Take 1 capsule (20 mg) by mouth daily    Gastroesophageal reflux disease with esophagitis       oxybutynin 5 MG 24 hr tablet    DITROPAN-XL    90 tablet    Take 1 tablet (5 mg) by mouth daily    Urinary frequency       propranolol 10 MG tablet    INDERAL    180 tablet    TAKE ONE TABLET BY MOUTH TWICE A DAY AS NEEDED FOR TREMOR    Generalized anxiety disorder       tamsulosin 0.4 MG capsule    FLOMAX    90 capsule    TAKE ONE CAPSULE BY MOUTH DAILY    Benign non-nodular prostatic hyperplasia with lower urinary tract symptoms       tiZANidine 4 MG tablet    ZANAFLEX    30 tablet    Take 1-2 tablets (4-8 mg) by mouth 3 times daily as needed for muscle spasms    Strain of lumbar region, initial encounter       * traMADol 50 MG tablet    ULTRAM    56 tablet    Take 1-2 tablets ( mg) by mouth every 6 hours as needed for severe pain #56 pills/4 weeks.    Neck pain       * traMADol 50 MG  tablet   Start taking on:  10/13/2018    ULTRAM    56 tablet    Take 1-2 tablets ( mg) by mouth every 6 hours as needed for severe pain #56 pills/4 weeks.    Neck pain       * traMADol 50 MG tablet   Start taking on:  11/13/2018    ULTRAM    56 tablet    Take 1-2 tablets ( mg) by mouth every 6 hours as needed for pain #56 pills/4 weeks.    Neck pain       VITAMIN D3 PO      Take 5,000 Units by mouth every other day        * Notice:  This list has 6 medication(s) that are the same as other medications prescribed for you. Read the directions carefully, and ask your doctor or other care provider to review them with you.

## 2018-09-12 NOTE — PROGRESS NOTES
SUBJECTIVE:   Jas Patel is a 48 year old male who presents to clinic today for the following health issues:    Chronic Pain Follow-Up       Type / Location of Pain: Neck pain  Analgesia/pain control:       Recent changes:  same      Overall control: Tolerable with discomfort  Activity level/function:      Daily activities:  Able to do all daily activities    Work:  Pain does not limit any  work  Adverse effects:  No  Adherance    Taking medication as directed?  Yes    Participating in other treatments: yes  Risk Factors:    Sleep:  Fair    Mood/anxiety:  controlled    Recent family or social stressors:  Opened new surgery center with work    Other aggravating factors: none  PHQ-9 SCORE 11/2/2016 7/17/2017 3/28/2018   Total Score - - -   Total Score 3 11 2     DONTE-7 SCORE 4/11/2016 7/17/2017 3/28/2018   Total Score - - -   Total Score 9 7 4     Encounter-Level CSA - 03/28/2018:          Controlled Substance Agreement - Scan on 4/12/2018  2:57 PM : CONTROLLED SUBSTANCE AGREEMENT (below)            Encounter-Level CSA - 03/28/2018:          Controlled Substance Agreement - Scan on 10/29/2015 11:24 AM : CONTROLLED SUBSTANCE AGREEMENT (below)                He takes Tramadol 1 tablet twice/day. He takes Norco depending on his pain level. Some days he doesn't take any and other he can take up to 2/day.             Problem list and histories reviewed & adjusted, as indicated.  Additional history: as documented    Labs reviewed in EPIC    Reviewed and updated as needed this visit by clinical staff       Reviewed and updated as needed this visit by Provider         ROS:  Constitutional, HEENT, cardiovascular, pulmonary, gi and gu systems are negative, except as otherwise noted.    OBJECTIVE:     /76 (BP Location: Left arm, Patient Position: Chair, Cuff Size: Adult Regular)  Pulse 93  Temp 97.6  F (36.4  C)  Resp 16  Wt 206 lb (93.4 kg)  SpO2 98%  BMI 26.45 kg/m2  Body mass index is 26.45  kg/(m^2).  GENERAL: healthy, alert and no distress  EYES: Eyes grossly normal to inspection  HENT:  nose and mouth without ulcers or lesions      Diagnostic Test Results:  none     ASSESSMENT/PLAN:       ## Neck pain  - [place] suspicious activity on [place] , refilled X 3 months  - HYDROcodone-acetaminophen (NORCO) 5-325 MG per tablet; Take 1 tablet by mouth every 6 hours as needed for pain #28 pills/4 weeks  Dispense: 28 tablet; Refill: 0  - traMADol (ULTRAM) 50 MG tablet; Take 1-2 tablets ( mg) by mouth every 6 hours as needed for pain #56 pills/4 weeks.  Dispense: 56 tablet; Refill: 0  - traMADol (ULTRAM) 50 MG tablet; Take 1-2 tablets ( mg) by mouth every 6 hours as needed for severe pain #56 pills/4 weeks.  Dispense: 56 tablet; Refill: 0  - traMADol (ULTRAM) 50 MG tablet; Take 1-2 tablets ( mg) by mouth every 6 hours as needed for severe pain #56 pills/4 weeks.  Dispense: 56 tablet; Refill: 0  - HYDROcodone-acetaminophen (NORCO) 5-325 MG per tablet; Take 1 tablet by mouth every 6 hours as needed for pain #28 pills/4 weeks  Dispense: 28 tablet; Refill: 0  - HYDROcodone-acetaminophen (NORCO) 5-325 MG per tablet; Take 1 tablet by mouth every 6 hours as needed for pain #28 pills/4 weeks  Dispense: 28 tablet; Refill: 0  - follow up in 3 months      Yadiel Britton MD  [facility]

## 2018-09-13 ASSESSMENT — PATIENT HEALTH QUESTIONNAIRE - PHQ9: SUM OF ALL RESPONSES TO PHQ QUESTIONS 1-9: 2

## 2018-10-10 ENCOUNTER — TELEPHONE (OUTPATIENT)
Dept: FAMILY MEDICINE | Facility: CLINIC | Age: 48
End: 2018-10-10

## 2018-10-10 NOTE — TELEPHONE ENCOUNTER
Mary, pharmacist, notified of Dr. Britton's authorization to refill both medications today.    Patient informed pharmacy notified he may refill both [place] and Tramadol today.    Patient verbalized understanding and stated he would be in this afternoon to  medications.  Writer informed patient writer unsure exactly when medications will be ready for .      KATHY PetersenN, RN

## 2018-10-10 NOTE — TELEPHONE ENCOUNTER
Dr. Britton-Please advise if authorization can be given to pharmacy to fill these medications today.    Thank you!  KATHY PetersenN, RN

## 2018-10-10 NOTE — TELEPHONE ENCOUNTER
Patient called stating the wrong start date of 10/13/18 was put on the Tramadol & Norco however, he needs it today    Please advise and ok to leave a message

## 2018-11-07 ENCOUNTER — TELEPHONE (OUTPATIENT)
Dept: FAMILY MEDICINE | Facility: CLINIC | Age: 48
End: 2018-11-07

## 2018-11-07 NOTE — TELEPHONE ENCOUNTER
Patient calling in regards to medication fill date.   He states they need to be changed from 11/13 to today- 11/7.  Please advise and f/u with patient at [phone] (home)       Medications:    --traMADol (ULTRAM) 50 MG tablet   --HYDROcodone-acetaminophen (NORCO) 5-325 MG per tablet      Sylvia ORDAZ     [facility]

## 2018-11-07 NOTE — TELEPHONE ENCOUNTER
Dr. Britton     Patient calling asking for change date on hydrocodone and tramadol     From 11/13 to 11/7     Per chart review this was done 10/10/18 as well for that month refills     Please review and advise     Chantelle Estrada Registered Nurse   [facility]

## 2018-11-07 NOTE — TELEPHONE ENCOUNTER
Phone call to [facility] - they do have both of these prescriptions on file - spoke to pharmacist hydrocodone and tramadol     Advised okay to fill early per Dr. Britton     Pharmacist calculated the past few rx's for these medications and they were written for 28 days - pharmacist wants to ensure that the future rx's are written for the correct number of doses/days to prevent upsetting patient     Patient informed     Chantelle Estrada Registered Nurse   [facility]

## 2018-11-08 NOTE — TELEPHONE ENCOUNTER
Can pharmacy ok early refills for the 3 month scripts that were written by me on my visit on 09/12/18.

## 2018-11-09 NOTE — TELEPHONE ENCOUNTER
Dr. Britton -   The three month prescriptions that were written for Sept, Oct, Nov have all been approved     Next month December will need new scripts written     Chantellesa Estrada Registered Nurse   [facility]

## 2018-11-11 DIAGNOSIS — G47.00 INSOMNIA, UNSPECIFIED TYPE: ICD-10-CM

## 2018-11-12 NOTE — TELEPHONE ENCOUNTER
"Requested Prescriptions   Pending Prescriptions Disp Refills     amitriptyline (ELAVIL) 25 MG tablet [Pharmacy Med Name: AMITRIPTYLINE HCL 25MG TABS]  Last Written Prescription Date:  2/1/2018  Last Fill Quantity: 90 tablet,  # refills: 2   Last Office Visit: 9/12/2018   Future Office Visit:      90 tablet 2     Sig: TAKE ONE TABLET BY MOUTH AT BEDTIME    Tricyclic Agents ( Annual appt and no PHQ9) Passed    11/11/2018  7:56 PM       Passed - Blood Pressure under 140/90 in past 12 mos    BP Readings from Last 3 Encounters:   09/12/18 124/76   03/28/18 113/77   12/26/17 136/90          Passed - Recent (12 mo) or future (30 days) visit within authorizing provider's specialty    Patient had office visit in the last 12 months or has a visit in the next 30 days with authorizing provider or within the authorizing provider's specialty.  See \"Patient Info\" tab in inbasket, or \"Choose Columns\" in Meds & Orders section of the refill encounter.           Passed - Patient is age 18 or older          "

## 2018-11-13 NOTE — TELEPHONE ENCOUNTER
LOV: 9/12/2018    Patient was to follow up in 3 months. Will need to be seen in December. Will fill through December.    Thanks! Kenzie Ahn RN

## 2018-12-05 ENCOUNTER — E-VISIT (OUTPATIENT)
Dept: FAMILY MEDICINE | Facility: CLINIC | Age: 48
End: 2018-12-05
Payer: COMMERCIAL

## 2018-12-05 ENCOUNTER — MYC MEDICAL ADVICE (OUTPATIENT)
Dept: FAMILY MEDICINE | Facility: CLINIC | Age: 48
End: 2018-12-05

## 2018-12-05 DIAGNOSIS — M54.2 NECK PAIN: ICD-10-CM

## 2018-12-05 PROCEDURE — 99444 ZZC PHYSICIAN ONLINE EVALUATION & MANAGEMENT SERVICE: CPT | Performed by: NURSE PRACTITIONER

## 2018-12-05 RX ORDER — TRAMADOL HYDROCHLORIDE 50 MG/1
50-100 TABLET ORAL EVERY 6 HOURS PRN
Qty: 56 TABLET | Refills: 0 | Status: SHIPPED | OUTPATIENT
Start: 2018-12-11 | End: 2018-12-05

## 2018-12-05 RX ORDER — HYDROCODONE BITARTRATE AND ACETAMINOPHEN 5; 325 MG/1; MG/1
1 TABLET ORAL EVERY 6 HOURS PRN
Qty: 28 TABLET | Refills: 0 | Status: SHIPPED | OUTPATIENT
Start: 2019-01-08 | End: 2018-12-05

## 2018-12-05 RX ORDER — TRAMADOL HYDROCHLORIDE 50 MG/1
50-100 TABLET ORAL EVERY 6 HOURS PRN
Qty: 56 TABLET | Refills: 0 | Status: SHIPPED | OUTPATIENT
Start: 2019-01-08 | End: 2018-12-05

## 2018-12-05 RX ORDER — HYDROCODONE BITARTRATE AND ACETAMINOPHEN 5; 325 MG/1; MG/1
1 TABLET ORAL EVERY 6 HOURS PRN
Qty: 28 TABLET | Refills: 0 | Status: SHIPPED | OUTPATIENT
Start: 2019-02-05 | End: 2019-01-04

## 2018-12-05 RX ORDER — TRAMADOL HYDROCHLORIDE 50 MG/1
50-100 TABLET ORAL EVERY 6 HOURS PRN
Qty: 56 TABLET | Refills: 0 | Status: SHIPPED | OUTPATIENT
Start: 2019-02-05 | End: 2019-01-04

## 2018-12-05 RX ORDER — HYDROCODONE BITARTRATE AND ACETAMINOPHEN 5; 325 MG/1; MG/1
1 TABLET ORAL EVERY 6 HOURS PRN
Qty: 28 TABLET | Refills: 0 | Status: SHIPPED | OUTPATIENT
Start: 2018-12-11 | End: 2018-12-05

## 2019-01-03 ENCOUNTER — TELEPHONE (OUTPATIENT)
Dept: FAMILY MEDICINE | Facility: CLINIC | Age: 49
End: 2019-01-03

## 2019-01-03 DIAGNOSIS — M54.2 NECK PAIN: ICD-10-CM

## 2019-01-03 NOTE — TELEPHONE ENCOUNTER
Patient has prescriptions dated for 1-5-19 for his Tramadol and Norco .. His last RX's where filled 12-6-18 and they are only a 28 day supply. Patient says provider put the wrong date on them and is asking to fill today ... Please call pharmacy if approved today. Thanks so much    Tyson Warren  Pharmacy [employer]  On Behalf of [facility]

## 2019-01-04 RX ORDER — HYDROCODONE BITARTRATE AND ACETAMINOPHEN 5; 325 MG/1; MG/1
1 TABLET ORAL EVERY 6 HOURS PRN
Qty: 28 TABLET | Refills: 0 | Status: SHIPPED | OUTPATIENT
Start: 2019-01-31 | End: 2019-03-05

## 2019-01-04 RX ORDER — TRAMADOL HYDROCHLORIDE 50 MG/1
50-100 TABLET ORAL EVERY 6 HOURS PRN
Qty: 56 TABLET | Refills: 0 | Status: SHIPPED | OUTPATIENT
Start: 2019-01-31 | End: 2019-03-05

## 2019-01-04 NOTE — TELEPHONE ENCOUNTER
Called pharmacy and notified okay to fill early., I did write incorrect dates.  New feb Rx printed to fill 1/31/19 and replace incorrect fill date of 2/5/19.    Lola Perez CNP

## 2019-03-04 NOTE — PROGRESS NOTES
SUBJECTIVE:   Jas Patel is a 48 year old male who presents to clinic today for the following health issues:    Depression and Anxiety Follow-Up    Status since last visit: doing well    Other associated symptoms:None    Complicating factors:     Significant life event: No     Current substance abuse: None    PHQ 7/17/2017 3/28/2018 9/12/2018   PHQ-9 Total Score 11 2 2   Q9: Suicide Ideation Not at all Not at all Not at all     DONTE-7 SCORE 4/11/2016 7/17/2017 3/28/2018   Total Score - - -   Total Score 9 7 4       PHQ-9  English  PHQ-9   Any Language  DONTE-7  Suicide Assessment Five-step Evaluation and Treatment (SAFE-T)      Amount of exercise or physical activity: 6-7 days/week for an average of greater than 60 minutes    Problems taking medications regularly: No    Medication side effects: none    Diet: regular (no restrictions)      MMD/DONTE-  On celexa for anxiety/depression, feels mood is well controlled, denies side effects.  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  Schedule is challenging, having more later shifts.  He is doing better the last few months.  He does employ meditation a lot.  Vit d has been very helpful for mood.  Daughter bruce did not graduate high school and he is feeling okay about this now, she recently has been experiencing some auditory hallucinations, she is seeing psychiatry, this contributes to his anxiety.  on prozac [id number], lexapro 2010.      BPH- Taking flomax and oxybutinyn for BPH. wondering if we can up oxybutynin, still has some frequency later in the day.      Essential tremor-  using propranolol as needed, very effective.  Also helping with anxiety.  no associated dizziness or lightheadedness.  He takes it every morning and occasionally in the evening.      Patient Active Problem List   Diagnosis     Low back pain     CARDIOVASCULAR SCREENING; LDL GOAL LESS THAN 160     Moderate major depression (H)      Generalized anxiety disorder     Gastroesophageal reflux disease without esophagitis     BPH (benign prostatic hyperplasia)     Chronic pain syndrome     Benign essential tremor     Lumbago     Past Surgical History:   Procedure Laterality Date     DRAIN SKIN ABSCESS COMPLIC      left thigh I and D       Social History     Tobacco Use     Smoking status: Former Smoker     Packs/day: 0.20     Years: 10.00     Pack years: 2.00     Types: Cigarettes     Start date: 1/28/2015     Smokeless tobacco: Never Used   Substance Use Topics     Alcohol use: Yes     Comment: 1 drink a day     Family History   Problem Relation Age of Onset     Hypertension Father      C.A.D. Maternal Grandfather      Hypertension Maternal Grandfather      Cerebrovascular Disease Other         great uncle     Diabetes Paternal Uncle         type 2     Diabetes Other         great aunt     Prostate Cancer Other         great uncle moms side     Alzheimer Disease Other         great uncle moms side         Current Outpatient Medications   Medication Sig Dispense Refill     amitriptyline (ELAVIL) 25 MG tablet TAKE ONE TABLET BY MOUTH AT BEDTIME 90 tablet 3     Cholecalciferol (VITAMIN D3 PO) Take 2,000 Units by mouth every other day        citalopram (CELEXA) 40 MG tablet Take 1 tablet (40 mg) by mouth daily 90 tablet 3     IBUPROFEN PO        loratadine (CLARITIN) 10 MG tablet Take 10 mg by mouth daily. 1 tab daily       MELATONIN PO Take 5 mg by mouth At Bedtime       MULTI-VITAMIN OR TABS one daily  0     omeprazole (PRILOSEC) 20 MG CR capsule Take 1 capsule (20 mg) by mouth daily 30 capsule 11     oxybutynin ER (DITROPAN-XL) 10 MG 24 hr tablet Take 1 tablet (10 mg) by mouth daily 90 tablet 3     propranolol (INDERAL) 10 MG tablet TAKE ONE TABLET BY MOUTH TWICE A DAY AS NEEDED FOR TREMOR 180 tablet 3     tamsulosin (FLOMAX) 0.4 MG capsule Take 1 capsule (0.4 mg) by mouth daily 90 capsule 3     tiZANidine (ZANAFLEX) 4 MG tablet Take 1-2 tablets (4-8  mg) by mouth 3 times daily as needed for muscle spasms 30 tablet 1     [START ON 4/30/2019] HYDROcodone-acetaminophen (NORCO) 5-325 MG tablet Take 1 tablet by mouth every 6 hours as needed for pain #28 pills/4 weeks 28 tablet 0     [START ON 4/30/2019] traMADol (ULTRAM) 50 MG tablet Take 1-2 tablets ( mg) by mouth every 6 hours as needed for pain #56 pills/4 weeks. 56 tablet 0       ROS:  Neuro, , Psych as above, otherwise negative       OBJECTIVE:                                                    /82 (BP Location: Left arm, Patient Position: Chair, Cuff Size: Adult Regular)   Pulse 75   Temp 97.2  F (36.2  C) (Oral)   Resp 12   Wt 94.8 kg (209 lb)   SpO2 100%   BMI 26.83 kg/m     GENERAL APPEARANCE: healthy, alert and no distress  EYES: Eyes grossly normal to inspection and conjunctivae and sclerae normal  RESP: respirations nonlabored  PSYCH: mentation appears normal and affect normal/bright        ASSESSMENT/PLAN:                                                    (F41.1) Generalized anxiety disorder  (primary encounter diagnosis)  Comment: does have some anxiety symptoms but feels they are manageable, he is able to utilize meditation and some cognitive therapies to cope  Plan: citalopram (CELEXA) 40 MG tablet, propranolol         (INDERAL) 10 MG tablet        Discussed consideration of adding buspar and/or CBT, declines at this time, feels symptoms are manageable, plan to cont celexa at current dose    (R35.0) Urinary frequency  (N40.1) Benign non-nodular prostatic hyperplasia with lower urinary tract symptoms  Comment: medications helping but some ongoing frequency  Plan: tamsulosin (FLOMAX) 0.4 MG capsule, oxybutynin         ER (DITROPAN-XL) 10 MG 24 hr tablet        Increase oxybutynin to 10mg daily, monitor for constipation, dry mouth, dizziness    (G25.0) Benign essential tremor  Comment: taking propranolol prn which is effective  Plan: propranolol (INDERAL) 10 MG tablet         refilled        See Patient Instructions    Lola Perez, [facility]    Patient Instructions   1.  Refilled norco and tramadol for 3 months, updated drug screen and CSA, plan for evisit in 3 months and office visit in 6 months    2.  We decided to continue celexa, can consider therapy and/or adding buspar in the future    3.  Refilled propranolol    4.  Try increasing oxybutynin to 10mg for urinary symptoms, continue flomax    5. Call Central Scheduling [phone] to schedule and colonoscopy    6.  Check with insurance about new shingles shot

## 2019-03-05 ENCOUNTER — OFFICE VISIT (OUTPATIENT)
Dept: FAMILY MEDICINE | Facility: CLINIC | Age: 49
End: 2019-03-05
Payer: COMMERCIAL

## 2019-03-05 ENCOUNTER — OFFICE VISIT (OUTPATIENT)
Dept: FAMILY MEDICINE | Facility: CLINIC | Age: 49
End: 2019-03-05
Payer: OTHER MISCELLANEOUS

## 2019-03-05 VITALS
DIASTOLIC BLOOD PRESSURE: 82 MMHG | RESPIRATION RATE: 12 BRPM | TEMPERATURE: 97.2 F | OXYGEN SATURATION: 100 % | BODY MASS INDEX: 26.83 KG/M2 | HEART RATE: 75 BPM | WEIGHT: 209 LBS | SYSTOLIC BLOOD PRESSURE: 134 MMHG

## 2019-03-05 VITALS
TEMPERATURE: 97.2 F | SYSTOLIC BLOOD PRESSURE: 134 MMHG | BODY MASS INDEX: 26.83 KG/M2 | DIASTOLIC BLOOD PRESSURE: 82 MMHG | WEIGHT: 209 LBS | HEART RATE: 75 BPM | RESPIRATION RATE: 12 BRPM | OXYGEN SATURATION: 100 %

## 2019-03-05 DIAGNOSIS — M54.2 NECK PAIN: ICD-10-CM

## 2019-03-05 DIAGNOSIS — R35.0 URINARY FREQUENCY: ICD-10-CM

## 2019-03-05 DIAGNOSIS — F41.1 GENERALIZED ANXIETY DISORDER: Primary | ICD-10-CM

## 2019-03-05 DIAGNOSIS — G25.0 BENIGN ESSENTIAL TREMOR: ICD-10-CM

## 2019-03-05 DIAGNOSIS — N40.1 BENIGN NON-NODULAR PROSTATIC HYPERPLASIA WITH LOWER URINARY TRACT SYMPTOMS: ICD-10-CM

## 2019-03-05 PROCEDURE — 99213 OFFICE O/P EST LOW 20 MIN: CPT | Performed by: NURSE PRACTITIONER

## 2019-03-05 PROCEDURE — 99214 OFFICE O/P EST MOD 30 MIN: CPT | Performed by: NURSE PRACTITIONER

## 2019-03-05 PROCEDURE — 99000 SPECIMEN HANDLING OFFICE-LAB: CPT | Performed by: NURSE PRACTITIONER

## 2019-03-05 PROCEDURE — 80307 DRUG TEST PRSMV CHEM ANLYZR: CPT | Mod: 90 | Performed by: NURSE PRACTITIONER

## 2019-03-05 RX ORDER — PROPRANOLOL HYDROCHLORIDE 10 MG/1
TABLET ORAL
Qty: 180 TABLET | Refills: 3 | Status: SHIPPED | OUTPATIENT
Start: 2019-03-05 | End: 2020-04-09

## 2019-03-05 RX ORDER — OXYBUTYNIN CHLORIDE 10 MG/1
10 TABLET, EXTENDED RELEASE ORAL DAILY
Qty: 90 TABLET | Refills: 3 | Status: SHIPPED | OUTPATIENT
Start: 2019-03-05 | End: 2020-06-19

## 2019-03-05 RX ORDER — HYDROCODONE BITARTRATE AND ACETAMINOPHEN 5; 325 MG/1; MG/1
1 TABLET ORAL EVERY 6 HOURS PRN
Qty: 28 TABLET | Refills: 0 | Status: SHIPPED | OUTPATIENT
Start: 2019-04-02 | End: 2019-03-05

## 2019-03-05 RX ORDER — HYDROCODONE BITARTRATE AND ACETAMINOPHEN 5; 325 MG/1; MG/1
1 TABLET ORAL EVERY 6 HOURS PRN
Qty: 28 TABLET | Refills: 0 | Status: SHIPPED | OUTPATIENT
Start: 2019-04-30 | End: 2019-05-31

## 2019-03-05 RX ORDER — TRAMADOL HYDROCHLORIDE 50 MG/1
50-100 TABLET ORAL EVERY 6 HOURS PRN
Qty: 56 TABLET | Refills: 0 | Status: SHIPPED | OUTPATIENT
Start: 2019-04-02 | End: 2019-03-05

## 2019-03-05 RX ORDER — OXYBUTYNIN CHLORIDE 5 MG/1
5 TABLET, EXTENDED RELEASE ORAL DAILY
Qty: 90 TABLET | Refills: 3 | Status: CANCELLED | OUTPATIENT
Start: 2019-03-05

## 2019-03-05 RX ORDER — HYDROCODONE BITARTRATE AND ACETAMINOPHEN 5; 325 MG/1; MG/1
1 TABLET ORAL EVERY 6 HOURS PRN
Qty: 28 TABLET | Refills: 0 | Status: SHIPPED | OUTPATIENT
Start: 2019-03-05 | End: 2019-03-05

## 2019-03-05 RX ORDER — CITALOPRAM HYDROBROMIDE 40 MG/1
40 TABLET ORAL DAILY
Qty: 90 TABLET | Refills: 3 | Status: SHIPPED | OUTPATIENT
Start: 2019-03-05 | End: 2020-04-20

## 2019-03-05 RX ORDER — TRAMADOL HYDROCHLORIDE 50 MG/1
50-100 TABLET ORAL EVERY 6 HOURS PRN
Qty: 56 TABLET | Refills: 0 | Status: SHIPPED | OUTPATIENT
Start: 2019-03-05 | End: 2019-03-05

## 2019-03-05 RX ORDER — TAMSULOSIN HYDROCHLORIDE 0.4 MG/1
0.4 CAPSULE ORAL DAILY
Qty: 90 CAPSULE | Refills: 3 | Status: SHIPPED | OUTPATIENT
Start: 2019-03-05 | End: 2019-08-30

## 2019-03-05 RX ORDER — TRAMADOL HYDROCHLORIDE 50 MG/1
50-100 TABLET ORAL EVERY 6 HOURS PRN
Qty: 56 TABLET | Refills: 0 | Status: SHIPPED | OUTPATIENT
Start: 2019-04-30 | End: 2019-05-31

## 2019-03-05 ASSESSMENT — ANXIETY QUESTIONNAIRES
3. WORRYING TOO MUCH ABOUT DIFFERENT THINGS: SEVERAL DAYS
2. NOT BEING ABLE TO STOP OR CONTROL WORRYING: SEVERAL DAYS
5. BEING SO RESTLESS THAT IT IS HARD TO SIT STILL: NOT AT ALL
1. FEELING NERVOUS, ANXIOUS, OR ON EDGE: MORE THAN HALF THE DAYS
6. BECOMING EASILY ANNOYED OR IRRITABLE: NOT AT ALL
GAD7 TOTAL SCORE: 8
7. FEELING AFRAID AS IF SOMETHING AWFUL MIGHT HAPPEN: MORE THAN HALF THE DAYS

## 2019-03-05 ASSESSMENT — PATIENT HEALTH QUESTIONNAIRE - PHQ9
5. POOR APPETITE OR OVEREATING: MORE THAN HALF THE DAYS
SUM OF ALL RESPONSES TO PHQ QUESTIONS 1-9: 4

## 2019-03-05 NOTE — LETTER
[facility]  03/05/19    Patient: Jas Patel  YOB: 1970  Medical Record Number: [id number]                                                                  Opioid / Opioid Plus Controlled Substance Agreement    I understand that my care provider has prescribed an opioid (narcotic) controlled substance to help manage my condition(s). I am taking this medicine to help me function or work. I know this is strong medicine, and that it can cause serious side effects. Opioid medicine can be sedating, addicting and may cause a dependency on the drug. They can affect my ability to drive or think, and cause depression. They need to be taken exactly as prescribed. Combining opioids with certain medicines or chemicals (such as cocaine, sedatives and tranquilizers, sleeping pills, meth) can be dangerous or even fatal. Also, if I stop opioids suddenly, I may have severe withdrawal symptoms. Last, I understand that opioids do not work for all types of pain nor for all patients. If not helpful, I may be asked to stop them.      The risks, benefits, and side effects of these medicine(s) were explained to me. I agree that:    1. I will take part in other treatments as advised by my care team. This may be psychiatry or counseling, physical therapy, behavioral therapy, group treatment or a referral to a pain clinic. I will reduce or stop my medicine when my care team tells me to do so.  2. I will take my medicines as prescribed. I will not change the dose or schedule unless my care team tells me to. There will be no refills if I  run out early.   I may be contactedwithout warning and asked to complete a urine drug test or pill count at any time.   3. I will keep all my appointments, and understand this is part of the monitoring of opioids. My care team may require an office visit for EVERY opioid/controlled substance refill. If I miss appointments or don t follow instructions, my care team may stop my  medicine.  4. I will not ask other providers to prescribe controlled substances, and I will not accept controlled substances from other people. If I need another prescribed controlled substance for a new reason, I will tell my care team within 1 business day.  5. I will use one pharmacy to fill all of my controlled substance prescriptions, and it is up to me to make sure that I do not run out of my medicines on weekends or holidays. If my care team is willing to refill my opioid prescription without a visit, I must request refills only during office hours, refills may take up to 3 days to process, and it may take up to 5 to 7 days for my medicine to be mailed and ready at my pharmacy. Prescriptions will not be mailed anywhere except my pharmacy.        [id number]  Rev 12/18         Registration to scan to EHR                             Page 1 of 2               Controlled Substance Agreement Opioid        [facility]  03/05/19  Patient: Jas Patel  YOB: 1970  Medical Record Number: [id number]                                                                  6. I am responsible for my prescriptions. If the medicine/prescription is lost or stolen, it will not be replaced. I also agree not to share controlled substance medicines with anyone.  7. I agree to not use ANY illegal or recreational drugs. This includes marijuana, cocaine, bath salts or other drugs. I agree not to use alcohol unless my care team says I may.          I agree to give urine samples whenever asked. If I don t give a urine sample, the care team may stop my medicine.    8. If I enroll in the [place] Medical Marijuana program, I will tell my care team. I will also sign an agreement to share my medical records with my care team.   9. I will bring in my list of medicines (or my medicine bottles) each time I come to the clinic.   10. I will tell my care team right away if I become pregnant or have a new medical problem  treated outside of my regular clinic.  11. I understand that this medicine can affect my thinking and judgment. It may be unsafe for me to drive, use machinery and do dangerous tasks. I will not do any of these things until I know how the medicine affects me. If my dose changes, I will wait to see how it affects me. I will contact my care team if I have concerns about medicine side effects.    I understand that if I do not follow any of the conditions above, my prescriptions or treatment may be stopped.      I agree that my provider, clinic care team, and pharmacy may work with any city, state or federal law enforcement agency that investigates the misuse, sale, or other diversion of my controlled medicine. I will allow my provider to discuss my care with or share a copy of this agreement with any other treating provider, pharmacy or emergency room where I receive care. I agree to give up (waive) any right of privacy or confidentiality with respect to these consents.     I have read this agreement and have asked questions about anything I did not understand.      ________________________________________________________________________  Patient signature - Date/Time -  Jas Patel                                      ________________________________________________________________________  Witness signature                                                            ________________________________________________________________________  Provider signature - RAFAEL Fournier CNP      [id number]  Rev 12/18         Registration to scan to EHR                         Page 2 of 2                   Controlled Substance Agreement Opioid           Page 1 of 2  Opioid Pain Medicines (also known as Narcotics)  What You Need to Know    What are opioids?   Opioids are pain medicines that must be prescribed by a doctor.  They are also known as narcotics.    Examples are:     morphine (MS Contin, Amy)    oxycodone  (Oxycontin)    oxycodone and acetaminophen (Percocet)    hydrocodone and acetaminophen (Vicodin, Norco)     fentanyl patch (Duragesic)     hydromorphone (Dilaudid)     methadone     What do opioids do well?   Opioids are best for short-term pain after a surgery or injury. They also work well for cancer pain. Unlike other pain medicines, they do not cause liver or kidney failure or ulcers. They may help some people with long-lasting (chronic) pain.     What do opioids NOT do well?   Opioids never get rid of pain entirely, and they do not work well for most patients with chronic pain. Opioids do not reduce swelling, one of the causes of pain. They also don t work well for nerve pain.                           For informational purposes only.  Not to replace the advice of your care provider.  Copyright [address] [facility]. All right reserved. [employer] 106893-[id number] 02/18.      Page 2 of 2    Risks and side effects   Talk to your doctor before you start or decide to keep taking one of these medicines. Side effects include:    Lowering your breathing rate enough to cause death    Overdose, including death, especially if taking higher than prescribed doses    Long-term opioid use    Worse depression symptoms; less pleasure in things you usually enjoy    Feeling tired or sluggish    Slower thoughts or cloudy thinking    Being more sensitive to pain over time; pain is harder to control    Trouble sleeping or restless sleep    Changes in hormone levels (for example, less testosterone)    Changes in sex drive or ability to have sex    Constipation    Unsafe driving    Itching and sweating    Feeling dizzy    Nausea, vomiting and dry mouth    What else should I know about opioids?  When someone takes opioids for too long or too often, they become dependent. This means that if you stop or reduce the medicine too quickly, you will have withdrawal symptoms.    Dependence is not the same as addiction. Addiction is when  people keep using a substance that harms their body, their mind or their relations with others. If you have a history of drug or alcohol abuse, taking opioids can cause a relapse.    Over time, opioids don t work as well. Most people will need higher and higher doses. The higher the dose, the more serious the side effects. We don t know the long-term effects of opioids.      Prescribed opioids aren't the best way to manage chronic pain    Other ways to manage pain include:      Ibuprofen or acetaminophen.  You should always try this first.      Treat health problems that may be causing pain.      acupuncture or massage, deep breathing, meditation, visual imagery, aromatherapy.      Use heat or ice at the pain site      Physical therapy and exercise      Stop smoking      See a counselor or therapist                                                  People who have used opioids for a long time may have a lower quality of life, worse depression, higher levels of pain and more visits to doctors.    Never share your opioids with others. Be sure to store opioids in a secure place, locked if possible.Young children can easily swallow them and overdose.     You can overdose on opioids.  Signs of overdose include decrease or loss of consciousness, slowed breathing, trouble waking and blue lips.  If someone is worried about overdose, they should call 911.    If you are at risk for overdose, you may get naloxone (Narcan, a medicine that reverses the effects of opioids.  If you overdose, a friend or family member can give you Narcan while waiting for the ambulance.  They need to know the signs of overdose and how to give Narcan.    While you're taking opioids:    Don't use alcohol or street drugs. Taking them together can cause death.    Don't take any of these medicines unless your doctor says its okay.  Taking these with opioids can cause death.    Benzodiazepines (such as lorazepam         or diazepam)    Muscle relaxers  (such as cyclobenzaprine)    sleeping pills    other opioids    Safe disposal of opioids  Find your area drug take-back program, your pharmacy mail-back program, buy a special disposal bag (such as Deterra) from your pharmacy or flush them down the toilet.  Use the guidelines at:  www.fda.gov/drugs/resourcesforyou

## 2019-03-05 NOTE — PATIENT INSTRUCTIONS
1.  Refilled norco and tramadol for 3 months, updated drug screen and CSA, plan for evisit in 3 months and office visit in 6 months    2.  We decided to continue celexa, can consider therapy and/or adding buspar in the future    3.  Refilled propranolol    4.  Try increasing oxybutynin to 10mg for urinary symptoms, continue flomax    5. Call Central Scheduling [phone] to schedule and colonoscopy    6.  Check with insurance about new shingles shot

## 2019-03-05 NOTE — PROGRESS NOTES
SUBJECTIVE:   Jas Patel is a 48 year old male who presents to clinic today for the following health issues:      Chronic Pain Follow-Up       Type / Location of Pain: Neck pain  Analgesia/pain control:       Recent changes:  same      Overall control: Tolerable with discomfort  Activity level/function:      Daily activities:  Able to do all daily activities    Work:  Pain does not limit any  work  Adverse effects:  No  Adherance    Taking medication as directed?  Yes    Participating in other treatments: yes  Risk Factors:    Sleep:  Fair    Mood/anxiety:  controlled    Recent family or social stressors:  Opened new surgery center with work    Other aggravating factors: none  PHQ-9 SCORE 7/17/2017 3/28/2018 9/12/2018   PHQ-9 Total Score - - -   PHQ-9 Total Score 11 2 2     DONTE-7 SCORE 4/11/2016 7/17/2017 3/28/2018   Total Score - - -   Total Score 9 7 4     Encounter-Level CSA - 03/28/2018:    Controlled Substance Agreement - Scan on 4/12/2018  2:57 PM: CONTROLLED SUBSTANCE AGREEMENT (below)       Encounter-Level CSA - 10/28/2015:    Controlled Substance Agreement - Scan on 10/29/2015 11:24 AM: CONTROLLED SUBSTANCE AGREEMENT (below)       Patient-Level CSA:    There are no patient-level csa.         Here today for follow up of chronic neck pain.       Pt injured neck while lifting a pt 9/2011. Feels neck pain is stable on current medication regimen which includes 800mg ibuprofen TID, tramadol BID, and norco usually about 1/d. Has done PT in the past, still doing exercise at home which are helpful. Also seeing chiropractor. Has been told he is not a surgical candidate. Working currently in day surgery pre/postop as a nurse.  Had trigger point injections and BOY injection which helped with numbness/tingling.  Last MRI done 2011 at [facility].     Today states pain continues to be manageable on current medication, no new side effects.  Last seen by Dr. Britton 9/2018 (while I was on leave) and given 3mo Rx refills at  that time, refilled for another 3mo with evisit with myself 12/2018.  Last Rx for 28d supply 2/5/19.    CSA on file 9/2018.  Urine drug + marijuana 4/2018.  MPM reviewed an no suspicious activity.           D.I.R.E Score: Patient Selection for Chronic Opioid Analgesia    For each factor, rate the patient's score from 1 - 3 based on the explanations on the right.       Diagnosis             2         1 = Benign chronic condition with minimal objective findings or no definite medical diagnosis.  Examples:  fibromyalgia, migraine, headaches, non-specific back pain.  2 = Slowly progressive condition concordant with moderate pain, or fixed condition with moderate objective findings.  Examples: failed back surgery syndrome, back pain with moderate degenerative changes, neuropathic pain.  3 = Advanced condition concordant with severe pain with objective findings.  Examples: severe ischemic vascular disease, advanced neuropathy, severe spinal stenosis.    Intractability             2         1 = Few therapies have been tried and the patient takes a passive role in his/her pain management process.   2 = Most costomary treatments have been tried but the patient is not fully engaged in the pain management process, or barriers prevent (insurance, transportation, medical illness)  3 = Patient fully engaged in a spectrum of appropriate treatments but with inadequate response.    Risk   (Risk = Total of P+C+R+S below)       Psychological             3         1 = Serious personality dysfunction or mental illness interfering with care.  Examples: personality disorder, severe affective disorder, significant personality issues.  2 = Personality or mental health interferes moderately.  Example: depression or anxiety disorder.  3 = Good communication with the clinic.  No significant personality dysfunction or mental illness.       Chemical      Health             3         1 = Active or very recent use of illicit drugs, excessive  alcohol, or prescription drug abuse.  2 = Chemical coper (uses medications to cope with stress) or history of chemical dependency in remission.  3 = No CD history.  Not drug-focused or chemically reliant       Reliability             3         1 = History of numerous problems: medication misuse, missed appointments, rarely follows through.  2 = Occasional difficulties with compliance, but generally reliable.  3 = Highly reliable patient with medications, appointments and treatment.       Social      Support             3         1= Life in chaos.  Little family support and few close relationships.  Loss of most normal life roles.  2 = Reduction in some relationships and life roles.  3 = Supportive family/close relationships.  Involved in work or school and no social isolation.    Efficacy score             3         1 = Poor function or minimal pain relief despite moderate to high doses.  2 = Moderate benefit with function improved in a number of ways (or insufficient info - hasn't tried opioid yet or very low doses or too short a trial.  3 = Good improvement in pain and function and quality of life with stable doses over time.                                    19    Total score = D + I + R + E    Score 7-13: Not a suitable candidate for long-term opioid analgesia  Score 14-21: May be a good candidate for long-term opioid analgesia    Copyright 2013 Emre Coreas MD, The DIRE Score: Predicting Outcomes of Opioid Prescribing for Chronic Pain. The Journal of Pain. 7(9) (September), 2006:671-681        Patient Active Problem List   Diagnosis     Low back pain     CARDIOVASCULAR SCREENING; LDL GOAL LESS THAN 160     Moderate major depression (H)     Generalized anxiety disorder     Gastroesophageal reflux disease without esophagitis     BPH (benign prostatic hyperplasia)     Chronic pain syndrome     Benign essential tremor     Lumbago     Past Surgical History:   Procedure Laterality Date     DRAIN SKIN ABSCESS  COMPLIC      left thigh I and D       Social History     Tobacco Use     Smoking status: Former Smoker     Packs/day: 0.20     Years: 10.00     Pack years: 2.00     Types: Cigarettes     Start date: 1/28/2015     Smokeless tobacco: Never Used   Substance Use Topics     Alcohol use: Yes     Comment: 1 drink a day     Family History   Problem Relation Age of Onset     Hypertension Father      C.A.D. Maternal Grandfather      Hypertension Maternal Grandfather      Cerebrovascular Disease Other         great uncle     Diabetes Paternal Uncle         type 2     Diabetes Other         great aunt     Prostate Cancer Other         great uncle moms side     Alzheimer Disease Other         great uncle moms side         Current Outpatient Medications   Medication Sig Dispense Refill     amitriptyline (ELAVIL) 25 MG tablet TAKE ONE TABLET BY MOUTH AT BEDTIME 90 tablet 3     Cholecalciferol (VITAMIN D3 PO) Take 2,000 Units by mouth every other day        [START ON 4/30/2019] HYDROcodone-acetaminophen (NORCO) 5-325 MG tablet Take 1 tablet by mouth every 6 hours as needed for pain #28 pills/4 weeks 28 tablet 0     IBUPROFEN PO        loratadine (CLARITIN) 10 MG tablet Take 10 mg by mouth daily. 1 tab daily       MULTI-VITAMIN OR TABS one daily  0     omeprazole (PRILOSEC) 20 MG CR capsule Take 1 capsule (20 mg) by mouth daily 30 capsule 11     [START ON 4/30/2019] traMADol (ULTRAM) 50 MG tablet Take 1-2 tablets ( mg) by mouth every 6 hours as needed for pain #56 pills/4 weeks. 56 tablet 0     citalopram (CELEXA) 40 MG tablet Take 1 tablet (40 mg) by mouth daily 90 tablet 3     MELATONIN PO Take 5 mg by mouth At Bedtime       oxybutynin ER (DITROPAN-XL) 10 MG 24 hr tablet Take 1 tablet (10 mg) by mouth daily 90 tablet 3     propranolol (INDERAL) 10 MG tablet TAKE ONE TABLET BY MOUTH TWICE A DAY AS NEEDED FOR TREMOR 180 tablet 3     tamsulosin (FLOMAX) 0.4 MG capsule Take 1 capsule (0.4 mg) by mouth daily 90 capsule 3      tiZANidine (ZANAFLEX) 4 MG tablet Take 1-2 tablets (4-8 mg) by mouth 3 times daily as needed for muscle spasms 30 tablet 1       ROS:  MSK, Psych as above, otherwise negative       OBJECTIVE:                                                    /82   Pulse 75   Temp 97.2  F (36.2  C) (Oral)   Resp 12   Wt 94.8 kg (209 lb)   SpO2 100%   BMI 26.83 kg/m     GENERAL APPEARANCE: healthy, alert and no distress  EYES: Eyes grossly normal to inspection and conjunctivae and sclerae normal  RESP: respirations nonlabored  PSYCH: mentation appears normal and affect normal/bright        ASSESSMENT/PLAN:                                                    (M54.2) Neck pain  Comment: symptoms stable on current regimen, current treatment plan is appropriate.  MPM reviewed without suspicious activity.    Plan: Drug  Screen Comprehensive , Urine with         Reported Meds (MedTox) (Pain Care Package),         HYDROcodone-acetaminophen (NORCO) 5-325 MG         tablet, traMADol (ULTRAM) 50 MG tablet,         DISCONTINUED: HYDROcodone-acetaminophen (NORCO)        5-325 MG tablet, DISCONTINUED: traMADol         (ULTRAM) 50 MG tablet, DISCONTINUED: traMADol         (ULTRAM) 50 MG tablet, DISCONTINUED:         HYDROcodone-acetaminophen (NORCO) 5-325 MG         tablet        Refilled x 3mo.  Updated CSA and urine drg screen at this tie.  Reviewed supportive therapies he may benefit from including physical therapy, he continues home exercise regimen.  Plan for refill through [facility] in 3 months and OV in 6 months           Lola Perez, CNP  [facility]    There are no Patient Instructions on file for this visit.

## 2019-03-06 ASSESSMENT — ANXIETY QUESTIONNAIRES: GAD7 TOTAL SCORE: 8

## 2019-03-08 LAB — PAIN DRUG SCR UR W RPTD MEDS: NORMAL

## 2019-03-11 DIAGNOSIS — N40.1 BENIGN NON-NODULAR PROSTATIC HYPERPLASIA WITH LOWER URINARY TRACT SYMPTOMS: ICD-10-CM

## 2019-03-12 RX ORDER — TAMSULOSIN HYDROCHLORIDE 0.4 MG/1
CAPSULE ORAL
Qty: 0.01 CAPSULE | Refills: 0 | OUTPATIENT
Start: 2019-03-12

## 2019-03-12 NOTE — TELEPHONE ENCOUNTER
Refused Prescriptions:                       Disp   Refills    tamsulosin (FLOMAX) 0.4 MG capsule [Pharma*0.01 c*0        Sig: TAKE ONE CAPSULE BY MOUTH DAILY  Refused By: FANY LEE  Reason for Refusal: Duplicate      Closing encounter - no further actions needed at this time    Fany Lee RN

## 2019-03-12 NOTE — TELEPHONE ENCOUNTER
"Requested Prescriptions   Pending Prescriptions Disp Refills     tamsulosin (FLOMAX) 0.4 MG capsule [Pharmacy Med Name: TAMSULOSIN HCL 0.4MG CAPS]  Last Written Prescription Date:  3/5/2019  Last Fill Quantity: 90 capsule,  # refills: 3   Last Office Visit: 3/5/2019   Future Office Visit:      90 capsule 2     Sig: TAKE ONE CAPSULE BY MOUTH DAILY    Alpha Blockers Passed - 3/11/2019  1:54 AM       Passed - Blood pressure under 140/90 in past 12 months    BP Readings from Last 3 Encounters:   03/05/19 134/82   03/05/19 134/82   09/12/18 124/76          Passed - Recent (12 mo) or future (30 days) visit within the authorizing provider's specialty    Patient had office visit in the last 12 months or has a visit in the next 30 days with authorizing provider or within the authorizing provider's specialty.  See \"Patient Info\" tab in inbasket, or \"Choose Columns\" in Meds & Orders section of the refill encounter.           Passed - Patient does not have Tadalafil, Vardenafil, or Sildenafil on their medication list       Passed - Medication is active on med list       Passed - Patient is 18 years of age or older          "

## 2019-05-29 ENCOUNTER — E-VISIT (OUTPATIENT)
Dept: FAMILY MEDICINE | Facility: CLINIC | Age: 49
End: 2019-05-29
Payer: COMMERCIAL

## 2019-05-29 DIAGNOSIS — M54.2 NECK PAIN: ICD-10-CM

## 2019-05-29 PROCEDURE — 99444 ZZC PHYSICIAN ONLINE EVALUATION & MANAGEMENT SERVICE: CPT | Performed by: NURSE PRACTITIONER

## 2019-05-31 RX ORDER — TRAMADOL HYDROCHLORIDE 50 MG/1
50-100 TABLET ORAL EVERY 6 HOURS PRN
Qty: 56 TABLET | Refills: 0 | Status: SHIPPED | OUTPATIENT
Start: 2019-06-28 | End: 2019-05-31

## 2019-05-31 RX ORDER — TRAMADOL HYDROCHLORIDE 50 MG/1
50-100 TABLET ORAL EVERY 6 HOURS PRN
Qty: 56 TABLET | Refills: 0 | Status: SHIPPED | OUTPATIENT
Start: 2019-07-26 | End: 2019-07-26

## 2019-05-31 RX ORDER — TRAMADOL HYDROCHLORIDE 50 MG/1
50-100 TABLET ORAL EVERY 6 HOURS PRN
Qty: 56 TABLET | Refills: 0 | Status: SHIPPED | OUTPATIENT
Start: 2019-05-31 | End: 2019-05-31

## 2019-05-31 RX ORDER — HYDROCODONE BITARTRATE AND ACETAMINOPHEN 5; 325 MG/1; MG/1
1 TABLET ORAL EVERY 6 HOURS PRN
Qty: 28 TABLET | Refills: 0 | Status: SHIPPED | OUTPATIENT
Start: 2019-07-26 | End: 2019-07-26

## 2019-05-31 RX ORDER — HYDROCODONE BITARTRATE AND ACETAMINOPHEN 5; 325 MG/1; MG/1
1 TABLET ORAL EVERY 6 HOURS PRN
Qty: 28 TABLET | Refills: 0 | Status: SHIPPED | OUTPATIENT
Start: 2019-06-28 | End: 2019-05-31

## 2019-05-31 RX ORDER — HYDROCODONE BITARTRATE AND ACETAMINOPHEN 5; 325 MG/1; MG/1
1 TABLET ORAL EVERY 6 HOURS PRN
Qty: 28 TABLET | Refills: 0 | Status: SHIPPED | OUTPATIENT
Start: 2019-05-31 | End: 2019-05-31

## 2019-07-26 DIAGNOSIS — M54.2 NECK PAIN: ICD-10-CM

## 2019-07-26 RX ORDER — HYDROCODONE BITARTRATE AND ACETAMINOPHEN 5; 325 MG/1; MG/1
1 TABLET ORAL EVERY 6 HOURS PRN
Qty: 28 TABLET | Refills: 0 | Status: SHIPPED | OUTPATIENT
Start: 2019-07-26 | End: 2019-08-16

## 2019-07-26 RX ORDER — TRAMADOL HYDROCHLORIDE 50 MG/1
50-100 TABLET ORAL EVERY 6 HOURS PRN
Qty: 56 TABLET | Refills: 0 | Status: SHIPPED | OUTPATIENT
Start: 2019-07-26 | End: 2019-08-16

## 2019-07-26 NOTE — TELEPHONE ENCOUNTER
Patient had prescriptions dated for today but they were signed in May so were voided due to new State law.  Pended replacement scripts.  Miranda lAtman RN

## 2019-07-26 NOTE — TELEPHONE ENCOUNTER
Patient called this morning wanting his Norco and Tramadol. He was not aware of the new law and is needing this before the weekend.     Thank You,    [facility] [place]

## 2019-07-31 NOTE — TELEPHONE ENCOUNTER
These 2 rx were walked over to the pharmacy.  I called to learn this info from pharmacy on 7/31/19.  LOREE Aparicio

## 2019-08-16 DIAGNOSIS — M54.2 NECK PAIN: ICD-10-CM

## 2019-08-16 RX ORDER — TRAMADOL HYDROCHLORIDE 50 MG/1
50-100 TABLET ORAL EVERY 6 HOURS PRN
Qty: 28 TABLET | Refills: 0 | Status: SHIPPED | OUTPATIENT
Start: 2019-08-23 | End: 2019-08-30

## 2019-08-16 RX ORDER — HYDROCODONE BITARTRATE AND ACETAMINOPHEN 5; 325 MG/1; MG/1
1 TABLET ORAL EVERY 6 HOURS PRN
Qty: 14 TABLET | Refills: 0 | Status: SHIPPED | OUTPATIENT
Start: 2019-08-16 | End: 2019-08-16

## 2019-08-16 RX ORDER — HYDROCODONE BITARTRATE AND ACETAMINOPHEN 5; 325 MG/1; MG/1
1 TABLET ORAL EVERY 6 HOURS PRN
Qty: 14 TABLET | Refills: 0 | Status: SHIPPED | OUTPATIENT
Start: 2019-08-23 | End: 2019-08-30

## 2019-08-16 NOTE — PROGRESS NOTES
Pt needs to be seen next week for refills, unable to get in due to my availability.  Will print 2 weeks Rx to tide over, given to .    Lola Perez, CNP

## 2019-08-22 ENCOUNTER — TRANSFERRED RECORDS (OUTPATIENT)
Dept: HEALTH INFORMATION MANAGEMENT | Facility: CLINIC | Age: 49
End: 2019-08-22

## 2019-08-28 NOTE — PROGRESS NOTES
Subjective     Jas Patel is a 49 year old male who presents to clinic today for the following health issues:    HPI   Depression and Anxiety Follow-Up    How are you doing with your depression since your last visit? Same, doing well    How are you doing with your anxiety since your last visit?  No change, dealing with it    Are you having other symptoms that might be associated with depression or anxiety? No    Have you had a significant life event? OTHER: daughter moved out on her own recently     Do you have any concerns with your use of alcohol or other drugs? No    Social History     Tobacco Use     Smoking status: Former Smoker     Packs/day: 0.20     Years: 10.00     Pack years: 2.00     Types: Cigarettes     Start date: 1/28/2015     Smokeless tobacco: Never Used   Substance Use Topics     Alcohol use: Yes     Comment: 1 drink a day     Drug use: No     PHQ 9/12/2018 3/5/2019 8/30/2019   PHQ-9 Total Score 2 4 2   Q9: Thoughts of better off dead/self-harm past 2 weeks Not at all Not at all Not at all     DONTE-7 SCORE 3/28/2018 3/5/2019 8/30/2019   Total Score - - -   Total Score - - 3 (minimal anxiety)   Total Score 4 8 3     No flowsheet data found.  No flowsheet data found.      Suicide Assessment Five-step Evaluation and Treatment (SAFE-T)        DONTE/MMD- he is on celexa 40mg daily.  Today reports mood is stable.  exercising more, lost 15 pounds.  Bought a treadclimber.      GERD- he is on PPI, taking it daily.  Wondering if he has gallbladder issues, flaring up for 1 week once a month.  RUQ pain that radiates to back.  Its off and on for a week.  Sometimes brought on by eating, sometimes when hes hungry.  No associated nausea or diarrhea.  Bowel movements are normal. Doesn't seem to be triggered by fatty foods.  He takes ibu twice a day.  Tried switching to ranitadine and symptoms flared up.      BPH- he is on oxybutynin and flomax.  Drinking more water, does still have some frequency and urgency.       Essential tremor- controlled with propranolol once a day    Amitriptyline for sleep, wondering about dose increase.  He does have some dry mouth.    Will schedule colonoscopy.     Woke up 1 week ago with right lower chest pain.  Felt dizzy and lightheaded.  Was at work, blood pressure 167/101.  Seen in ER, initial trop slightly elevated (0.019), 2nd one was normal.  He was sleeping when chest pressure came on.  No recurrent episode.  Climbed lots of steps on vacation without issues.    Maternal grandfather  of MI at age 64.  Dad had aortic valve replacement and MI.        Patient Active Problem List   Diagnosis     Low back pain     CARDIOVASCULAR SCREENING; LDL GOAL LESS THAN 160     Moderate major depression (H)     Generalized anxiety disorder     Gastroesophageal reflux disease without esophagitis     BPH (benign prostatic hyperplasia)     Chronic pain syndrome     Benign essential tremor     Lumbago     Past Surgical History:   Procedure Laterality Date     DRAIN SKIN ABSCESS COMPLIC      left thigh I and D       Social History     Tobacco Use     Smoking status: Former Smoker     Packs/day: 0.20     Years: 10.00     Pack years: 2.00     Types: Cigarettes     Start date: 2015     Smokeless tobacco: Never Used   Substance Use Topics     Alcohol use: Yes     Comment: 1 drink a day     Family History   Problem Relation Age of Onset     Hypertension Father      C.A.D. Maternal Grandfather      Hypertension Maternal Grandfather      Cerebrovascular Disease Other         great uncle     Diabetes Paternal Uncle         type 2     Diabetes Other         great aunt     Prostate Cancer Other         great uncle moms side     Alzheimer Disease Other         great uncle moms side         Current Outpatient Medications   Medication Sig Dispense Refill     amitriptyline (ELAVIL) 25 MG tablet TAKE ONE TABLET BY MOUTH AT BEDTIME 90 tablet 3     Cholecalciferol (VITAMIN D3 PO) Take 2,000 Units by mouth every other  day        citalopram (CELEXA) 40 MG tablet Take 1 tablet (40 mg) by mouth daily 90 tablet 3     IBUPROFEN PO        loratadine (CLARITIN) 10 MG tablet Take 10 mg by mouth daily. 1 tab daily       MELATONIN PO Take 5 mg by mouth At Bedtime       MULTI-VITAMIN OR TABS one daily  0     omeprazole (PRILOSEC) 20 MG DR capsule Take 1 capsule (20 mg) by mouth daily 90 capsule 3     oxybutynin ER (DITROPAN-XL) 10 MG 24 hr tablet Take 1 tablet (10 mg) by mouth daily 90 tablet 3     propranolol (INDERAL) 10 MG tablet TAKE ONE TABLET BY MOUTH TWICE A DAY AS NEEDED FOR TREMOR 180 tablet 3     tiZANidine (ZANAFLEX) 4 MG tablet Take 1-2 tablets (4-8 mg) by mouth 3 times daily as needed for muscle spasms 30 tablet 1     [START ON 9/6/2019] HYDROcodone-acetaminophen (NORCO) 5-325 MG tablet Take 1 tablet by mouth every 6 hours as needed for pain #28 pills/4 weeks 28 tablet 0     [START ON 9/6/2019] traMADol (ULTRAM) 50 MG tablet Take 1-2 tablets ( mg) by mouth every 6 hours as needed for pain #56 pills/4 weeks. 56 tablet 0         Reviewed and updated as needed this visit by Provider         Review of Systems   ROS COMP: Constitutional, HEENT, cardiovascular, pulmonary, gi and gu systems are negative, except as otherwise noted.      Objective    /64 (BP Location: Left arm, Patient Position: Chair, Cuff Size: Adult Regular)   Pulse 72   Temp 97  F (36.1  C) (Oral)   Resp 16   Wt 88.5 kg (195 lb)   SpO2 100%   BMI 25.04 kg/m    Body mass index is 25.04 kg/m .  Physical Exam   GENERAL APPEARANCE: healthy, alert and no distress  EYES: Eyes grossly normal to inspection and conjunctivae and sclerae normal  RESP: respirations nonlabored  PSYCH: mentation appears normal and affect normal/bright       Diagnostic Test Results:  Labs reviewed in Epic        Assessment & Plan     (R07.89) Chest pressure  (primary encounter diagnosis)  (R74.8) Elevated troponin  Comment: recent ER visit for episode of chest pain, did have  slightly elevated troponin which normalized, no subsequent episodes  Plan: Exercise Stress Test - Adult        Plan for exercise stress test, he will call to schedule.  Discussed holding bb for 24h before testing.      (R10.11) Abdominal pain, right upper quadrant  Comment: concerning for biliary colic v. PUD  Plan: US Abdomen Limited        Cont PPI, schedule abd US, is cholelithiasis will recommend gen surgery referral.  If eg will proceed with upper GI as below     (F41.1) Generalized anxiety disorder  (F32.1) Moderate major depression (H)  Comment: controlled on celexa  Plan: cont celexa 40mg dailt    (K21.0) Gastroesophageal reflux disease with esophagitis  Comment: he tried stepping down to H2 blocker with worsening symptoms.  Would like to get endoscopy for surveillance, still has some heartburn symptoms   Plan: GASTROENTEROLOGY ADULT REF PROCEDURE ONLY,         omeprazole (PRILOSEC) 20 MG DR capsule        He will schedule upper GI, cont PPI    (G25.0) Benign essential tremor  Comment:   Plan: cont propanolol    (N40.1,  R35.0) Benign prostatic hyperplasia with urinary frequency  Comment:   Plan: increase flomax to 0.8mg daily, consider follow up with urology     (G47.00) Insomnia, unspecified type  Comment:   Plan: amitriptyline (ELAVIL) 25 MG tablet        Wants to increase dose but discussed dry mouth may worsen, reviewed CBT for insomnia              See Patient Instructions    Return in about 8 weeks (around 10/25/2019).    RAFAEL Fournier [facility]

## 2019-08-28 NOTE — PROGRESS NOTES
Subjective     Jas Patel is a 49 year old male who presents to clinic today for the following health issues:    HPI   Chronic Pain Follow-Up       Type / Location of Pain: Neck pain  Analgesia/pain control:       Recent changes:  same      Overall control: Tolerable with discomfort  Activity level/function:      Daily activities:  Able to do all daily activities    Work:  Pain does not limit any  work  Adverse effects:  No  Adherance    Taking medication as directed?  Yes    Participating in other treatments: yes  Risk Factors:    Sleep:  Fair    Mood/anxiety:  controlled    Recent family or social stressors:  Opened new surgery center with work    Other aggravating factors: none  PHQ-9 SCORE 3/28/2018 9/12/2018 3/5/2019   PHQ-9 Total Score - - -   PHQ-9 Total Score 2 2 4     DONTE-7 SCORE 7/17/2017 3/28/2018 3/5/2019   Total Score - - -   Total Score 7 4 8     Encounter-Level CSA - 03/28/2018:    Controlled Substance Agreement - Scan on 4/12/2018  2:57 PM: CONTROLLED SUBSTANCE AGREEMENT (below)       Encounter-Level CSA - 10/28/2015:    Controlled Substance Agreement - Scan on 10/29/2015 11:24 AM: CONTROLLED SUBSTANCE AGREEMENT (below)       Patient-Level CSA:    There are no patient-level csa.           Here today for follow up of chronic neck pain.       Pt injured neck while lifting a pt 9/2011. Feels neck pain is stable on current medication regimen which includes 800mg ibuprofen TID, tramadol BID, and norco usually about 1/d. Has done PT in the past, still doing exercise at home which are helpful. Also seeing chiropractor. Has been told he is not a surgical candidate. Working currently in day surgery pre/postop as a nurse.  Had trigger point injections and BOY injection which helped with numbness/tingling.  Last MRI done 2011 at [facility].     Today states pain continues to be manageable on current medication, no new side effects.      CSA on file 9/2018.  Urine drug + marijuana 4/2018.  we discussed needs to  abstain from marijuana.  MPM reviewed an no suspicious activity.    Patient Active Problem List   Diagnosis     Low back pain     CARDIOVASCULAR SCREENING; LDL GOAL LESS THAN 160     Moderate major depression (H)     Generalized anxiety disorder     Gastroesophageal reflux disease without esophagitis     BPH (benign prostatic hyperplasia)     Chronic pain syndrome     Benign essential tremor     Lumbago     Past Surgical History:   Procedure Laterality Date     DRAIN SKIN ABSCESS COMPLIC      left thigh I and D       Social History     Tobacco Use     Smoking status: Former Smoker     Packs/day: 0.20     Years: 10.00     Pack years: 2.00     Types: Cigarettes     Start date: 1/28/2015     Smokeless tobacco: Never Used   Substance Use Topics     Alcohol use: Yes     Comment: 1 drink a day     Family History   Problem Relation Age of Onset     Hypertension Father      C.A.D. Maternal Grandfather      Hypertension Maternal Grandfather      Cerebrovascular Disease Other         great uncle     Diabetes Paternal Uncle         type 2     Diabetes Other         great aunt     Prostate Cancer Other         great uncle moms side     Alzheimer Disease Other         great uncle moms side         Current Outpatient Medications   Medication Sig Dispense Refill     Cholecalciferol (VITAMIN D3 PO) Take 2,000 Units by mouth every other day        citalopram (CELEXA) 40 MG tablet Take 1 tablet (40 mg) by mouth daily 90 tablet 3     [START ON 9/6/2019] HYDROcodone-acetaminophen (NORCO) 5-325 MG tablet Take 1 tablet by mouth every 6 hours as needed for pain #28 pills/4 weeks 28 tablet 0     IBUPROFEN PO        loratadine (CLARITIN) 10 MG tablet Take 10 mg by mouth daily. 1 tab daily       MELATONIN PO Take 5 mg by mouth At Bedtime       MULTI-VITAMIN OR TABS one daily  0     oxybutynin ER (DITROPAN-XL) 10 MG 24 hr tablet Take 1 tablet (10 mg) by mouth daily 90 tablet 3     propranolol (INDERAL) 10 MG tablet TAKE ONE TABLET BY MOUTH  TWICE A DAY AS NEEDED FOR TREMOR 180 tablet 3     tiZANidine (ZANAFLEX) 4 MG tablet Take 1-2 tablets (4-8 mg) by mouth 3 times daily as needed for muscle spasms 30 tablet 1     [START ON 9/6/2019] traMADol (ULTRAM) 50 MG tablet Take 1-2 tablets ( mg) by mouth every 6 hours as needed for pain #56 pills/4 weeks. 56 tablet 0     amitriptyline (ELAVIL) 25 MG tablet TAKE ONE TABLET BY MOUTH AT BEDTIME 90 tablet 3     omeprazole (PRILOSEC) 20 MG DR capsule Take 1 capsule (20 mg) by mouth daily 90 capsule 3         Reviewed and updated as needed this visit by Provider         Review of Systems   ROS COMP: Constitutional, HEENT, cardiovascular, pulmonary, gi and gu systems are negative, except as otherwise noted.      Objective    There were no vitals taken for this visit.  There is no height or weight on file to calculate BMI.  Physical Exam   GENERAL APPEARANCE: healthy, alert and no distress  EYES: Eyes grossly normal to inspection and conjunctivae and sclerae normal  RESP: respirations nonlabored  PSYCH: mentation appears normal and affect normal/bright       Diagnostic Test Results:  Labs reviewed in Epic        Assessment & Plan     (G89.4) Chronic pain syndrome  (primary encounter diagnosis)  (M54.2) Neck pain  Comment: symptoms stable on current regimen, current treatment plan is appropriate.  MPM reviewed without suspicious activity.  + urine drug screen for marijuana last visit, pt instructed needs to abstain to continue on opioids  Plan: HYDROcodone-acetaminophen (NORCO) 5-325 MG         tablet, traMADol (ULTRAM) 50 MG tablet        Discussed new prescribing laws requiring Rx be filled within 30d of writing.  We will plan for evisit follow up q2mo for refills with clinic visit q6mo.  Provided with sept Rxs today and I will send myself a message queing October Rxs.  Plan for evisit in November.  CSA updated today.  Update urine drug screen.             No follow-ups on file.    Lola Perez APRN  [facility]

## 2019-08-30 ENCOUNTER — OFFICE VISIT (OUTPATIENT)
Dept: FAMILY MEDICINE | Facility: CLINIC | Age: 49
End: 2019-08-30
Payer: OTHER MISCELLANEOUS

## 2019-08-30 VITALS — BODY MASS INDEX: 25.04 KG/M2 | WEIGHT: 195 LBS | RESPIRATION RATE: 16 BRPM

## 2019-08-30 VITALS
OXYGEN SATURATION: 100 % | SYSTOLIC BLOOD PRESSURE: 118 MMHG | DIASTOLIC BLOOD PRESSURE: 64 MMHG | RESPIRATION RATE: 16 BRPM | HEART RATE: 72 BPM | BODY MASS INDEX: 25.04 KG/M2 | TEMPERATURE: 97 F | WEIGHT: 195 LBS

## 2019-08-30 DIAGNOSIS — F32.1 MODERATE MAJOR DEPRESSION (H): ICD-10-CM

## 2019-08-30 DIAGNOSIS — G25.0 BENIGN ESSENTIAL TREMOR: ICD-10-CM

## 2019-08-30 DIAGNOSIS — N40.1 BENIGN PROSTATIC HYPERPLASIA WITH URINARY FREQUENCY: ICD-10-CM

## 2019-08-30 DIAGNOSIS — F41.1 GENERALIZED ANXIETY DISORDER: ICD-10-CM

## 2019-08-30 DIAGNOSIS — R35.0 BENIGN PROSTATIC HYPERPLASIA WITH URINARY FREQUENCY: ICD-10-CM

## 2019-08-30 DIAGNOSIS — K21.00 GASTROESOPHAGEAL REFLUX DISEASE WITH ESOPHAGITIS: ICD-10-CM

## 2019-08-30 DIAGNOSIS — M54.2 NECK PAIN: ICD-10-CM

## 2019-08-30 DIAGNOSIS — R79.89 ELEVATED TROPONIN: ICD-10-CM

## 2019-08-30 DIAGNOSIS — R10.11 ABDOMINAL PAIN, RIGHT UPPER QUADRANT: ICD-10-CM

## 2019-08-30 DIAGNOSIS — G89.4 CHRONIC PAIN SYNDROME: Primary | ICD-10-CM

## 2019-08-30 DIAGNOSIS — R07.89 CHEST PRESSURE: Primary | ICD-10-CM

## 2019-08-30 DIAGNOSIS — G47.00 INSOMNIA, UNSPECIFIED TYPE: ICD-10-CM

## 2019-08-30 PROCEDURE — 99214 OFFICE O/P EST MOD 30 MIN: CPT | Performed by: NURSE PRACTITIONER

## 2019-08-30 PROCEDURE — 99213 OFFICE O/P EST LOW 20 MIN: CPT | Performed by: NURSE PRACTITIONER

## 2019-08-30 PROCEDURE — 99000 SPECIMEN HANDLING OFFICE-LAB: CPT | Performed by: NURSE PRACTITIONER

## 2019-08-30 PROCEDURE — 80307 DRUG TEST PRSMV CHEM ANLYZR: CPT | Mod: 90 | Performed by: NURSE PRACTITIONER

## 2019-08-30 RX ORDER — TRAMADOL HYDROCHLORIDE 50 MG/1
50-100 TABLET ORAL EVERY 6 HOURS PRN
Qty: 56 TABLET | Refills: 0 | Status: SHIPPED | OUTPATIENT
Start: 2019-09-06 | End: 2019-09-13

## 2019-08-30 RX ORDER — HYDROCODONE BITARTRATE AND ACETAMINOPHEN 5; 325 MG/1; MG/1
1 TABLET ORAL EVERY 6 HOURS PRN
Qty: 28 TABLET | Refills: 0 | Status: SHIPPED | OUTPATIENT
Start: 2019-09-06 | End: 2019-09-13

## 2019-08-30 ASSESSMENT — ANXIETY QUESTIONNAIRES
7. FEELING AFRAID AS IF SOMETHING AWFUL MIGHT HAPPEN: SEVERAL DAYS
5. BEING SO RESTLESS THAT IT IS HARD TO SIT STILL: NOT AT ALL
4. TROUBLE RELAXING: NOT AT ALL
GAD7 TOTAL SCORE: 3
1. FEELING NERVOUS, ANXIOUS, OR ON EDGE: SEVERAL DAYS
2. NOT BEING ABLE TO STOP OR CONTROL WORRYING: NOT AT ALL
GAD7 TOTAL SCORE: 3
6. BECOMING EASILY ANNOYED OR IRRITABLE: NOT AT ALL
7. FEELING AFRAID AS IF SOMETHING AWFUL MIGHT HAPPEN: SEVERAL DAYS
3. WORRYING TOO MUCH ABOUT DIFFERENT THINGS: SEVERAL DAYS
GAD7 TOTAL SCORE: 3

## 2019-08-30 ASSESSMENT — PATIENT HEALTH QUESTIONNAIRE - PHQ9
SUM OF ALL RESPONSES TO PHQ QUESTIONS 1-9: 2
10. IF YOU CHECKED OFF ANY PROBLEMS, HOW DIFFICULT HAVE THESE PROBLEMS MADE IT FOR YOU TO DO YOUR WORK, TAKE CARE OF THINGS AT HOME, OR GET ALONG WITH OTHER PEOPLE: NOT DIFFICULT AT ALL
SUM OF ALL RESPONSES TO PHQ QUESTIONS 1-9: 2

## 2019-08-30 NOTE — LETTER
[facility]  08/30/19    Patient: Jas Patel  YOB: 1970  Medical Record Number: [id number]                                                                  Opioid / Opioid Plus Controlled Substance Agreement    I understand that my care provider has prescribed an opioid (narcotic) controlled substance to help manage my condition(s). I am taking this medicine to help me function or work. I know this is strong medicine, and that it can cause serious side effects. Opioid medicine can be sedating, addicting and may cause a dependency on the drug. They can affect my ability to drive or think, and cause depression. They need to be taken exactly as prescribed. Combining opioids with certain medicines or chemicals (such as cocaine, sedatives and tranquilizers, sleeping pills, meth) can be dangerous or even fatal. Also, if I stop opioids suddenly, I may have severe withdrawal symptoms. Last, I understand that opioids do not work for all types of pain nor for all patients. If not helpful, I may be asked to stop them.      The risks, benefits, and side effects of these medicine(s) were explained to me. I agree that:    1. I will take part in other treatments as advised by my care team. This may be psychiatry or counseling, physical therapy, behavioral therapy, group treatment or a referral to a pain clinic. I will reduce or stop my medicine when my care team tells me to do so.  2. I will take my medicines as prescribed. I will not change the dose or schedule unless my care team tells me to. There will be no refills if I  run out early.   I may be contactedwithout warning and asked to complete a urine drug test or pill count at any time.   3. I will keep all my appointments, and understand this is part of the monitoring of opioids. My care team may require an office visit for EVERY opioid/controlled substance refill. If I miss appointments or don t follow instructions, my care team may stop my  medicine.  4. I will not ask other providers to prescribe controlled substances, and I will not accept controlled substances from other people. If I need another prescribed controlled substance for a new reason, I will tell my care team within 1 business day.  5. I will use one pharmacy to fill all of my controlled substance prescriptions, and it is up to me to make sure that I do not run out of my medicines on weekends or holidays. If my care team is willing to refill my opioid prescription without a visit, I must request refills only during office hours, refills may take up to 3 days to process, and it may take up to 5 to 7 days for my medicine to be mailed and ready at my pharmacy. Prescriptions will not be mailed anywhere except my pharmacy.        [id number]  Rev 12/18         Registration to scan to EHR                             Page 1 of 2               Controlled Substance Agreement Opioid        [facility]  08/30/19  Patient: Jas Patel  YOB: 1970  Medical Record Number: [id number]                                                                  6. I am responsible for my prescriptions. If the medicine/prescription is lost or stolen, it will not be replaced. I also agree not to share controlled substance medicines with anyone.  7. I agree to not use ANY illegal or recreational drugs. This includes marijuana, cocaine, bath salts or other drugs. I agree not to use alcohol unless my care team says I may.          I agree to give urine samples whenever asked. If I don t give a urine sample, the care team may stop my medicine.    8. If I enroll in the [place] Medical Marijuana program, I will tell my care team. I will also sign an agreement to share my medical records with my care team.   9. I will bring in my list of medicines (or my medicine bottles) each time I come to the clinic.   10. I will tell my care team right away if I become pregnant or have a new medical problem  treated outside of my regular clinic.  11. I understand that this medicine can affect my thinking and judgment. It may be unsafe for me to drive, use machinery and do dangerous tasks. I will not do any of these things until I know how the medicine affects me. If my dose changes, I will wait to see how it affects me. I will contact my care team if I have concerns about medicine side effects.    I understand that if I do not follow any of the conditions above, my prescriptions or treatment may be stopped.      I agree that my provider, clinic care team, and pharmacy may work with any city, state or federal law enforcement agency that investigates the misuse, sale, or other diversion of my controlled medicine. I will allow my provider to discuss my care with or share a copy of this agreement with any other treating provider, pharmacy or emergency room where I receive care. I agree to give up (waive) any right of privacy or confidentiality with respect to these consents.     I have read this agreement and have asked questions about anything I did not understand.      ________________________________________________________________________  Patient signature - Date/Time -  Jas Patel                                      ________________________________________________________________________  Witness signature                                                            ________________________________________________________________________  Provider signature - RAFAEL Fournier CNP      [id number]  Rev 12/18         Registration to scan to EHR                         Page 2 of 2                   Controlled Substance Agreement Opioid           Page 1 of 2  Opioid Pain Medicines (also known as Narcotics)  What You Need to Know    What are opioids?   Opioids are pain medicines that must be prescribed by a doctor.  They are also known as narcotics.    Examples are:     morphine (MS Contin, Amy)    oxycodone  (Oxycontin)    oxycodone and acetaminophen (Percocet)    hydrocodone and acetaminophen (Vicodin, Norco)     fentanyl patch (Duragesic)     hydromorphone (Dilaudid)     methadone     What do opioids do well?   Opioids are best for short-term pain after a surgery or injury. They also work well for cancer pain. Unlike other pain medicines, they do not cause liver or kidney failure or ulcers. They may help some people with long-lasting (chronic) pain.     What do opioids NOT do well?   Opioids never get rid of pain entirely, and they do not work well for most patients with chronic pain. Opioids do not reduce swelling, one of the causes of pain. They also don t work well for nerve pain.                           For informational purposes only.  Not to replace the advice of your care provider.  Copyright 201 [facility]. All right reserved. [employer] 457814-God 02/18.      Page 2 of 2    Risks and side effects   Talk to your doctor before you start or decide to keep taking one of these medicines. Side effects include:    Lowering your breathing rate enough to cause death    Overdose, including death, especially if taking higher than prescribed doses    Long-term opioid use    Worse depression symptoms; less pleasure in things you usually enjoy    Feeling tired or sluggish    Slower thoughts or cloudy thinking    Being more sensitive to pain over time; pain is harder to control    Trouble sleeping or restless sleep    Changes in hormone levels (for example, less testosterone)    Changes in sex drive or ability to have sex    Constipation    Unsafe driving    Itching and sweating    Feeling dizzy    Nausea, vomiting and dry mouth    What else should I know about opioids?  When someone takes opioids for too long or too often, they become dependent. This means that if you stop or reduce the medicine too quickly, you will have withdrawal symptoms.    Dependence is not the same as addiction. Addiction is when  people keep using a substance that harms their body, their mind or their relations with others. If you have a history of drug or alcohol abuse, taking opioids can cause a relapse.    Over time, opioids don t work as well. Most people will need higher and higher doses. The higher the dose, the more serious the side effects. We don t know the long-term effects of opioids.      Prescribed opioids aren't the best way to manage chronic pain    Other ways to manage pain include:      Ibuprofen or acetaminophen.  You should always try this first.      Treat health problems that may be causing pain.      acupuncture or massage, deep breathing, meditation, visual imagery, aromatherapy.      Use heat or ice at the pain site      Physical therapy and exercise      Stop smoking      See a counselor or therapist                                                  People who have used opioids for a long time may have a lower quality of life, worse depression, higher levels of pain and more visits to doctors.    Never share your opioids with others. Be sure to store opioids in a secure place, locked if possible.Young children can easily swallow them and overdose.     You can overdose on opioids.  Signs of overdose include decrease or loss of consciousness, slowed breathing, trouble waking and blue lips.  If someone is worried about overdose, they should call 911.    If you are at risk for overdose, you may get naloxone (Narcan, a medicine that reverses the effects of opioids.  If you overdose, a friend or family member can give you Narcan while waiting for the ambulance.  They need to know the signs of overdose and how to give Narcan.    While you're taking opioids:    Don't use alcohol or street drugs. Taking them together can cause death.    Don't take any of these medicines unless your doctor says its okay.  Taking these with opioids can cause death.    Benzodiazepines (such as lorazepam         or diazepam)    Muscle relaxers  (such as cyclobenzaprine)    sleeping pills    other opioids    Safe disposal of opioids  Find your area drug take-back program, your pharmacy mail-back program, buy a special disposal bag (such as Deterra) from your pharmacy or flush them down the toilet.  Use the guidelines at:  www.fda.gov/drugs/resourcesforyou

## 2019-08-30 NOTE — PATIENT INSTRUCTIONS
1.  Refilled tramadol and norco, updated CSA, urine drug today.  Follow up with evisit in 2 months, evisit again in 2 months, come into the office in 6 months    2.  Call to schedule stress [phone].  Hold propanolol for 24h before.    3.  Call to schedule upper endoscopy and colonoscopy [phone]    4.  Increase flomax to 0.8mg daily (take two 0.4mg tabs)    5.  Check out [employer] and [employer] apps for CBT for sleep

## 2019-08-31 ASSESSMENT — ANXIETY QUESTIONNAIRES: GAD7 TOTAL SCORE: 3

## 2019-08-31 ASSESSMENT — PATIENT HEALTH QUESTIONNAIRE - PHQ9: SUM OF ALL RESPONSES TO PHQ QUESTIONS 1-9: 2

## 2019-09-06 LAB — PAIN DRUG SCR UR W RPTD MEDS: NORMAL

## 2019-09-13 DIAGNOSIS — M54.2 NECK PAIN: ICD-10-CM

## 2019-09-13 RX ORDER — HYDROCODONE BITARTRATE AND ACETAMINOPHEN 5; 325 MG/1; MG/1
1 TABLET ORAL EVERY 6 HOURS PRN
Qty: 28 TABLET | Refills: 0 | Status: SHIPPED | OUTPATIENT
Start: 2019-10-04 | End: 2019-11-01

## 2019-09-13 RX ORDER — TRAMADOL HYDROCHLORIDE 50 MG/1
50-100 TABLET ORAL EVERY 6 HOURS PRN
Qty: 56 TABLET | Refills: 0 | Status: SHIPPED | OUTPATIENT
Start: 2019-10-04 | End: 2019-11-01

## 2019-10-01 ENCOUNTER — HEALTH MAINTENANCE LETTER (OUTPATIENT)
Age: 49
End: 2019-10-01

## 2019-10-04 ENCOUNTER — MYC REFILL (OUTPATIENT)
Dept: FAMILY MEDICINE | Facility: CLINIC | Age: 49
End: 2019-10-04

## 2019-10-04 ENCOUNTER — MYC MEDICAL ADVICE (OUTPATIENT)
Dept: FAMILY MEDICINE | Facility: CLINIC | Age: 49
End: 2019-10-04

## 2019-10-04 DIAGNOSIS — M54.2 NECK PAIN: ICD-10-CM

## 2019-10-04 NOTE — TELEPHONE ENCOUNTER
From 8/30/19 office visit, plan was:  Discussed new prescribing laws requiring Rx be filled within 30d of writing.  We will plan for evisit follow up q2mo for refills with clinic visit q6mo.  Provided with sept Rxs today and I will send myself a message queing October Rxs.  Plan for evisit in November.  CSA updated today.  Update urine drug screen.     I see meds were refilled today  So patient should request e-visit next month then?   Miranda Altman RN

## 2019-10-05 NOTE — TELEPHONE ENCOUNTER
Requested Prescriptions   Pending Prescriptions Disp Refills     traMADol (ULTRAM) 50 MG tablet 56 tablet 0     Sig: Take 1-2 tablets ( mg) by mouth every 6 hours as needed for pain #56 pills/4 weeks.         Last Written Prescription Date:  10/4/19  Last Fill Quantity: 56,   # refills: 0  Last Office Visit: 8/30/10  Future Office visit:       Routing refill request to provider for review/approval because:  Drug not on the [facility] or  [place] refill protocol or controlled substance      There is no refill protocol information for this order        HYDROcodone-acetaminophen (NORCO) 5-325 MG tablet 28 tablet 0     Sig: Take 1 tablet by mouth every 6 hours as needed for pain #28 pills/4 weeks         Last Written Prescription Date:  10/4/19  Last Fill Quantity: 28,   # refills: 0       Routing refill request to provider for review/approval because:  Drug not on the [facility]  [place] refill protocol or controlled substance      There is no refill protocol information for this order

## 2019-10-07 RX ORDER — HYDROCODONE BITARTRATE AND ACETAMINOPHEN 5; 325 MG/1; MG/1
1 TABLET ORAL EVERY 6 HOURS PRN
Qty: 28 TABLET | Refills: 0 | OUTPATIENT
Start: 2019-10-07

## 2019-10-07 RX ORDER — TRAMADOL HYDROCHLORIDE 50 MG/1
50-100 TABLET ORAL EVERY 6 HOURS PRN
Qty: 56 TABLET | Refills: 0 | OUTPATIENT
Start: 2019-10-07

## 2019-10-07 NOTE — TELEPHONE ENCOUNTER
I sent in Rx to our pharmacy for 10/4, sent electronically.  please confirm they have on file.    Lola Perez, CNP

## 2019-10-08 ENCOUNTER — TELEPHONE (OUTPATIENT)
Dept: FAMILY MEDICINE | Facility: CLINIC | Age: 49
End: 2019-10-08

## 2019-10-08 DIAGNOSIS — R07.89 CHEST PRESSURE: Primary | ICD-10-CM

## 2019-10-08 NOTE — TELEPHONE ENCOUNTER
[facility] stress test order would you like echo pictures as well due to chest pain?    Patient has stress test scheduled tomorrow 10/9/19     Please advise

## 2019-10-08 NOTE — TELEPHONE ENCOUNTER
Lola-Please review, sign if agree and may close encounter.    Writer called Blanca with provider response and was asked for provider to enter new order for Echocardiogram Exercise Stress test.    Order pended.    Thank you!  KATHY PetersenN, RN

## 2019-10-09 ENCOUNTER — ANCILLARY PROCEDURE (OUTPATIENT)
Dept: CARDIOLOGY | Facility: CLINIC | Age: 49
End: 2019-10-09
Attending: NURSE PRACTITIONER
Payer: COMMERCIAL

## 2019-10-09 DIAGNOSIS — R07.89 CHEST PRESSURE: ICD-10-CM

## 2019-10-09 RX ADMIN — Medication 5 ML: at 15:14

## 2019-10-23 ENCOUNTER — MYC MEDICAL ADVICE (OUTPATIENT)
Dept: FAMILY MEDICINE | Facility: CLINIC | Age: 49
End: 2019-10-23

## 2019-10-23 ENCOUNTER — ANCILLARY PROCEDURE (OUTPATIENT)
Dept: ULTRASOUND IMAGING | Facility: CLINIC | Age: 49
End: 2019-10-23
Attending: NURSE PRACTITIONER
Payer: COMMERCIAL

## 2019-10-23 DIAGNOSIS — R10.11 ABDOMINAL PAIN, RIGHT UPPER QUADRANT: Primary | ICD-10-CM

## 2019-10-23 DIAGNOSIS — R10.11 ABDOMINAL PAIN, RIGHT UPPER QUADRANT: ICD-10-CM

## 2019-10-23 DIAGNOSIS — K21.9 GASTROESOPHAGEAL REFLUX DISEASE WITHOUT ESOPHAGITIS: ICD-10-CM

## 2019-10-23 NOTE — TELEPHONE ENCOUNTER
Lola Perez CNP,  Please see patient's MyChart message - f/u from abdominal US    Upper endoscopy referral cued    Please advise    Thank You!  Alba Obrien, RN  Triage Nurse

## 2019-10-23 NOTE — RESULT ENCOUNTER NOTE
Jas,    Ultrasound of your gallbladder was normal.  No findings to explain abdominal pain.  Did you want to move forward with upper endoscopy as we discussed at our last visit?  If so I will place an order and you can schedule.    Lola Perez, CNP

## 2019-11-01 ENCOUNTER — MYC MEDICAL ADVICE (OUTPATIENT)
Dept: FAMILY MEDICINE | Facility: CLINIC | Age: 49
End: 2019-11-01

## 2019-11-01 ENCOUNTER — E-VISIT (OUTPATIENT)
Dept: FAMILY MEDICINE | Facility: CLINIC | Age: 49
End: 2019-11-01
Payer: COMMERCIAL

## 2019-11-01 DIAGNOSIS — M54.2 NECK PAIN: ICD-10-CM

## 2019-11-01 PROCEDURE — 99207 ZZC NO BILLABLE SERVICE THIS VISIT: CPT | Performed by: NURSE PRACTITIONER

## 2019-11-01 RX ORDER — TRAMADOL HYDROCHLORIDE 50 MG/1
50-100 TABLET ORAL EVERY 6 HOURS PRN
Qty: 56 TABLET | Refills: 0 | Status: SHIPPED | OUTPATIENT
Start: 2019-11-29 | End: 2019-12-04

## 2019-11-01 RX ORDER — TRAMADOL HYDROCHLORIDE 50 MG/1
50-100 TABLET ORAL EVERY 6 HOURS PRN
Qty: 56 TABLET | Refills: 0 | Status: SHIPPED | OUTPATIENT
Start: 2019-11-01 | End: 2019-11-01

## 2019-11-01 RX ORDER — HYDROCODONE BITARTRATE AND ACETAMINOPHEN 5; 325 MG/1; MG/1
1 TABLET ORAL EVERY 6 HOURS PRN
Qty: 28 TABLET | Refills: 0 | Status: SHIPPED | OUTPATIENT
Start: 2019-11-29 | End: 2020-01-02

## 2019-11-01 RX ORDER — HYDROCODONE BITARTRATE AND ACETAMINOPHEN 5; 325 MG/1; MG/1
1 TABLET ORAL EVERY 6 HOURS PRN
Qty: 28 TABLET | Refills: 0 | Status: SHIPPED | OUTPATIENT
Start: 2019-11-01 | End: 2019-11-01

## 2019-11-01 NOTE — TELEPHONE ENCOUNTER
I already sent in refills and messaged pt through [employer], please make sure he knows these were sent in.    Lola Perez, CNP

## 2019-11-07 ENCOUNTER — ANCILLARY PROCEDURE (OUTPATIENT)
Dept: GENERAL RADIOLOGY | Facility: CLINIC | Age: 49
End: 2019-11-07
Attending: FAMILY MEDICINE
Payer: COMMERCIAL

## 2019-11-07 ENCOUNTER — E-VISIT (OUTPATIENT)
Dept: FAMILY MEDICINE | Facility: CLINIC | Age: 49
End: 2019-11-07
Payer: COMMERCIAL

## 2019-11-07 ENCOUNTER — OFFICE VISIT (OUTPATIENT)
Dept: ORTHOPEDICS | Facility: CLINIC | Age: 49
End: 2019-11-07
Payer: COMMERCIAL

## 2019-11-07 DIAGNOSIS — M54.41 ACUTE RIGHT-SIDED LOW BACK PAIN WITH RIGHT-SIDED SCIATICA: Primary | ICD-10-CM

## 2019-11-07 DIAGNOSIS — M54.9 ACUTE BACK PAIN, UNSPECIFIED BACK LOCATION, UNSPECIFIED BACK PAIN LATERALITY: Primary | ICD-10-CM

## 2019-11-07 DIAGNOSIS — M54.41 ACUTE RIGHT-SIDED LOW BACK PAIN WITH RIGHT-SIDED SCIATICA: ICD-10-CM

## 2019-11-07 DIAGNOSIS — S39.012A STRAIN OF LUMBAR REGION, INITIAL ENCOUNTER: ICD-10-CM

## 2019-11-07 PROCEDURE — 99207 ZZC NO CHARGE LOS: CPT | Performed by: NURSE PRACTITIONER

## 2019-11-07 RX ORDER — PREDNISONE 20 MG/1
40 TABLET ORAL DAILY
Qty: 10 TABLET | Refills: 0 | Status: SHIPPED | OUTPATIENT
Start: 2019-11-07 | End: 2019-12-27

## 2019-11-07 NOTE — Clinical Note
11/7/2019       RE: Jas Patel  [address]  [place] [id number]     Dear Colleague,    Thank you for referring your patient, Jas Patel, to the [facility] SPORTS AND ORTHOPAEDIC WALK IN CLINIC at [facility]. Please see a copy of my visit note below.          SPORTS & ORTHOPEDIC WALK-IN VISIT 11/7/2019    Primary Care Physician: Dr. Perez    Bought an inversion table to help with some back issues. Was hanging off of it a few weeks ago and felt a little crack. Since then has had shooting pain through right side down lateral thigh, anterior shin and to foot. Soreness in this area as well. Pain with standing, sitting, changing positions.   History of back issues, low back/muscle stuff but never had sciatica type symptoms.     Reason for visit:     What part of your body is injured / painful?  right low back    What caused the injury /pain? Inversion table     How long ago did your injury occur or pain begin? several weeks ago    What are your most bothersome symptoms? Pain, Tingling and Weakness    How would you characterize your symptom?  sharp and shooting    What makes your symptoms better? Other: chiro, manual muscle release, ibuprofen 800mg 3 times a day, norco if not working, tramadol    What makes your symptoms worse? Standing, Sitting and Movement    Have you been previously seen for this problem? No    Medical History:    Any recent changes to your medical history? No    Any new medication prescribed since last visit? No    Have you had surgery on this body part before? No    Social History:    [place] [employer] [place]    Handedness:     Exercise: 3-4 days/week    Review of Systems:    Do you have fever, chills, weight loss? No    Do you have any vision problems? No    Do you have any chest pain or edema? No    Do you have any shortness of breath or wheezing?  No    Do you have stomach problems? No    Do you have any numbness or focal weakness? No    Do you have  diabetes? No    Do you have problems with bleeding or clotting? No    Do you have an rashes or other skin lesions? No           [facility]  Orthopedics  Reinaldo Lopes MD  2019     Name: Jas Patel  MRN: [id number]  Age: 49 year old  : 1970  Referring provider: Referred Self     Chief Complaint: Pain of the Lower Back    Date of Injury: 2 weeks ago    History of Present Illness:   Jas Patel is a 49 year old male who presents today for evaluation of lower back pain. The patient bought an inversion table due to some preceding back issues. States that he used the inversion table, heard a pop, and developed pain down the lateral aspect of his right leg. Pain worsened with sitting down. No alleviatory management. Has tried going to the chiropractor about 5 times in the last 1.5 weeks. States that one of these treatments helped momentarily. Endorses paresthesias radiating down the lateral quadriceps, radiating down the anterior right shin. States that he feels he may have some foot drop in his right side.    Review of Systems:   A 10-point review of systems was obtained and is negative except for as noted in the HPI.     Medications:     Current Outpatient Medications:      amitriptyline (ELAVIL) 25 MG tablet, TAKE ONE TABLET BY MOUTH AT BEDTIME, Disp: 90 tablet, Rfl: 3     Cholecalciferol (VITAMIN D3 PO), Take 2,000 Units by mouth every other day , Disp: , Rfl:      citalopram (CELEXA) 40 MG tablet, Take 1 tablet (40 mg) by mouth daily, Disp: 90 tablet, Rfl: 3     [START ON 2019] HYDROcodone-acetaminophen (NORCO) 5-325 MG tablet, Take 1 tablet by mouth every 6 hours as needed for pain #28 pills/4 weeks, Disp: 28 tablet, Rfl: 0     IBUPROFEN PO, , Disp: , Rfl:      loratadine (CLARITIN) 10 MG tablet, Take 10 mg by mouth daily. 1 tab daily, Disp: , Rfl:      MELATONIN PO, Take 5 mg by mouth At Bedtime, Disp: , Rfl:      MULTI-VITAMIN OR TABS, one daily, Disp: , Rfl: 0     omeprazole (PRILOSEC) 20  MG DR capsule, Take 1 capsule (20 mg) by mouth daily, Disp: 90 capsule, Rfl: 3     oxybutynin ER (DITROPAN-XL) 10 MG 24 hr tablet, Take 1 tablet (10 mg) by mouth daily, Disp: 90 tablet, Rfl: 3     propranolol (INDERAL) 10 MG tablet, TAKE ONE TABLET BY MOUTH TWICE A DAY AS NEEDED FOR TREMOR, Disp: 180 tablet, Rfl: 3     tiZANidine (ZANAFLEX) 4 MG tablet, Take 1-2 tablets (4-8 mg) by mouth 3 times daily as needed for muscle spasms, Disp: 30 tablet, Rfl: 1     [START ON 11/29/2019] traMADol (ULTRAM) 50 MG tablet, Take 1-2 tablets ( mg) by mouth every 6 hours as needed for pain #56 pills/4 weeks., Disp: 56 tablet, Rfl: 0    Allergies:  No known drug allergies     Past Medical History:  Herniated disc: Neck  Moderate major depression    Past Surgical History:  Drain skin abscess     Social History:  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  He denies tobacco use and admits to alcohol use.   Denies drug use.    Family History:  No pertinent family history.     Physical Examination:  MSK: Pain with sided to sided back movement. Pain with bending over. Positive slump test. No pain with rotation of back.   Neuro: Right foot drop on gate exam. Patellar and achilles tendon reflexes intact. Subjective decreased sensation over the anterior ankle compared to left side.     Imaging:   Radiographs of the lumbar spine - 3 views (11/07/2019)  Disc height narrowing at L5-S1. Facet arthropathy. No other significant acute changes.     I have independently reviewed the above imaging studies; the results were discussed with the patient.     Assessment:   49 year old male with with and exam and imaging consistent with a L5 nerve impingement.     Plan:   -Prednisone 40 mg, 5 day course. Take in the morning with food.   -Tizanidine refill.  -Advised on precautionary restrictions. Limited to no bending at the waist, no lifting from the floor, no lifting weight more than 10 pounds for the next month if possible.   -Follow up PRN after course of  Prednisone if no improvement or not operating at a functional level.   -PT follow up PRN.      Scribe Disclosure:  I, Haresh Paniagua, am serving as a scribe to document services personally performed by Reinaldo Lopes MD at this visit, based upon the provider's statements to me. All documentation has been reviewed by the aforementioned provider prior to being entered into the official medical record.           Again, thank you for allowing me to participate in the care of your patient.      Sincerely,    Reinaldo Lopes MD

## 2019-11-07 NOTE — PROGRESS NOTES
SPORTS & ORTHOPEDIC WALK-IN VISIT 11/7/2019    Primary Care Physician: Dr. Perez    Bought an inversion table to help with some back issues. Was hanging off of it a few weeks ago and felt a little crack. Since then has had shooting pain through right side down lateral thigh, anterior shin and to foot. Soreness in this area as well. Pain with standing, sitting, changing positions.   History of back issues, low back/muscle stuff but never had sciatica type symptoms.     Reason for visit:     What part of your body is injured / painful?  right low back    What caused the injury /pain? Inversion table     How long ago did your injury occur or pain begin? several weeks ago    What are your most bothersome symptoms? Pain, Tingling and Weakness    How would you characterize your symptom?  sharp and shooting    What makes your symptoms better? Other: chiro, manual muscle release, ibuprofen 800mg 3 times a day, norco if not working, tramadol    What makes your symptoms worse? Standing, Sitting and Movement    Have you been previously seen for this problem? No    Medical History:    Any recent changes to your medical history? No    Any new medication prescribed since last visit? No    Have you had surgery on this body part before? No    Social History:    Occupation: [employer] [place]    Handedness:     Exercise: 3-4 days/week    Review of Systems:    Do you have fever, chills, weight loss? No    Do you have any vision problems? No    Do you have any chest pain or edema? No    Do you have any shortness of breath or wheezing?  No    Do you have stomach problems? No    Do you have any numbness or focal weakness? No    Do you have diabetes? No    Do you have problems with bleeding or clotting? No    Do you have an rashes or other skin lesions? No

## 2019-11-07 NOTE — LETTER
Patient:  Jas Patel  :   1970  MRN:     [id number]      2019    To whom it may concern:    Jas Patel is under my professional care for a right lower back injury and may return to work on 2019 with the following restrictions:     -Limited to no bending at the waist.  No lifting from the floor.  No lifting greater than 10 pounds.     Restrictions to remain in place for 4 weeks.        Please contact our office with questions or concerns.     Sincerely,        Reinaldo Lopes MD

## 2019-11-07 NOTE — PROGRESS NOTES
[facility]  Orthopedics  Reinaldo Lopes MD  2019     Name: Jas Patel  MRN: [id number]  Age: 49 year old  : 1970  Referring provider: Referred Self     Chief Complaint: Pain of the Lower Back    Date of Injury: 2 weeks ago    History of Present Illness:   Jas Patel is a 49 year old male who presents today for evaluation of lower back pain. The patient bought an inversion table due to some preceding back issues. States that he used the inversion table, heard a pop, and developed pain down the lateral aspect of his right leg. Pain worsened with sitting down. No alleviatory management. Has tried going to the chiropractor about 5 times in the last 1.5 weeks. States that one of these treatments helped momentarily. Endorses paresthesias radiating down the lateral quadriceps, radiating down the anterior right shin. States that he feels he may have some foot drop in his right side.    Review of Systems:   A 10-point review of systems was obtained and is negative except for as noted in the HPI.     Medications:     Current Outpatient Medications:      amitriptyline (ELAVIL) 25 MG tablet, TAKE ONE TABLET BY MOUTH AT BEDTIME, Disp: 90 tablet, Rfl: 3     Cholecalciferol (VITAMIN D3 PO), Take 2,000 Units by mouth every other day , Disp: , Rfl:      citalopram (CELEXA) 40 MG tablet, Take 1 tablet (40 mg) by mouth daily, Disp: 90 tablet, Rfl: 3     [START ON 2019] HYDROcodone-acetaminophen (NORCO) 5-325 MG tablet, Take 1 tablet by mouth every 6 hours as needed for pain #28 pills/4 weeks, Disp: 28 tablet, Rfl: 0     IBUPROFEN PO, , Disp: , Rfl:      loratadine (CLARITIN) 10 MG tablet, Take 10 mg by mouth daily. 1 tab daily, Disp: , Rfl:      MELATONIN PO, Take 5 mg by mouth At Bedtime, Disp: , Rfl:      MULTI-VITAMIN OR TABS, one daily, Disp: , Rfl: 0     omeprazole (PRILOSEC) 20 MG DR capsule, Take 1 capsule (20 mg) by mouth daily, Disp: 90 capsule, Rfl: 3     oxybutynin ER (DITROPAN-XL) 10 MG 24 hr  tablet, Take 1 tablet (10 mg) by mouth daily, Disp: 90 tablet, Rfl: 3     propranolol (INDERAL) 10 MG tablet, TAKE ONE TABLET BY MOUTH TWICE A DAY AS NEEDED FOR TREMOR, Disp: 180 tablet, Rfl: 3     tiZANidine (ZANAFLEX) 4 MG tablet, Take 1-2 tablets (4-8 mg) by mouth 3 times daily as needed for muscle spasms, Disp: 30 tablet, Rfl: 1     [START ON 11/29/2019] traMADol (ULTRAM) 50 MG tablet, Take 1-2 tablets ( mg) by mouth every 6 hours as needed for pain #56 pills/4 weeks., Disp: 56 tablet, Rfl: 0    Allergies:  No known drug allergies     Past Medical History:  Herniated disc: Neck  Moderate major depression    Past Surgical History:  Drain skin abscess     Social History:  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  He denies tobacco use and admits to alcohol use.   Denies drug use.    Family History:  No pertinent family history.     Physical Examination:  General: alert, pleasant, no distress. sitting comfortably in chair  Back/Spine: no tenderness to palpation of spinous processes, or paraspinous musculature of lumbar spine. Pain with side bending and forward flexion. Slump test positive on right.  hamstring tightness noted. no pain in back with MEENAKSHI testing bilaterally  Neuro: decreased sensation to light touch over anterior lower leg and ankle. Difficulty walking on heel on right side. Toe walking ok.  can stand on toes and heel walk without difficulty, patellar and achilles reflexes 2+ and symmetric, babinski downgoing bilaterally     Imaging:   Radiographs of the lumbar spine - 3 views (11/07/2019)  Disc height narrowing at L5-S1. Lower lumbar Facet arthropathy. No  acute changes.   See EMR for formal radiology report.     I have independently reviewed the above imaging studies; the results were discussed with the patient.     Assessment:   49 year old male with with and exam and imaging consistent with a L5 nerve impingement.     Plan:   -Prednisone 40 mg, 5 day course. Take in the morning with food.   -Tizanidine  refill.  -Advised on precautionary restrictions. Limited to no bending at the waist, no lifting from the floor, no lifting weight more than 10 pounds for the next month if possible.   -Follow up PRN after course of Prednisone if no improvement or not operating at a functional level.   -PT follow up PRN.      Reinaldo Lopes MD    Scribe Disclosure:  I, Haresh Paniagua, am serving as a scribe to document services personally performed by Reinaldo Lopes MD at this visit, based upon the provider's statements to me. All documentation has been reviewed by the aforementioned provider prior to being entered into the official medical record.

## 2019-11-08 ENCOUNTER — MYC MEDICAL ADVICE (OUTPATIENT)
Dept: ORTHOPEDICS | Facility: CLINIC | Age: 49
End: 2019-11-08

## 2019-11-08 DIAGNOSIS — M54.41 ACUTE RIGHT-SIDED LOW BACK PAIN WITH RIGHT-SIDED SCIATICA: Primary | ICD-10-CM

## 2019-11-13 ENCOUNTER — THERAPY VISIT (OUTPATIENT)
Dept: PHYSICAL THERAPY | Facility: CLINIC | Age: 49
End: 2019-11-13
Attending: FAMILY MEDICINE
Payer: COMMERCIAL

## 2019-11-13 ENCOUNTER — TELEPHONE (OUTPATIENT)
Dept: FAMILY MEDICINE | Facility: CLINIC | Age: 49
End: 2019-11-13

## 2019-11-13 DIAGNOSIS — M54.41 ACUTE RIGHT-SIDED LOW BACK PAIN WITH RIGHT-SIDED SCIATICA: Primary | ICD-10-CM

## 2019-11-13 DIAGNOSIS — N40.1 BENIGN PROSTATIC HYPERPLASIA WITH URINARY FREQUENCY: Primary | ICD-10-CM

## 2019-11-13 DIAGNOSIS — R35.0 BENIGN PROSTATIC HYPERPLASIA WITH URINARY FREQUENCY: Primary | ICD-10-CM

## 2019-11-13 PROCEDURE — 97162 PT EVAL MOD COMPLEX 30 MIN: CPT | Mod: GP | Performed by: PHYSICAL THERAPIST

## 2019-11-13 PROCEDURE — 97110 THERAPEUTIC EXERCISES: CPT | Mod: GP | Performed by: PHYSICAL THERAPIST

## 2019-11-13 RX ORDER — TAMSULOSIN HYDROCHLORIDE 0.4 MG/1
0.8 CAPSULE ORAL DAILY
Qty: 180 CAPSULE | Refills: 3 | Status: SHIPPED | OUTPATIENT
Start: 2019-11-13 | End: 2020-11-27

## 2019-11-13 NOTE — TELEPHONE ENCOUNTER
Patient stopped at the pharmacy expecting new prescription for tamsulosin BID. Patient is out. Please advise / send new prescription.     Thank you!  Angeles Guardado  [facility] Float Tech  On behalf of [facility]

## 2019-11-13 NOTE — PROGRESS NOTES
Jas Patel is a 49 year old with a lumbar complaint.  Symptoms commenced as a result of: laying on inversion table at home. Pain in buttock shortly after. Sciatic type shooting pain down into foot in R leg right after. Started steroids recently and seemed to help a lot.   Condition occurred in the following environment: home.   Onset of symptoms: 10/13/19.   Location of symptoms: R lumbar spine down R leg into foot.   Pain level on number scale: 4/10.   Quality of pain: sharp, stabbing, shooting.   Associated symptoms: numbness.   Pain frequency (constant/intermittent): constant.   Symptoms are exacerbated by: position changes.   Symptoms are relieved by: medication.  Progression of symptoms since onset (same/better/worse): better.   Special tests (x-ray, MRI, CT scan, EMG, bone scan): x-ray.  General health as reported by patient is good.  Pertinent medical history includes: depression, numbness/tingling, some foot drop, See Epic.   Medical allergies: anti-depressants, anti-inflammatory, muscle relaxant, pain, see Epic.   Other pertinent surgeries: See Epic.   Current medications: See Epic.   Occupation: RN.   Patient is (working in normal job without restrictions/working in normal job with restrictions/working in an alternate job/not working due to present treatment problem): missed 5 weeks of work. 4 week restriction (10 lb)   Primary job tasks: computer work, lifting carrying, pushing, pushing pulling.   Barriers at home/work: needs help with ADLs, lifting (wife and son can help)  Red flags: None reported by patient.    Objective:  Lumbar Spine Evaluation  Trunk Range of Motion  Movement Loss Major Moderate Minimal Nil Pain   Flexion   x  x   Extension    x    Sidebending R    x x   Sidebending L    x    Side Gliding R        Side Gliding L          Test Movements   Symptoms During Testing   Standing    FIS Increases   Rep FIS Increases   EIS Nil   Rep EIS Nil     Special Tests  Spring testing: Positive  R  Neural tension: Positive R    Palpation:  No tenderness to palpation    Assessment/Plan:    Patient has the following significant findings with corresponding treatment plan.                Diagnosis 1:  Lumbar pain with R sided sciatica  Pain -  manual therapy, self management, education, directional preference exercise and home program  Decreased ROM/flexibility - manual therapy and therapeutic exercise  Decreased joint mobility - manual therapy and therapeutic exercise  Decreased strength - therapeutic exercise and therapeutic activities  Impaired balance - neuro re-education and therapeutic activities  Decreased proprioception - neuro re-education and therapeutic activities  Impaired posture - neuro re-education    Therapy Evaluation Codes:   1) Clinical presentation characteristics are:   Evolving/Changing.  2) Decision-Making    Moderate complexity using standardized patient assessment instrument and/or measureable assessment of functional outcome.  Cumulative Therapy Evaluation is: Moderate complexity.    Previous and current functional limitations:  (See Goal Flow Sheet for this information)    Short term and Long term goals: (See Goal Flow Sheet for this information)     Communication ability:  Patient appears to be able to clearly communicate and understand verbal and written communication and follow directions correctly.  Treatment Explanation - The following has been discussed with the patient:   RX ordered/plan of care  Anticipated outcomes  Possible risks and side effects  This patient would benefit from PT intervention to resume normal activities.   Rehab potential is good.    Frequency:  1 X week, once daily  Duration:  for 8 weeks  Discharge Plan:  Achieve all LTG.  Independent in home treatment program.  Reach maximal therapeutic benefit.    Please refer to the daily flowsheet for treatment today, total treatment time and time spent performing 1:1 timed codes.

## 2019-11-18 DIAGNOSIS — M54.41 ACUTE RIGHT-SIDED LOW BACK PAIN WITH RIGHT-SIDED SCIATICA: Primary | ICD-10-CM

## 2019-11-20 ENCOUNTER — THERAPY VISIT (OUTPATIENT)
Dept: PHYSICAL THERAPY | Facility: CLINIC | Age: 49
End: 2019-11-20
Payer: COMMERCIAL

## 2019-11-20 ENCOUNTER — DOCUMENTATION ONLY (OUTPATIENT)
Dept: CARE COORDINATION | Facility: CLINIC | Age: 49
End: 2019-11-20

## 2019-11-20 ENCOUNTER — HOSPITAL ENCOUNTER (OUTPATIENT)
Dept: MRI IMAGING | Facility: CLINIC | Age: 49
Discharge: HOME | End: 2019-11-20
Attending: FAMILY MEDICINE | Admitting: FAMILY MEDICINE
Payer: COMMERCIAL

## 2019-11-20 DIAGNOSIS — M54.50 LOW BACK PAIN: Primary | ICD-10-CM

## 2019-11-20 PROCEDURE — 97110 THERAPEUTIC EXERCISES: CPT | Mod: GP | Performed by: PHYSICAL THERAPIST

## 2019-11-20 PROCEDURE — 72148 MRI LUMBAR SPINE W/O DYE: CPT

## 2019-11-22 ENCOUNTER — OFFICE VISIT (OUTPATIENT)
Dept: ORTHOPEDICS | Facility: CLINIC | Age: 49
End: 2019-11-22
Payer: COMMERCIAL

## 2019-11-22 VITALS — RESPIRATION RATE: 16 BRPM | WEIGHT: 195 LBS | BODY MASS INDEX: 25.04 KG/M2

## 2019-11-22 DIAGNOSIS — M54.41 ACUTE RIGHT-SIDED LOW BACK PAIN WITH RIGHT-SIDED SCIATICA: Primary | ICD-10-CM

## 2019-11-22 NOTE — LETTER
11/22/2019       RE: Jas Patel  [address] [place] [id number]     Dear Colleague,    Thank you for referring your patient, Jas Patel, to the [facility] SPORTS AND ORTHOPAEDIC WALK IN CLINIC at [facility]. Please see a copy of my visit note below.          SPORTS & ORTHOPEDIC WALK-IN FOLLOW-UP VISIT 11/22/2019    Interval History:     Follow up reason: MRI results     Date of injury: Several weeks ago     Date last seen: 11/7/19    Following Therapeutic Plan: Yes     Pain: Worsening    Function: Worsening    Interval History: Beleives it has gotten worse. Steroids helped for a couple of days but pain came before he was done with meds.      Medical History:    Any recent changes to your medical history? No    Any new medication prescribed since last visit? No    Review of Systems:    Do you have fever, chills, weight loss? No    Do you have any vision problems? No    Do you have any chest pain or edema? No    Do you have any shortness of breath or wheezing?  No    Do you have stomach problems? No    Do you have any numbness or focal weakness? No    Do you have diabetes? No    Do you have problems with bleeding or clotting? No    Do you have an rashes or other skin lesions? No         HISTORY OF PRESENT ILLNESS  Mr. Patel is a pleasant 49 year old year old male following up with back and leg pain.  Jas originally saw Dr. Macedo for this a couple of weeks ago.  He was given prednisone, unfortunately this did not help him very much.  He is here to review his MRI.  He feels as if his symptoms are getting worse.     PHYSICAL EXAM  Vitals:    11/22/19 1112   Resp: 16   Weight: 88.5 kg (195 lb)       ASSESSMENT & PLAN  Mr. Patel is a 49 year old year old male following up with low back pain with radicular features.    I reviewed his MR in the room with him which reads:                                                                 Impression: Lumbar degenerative  disease, most prominent at L4-5 which  is central disc extrusion migrating inferiorly and resulting in  mild-to-moderate spinal canal narrowing and also right greater than  left lateral recess narrowing with impingement of the traversing L5  nerve roots.    Jas and I had a good discussion regarding non-operative treatment for degenerative disc disease.  This included strengthening of the paraspinal and core muscles, weight control, and oral analgesics when needed.  We also discussed the role of epidural corticosteroid injections.    I'm referring Jas for a corticosteroid injection at L4-5 on the right.    I'd have a low thresshold to refer Jas to our surgical partners if no better after this or if relief is short lived.    It was a pleasure seeing Jas.    20 minutes was spent in the room, 15 of which was spent on counseling and coordination of care.    Zurdo Cottrell DO, CAQSM    This note was constructed using Dragon dictation software, please excuse any minor errors in spelling, grammar, or syntax.

## 2019-11-22 NOTE — PROGRESS NOTES
SPORTS & ORTHOPEDIC WALK-IN FOLLOW-UP VISIT 11/22/2019    Interval History:     Follow up reason: MRI results     Date of injury: Several weeks ago     Date last seen: 11/7/19    Following Therapeutic Plan: Yes     Pain: Worsening    Function: Worsening    Interval History: [facility] it has gotten worse. Steroids helped for a couple of days but pain came before he was done with meds.      Medical History:    Any recent changes to your medical history? No    Any new medication prescribed since last visit? No    Review of Systems:    Do you have fever, chills, weight loss? No    Do you have any vision problems? No    Do you have any chest pain or edema? No    Do you have any shortness of breath or wheezing?  No    Do you have stomach problems? No    Do you have any numbness or focal weakness? No    Do you have diabetes? No    Do you have problems with bleeding or clotting? No    Do you have an rashes or other skin lesions? No         HISTORY OF PRESENT ILLNESS  Mr. Patel is a pleasant 49 year old year old male following up with back and leg pain.  Jas originally saw Dr. Macedo for this a couple of weeks ago.  He was given prednisone, unfortunately this did not help him very much.  He is here to review his MRI.  He feels as if his symptoms are getting worse.     PHYSICAL EXAM  Vitals:    11/22/19 1112   Resp: 16   Weight: 88.5 kg (195 lb)       ASSESSMENT & PLAN  Mr. Patel is a 49 year old year old male following up with low back pain with radicular features.    I reviewed his MR in the room with him which reads:                                                                 Impression: Lumbar degenerative disease, most prominent at L4-5 which  is central disc extrusion migrating inferiorly and resulting in  mild-to-moderate spinal canal narrowing and also right greater than  left lateral recess narrowing with impingement of the traversing L5  nerve roots.    Jas and I had a good discussion regarding  non-operative treatment for degenerative disc disease.  This included strengthening of the paraspinal and core muscles, weight control, and oral analgesics when needed.  We also discussed the role of epidural corticosteroid injections.    I'm referring Jas for a corticosteroid injection at L4-5 on the right.    I'd have a low thresshold to refer Jas to our surgical partners if no better after this or if relief is short lived.    It was a pleasure seeing Jas.    20 minutes was spent in the room, 15 of which was spent on counseling and coordination of care.      Zurdo Cottrell DO, CAQSM      This note was constructed using Dragon dictation software, please excuse any minor errors in spelling, grammar, or syntax.

## 2019-11-26 ENCOUNTER — TRANSFERRED RECORDS (OUTPATIENT)
Dept: HEALTH INFORMATION MANAGEMENT | Facility: CLINIC | Age: 49
End: 2019-11-26

## 2019-11-29 DIAGNOSIS — M54.2 NECK PAIN: ICD-10-CM

## 2019-11-29 NOTE — TELEPHONE ENCOUNTER
Requested Prescriptions   Pending Prescriptions Disp Refills     traMADol (ULTRAM) 50 MG tablet [Pharmacy Med Name: TRAMADOL HCL 50MG TABS] 56 tablet 0     Sig: TAKE ONE TO TWO TABLETS BY MOUTH EVERY 6 HOURS AS NEEDED FOR PAIN (MUST LAST 4 WEEKS)         Last Written Prescription Date:  11/29/2019  Last Fill Quantity: 56 tabs,   # refills: 0  Last Office Visit: 3/5/2019  Future Office visit:       Routing refill request to provider for review/approval because:  Drug not on the [facility], [facility] or [facility] refill protocol or controlled substance

## 2019-12-02 ENCOUNTER — TELEPHONE (OUTPATIENT)
Dept: FAMILY MEDICINE | Facility: CLINIC | Age: 49
End: 2019-12-02

## 2019-12-02 RX ORDER — TRAMADOL HYDROCHLORIDE 50 MG/1
TABLET ORAL
Qty: 56 TABLET | Refills: 0 | OUTPATIENT
Start: 2019-12-02

## 2019-12-02 NOTE — TELEPHONE ENCOUNTER
Prior Authorization Retail Medication Request    Medication/Dose: Omeprazole 20mg  ICD code (if different than what is on RX): NEVIN  Previously Tried and Failed: NEVIN  Rationale: NEVIN    Insurance Name: [employer]   Insurance ID: [id number]      Pharmacy Information (if different than what is on RX)  Name: NEVIN  Phone: NEVIN    [phone]  MAX QTY OF [phone]  DAYS    Thank You,    [facility]

## 2019-12-02 NOTE — TELEPHONE ENCOUNTER
Central Prior Authorization Team  Phone: [phone]    PA Initiation    Medication: Omeprazole 20mg  Insurance Company: [employer] - Phone [phone] Fax [phone]  Pharmacy Filling the Rx: [place], [place] - [address]  Filling Pharmacy Phone: [phone]  Filling Pharmacy Fax:    Start Date: 12/2/2019

## 2019-12-02 NOTE — TELEPHONE ENCOUNTER
Prior Authorization Approval    Authorization Effective Date: 12/2/2019  Authorization Expiration Date: 12/1/2022  Medication: Omeprazole 20mg- APPROVED   Approved Dose/Quantity:   Reference #:     Insurance Company: [employer] - [employer] [phone] Fax [phone]  Expected CoPay:       CoPay Card Available:      Foundation Assistance Needed:    Which Pharmacy is filling the prescription (Not needed for infusion/clinic administered): [facility] [place], [place] - [address]  Pharmacy Notified: Yes  Patient Notified: Comment:  **Instructed pharmacy to notify patient when script is ready to /ship.**

## 2019-12-04 ENCOUNTER — TELEPHONE (OUTPATIENT)
Dept: FAMILY MEDICINE | Facility: CLINIC | Age: 49
End: 2019-12-04

## 2019-12-04 ENCOUNTER — THERAPY VISIT (OUTPATIENT)
Dept: PHYSICAL THERAPY | Facility: CLINIC | Age: 49
End: 2019-12-04
Payer: COMMERCIAL

## 2019-12-04 DIAGNOSIS — M54.2 NECK PAIN: ICD-10-CM

## 2019-12-04 DIAGNOSIS — M54.50 LOW BACK PAIN: ICD-10-CM

## 2019-12-04 PROCEDURE — 97110 THERAPEUTIC EXERCISES: CPT | Mod: GP | Performed by: PHYSICAL THERAPIST

## 2019-12-04 PROCEDURE — 97140 MANUAL THERAPY 1/> REGIONS: CPT | Mod: GP | Performed by: PHYSICAL THERAPIST

## 2019-12-04 RX ORDER — TRAMADOL HYDROCHLORIDE 50 MG/1
50-100 TABLET ORAL EVERY 6 HOURS PRN
Qty: 56 TABLET | Refills: 0 | Status: SHIPPED | OUTPATIENT
Start: 2019-11-29 | End: 2020-01-02

## 2019-12-04 NOTE — TELEPHONE ENCOUNTER
Clarification-  [facility] pharmacy says they got both rxs for norco to fill on 11/1/19 and 11/29/19.  They only got the tramadol for 11/1/19 with no refills. They didn't get the 11/29/19 refill on this med.  Please advise.  LOREE Aparicio

## 2019-12-04 NOTE — TELEPHONE ENCOUNTER
[facility] pharmacy says they don't have these rx from 11/1/19-    HYDROcodone-acetaminophen (NORCO) 5-325 MG tablet 28 tablet 0 11/29/2019  No   Sig - Route: Take 1 tablet by mouth every 6 hours as needed for pain #28 pills/4 weeks - Oral   Sent to pharmacy as: HYDROcodone-acetaminophen (NORCO) 5-325 MG tablet   Class: E-Prescribe   Earliest Fill Date: 11/29/2019   Order: [id number]   E-Prescribing Status: Receipt confirmed by pharmacy (11/1/2019 10:13 AM CDT)     traMADol (ULTRAM) 50 MG tablet 56 tablet 0 11/29/2019  No   Sig - Route: Take 1-2 tablets ( mg) by mouth every 6 hours as needed for pain #56 pills/4 weeks. - Oral   Sent to pharmacy as: traMADol (ULTRAM) 50 MG tablet   Class: E-Prescribe   Order: [id number]   E-Prescribing Status: Receipt confirmed by pharmacy (11/1/2019 10:13 AM CDT)

## 2019-12-06 DIAGNOSIS — S39.012A STRAIN OF LUMBAR REGION, INITIAL ENCOUNTER: ICD-10-CM

## 2019-12-09 ENCOUNTER — THERAPY VISIT (OUTPATIENT)
Dept: PHYSICAL THERAPY | Facility: CLINIC | Age: 49
End: 2019-12-09
Payer: COMMERCIAL

## 2019-12-09 DIAGNOSIS — M54.50 LOW BACK PAIN: ICD-10-CM

## 2019-12-09 PROCEDURE — 97110 THERAPEUTIC EXERCISES: CPT | Mod: GP | Performed by: PHYSICAL THERAPIST

## 2019-12-09 PROCEDURE — 97140 MANUAL THERAPY 1/> REGIONS: CPT | Mod: GP | Performed by: PHYSICAL THERAPIST

## 2019-12-09 NOTE — TELEPHONE ENCOUNTER
RECORDS RECEIVED FROM: low back    DATE RECEIVED: Dec 12, 2019   NOTES STATUS DETAILS   OFFICE NOTE from referring provider Internal  Zurdo Cottrell, DO Lopes, Reinaldo Sanford MD    OFFICE NOTE from other specialist Internal  Dennys Hess PT    DISCHARGE SUMMARY from hospital N/A    DISCHARGE REPORT from the ER N/A    OPERATIVE REPORT N/A    MEDICATION LIST Internal    IMPLANT RECORD/STICKER N/A    LABS     CBC/DIFF N/A    CULTURES N/A    INJECTIONS DONE IN RADIOLOGY N/A    MRI Internal    CT SCAN N/A    XRAYS (IMAGES & REPORTS) Internal    TUMOR     PATHOLOGY  Slides & report N/A      12/09/19   11:20 AM   Pre-visit complete  Debbie Glamm, CMA

## 2019-12-12 ENCOUNTER — PRE VISIT (OUTPATIENT)
Dept: ORTHOPEDICS | Facility: CLINIC | Age: 49
End: 2019-12-12

## 2019-12-18 ENCOUNTER — THERAPY VISIT (OUTPATIENT)
Dept: PHYSICAL THERAPY | Facility: CLINIC | Age: 49
End: 2019-12-18
Payer: COMMERCIAL

## 2019-12-18 DIAGNOSIS — M54.50 LOW BACK PAIN: ICD-10-CM

## 2019-12-18 PROCEDURE — 97530 THERAPEUTIC ACTIVITIES: CPT | Mod: GP | Performed by: PHYSICAL THERAPIST

## 2019-12-18 PROCEDURE — 97110 THERAPEUTIC EXERCISES: CPT | Mod: GP | Performed by: PHYSICAL THERAPIST

## 2019-12-19 DIAGNOSIS — M54.50 LOW BACK PAIN: Primary | ICD-10-CM

## 2019-12-20 ENCOUNTER — OFFICE VISIT (OUTPATIENT)
Dept: ORTHOPEDICS | Facility: CLINIC | Age: 49
End: 2019-12-20
Payer: COMMERCIAL

## 2019-12-20 ENCOUNTER — ANCILLARY PROCEDURE (OUTPATIENT)
Dept: GENERAL RADIOLOGY | Facility: CLINIC | Age: 49
End: 2019-12-20
Attending: ORTHOPAEDIC SURGERY
Payer: COMMERCIAL

## 2019-12-20 VITALS — BODY MASS INDEX: 25.04 KG/M2 | WEIGHT: 195 LBS

## 2019-12-20 DIAGNOSIS — M54.16 LUMBAR RADICULOPATHY: Primary | ICD-10-CM

## 2019-12-20 LAB
MRSA DNA SPEC QL NAA+PROBE: NEGATIVE
SPECIMEN SOURCE: NORMAL

## 2019-12-20 NOTE — NURSING NOTE
Reason For Visit:   Chief Complaint   Patient presents with     Consult     low back pain        Primary MD: Lola Perez  Ref. MD: Moses  Date of surgery: none   Type of surgery: none .  Smoker: No  Request smoking cessation information: No    Wt 88.5 kg (195 lb)   BMI 25.04 kg/m           Oswestry (LI) Questionnaire    OSWESTRY DISABILITY INDEX 11/13/2019   Count 9   Sum 16   Oswestry Score (%) 35.56   Some recent data might be hidden            Neck Disability Index (NDI) Questionnaire    No flowsheet data found.                Promis 10 Assessment    No flowsheet data found.             Deejay Pierce, ATC

## 2019-12-20 NOTE — PROGRESS NOTES
Spine Surgery Consultation    REFERRING PHYSICIAN: Reinaldo Lopes   PRIMARY CARE PHYSICIAN: Lola Perez           Chief Complaint:   Consult (low back pain )      History of Present Illness:  Symptom Profile Including: location of symptoms, onset, severity, exacerbating/alleviating factors, previous treatments:        Jas Patel is a 49 year old male who presents today for evaluation of new onset low back pain with right-sided radicular symptoms.  Patient says that he was on an inversion table in October of this year and felt a pop.  The next day he woke up with severe pain radiating into his right leg in an L5.  Associated with numbness in lateral calf and foot drop in right leg.  Pain worse with bending, leaning forward to put on his shoes, sitting in a chair.  Has not found a position that improves pain.  Has been unable to sleep well lately.  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  He has tried Norco, tramadol, ibuprofen, tizanidine without relief of symptoms.  He is hesitant to try gabapentin because of sedative symptoms.  He has been to the chiropractor several times without relief.  He has also completed a trial of physical therapy without relief.  He had a L4-5 injection at [facility] which helped with his symptoms when the numbing medication was in place, but with no long term relief. Denies any other associated signs or symptoms.        PMH:  Anxiety/ depression  Benign essential familiar tremor  No previous spine surgeries    Social:  Lives in [place].  Works full-time at [employer] as a nurse.  Has been unable to work since November due to back and leg symptoms.  Denies tobacco and illicit drug use.  Admits to occasional alcohol use.         Past Medical History:     Past Medical History:   Diagnosis Date     Herniated disc     in neck     Moderate major depression (H) 11/22/2010            Past  Surgical History:     Past Surgical History:   Procedure Laterality Date     DRAIN SKIN ABSCESS COMPLIC      left thigh I and D            Social History:     Social History     Tobacco Use     Smoking status: Former Smoker     Packs/day: 0.20     Years: 10.00     Pack years: 2.00     Types: Cigarettes     Start date: 1/28/2015     Smokeless tobacco: Never Used   Substance Use Topics     Alcohol use: Yes     Comment: 1 drink a day            Family History:     Family History   Problem Relation Age of Onset     Hypertension Father      C.A.D. Maternal Grandfather      Hypertension Maternal Grandfather      Cerebrovascular Disease Other         great uncle     Diabetes Paternal Uncle         type 2     Diabetes Other         great aunt     Prostate Cancer Other         great uncle moms side     Alzheimer Disease Other         great uncle moms side            Allergies:     Allergies   Allergen Reactions     No Known Drug Allergies             Medications:     Current Outpatient Medications   Medication     amitriptyline (ELAVIL) 25 MG tablet     Cholecalciferol (VITAMIN D3 PO)     citalopram (CELEXA) 40 MG tablet     HYDROcodone-acetaminophen (NORCO) 5-325 MG tablet     IBUPROFEN PO     loratadine (CLARITIN) 10 MG tablet     MELATONIN PO     MULTI-VITAMIN OR TABS     omeprazole (PRILOSEC) 20 MG DR capsule     oxybutynin ER (DITROPAN-XL) 10 MG 24 hr tablet     propranolol (INDERAL) 10 MG tablet     tamsulosin (FLOMAX) 0.4 MG capsule     tiZANidine (ZANAFLEX) 4 MG tablet     traMADol (ULTRAM) 50 MG tablet     No current facility-administered medications for this visit.              Review of Systems:     A 10 point ROS was performed and reviewed. Specific responses to these questions are noted at the end of the document.         Physical Exam:     PHYSICAL EXAM:   Constitutional - Patient is healthy, well-nourished and appears stated age.    Vitals: Wt 88.5 kg (195 lb)   BMI 25.04 kg/m     Respiratory - Patient is  breathing normally and in no respiratory distress.   Skin - No suspicious rashes or lesions.   Psychiatric - Normal mood and affect.   Cardiovascular - Extremities warm and well perfused.   Eyes - Visual acuity is normal to the written word.   ENT - Hearing intact to the spoken word.   GI - No abdominal distention.   Musculoskeletal - Non-antalgic gait without use of assistive devices        Lumbar Spine:    Appearance - Normal     Palpation - Non-tender to palpation midline and paraspinal muscles    ROM - Full.  Painful flexion    Motor -able to do toe walk well on both right and left.  Right foot drop during heel walk.  Able to do one- legged calf raise with ease on both right and left leg.       LOWER EXTREMITY Left Right   Hip flexion 5/5 5/5   Knee flexion 5/5 5/5   Knee extension 5/5 5/5   Ankle dorsiflexion 5/5 4+/5   Ankle plantarflexion 5/5 5/5   Great toe extension 5/5 5/5        Special tests -     Straight leg raise - Positive right leg, negative left leg     Neurologic - decreased sensation to light touch in right leg L5 distribution.  Otherwise intact to light touch throughout both legs.      REFLEXES Left Right             Clonus  0 beats  0 beats   Patella 2+ 2+   Ankle jerk 2+ 2+   Babinski downgoing downgoing     Alignment:  Patient stands with a neutral standing sagittal balance.         Imaging:   We ordered and independently reviewed new radiographs at this clinic visit. The results were discussed with the patient.  Findings include:  12/20/2019 XR lumbar spine flexion/extension x-rays and 11/7/2019 AP/lateral views: Multilevel degenerative changes, worst at L5-S1.  No acute fracture, no spondylolisthesis, no deformity.  Neutral coronal and sagittal alignment.  See full radiographic report in chart.    11/20/2019 MRI lumbar spine without contrast.  Large central disc herniation at L4-5 with compression of descending right L5 nerve root.  There is also bilateral foraminal stenosis at L5-S1, right  greater than left.  See full radiographic report in chart.             Assessment and Plan:   Assessment:  49 year old male with right L5 radiculopathy     Plan:  Showed patient XR and MR imaging today to demonstrate the disc herniation at L4-5 and foraminal stenosis at L5-S1.  Discussed nonoperative versus operative treatment options.  He is already tried and failed all conservative management options including medication, injection, and physical therapy.  Surgical intervention is difficult to decide because it appears he has a problem at the L4-5 level and L5-S1 level.  The disc herniation at L4-5 could be treated with a simple microdiscectomy surgery.  However the bilateral foraminal stenosis at L5-S1 would need fusion surgery due to the amount of joints that would need to be removed to relieve compression on the nerve root.  It is hard to tell exactly what level his symptoms are coming from, but due to the sudden onset of symptoms, it is likely that the disc herniation is the main cause of his radicular pain.  Arthritis that has caused the L5-S1 stenosis would likely have caused this gradual onset of symptoms.  We recommend that he proceed with a  discectomy at L4-5 to see if this relieves his symptoms.  This is a less invasive surgery, with a shorter recovery time and less risks.  However, it is possible that he may have continued symptoms after this surgery if part of the problem is due to the compression at the L5-S1 level.    Risks of this surgery include risk of infection, risk of dural tear resulting in CSF leak which might result in headaches, or possible need for lumbar drain, or possible revision surgery in the setting of a persistent leak. Risk of seroma or hematoma requiring revision surgery. Possible nerve root injury resulting in numbness weakness or paralysis into the leg. Possible radiculitis which could result in similar symptoms or could result in significant neurogenic type pain into the leg.  Risk of incomplete decompression which might require revision surgery in the future.  Risk of pars fracture or postoperative instability requiring conversion to a fusion in the future. Risk of adjacent segment problems requiring surgery in the future. Risk of incomplete relief of symptoms possibly requiring revision surgery in the future; this was specifically stressed with the patient due to the foraminal stenosis at L5-S1 which will not be addressed with the discectomy surgery. There is a risk of blood clots in the legs or the lungs.  Furthermore, although rare, there are risks of major vessel or major organ injury from the surgery, and risks of the anesthetic including stroke heart attack and death.      Patient verbalized understanding of all the risks of surgery, including the risk that surgery may not completely relieve his right leg symptoms.  He wishes to proceed with surgical intervention, and we will get this set up for him.    - schedule for L4-5 discectomy and decompression  - Referral to PAC clinic.  - wrote him a work letter stating that he can return to work once evaluated post-operatively    Attending MD (Dr. Juvenal Dexter) :  I reviewed and verified the history and physical exam of the patient and discussed the patient's management with the other clinical providers involved in this patient's care including any involved residents or physicians assistants. I reviewed the above note and agree with the documented findings and plan of care, which were communicated to the patient.      Juvenal Dexter MD    Staff statement:  I personally examined the patient.  He is tender over the low back and has weakness with right tibialis anterior as noted above and I confirmed the exam.  I also personally reviewed the imaging studies and as noted above he has a large disc herniation at L4-5 with severe impingement bilaterally of the traversing L5 nerve roots right worse than left.  He also has right  L5-S1 foraminal stenosis due to a foraminal disc herniation and facet arthropathy.  I discussed 2 options with the patient.  The first would be a decompression alone of the L4-5 level to address the lateral recess stenosis.  The second would be to do a combined fusion surgery at L5-S1 address the foraminal stenosis.  He feels very strongly that this issue is a very acute 1 that came on suddenly after a pop on his back.  That history would seem to support a disc herniation rather than the foraminal stenosis is being the chief complaint.  As a result I think it is reasonable to do the lumbar decompression at this time and hope that that will address his symptoms to address this acute issue.  If he did not have full relief I explained it is possible we might need to do a fusion of the L5-S1 level in the future to address the foraminal stenosis there, but given that his acute issue seems to be the disc herniation I think it is reasonable to proceed with this less invasive surgery now and only do the fusion later if it really proved to be necessary.  I went over the risks and benefits as noted above and agree with the plan as documented.  The patient would like to proceed to surgery.    Gosia Winter PA-C    Respectfully,  Juvenal Dexter MD  Spine Surgery  [facility]

## 2019-12-20 NOTE — LETTER
Return to Work  2019     Seen today: yes    Patient:  Jas Patel  :   1970  MRN:     [id number]  Physician: FRANTZ RIVERA    Jas Patel may not return to work until after Lumber 4-5 decompression surgery. Return to work date will be determined at post-operative visit.      The next clinic appointment is scheduled for (date/time): pending upcoming surgical date.    Patient limitations:  Unable to return to work.         Electronically signed by Frantz Rivera MD

## 2019-12-20 NOTE — NURSING NOTE
Teaching Flowsheet   Relevant Diagnosis: L4 - L5 Decompression  Teaching Topic: Pre op teaching     Person(s) involved in teaching:   Patient     Motivation Level:  Asks Questions: Yes  Eager to Learn: Yes  Cooperative: Yes  Receptive (willing/able to accept information): Yes  Any cultural factors/Church beliefs that may influence understanding or compliance? No       Patient demonstrates understanding of the following:  Reason for the appointment, diagnosis and treatment plan: Yes  Knowledge of proper use of medications and conditions for which they are ordered (with special attention to potential side effects or drug interactions): Yes  Which situations necessitate calling provider and whom to contact: Yes       Teaching Concerns Addressed: RN discussed all aspects of pre op surgery including location, pre op shower, NPO status and post surgery pain mgmt. Patient stated that he has a pain contract with a doctor. RN told patient that we can help refill his pain meds post op as well. RN obtained a MRSA swab. Right nares. Sample sent to lab. RN told patient if it came back positive, patient is to apply mupirocin for 5 days leading up to the surgery. Jania is scheduling patient for surgery and PAC appt.       Proper use and care of meds (medical equip, care aids, etc.): Yes  Nutritional needs and diet plan: Yes  Pain management techniques: Yes  Wound Care: Yes  How and/when to access community resources: Yes     Instructional Materials Used/Given: Pre op packet and antibacterial soap.     Time spent with patient: 15 minutes.

## 2019-12-20 NOTE — LETTER
12/20/2019       RE: Jas Patel  [address] [facility] [address]     Dear Colleague,    Thank you for referring your patient, Jas Patel, to the [facility] at [facility]. Please see a copy of my visit note below.    Spine Surgery Consultation    REFERRING PHYSICIAN: Reinaldo Lopes   PRIMARY CARE PHYSICIAN: Lola Perez           Chief Complaint:   Consult (low back pain )      History of Present Illness:  Symptom Profile Including: location of symptoms, onset, severity, exacerbating/alleviating factors, previous treatments:        Jas Patel is a 49 year old male who presents today for evaluation of new onset low back pain with right-sided radicular symptoms.  Patient says that he was on an inversion table in October of this year and felt a pop.  The next day he woke up with severe pain radiating into his right leg in an L5.  Associated with numbness in lateral calf and foot drop in right leg.  Pain worse with bending, leaning forward to put on his shoes, sitting in a chair.  Has not found a position that improves pain.  Has been unable to sleep well lately.  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  He has tried Norco, tramadol, ibuprofen, tizanidine without relief of symptoms.  He is hesitant to try gabapentin because of sedative symptoms.  He has been to the chiropractor several times without relief.  He has also completed a trial of physical therapy without relief.  He had a L4-5 injection at [facility] which helped with his symptoms when the numbing medication was in place, but with no long term relief. Denies any other associated signs or symptoms.        PMH:  Anxiety/ depression  Benign essential familiar tremor  No previous spine surgeries    Social:  Lives in [facility] [place] [facility].  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  Has been unable to work  since November due to back and leg symptoms.  Denies tobacco and illicit drug use.  Admits to occasional alcohol use.         Past Medical History:     Past Medical History:   Diagnosis Date     Herniated disc     in neck     Moderate major depression (H) 11/22/2010            Past Surgical History:     Past Surgical History:   Procedure Laterality Date     DRAIN SKIN ABSCESS COMPLIC      left thigh I and D            Social History:     Social History     Tobacco Use     Smoking status: Former Smoker     Packs/day: 0.20     Years: 10.00     Pack years: 2.00     Types: Cigarettes     Start date: 1/28/2015     Smokeless tobacco: Never Used   Substance Use Topics     Alcohol use: Yes     Comment: 1 drink a day            Family History:     Family History   Problem Relation Age of Onset     Hypertension Father      C.A.D. Maternal Grandfather      Hypertension Maternal Grandfather      Cerebrovascular Disease Other         great uncle     Diabetes Paternal Uncle         type 2     Diabetes Other         great aunt     Prostate Cancer Other         great uncle moms side     Alzheimer Disease Other         great uncle moms side            Allergies:     Allergies   Allergen Reactions     No Known Drug Allergies             Medications:     Current Outpatient Medications   Medication     amitriptyline (ELAVIL) 25 MG tablet     Cholecalciferol (VITAMIN D3 PO)     citalopram (CELEXA) 40 MG tablet     HYDROcodone-acetaminophen (NORCO) 5-325 MG tablet     IBUPROFEN PO     loratadine (CLARITIN) 10 MG tablet     MELATONIN PO     MULTI-VITAMIN OR TABS     omeprazole (PRILOSEC) 20 MG DR capsule     oxybutynin ER (DITROPAN-XL) 10 MG 24 hr tablet     propranolol (INDERAL) 10 MG tablet     tamsulosin (FLOMAX) 0.4 MG capsule     tiZANidine (ZANAFLEX) 4 MG tablet     traMADol (ULTRAM) 50 MG tablet     No current facility-administered medications for this visit.              Review of Systems:     A 10 point ROS was performed and  reviewed. Specific responses to these questions are noted at the end of the document.         Physical Exam:     PHYSICAL EXAM:   Constitutional - Patient is healthy, well-nourished and appears stated age.    Vitals: Wt 88.5 kg (195 lb)   BMI 25.04 kg/m      Respiratory - Patient is breathing normally and in no respiratory distress.   Skin - No suspicious rashes or lesions.   Psychiatric - Normal mood and affect.   Cardiovascular - Extremities warm and well perfused.   Eyes - Visual acuity is normal to the written word.   ENT - Hearing intact to the spoken word.   GI - No abdominal distention.   Musculoskeletal - Non-antalgic gait without use of assistive devices        Lumbar Spine:    Appearance - Normal     Palpation - Non-tender to palpation midline and paraspinal muscles    ROM - Full.  Painful flexion    Motor -able to do toe walk well on both right and left.  Right foot drop during heel walk.  Able to do one- legged calf raise with ease on both right and left leg.       LOWER EXTREMITY Left Right   Hip flexion 5/5 5/5   Knee flexion 5/5 5/5   Knee extension 5/5 5/5   Ankle dorsiflexion 5/5 4+/5   Ankle plantarflexion 5/5 5/5   Great toe extension 5/5 5/5        Special tests -     Straight leg raise - Positive right leg, negative left leg     Neurologic - decreased sensation to light touch in right leg L5 distribution.  Otherwise intact to light touch throughout both legs.      REFLEXES Left Right             Clonus  0 beats  0 beats   Patella 2+ 2+   Ankle jerk 2+ 2+   Babinski downgoing downgoing     Alignment:  Patient stands with a neutral standing sagittal balance.         Imaging:   We ordered and independently reviewed new radiographs at this clinic visit. The results were discussed with the patient.  Findings include:  12/20/2019 XR lumbar spine flexion/extension x-rays and 11/7/2019 AP/lateral views: Multilevel degenerative changes, worst at L5-S1.  No acute fracture, no spondylolisthesis, no  deformity.  Neutral coronal and sagittal alignment.  See full radiographic report in chart.    11/20/2019 MRI lumbar spine without contrast.  Large central disc herniation at L4-5 with compression of descending right L5 nerve root.  There is also bilateral foraminal stenosis at L5-S1, right greater than left.  See full radiographic report in chart.             Assessment and Plan:   Assessment:  49 year old male with right L5 radiculopathy     Plan:  Showed patient XR and MR imaging today to demonstrate the disc herniation at L4-5 and foraminal stenosis at L5-S1.  Discussed nonoperative versus operative treatment options.  He is already tried and failed all conservative management options including medication, injection, and physical therapy.  Surgical intervention is difficult to decide because it appears he has a problem at the L4-5 level and L5-S1 level.  The disc herniation at L4-5 could be treated with a simple microdiscectomy surgery.  However the bilateral foraminal stenosis at L5-S1 would need fusion surgery due to the amount of joints that would need to be removed to relieve compression on the nerve root.  It is hard to tell exactly what level his symptoms are coming from, but due to the sudden onset of symptoms, it is likely that the disc herniation is the main cause of his radicular pain.  Arthritis that has caused the L5-S1 stenosis would likely have caused this gradual onset of symptoms.  We recommend that he proceed with a  discectomy at L4-5 to see if this relieves his symptoms.  This is a less invasive surgery, with a shorter recovery time and less risks.  However, it is possible that he may have continued symptoms after this surgery if part of the problem is due to the compression at the L5-S1 level.    Risks of this surgery include risk of infection, risk of dural tear resulting in CSF leak which might result in headaches, or possible need for lumbar drain, or possible revision surgery in the  setting of a persistent leak. Risk of seroma or hematoma requiring revision surgery. Possible nerve root injury resulting in numbness weakness or paralysis into the leg. Possible radiculitis which could result in similar symptoms or could result in significant neurogenic type pain into the leg. Risk of incomplete decompression which might require revision surgery in the future.  Risk of pars fracture or postoperative instability requiring conversion to a fusion in the future. Risk of adjacent segment problems requiring surgery in the future. Risk of incomplete relief of symptoms possibly requiring revision surgery in the future; this was specifically stressed with the patient due to the foraminal stenosis at L5-S1 which will not be addressed with the discectomy surgery. There is a risk of blood clots in the legs or the lungs.  Furthermore, although rare, there are risks of major vessel or major organ injury from the surgery, and risks of the anesthetic including stroke heart attack and death.      Patient verbalized understanding of all the risks of surgery, including the risk that surgery may not completely relieve his right leg symptoms.  He wishes to proceed with surgical intervention, and we will get this set up for him.    - schedule for L4-5 discectomy and decompression  - Referral to PAC clinic.  - wrote him a work letter stating that he can return to work once evaluated post-operatively    Attending MD (Dr. Juvenal Dexter) :  I reviewed and verified the history and physical exam of the patient and discussed the patient's management with the other clinical providers involved in this patient's care including any involved residents or physicians assistants. I reviewed the above note and agree with the documented findings and plan of care, which were communicated to the patient.      Juvenal Dexter MD    Staff statement:  I personally examined the patient.  He is tender over the low back and has  weakness with right tibialis anterior as noted above and I confirmed the exam.  I also personally reviewed the imaging studies and as noted above he has a large disc herniation at L4-5 with severe impingement bilaterally of the traversing L5 nerve roots right worse than left.  He also has right L5-S1 foraminal stenosis due to a foraminal disc herniation and facet arthropathy.  I discussed 2 options with the patient.  The first would be a decompression alone of the L4-5 level to address the lateral recess stenosis.  The second would be to do a combined fusion surgery at L5-S1 address the foraminal stenosis.  He feels very strongly that this issue is a very acute 1 that came on suddenly after a pop on his back.  That history would seem to support a disc herniation rather than the foraminal stenosis is being the chief complaint.  As a result I think it is reasonable to do the lumbar decompression at this time and hope that that will address his symptoms to address this acute issue.  If he did not have full relief I explained it is possible we might need to do a fusion of the L5-S1 level in the future to address the foraminal stenosis there, but given that his acute issue seems to be the disc herniation I think it is reasonable to proceed with this less invasive surgery now and only do the fusion later if it really proved to be necessary.  I went over the risks and benefits as noted above and agree with the plan as documented.  The patient would like to proceed to surgery.    Gosia Winter PA-C    Respectfully,  Juvenal Dexter MD  Spine Surgery  [facility]

## 2019-12-23 NOTE — TELEPHONE ENCOUNTER
FUTURE VISIT INFORMATION      SURGERY INFORMATION:    Date: 20    Location: [facility] OR    Surgeon:  Juvenal Dexter    Anesthesia Type:  General    RECORDS REQUESTED FROM:       Primary Care Provider: Lola Andres    Most recent EKG+ Tracin19- [facility]- requested tracing

## 2019-12-27 ENCOUNTER — ANESTHESIA EVENT (OUTPATIENT)
Dept: SURGERY | Facility: CLINIC | Age: 49
End: 2019-12-27

## 2019-12-27 ENCOUNTER — PRE VISIT (OUTPATIENT)
Dept: SURGERY | Facility: CLINIC | Age: 49
End: 2019-12-27

## 2019-12-27 ENCOUNTER — OFFICE VISIT (OUTPATIENT)
Dept: SURGERY | Facility: CLINIC | Age: 49
End: 2019-12-27
Payer: COMMERCIAL

## 2019-12-27 VITALS
BODY MASS INDEX: 25.96 KG/M2 | OXYGEN SATURATION: 99 % | TEMPERATURE: 98.8 F | RESPIRATION RATE: 19 BRPM | DIASTOLIC BLOOD PRESSURE: 87 MMHG | HEART RATE: 85 BPM | HEIGHT: 74 IN | SYSTOLIC BLOOD PRESSURE: 128 MMHG | WEIGHT: 202.3 LBS

## 2019-12-27 DIAGNOSIS — M54.16 LUMBAR RADICULOPATHY: ICD-10-CM

## 2019-12-27 DIAGNOSIS — Z01.818 PREOP EXAMINATION: ICD-10-CM

## 2019-12-27 DIAGNOSIS — E87.1 HYPONATREMIA: Primary | ICD-10-CM

## 2019-12-27 LAB
ANION GAP SERPL CALCULATED.3IONS-SCNC: 3 MMOL/L (ref 3–14)
BUN SERPL-MCNC: 12 MG/DL (ref 7–30)
CALCIUM SERPL-MCNC: 8.8 MG/DL (ref 8.5–10.1)
CHLORIDE SERPL-SCNC: 100 MMOL/L (ref 94–109)
CO2 SERPL-SCNC: 27 MMOL/L (ref 20–32)
CREAT SERPL-MCNC: 0.94 MG/DL (ref 0.66–1.25)
ERYTHROCYTE [DISTWIDTH] IN BLOOD BY AUTOMATED COUNT: 12.1 % (ref 10–15)
GFR SERPL CREATININE-BSD FRML MDRD: >90 ML/MIN/{1.73_M2}
GLUCOSE SERPL-MCNC: 109 MG/DL (ref 70–99)
HCT VFR BLD AUTO: 44.2 % (ref 40–53)
HGB BLD-MCNC: 15.3 G/DL (ref 13.3–17.7)
MCH RBC QN AUTO: 32.4 PG (ref 26.5–33)
MCHC RBC AUTO-ENTMCNC: 34.6 G/DL (ref 31.5–36.5)
MCV RBC AUTO: 94 FL (ref 78–100)
PLATELET # BLD AUTO: 245 10E9/L (ref 150–450)
POTASSIUM SERPL-SCNC: 4 MMOL/L (ref 3.4–5.3)
RBC # BLD AUTO: 4.72 10E12/L (ref 4.4–5.9)
SODIUM SERPL-SCNC: 130 MMOL/L (ref 133–144)
WBC # BLD AUTO: 6.8 10E9/L (ref 4–11)

## 2019-12-27 ASSESSMENT — MIFFLIN-ST. JEOR: SCORE: 1842.38

## 2019-12-27 ASSESSMENT — LIFESTYLE VARIABLES: TOBACCO_USE: 1

## 2019-12-27 ASSESSMENT — PAIN SCALES - GENERAL: PAINLEVEL: MODERATE PAIN (5)

## 2019-12-27 NOTE — H&P
Pre-Operative H & P     CC:  Preoperative exam to assess for increased cardiopulmonary risk while undergoing surgery and anesthesia.    Date of Encounter: 12/27/2019  Primary Care Physician:  Lola Perez  Reason for visit: Lumbar radiculopathy [M54.16]  HPI  Jas Patel is a 49 year old male who presents for pre-operative H & P in preparation for Lumbar 4 to 5 decompression and discectomy with Dr. Dexter on 1/23/20 at [facility]. History is obtained from the patient and medical record.    Patient who was recently evaluated by Dr. Dexter for new onset low back pain and right sided radicular symptoms. He reported that he was on an inversion table in October 2019 and felt a pop. He woke up the next day with severe pain radiating into his right leg with associated numbness in lateral calf and foot drop in right leg. The pain was worsened with bending, leaning forward to put on his shoes, and sitting in a chair.  He has not found a position that improves the pain and it has interfered with sleep. 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 He has attempted multiple conservative measures such as Norco, tramadol, ibuprofen, and tizanidine, chiropractic care, physical therapy and injection, none of these offering lasting relief. His imaging was reviewed showing a large central disc herniation at L4-5 with compression of descending right L5 nerve root. There is also bilateral foraminal stenosis at L5-S1, right greater than left. He was counseled for above procedure.     His history is otherwise significant for BPH, GERD, benign essential tremor, anxiety and depression. He reports a C5-6 disc herniation and has been taking nightly Hydrocodone since approximately 2012. He has a pain contract with his PCP.    Past Medical History  Past Medical History:   Diagnosis Date     Benign essential tremor 11/2/2016     BPH (benign  prostatic hyperplasia) 10/28/2015     Gastroesophageal reflux disease without esophagitis 10/28/2015     Generalized anxiety disorder 10/26/2011     Herniated disc     in neck     Lumbar radiculopathy 12/20/2019     Moderate major depression (H) 11/22/2010       Past Surgical History  Past Surgical History:   Procedure Laterality Date     DRAIN SKIN ABSCESS COMPLIC      left thigh I and D       Hx of Blood transfusions/reactions: Denies.      Hx of abnormal bleeding or anti-platelet use: Denies.     Menstrual history: No LMP for male patient.    Steroid use in the last year: Yes.     Personal or FH with difficulty with Anesthesia:  Was told he had difficult intubation in 1991 after several consecutive intubations for I and D of abscess. No GA since. He believes he has had some voice changes and possibly food getting stuck up high since.     Prior to Admission Medications  Current Outpatient Medications   Medication Sig Dispense Refill     amitriptyline (ELAVIL) 25 MG tablet TAKE ONE TABLET BY MOUTH AT BEDTIME 90 tablet 3     Cholecalciferol (VITAMIN D3 PO) Take 4,000 Units by mouth every other day        citalopram (CELEXA) 40 MG tablet Take 1 tablet (40 mg) by mouth daily (Patient taking differently: Take 40 mg by mouth every morning ) 90 tablet 3     HYDROcodone-acetaminophen (NORCO) 5-325 MG tablet Take 1 tablet by mouth every 6 hours as needed for pain #28 pills/4 weeks (Patient taking differently: Take 1 tablet by mouth every 6 hours as needed for pain (Pt last took 12/25/19) #28 pills/4 weeks) 28 tablet 0     IBUPROFEN PO Take 800 mg by mouth 3 times daily        loratadine (CLARITIN) 10 MG tablet Take 10 mg by mouth every morning 1 tab daily       omeprazole (PRILOSEC) 20 MG DR capsule Take 1 capsule (20 mg) by mouth daily (Patient taking differently: Take 20 mg by mouth every morning ) 90 capsule 3     oxybutynin ER (DITROPAN-XL) 10 MG 24 hr tablet Take 1 tablet (10 mg) by mouth daily (Patient taking  differently: Take 10 mg by mouth daily (with dinner) ) 90 tablet 3     propranolol (INDERAL) 10 MG tablet TAKE ONE TABLET BY MOUTH TWICE A DAY AS NEEDED FOR TREMOR (Patient taking differently: Take 10 mg by mouth every morning TAKE ONE TABLET BY MOUTH TWICE A DAY AS NEEDED FOR TREMOR) 180 tablet 3     tamsulosin (FLOMAX) 0.4 MG capsule Take 2 capsules (0.8 mg) by mouth daily (Patient taking differently: Take 0.8 mg by mouth daily (with dinner) ) 180 capsule 3     tiZANidine (ZANAFLEX) 4 MG tablet Take 0.5-1 tablets (2-4 mg) by mouth 3 times daily as needed for muscle spasms (Patient taking differently: Take 2-4 mg by mouth 3 times daily as needed for muscle spasms (Pt last dose 12/26/19, whole tablet 12/27/19) ) 30 tablet 1     traMADol (ULTRAM) 50 MG tablet Take 1-2 tablets ( mg) by mouth every 6 hours as needed for pain #56 pills/4 weeks. (Patient taking differently: Take  mg by mouth every 6 hours as needed for pain (Pt. last dose 12/26/19) #56 pills/4 weeks.) 56 tablet 0       Allergies  Allergies   Allergen Reactions     No Known Drug Allergies        Social History  Social History     Socioeconomic History     Marital status:      Spouse name: Marcelina     Number of children: 1     Years of education: 16     Highest education level: Not on file   Occupational History     Occupation: Rn     Employer: [employer] [place]   Social Needs     Financial resource strain: Not on file     Food insecurity:     Worry: Not on file     Inability: Not on file     Transportation needs:     Medical: Not on file     Non-medical: Not on file   Tobacco Use     Smoking status: Former Smoker     Packs/day: 0.20     Years: 10.00     Pack years: 2.00     Types: Cigarettes     Start date: 1/28/2015     Smokeless tobacco: Never Used   Substance and Sexual Activity     Alcohol use: Yes     Comment: 1 drink a day     Drug use: No     Sexual activity: Yes     Partners: Female     Birth control/protection: Post-menopausal   Lifestyle      Physical activity:     Days per week: Not on file     Minutes per session: Not on file     Stress: Not on file   Relationships     Social connections:     Talks on phone: Not on file     Gets together: Not on file     Attends [employer] service: Not on file     Active member of club or organization: Not on file     Attends meetings of clubs or organizations: Not on file     Relationship status: Not on file     Intimate partner violence:     Fear of current or ex partner: Not on file     Emotionally abused: Not on file     Physically abused: Not on file     Forced sexual activity: Not on file   Other Topics Concern     Parent/sibling w/ CABG, MI or angioplasty before 65F 55M? No   Social History Narrative     Not on file       Family History  Family History   Problem Relation Age of Onset     Hypertension Father      C.A.D. Maternal Grandfather      Hypertension Maternal Grandfather      Cerebrovascular Disease Other         great uncle     Diabetes Paternal Uncle         type 2     Diabetes Other         great aunt     Prostate Cancer Other         great uncle moms side     Alzheimer Disease Other         great uncle moms side       Review of Systems    ROS/MED HX    The complete review of systems is negative other than noted in the HPI or here.   ENT/Pulmonary:     (+)tobacco use, Past use , . .    Neurologic: Comment: Benign essential tremor    (+)other neuro Lumbar radiculopathy    Cardiovascular:  - neg cardiovascular ROS   (+) ----. : . . . :. . Previous cardiac testing date:results:date: results:ECG reviewed date:8/22/19 results:SR date: results:          METS/Exercise Tolerance:  >4 METS   Hematologic:  - neg hematologic  ROS       Musculoskeletal:   (+)  other musculoskeletal- low back pain      GI/Hepatic:     (+) GERD Asymptomatic on medication,       Renal/Genitourinary:     (+) BPH,       Endo:  - neg endo ROS       Psychiatric:     (+) psychiatric history anxiety and depression      Infectious  "Disease:  - neg infectious disease ROS       Malignancy:      - no malignancy   Other:    (+) No chance of pregnancy C-spine cleared: N/A, H/O Chronic Pain,no other significant disability            PHYSICAL EXAM:   Mental Status/Neuro: A/A/O; Age Appropriate   Airway: Facies: Feasible  Mallampati: I  Mouth/Opening: Full  TM distance: > 6 cm  Neck ROM: Full   Respiratory: Auscultation: CTAB     Resp. Rate: Normal     Resp. Effort: Normal      CV: Rhythm: Regular  Heart: Normal Sounds  Edema: None   Comments:        Preop Vitals    BP Readings from Last 3 Encounters:   08/30/19 118/64   03/05/19 134/82   03/05/19 134/82    Pulse Readings from Last 3 Encounters:   08/30/19 72   03/05/19 75   03/05/19 75      Resp Readings from Last 3 Encounters:   11/22/19 16   08/30/19 16   08/30/19 16    SpO2 Readings from Last 3 Encounters:   08/30/19 100%   03/05/19 100%   03/05/19 100%      Temp Readings from Last 1 Encounters:   08/30/19 97  F (36.1  C) (Oral)    Ht Readings from Last 1 Encounters:   08/25/17 1.88 m (6' 2\")      Wt Readings from Last 1 Encounters:   12/20/19 88.5 kg (195 lb)    Estimated body mass index is 25.04 kg/m  as calculated from the following:    Height as of 8/25/17: 1.88 m (6' 2\").    Weight as of 12/20/19: 88.5 kg (195 lb).     Temp: 98.8  F (37.1  C)   BP: 128/87 Pulse: 85   Resp: 19 SpO2: 99 %         202 lbs 4.8 oz  6' 2\"   Body mass index is 25.97 kg/m .       Physical Exam  Constitutional: Awake, alert, cooperative, no apparent distress, and appears stated age.  Eyes: Pupils equal, round and reactive to light, extra ocular muscles intact, sclera clear, conjunctiva normal.  HENT: Normocephalic, oral pharynx with moist mucus membranes, good dentition. No goiter appreciated.   Respiratory: Clear to auscultation bilaterally, no crackles or wheezing. No cough or obvious dyspnea.  Cardiovascular: Regular rate and rhythm, normal S1 and S2, and no murmur noted.  Carotids +2, no bruits. No edema. Palpable " pulses to radial  DP and PT arteries.   GI: Normal bowel sounds, soft, non-distended, non-tender, no masses palpated, no hepatosplenomegaly.    Lymph/Hematologic: No cervical lymphadenopathy and no supraclavicular lymphadenopathy.  Genitourinary: Deferred.   Skin: Warm and dry.  No rashes at anticipated surgical site.   Musculoskeletal: Full ROM of neck. There is no redness, warmth, or swelling of the joints. Gross motor strength is normal.    Neurologic: Awake, alert, oriented to name, place and time. Cranial nerves II-XII are grossly intact. Gait is normal.   Neuropsychiatric: Calm, cooperative. Normal affect.     Labs: (personally reviewed)  Lab Results   Component Value Date    WBC 6.8 12/27/2019     Lab Results   Component Value Date    RBC 4.72 12/27/2019     Lab Results   Component Value Date    HGB 15.3 12/27/2019     Lab Results   Component Value Date    HCT 44.2 12/27/2019     Lab Results   Component Value Date    MCV 94 12/27/2019     Lab Results   Component Value Date    MCH 32.4 12/27/2019     Lab Results   Component Value Date    MCHC 34.6 12/27/2019     Lab Results   Component Value Date    RDW 12.1 12/27/2019     Lab Results   Component Value Date     12/27/2019     Last Comprehensive Metabolic Panel:  Sodium   Date Value Ref Range Status   12/27/2019 130 (L) 133 - 144 mmol/L Final     Potassium   Date Value Ref Range Status   12/27/2019 4.0 3.4 - 5.3 mmol/L Final     Chloride   Date Value Ref Range Status   12/27/2019 100 94 - 109 mmol/L Final     Carbon Dioxide   Date Value Ref Range Status   12/27/2019 27 20 - 32 mmol/L Final     Anion Gap   Date Value Ref Range Status   12/27/2019 3 3 - 14 mmol/L Final     Glucose   Date Value Ref Range Status   12/27/2019 109 (H) 70 - 99 mg/dL Final     Urea Nitrogen   Date Value Ref Range Status   12/27/2019 12 7 - 30 mg/dL Final     Creatinine   Date Value Ref Range Status   12/27/2019 0.94 0.66 - 1.25 mg/dL Final     GFR Estimate   Date Value Ref Range  Status   12/27/2019 >90 >60 mL/min/[1.73_m2] Final     Comment:     Non  GFR Calc  Starting 12/18/2018, serum creatinine based estimated GFR (eGFR) will be   calculated using the Chronic Kidney Disease Epidemiology Collaboration   (CKD-EPI) equation.       Calcium   Date Value Ref Range Status   12/27/2019 8.8 8.5 - 10.1 mg/dL Final     EKG: Personally reviewed 8/22/19 Sinus rhythm  Stress test: 10/8/19 Echo stress test  Interpretation Summary  This was a normal stress echocardiogram with no evidence of stress-induced  ischemia. Normal resting LV function with EF of approximately 60-65%; normal  response to exercise with increase to approximately 70-75%. No stress induced  regional wall motion abnormalities. No ECG evidence of ischemia. No  significant valvular disease noted on routine screening color flow Doppler and  pulsed Doppler examination. Normal functional capacity. No resting wall motion  abnormalities.  Exercise was stopped due to leg fatigue.  The patient did not exhibit any symptoms during exercise.  Normal blood pressure response with stress.  Normal heart rate response to exercise.    Xray lumbar spine 12/19/19   Impression:  1.  No acute osseous abnormality.  2.  Multilevel degenerative changes most pronounced at L5-S1. No  evidence of abnormal motion in flexion/extension.    MR lumbar spine 11/18/19  Impression: Lumbar degenerative disease, most prominent at L4-5 which  is central disc extrusion migrating inferiorly and resulting in  mild-to-moderate spinal canal narrowing and also right greater than  left lateral recess narrowing with impingement of the traversing L5  nerve roots.     CXR 8/22/19   Impression: No acute airspace disease.    Imaging and cardiac testing reviewed by this provider    Outside records reviewed from: Care Everywhere    ASSESSMENT and PLAN  Jas Patel is a 49 year old male scheduled to undergo Lumbar 4 to 5 decompression and discectomy with Dr. Dexter on  1/23/20. He has the following specific operative considerations:   - RCRI : No serious cardiac risks.    - Anesthesia considerations:  Refer to PAC assessment in anesthesia records  - VTE risk: 0.5%  - CALLI # of risks 3/8 = intermediate risk  - Risk of PONV score = 2.  If > 2, anti-emetic intervention recommended.    --Lumbar radiculopathy with above procedure now planned. Will take Tramadol on DOS. Zanaflex prn.  --C5-6 disc herniation with chronic pain. Hydrocodone nightly since ~2012.   --Reported history of difficult intubation in 1991. Final evaluation and decisions by Anesthesia on DOS.   --No cardiac history, symptoms or meds. Testing above including recent negative stress test completed for chest discomfort. Good exercise tolerance.  --Intermittent hyponatremia. Today 130. Will redraw on DOS.  --Former smoker. Denies pulmonary symptoms. Snores.  --GERD. Will take omeprazole on DOS.  --BPH oxybutynin and tamsulosin at evening meal.   --Benign essential tremor Will take propranolol on DOS.   --Anxiety/depression. Amitriptyline at HS. Will take citalopram on DOS.     Arrival time, NPO, shower and medication instructions provided by nursing staff today. Preparing For Your Surgery handout given.        Patient was discussed with Dr Celis.    RAFAEL Shaikh CNS  Preoperative Assessment Center  [facility]  Cl[facility] and [facility] Center  Phone: [phone]  Fax: [phone]

## 2019-12-27 NOTE — ANESTHESIA PREPROCEDURE EVALUATION
Anesthesia Pre-Procedure Evaluation    Patient: Jas Patel   MRN:     [id number] Gender:   male   Age:    49 year old :      1970        Preoperative Diagnosis: * No surgery found *        Past Medical History:   Diagnosis Date     Benign essential tremor 2016     BPH (benign prostatic hyperplasia) 10/28/2015     Gastroesophageal reflux disease without esophagitis 10/28/2015     Generalized anxiety disorder 10/26/2011     Herniated disc     in neck     Lumbar radiculopathy 2019     Moderate major depression (H) 2010      Past Surgical History:   Procedure Laterality Date     DRAIN SKIN ABSCESS COMPLIC      left thigh I and D          Anesthesia Evaluation     . Pt has had prior anesthetic. Type: General and Regional    History of anesthetic complications   - difficult intubation  Reported hx of difficult intubation in  after several consecutives intubations for I and D. No GA  since.      ROS/MED HX    ENT/Pulmonary:     (+)CALLI risk factors snores loudly, tobacco use, Past use , . .    Neurologic: Comment: Benign essential tremor hands    (+)other neuro Lumbar radiculopathy    Cardiovascular:  - neg cardiovascular ROS   (+) ----. : . . . :. . Previous cardiac testing date:results:Stress Testdate:10/8/19 results:ECG reviewed date:19 results:SR date: results:          METS/Exercise Tolerance:  >4 METS   Hematologic:  - neg hematologic  ROS       Musculoskeletal: Comment: C 5-6 herniation  (+)  other musculoskeletal- low back pain      GI/Hepatic:     (+) GERD Asymptomatic on medication,       Renal/Genitourinary:     (+) BPH,       Endo:  - neg endo ROS       Psychiatric:     (+) psychiatric history anxiety and depression      Infectious Disease:  - neg infectious disease ROS       Malignancy:      - no malignancy   Other:    (+) No chance of pregnancy C-spine cleared: N/A, H/O Chronic Pain,H/O chronic opiod use , no other significant disability                        PHYSICAL  "EXAM:   Mental Status/Neuro: A/A/O; Age Appropriate   Airway: Facies: Feasible  Mallampati: I  Mouth/Opening: Full  TM distance: > 6 cm  Neck ROM: Full   Respiratory: Auscultation: CTAB     Resp. Rate: Normal     Resp. Effort: Normal      CV: Rhythm: Regular  Heart: Normal Sounds  Edema: None   Comments:      Dental: Normal Dentition                LABS:  CBC:   Lab Results   Component Value Date    WBC 7.5 02/08/2008    HGB 16.3 02/08/2008    HCT 46.8 02/08/2008     02/08/2008     BMP:   Lab Results   Component Value Date     11/02/2016     04/08/2015    POTASSIUM 4.7 11/02/2016    POTASSIUM 4.3 04/08/2015    CHLORIDE 106 11/02/2016    CHLORIDE 107 04/08/2015    CO2 25 11/02/2016    CO2 25 04/08/2015    BUN 11 11/02/2016    BUN 12 04/08/2015    CR 0.84 11/02/2016    CR 0.97 04/08/2015     (H) 11/02/2016     (H) 04/08/2015     COAGS: No results found for: PTT, INR, FIBR  POC: No results found for: BGM, HCG, HCGS  OTHER:   Lab Results   Component Value Date    HIWOT 8.8 11/02/2016    ALBUMIN 3.8 (L) 01/15/2013    PROTTOTAL 7.1 01/15/2013    ALT 35 01/15/2013    AST 25 01/15/2013    ALKPHOS 92 01/15/2013    BILITOTAL 0.2 01/15/2013    LIPASE 74 02/08/2008        Preop Vitals    BP Readings from Last 3 Encounters:   08/30/19 118/64   03/05/19 134/82   03/05/19 134/82    Pulse Readings from Last 3 Encounters:   08/30/19 72   03/05/19 75   03/05/19 75      Resp Readings from Last 3 Encounters:   11/22/19 16   08/30/19 16   08/30/19 16    SpO2 Readings from Last 3 Encounters:   08/30/19 100%   03/05/19 100%   03/05/19 100%      Temp Readings from Last 1 Encounters:   08/30/19 97  F (36.1  C) (Oral)    Ht Readings from Last 1 Encounters:   08/25/17 1.88 m (6' 2\")      Wt Readings from Last 1 Encounters:   12/20/19 88.5 kg (195 lb)    Estimated body mass index is 25.04 kg/m  as calculated from the following:    Height as of 8/25/17: 1.88 m (6' 2\").    Weight as of 12/20/19: 88.5 kg (195 lb). "     LDA:  Peripheral IV 10/09/19 Distal;Left Upper arm (Active)   Number of days: 79        JZG FV AN PLAN NO PONV RULE       PAC Discussion and Assessment    ASA Classification: 3  Case is suitable for: [facility]  Anesthetic techniques and relevant risks discussed: GA  Invasive monitoring and risk discussed: No  Types:   Possibility and Risk of blood transfusion discussed: No  NPO instructions given:   Additional anesthetic preparation and risks discussed:   Needs early admission to pre-op area:   Other:     PAC Resident/NP Anesthesia Assessment:  Jas Patel is a 49 year old male scheduled to undergo Lumbar 4 to 5 decompression and discectomy with Dr. Dexter on 1/23/20. He has the following specific operative considerations:   - RCRI : No serious cardiac risks.    - VTE risk: 0.5%  - CALLI # of risks 3/8 = intermediate risk  - Risk of PONV score = 2.  If > 2, anti-emetic intervention recommended.    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 Will take Tramadol on DOS. Zanaflex prn.  --C5-6 disc herniation with chronic pain. Hydrocodone nightly since ~2012.   --Reported history of difficult intubation in 1991. Final evaluation and decisions by Anesthesia on DOS.   --No cardiac history, symptoms or meds. Testing above including recent negative stress test completed for chest discomfort. Good exercise tolerance.  --Intermittent hyponatremia. Today 130. Will redraw on DOS.  --Former smoker. Denies pulmonary symptoms. Snores.  --GERD. Will take omeprazole on DOS.  --BPH oxybutynin and tamsulosin at evening meal.   --Benign essential tremor Will take propranolol on DOS.   --Anxiety/depression. Amitriptyline at HS. Will take citalopram on DOS.           Patient was discussed with Dr Celis.      Reviewed and Signed by PAC Mid-Level Provider/Resident  Mid-Level Provider/Resident: RAFAEL Judge, CNS  Date: 12/27/19  Time: 1:30pm    Attending Anesthesiologist  Anesthesia Assessment:  49 year old for L4/5 decompression and discectomy in management of radiculopathy. Patient has no significant cardiac or pulmonary disease; patient is nurse.    Patient/case discussed with MONTY/resident; agree with above assessment. No need to see patient. Patient is appropriate for the planned procedure without further work-up or medical management.    Maryam Celis MD        Anesthesiologist:   Date:   Time:   Pass/Fail:   Disposition:     PAC Pharmacist Assessment:        Pharmacist:   Date:   Time:    RAFAEL Shaikh CNS

## 2019-12-27 NOTE — PATIENT INSTRUCTIONS
Preparing for Your Surgery      Name:  Jas Patel   MRN:  [id number]   :  1970   Today's Date:  2019     Arriving for surgery:  Surgery date:    Arrival time:  05:30 am  Please come to:     [facility] [address]  [address] [place]  [address]    - parking is available in front of [employer] from 5:15AM to 8:00PM. If you prefer, park your car in the Green Lot.    -Proceed to the 3rd floor, check in at the Adult Surgery Waiting Lounge. [phone]    If an escort is needed stop at the Information Desk in the lobby. Inform the information person that you are here for surgery. An escort to the Adult Surgery Waiting Lounge will be provided.        What can I eat or drink?  -  You may have solid food or milk products until 8 hours prior to your surgery.  -  You may have water, apple juice or 7up/Sprite until 2 hours prior to your surgery.    Which medicines can I take?  Stop Aspirin, vitamins and supplements one week prior to surgery.  Hold Ibuprofen for 24 hours and/or Naproxen for 48 hours prior to surgery.   -  Please take these medications the day of surgery:  norco or tylenol if needed; take all other scheduled medications normally taken in the morning.    How do I prepare myself?  -  Take two showers: one the night before surgery; and one the morning of surgery.         Use Scrubcare or Hibiclens to wash from neck down, leave soap on your skin for up to one minute.  Do not get soap in your eyes or ears.  You may use your own shampoo and conditioner; no other hair products.   -  Do NOT use lotion, powder, deodorant, or antiperspirant the day of your surgery.  -  Do NOT wear any jewelry.  - Do not bring your own medications to the hospital, except for inhalers and eye   drops.  -  Bring your ID and insurance card.    -If you are scheduled to go home the Same Day as surgery you must have a responsible adult as a  and to stay  with you overnight the first 24 hours after surgery.     Questions or Concerns:  -If you are scheduled on the East or West campus and have questions or concerns regarding the day of surgery, please call Preadmission Nursing at [phone].    -If you have health changes between today and your surgery please call your surgeon. For questions after surgery please call your surgeons office.

## 2019-12-30 ENCOUNTER — TELEPHONE (OUTPATIENT)
Dept: SURGERY | Facility: CLINIC | Age: 49
End: 2019-12-30

## 2019-12-30 NOTE — TELEPHONE ENCOUNTER
12/30/19    Per message from Snehal Loyd, Pt called regarding low sodium levels.  Pt instructed to increase Gatorade and water intake in the days proceeding his procedure.  Pt instructed to give PAC clinic a call back if he had any further questions.

## 2019-12-31 ENCOUNTER — TELEPHONE (OUTPATIENT)
Dept: ORTHOPEDICS | Facility: CLINIC | Age: 49
End: 2019-12-31

## 2019-12-31 NOTE — TELEPHONE ENCOUNTER
Phoned patient to see if he would like to move his surgery a day sooner, from 1/23/20 to 1/22/20. I left him my direct number to call back as soon as he is able and let me know either way. [phone].

## 2019-12-31 NOTE — TELEPHONE ENCOUNTER
Received call back from patient declining the opportunity to move his surgery to an earlier day. 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

## 2020-01-02 ENCOUNTER — E-VISIT (OUTPATIENT)
Dept: FAMILY MEDICINE | Facility: CLINIC | Age: 50
End: 2020-01-02
Payer: COMMERCIAL

## 2020-01-02 DIAGNOSIS — M54.2 NECK PAIN: ICD-10-CM

## 2020-01-02 PROCEDURE — 98970 NQHP OL DIG ASSMT&MGMT 5-10: CPT | Performed by: NURSE PRACTITIONER

## 2020-01-02 RX ORDER — TRAMADOL HYDROCHLORIDE 50 MG/1
50-100 TABLET ORAL EVERY 6 HOURS PRN
Qty: 56 TABLET | Refills: 0 | Status: ON HOLD | OUTPATIENT
Start: 2020-01-30 | End: 2020-01-23

## 2020-01-02 RX ORDER — HYDROCODONE BITARTRATE AND ACETAMINOPHEN 5; 325 MG/1; MG/1
1 TABLET ORAL EVERY 6 HOURS PRN
Qty: 28 TABLET | Refills: 0 | Status: SHIPPED | OUTPATIENT
Start: 2020-01-02 | End: 2020-01-02

## 2020-01-02 RX ORDER — TRAMADOL HYDROCHLORIDE 50 MG/1
50-100 TABLET ORAL EVERY 6 HOURS PRN
Qty: 56 TABLET | Refills: 0 | Status: SHIPPED | OUTPATIENT
Start: 2020-01-02 | End: 2020-01-02

## 2020-01-02 RX ORDER — HYDROCODONE BITARTRATE AND ACETAMINOPHEN 5; 325 MG/1; MG/1
1 TABLET ORAL EVERY 6 HOURS PRN
Qty: 28 TABLET | Refills: 0 | Status: ON HOLD | OUTPATIENT
Start: 2020-01-30 | End: 2020-01-23

## 2020-01-15 RX ORDER — ACETAMINOPHEN 325 MG/1
975 TABLET ORAL ONCE
Status: CANCELLED | OUTPATIENT
Start: 2020-01-15 | End: 2020-01-15

## 2020-01-15 RX ORDER — GABAPENTIN 300 MG/1
300 CAPSULE ORAL
Status: CANCELLED | OUTPATIENT
Start: 2020-01-15

## 2020-01-22 ENCOUNTER — ANESTHESIA EVENT (OUTPATIENT)
Dept: SURGERY | Facility: CLINIC | Age: 50
End: 2020-01-22
Payer: COMMERCIAL

## 2020-01-22 ASSESSMENT — LIFESTYLE VARIABLES: TOBACCO_USE: 1

## 2020-01-22 NOTE — ANESTHESIA PREPROCEDURE EVALUATION
Anesthesia Pre-Procedure Evaluation    Patient: Jas Patel   MRN:     [id number] Gender:   male   Age:    49 year old :      1970        Preoperative Diagnosis: * No surgery found *        Past Medical History:   Diagnosis Date     Benign essential tremor 2016     BPH (benign prostatic hyperplasia) 10/28/2015     Gastroesophageal reflux disease without esophagitis 10/28/2015     Generalized anxiety disorder 10/26/2011     Herniated disc     in neck     Lumbar radiculopathy 2019     Moderate major depression (H) 2010      Past Surgical History:   Procedure Laterality Date     DRAIN SKIN ABSCESS COMPLIC      left thigh I and D          Anesthesia Evaluation     . Pt has had prior anesthetic. Type: General and Regional    History of anesthetic complications   - difficult intubation  Reported hx of difficult intubation in  after several consecutives intubations for I and D. No GA  since.      ROS/MED HX    ENT/Pulmonary:     (+)CALLI risk factors snores loudly, tobacco use, Past use , . .    Neurologic: Comment: Benign essential tremor hands    (+)other neuro Lumbar radiculopathy    Cardiovascular:  - neg cardiovascular ROS   (+) ----. : . . . :. . Previous cardiac testing date:results:Stress Testdate:10/8/19 results:ECG reviewed date:19 results:SR date: results:          METS/Exercise Tolerance:  >4 METS   Hematologic:  - neg hematologic  ROS       Musculoskeletal: Comment: C 5-6 herniation  (+)  other musculoskeletal- low back pain      GI/Hepatic:     (+) GERD Asymptomatic on medication,       Renal/Genitourinary:     (+) BPH,       Endo:  - neg endo ROS       Psychiatric:     (+) psychiatric history anxiety and depression      Infectious Disease:  - neg infectious disease ROS       Malignancy:      - no malignancy   Other:    (+) No chance of pregnancy C-spine cleared: N/A, H/O Chronic Pain,H/O chronic opiod use , no other significant disability                        PHYSICAL  "EXAM:   Mental Status/Neuro: A/A/O; Age Appropriate   Airway: Facies: Feasible  Mallampati: I  Mouth/Opening: Full  TM distance: > 6 cm  Neck ROM: Full   Respiratory:   Resp. Rate: Normal     Resp. Effort: Normal      CV:   Rate: Age appropriate  Edema: None   Comments: Chip left front tooth     Dental: Details                LABS:  CBC:   Lab Results   Component Value Date    WBC 6.8 12/27/2019    WBC 7.5 02/08/2008    HGB 15.3 12/27/2019    HGB 16.3 02/08/2008    HCT 44.2 12/27/2019    HCT 46.8 02/08/2008     12/27/2019     02/08/2008     BMP:   Lab Results   Component Value Date     (L) 12/27/2019     11/02/2016    POTASSIUM 4.0 12/27/2019    POTASSIUM 4.7 11/02/2016    CHLORIDE 100 12/27/2019    CHLORIDE 106 11/02/2016    CO2 27 12/27/2019    CO2 25 11/02/2016    BUN 12 12/27/2019    BUN 11 11/02/2016    CR 0.94 12/27/2019    CR 0.84 11/02/2016     (H) 12/27/2019     (H) 11/02/2016     COAGS: No results found for: PTT, INR, FIBR  POC: No results found for: BGM, HCG, HCGS  OTHER:   Lab Results   Component Value Date    HIWOT 8.8 12/27/2019    ALBUMIN 3.8 (L) 01/15/2013    PROTTOTAL 7.1 01/15/2013    ALT 35 01/15/2013    AST 25 01/15/2013    ALKPHOS 92 01/15/2013    BILITOTAL 0.2 01/15/2013    LIPASE 74 02/08/2008        Preop Vitals    BP Readings from Last 3 Encounters:   12/27/19 128/87   08/30/19 118/64   03/05/19 134/82    Pulse Readings from Last 3 Encounters:   12/27/19 85   08/30/19 72   03/05/19 75      Resp Readings from Last 3 Encounters:   12/27/19 19   11/22/19 16   08/30/19 16    SpO2 Readings from Last 3 Encounters:   12/27/19 99%   08/30/19 100%   03/05/19 100%      Temp Readings from Last 1 Encounters:   12/27/19 37.1  C (98.8  F)    Ht Readings from Last 1 Encounters:   12/27/19 1.88 m (6' 2\")      Wt Readings from Last 1 Encounters:   12/27/19 91.8 kg (202 lb 4.8 oz)    Estimated body mass index is 25.97 kg/m  as calculated from the following:    Height as " "of 12/27/19: 1.88 m (6' 2\").    Weight as of 12/27/19: 91.8 kg (202 lb 4.8 oz).     LDA:  Peripheral IV 10/09/19 Distal;Left Upper arm (Active)   Number of days: 105        Assessment:   ASA SCORE: 2    H&P: History and physical reviewed and following examination; no interval change.    NPO Status: NPO Appropriate     Plan:   Anes. Type:  General   Pre-Medication: None   Induction:  IV (Standard)   Airway: ETT; Oral   Access/Monitoring: PIV   Maintenance: Balanced     Postop Plan:   Postop Pain: Opioids  Postop Sedation/Airway: Not planned  Disposition: Outpatient     PONV Management:   Adult Risk Factors:, Postop Opioids   Prevention: Ondansetron, Dexamethasone     CONSENT: Direct conversation   Plan and risks discussed with: Patient          Comments for Plan/Consent:  [facility] (4 and D blade available)                PAC Discussion and Assessment    ASA Classification: 3  Case is suitable for: [facility]  Anesthetic techniques and relevant risks discussed: GA  Invasive monitoring and risk discussed: No  Types:   Possibility and Risk of blood transfusion discussed: No  NPO instructions given:   Additional anesthetic preparation and risks discussed:   Needs early admission to pre-op area:   Other:     PAC Resident/NP Anesthesia Assessment:  Jas Patel is a 49 year old male scheduled to undergo Lumbar 4 to 5 decompression and discectomy with Dr. Dexter on 1/23/20. He has the following specific operative considerations:   - RCRI : No serious cardiac risks.    - VTE risk: 0.5%  - CALLI # of risks 3/8 = intermediate risk  - Risk of PONV score = 2.  If > 2, anti-emetic intervention recommended.    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 Will take Tramadol on DOS. Zanaflex prn.  --C5-6 disc herniation with chronic pain. Hydrocodone nightly since ~2012.   --Reported history of difficult intubation in 1991. Final evaluation and decisions by Anesthesia on DOS.   --No " cardiac history, symptoms or meds. Testing above including recent negative stress test completed for chest discomfort. Good exercise tolerance.  --Intermittent hyponatremia. Today 130. Will redraw on DOS.  --Former smoker. Denies pulmonary symptoms. Snores.  --GERD. Will take omeprazole on DOS.  --BPH oxybutynin and tamsulosin at evening meal.   --Benign essential tremor Will take propranolol on DOS.   --Anxiety/depression. Amitriptyline at HS. Will take citalopram on DOS.           Patient was discussed with Dr Celis.      Reviewed and Signed by PAC Mid-Level Provider/Resident  Mid-Level Provider/Resident: RAFAEL Judge, CNS  Date: 12/27/19  Time: 1:30pm    Attending Anesthesiologist Anesthesia Assessment:  49 year old for L4/5 decompression and discectomy in management of radiculopathy. Patient has no significant cardiac or pulmonary disease; patient is nurse.    Patient/case discussed with MONTY/resident; agree with above assessment. No need to see patient. Patient is appropriate for the planned procedure without further work-up or medical management.    Maryam Celis MD        Anesthesiologist:   Date:   Time:   Pass/Fail:   Disposition:     PAC Pharmacist Assessment:        Pharmacist:   Date:   Time:    Ricki Wiggins MD

## 2020-01-23 ENCOUNTER — APPOINTMENT (OUTPATIENT)
Dept: GENERAL RADIOLOGY | Facility: CLINIC | Age: 50
End: 2020-01-23
Attending: ORTHOPAEDIC SURGERY
Payer: COMMERCIAL

## 2020-01-23 ENCOUNTER — ANESTHESIA (OUTPATIENT)
Dept: SURGERY | Facility: CLINIC | Age: 50
End: 2020-01-23
Payer: COMMERCIAL

## 2020-01-23 ENCOUNTER — HOSPITAL ENCOUNTER (OUTPATIENT)
Facility: CLINIC | Age: 50
Discharge: HOME | End: 2020-01-23
Attending: ORTHOPAEDIC SURGERY | Admitting: ORTHOPAEDIC SURGERY
Payer: COMMERCIAL

## 2020-01-23 VITALS
BODY MASS INDEX: 25.49 KG/M2 | HEART RATE: 84 BPM | HEIGHT: 74 IN | SYSTOLIC BLOOD PRESSURE: 139 MMHG | RESPIRATION RATE: 16 BRPM | TEMPERATURE: 98.1 F | OXYGEN SATURATION: 98 % | DIASTOLIC BLOOD PRESSURE: 80 MMHG | WEIGHT: 198.63 LBS

## 2020-01-23 DIAGNOSIS — M54.16 LUMBAR RADICULOPATHY: ICD-10-CM

## 2020-01-23 LAB
GLUCOSE SERPL-MCNC: 120 MG/DL (ref 70–99)
SODIUM SERPL-SCNC: 138 MMOL/L (ref 133–144)

## 2020-01-23 PROCEDURE — 25000128 H RX IP 250 OP 636: Performed by: NURSE ANESTHETIST, CERTIFIED REGISTERED

## 2020-01-23 PROCEDURE — 25000128 H RX IP 250 OP 636: Performed by: ORTHOPAEDIC SURGERY

## 2020-01-23 PROCEDURE — 27210794 ZZH OR GENERAL SUPPLY STERILE: Performed by: ORTHOPAEDIC SURGERY

## 2020-01-23 PROCEDURE — 37000008 ZZH ANESTHESIA TECHNICAL FEE, 1ST 30 MIN: Performed by: ORTHOPAEDIC SURGERY

## 2020-01-23 PROCEDURE — 71000027 ZZH RECOVERY PHASE 2 EACH 15 MINS: Performed by: ORTHOPAEDIC SURGERY

## 2020-01-23 PROCEDURE — 36415 COLL VENOUS BLD VENIPUNCTURE: CPT | Performed by: STUDENT IN AN ORGANIZED HEALTH CARE EDUCATION/TRAINING PROGRAM

## 2020-01-23 PROCEDURE — 25000125 ZZHC RX 250: Performed by: NURSE ANESTHETIST, CERTIFIED REGISTERED

## 2020-01-23 PROCEDURE — 71000015 ZZH RECOVERY PHASE 1 LEVEL 2 EA ADDTL HR: Performed by: ORTHOPAEDIC SURGERY

## 2020-01-23 PROCEDURE — 25000566 ZZH SEVOFLURANE, EA 15 MIN: Performed by: ORTHOPAEDIC SURGERY

## 2020-01-23 PROCEDURE — 25000128 H RX IP 250 OP 636: Performed by: PHYSICIAN ASSISTANT

## 2020-01-23 PROCEDURE — 25000128 H RX IP 250 OP 636: Performed by: STUDENT IN AN ORGANIZED HEALTH CARE EDUCATION/TRAINING PROGRAM

## 2020-01-23 PROCEDURE — 25000125 ZZHC RX 250: Performed by: ORTHOPAEDIC SURGERY

## 2020-01-23 PROCEDURE — 36000062 ZZH SURGERY LEVEL 4 1ST 30 MIN - UMMC: Performed by: ORTHOPAEDIC SURGERY

## 2020-01-23 PROCEDURE — 25800030 ZZH RX IP 258 OP 636: Performed by: STUDENT IN AN ORGANIZED HEALTH CARE EDUCATION/TRAINING PROGRAM

## 2020-01-23 PROCEDURE — 25000132 ZZH RX MED GY IP 250 OP 250 PS 637: Performed by: PHYSICIAN ASSISTANT

## 2020-01-23 PROCEDURE — 36000064 ZZH SURGERY LEVEL 4 EA 15 ADDTL MIN - UMMC: Performed by: ORTHOPAEDIC SURGERY

## 2020-01-23 PROCEDURE — 25800030 ZZH RX IP 258 OP 636: Performed by: NURSE ANESTHETIST, CERTIFIED REGISTERED

## 2020-01-23 PROCEDURE — 82947 ASSAY GLUCOSE BLOOD QUANT: CPT | Performed by: STUDENT IN AN ORGANIZED HEALTH CARE EDUCATION/TRAINING PROGRAM

## 2020-01-23 PROCEDURE — 71000014 ZZH RECOVERY PHASE 1 LEVEL 2 FIRST HR: Performed by: ORTHOPAEDIC SURGERY

## 2020-01-23 PROCEDURE — 40000985 XR LUMBAR SPINE PORT 1 VW

## 2020-01-23 PROCEDURE — 84295 ASSAY OF SERUM SODIUM: CPT | Performed by: STUDENT IN AN ORGANIZED HEALTH CARE EDUCATION/TRAINING PROGRAM

## 2020-01-23 PROCEDURE — 40000986 XR LUMBAR SPINE PORT 1 VW

## 2020-01-23 PROCEDURE — 25000301 ZZH OR RX SURGIFLO W/THROMBIN KIT 2ML 1991 OPNP: Performed by: ORTHOPAEDIC SURGERY

## 2020-01-23 PROCEDURE — 40000170 ZZH STATISTIC PRE-PROCEDURE ASSESSMENT II: Performed by: ORTHOPAEDIC SURGERY

## 2020-01-23 PROCEDURE — 25000132 ZZH RX MED GY IP 250 OP 250 PS 637: Performed by: STUDENT IN AN ORGANIZED HEALTH CARE EDUCATION/TRAINING PROGRAM

## 2020-01-23 PROCEDURE — 37000009 ZZH ANESTHESIA TECHNICAL FEE, EACH ADDTL 15 MIN: Performed by: ORTHOPAEDIC SURGERY

## 2020-01-23 RX ORDER — VANCOMYCIN HYDROCHLORIDE 1 G/20ML
INJECTION, POWDER, LYOPHILIZED, FOR SOLUTION INTRAVENOUS PRN
Status: DISCONTINUED | OUTPATIENT
Start: 2020-01-23 | End: 2020-01-23 | Stop reason: HOSPADM

## 2020-01-23 RX ORDER — HYDROMORPHONE HYDROCHLORIDE 1 MG/ML
.3-.5 INJECTION, SOLUTION INTRAMUSCULAR; INTRAVENOUS; SUBCUTANEOUS EVERY 10 MIN PRN
Status: DISCONTINUED | OUTPATIENT
Start: 2020-01-23 | End: 2020-01-23 | Stop reason: HOSPADM

## 2020-01-23 RX ORDER — BUPIVACAINE HYDROCHLORIDE AND EPINEPHRINE 2.5; 5 MG/ML; UG/ML
INJECTION, SOLUTION INFILTRATION; PERINEURAL PRN
Status: DISCONTINUED | OUTPATIENT
Start: 2020-01-23 | End: 2020-01-23 | Stop reason: HOSPADM

## 2020-01-23 RX ORDER — DEXAMETHASONE SODIUM PHOSPHATE 4 MG/ML
INJECTION, SOLUTION INTRA-ARTICULAR; INTRALESIONAL; INTRAMUSCULAR; INTRAVENOUS; SOFT TISSUE PRN
Status: DISCONTINUED | OUTPATIENT
Start: 2020-01-23 | End: 2020-01-23

## 2020-01-23 RX ORDER — SODIUM CHLORIDE, SODIUM LACTATE, POTASSIUM CHLORIDE, CALCIUM CHLORIDE 600; 310; 30; 20 MG/100ML; MG/100ML; MG/100ML; MG/100ML
INJECTION, SOLUTION INTRAVENOUS CONTINUOUS
Status: DISCONTINUED | OUTPATIENT
Start: 2020-01-23 | End: 2020-01-23 | Stop reason: HOSPADM

## 2020-01-23 RX ORDER — PROPOFOL 10 MG/ML
INJECTION, EMULSION INTRAVENOUS PRN
Status: DISCONTINUED | OUTPATIENT
Start: 2020-01-23 | End: 2020-01-23

## 2020-01-23 RX ORDER — KETAMINE HYDROCHLORIDE 10 MG/ML
INJECTION, SOLUTION INTRAMUSCULAR; INTRAVENOUS PRN
Status: DISCONTINUED | OUTPATIENT
Start: 2020-01-23 | End: 2020-01-23

## 2020-01-23 RX ORDER — OXYCODONE HYDROCHLORIDE 5 MG/1
5-10 TABLET ORAL
Status: COMPLETED | OUTPATIENT
Start: 2020-01-23 | End: 2020-01-23

## 2020-01-23 RX ORDER — ONDANSETRON 4 MG/1
4-8 TABLET, ORALLY DISINTEGRATING ORAL EVERY 8 HOURS PRN
Qty: 20 TABLET | Refills: 0 | Status: SHIPPED | OUTPATIENT
Start: 2020-01-23 | End: 2020-02-14

## 2020-01-23 RX ORDER — KETOROLAC TROMETHAMINE 30 MG/ML
INJECTION, SOLUTION INTRAMUSCULAR; INTRAVENOUS PRN
Status: DISCONTINUED | OUTPATIENT
Start: 2020-01-23 | End: 2020-01-23

## 2020-01-23 RX ORDER — POLYETHYLENE GLYCOL 3350 17 G/17G
1 POWDER, FOR SOLUTION ORAL DAILY
Qty: 10 PACKET | Refills: 0 | Status: SHIPPED | OUTPATIENT
Start: 2020-01-23 | End: 2020-03-19

## 2020-01-23 RX ORDER — DOCUSATE SODIUM 100 MG/1
100 CAPSULE, LIQUID FILLED ORAL 2 TIMES DAILY
Qty: 20 CAPSULE | Refills: 0 | Status: SHIPPED | OUTPATIENT
Start: 2020-01-23 | End: 2020-02-14

## 2020-01-23 RX ORDER — CEFAZOLIN SODIUM 2 G/100ML
2 INJECTION, SOLUTION INTRAVENOUS
Status: COMPLETED | OUTPATIENT
Start: 2020-01-23 | End: 2020-01-23

## 2020-01-23 RX ORDER — HYDROXYZINE HYDROCHLORIDE 10 MG/1
10 TABLET, FILM COATED ORAL
Status: CANCELLED | OUTPATIENT
Start: 2020-01-23

## 2020-01-23 RX ORDER — ONDANSETRON 4 MG/1
4 TABLET, ORALLY DISINTEGRATING ORAL
Status: CANCELLED | OUTPATIENT
Start: 2020-01-23

## 2020-01-23 RX ORDER — ACETAMINOPHEN 325 MG/1
650 TABLET ORAL
Status: CANCELLED | OUTPATIENT
Start: 2020-01-23

## 2020-01-23 RX ORDER — GABAPENTIN 300 MG/1
300 CAPSULE ORAL ONCE
Status: COMPLETED | OUTPATIENT
Start: 2020-01-23 | End: 2020-01-23

## 2020-01-23 RX ORDER — FENTANYL CITRATE 50 UG/ML
INJECTION, SOLUTION INTRAMUSCULAR; INTRAVENOUS PRN
Status: DISCONTINUED | OUTPATIENT
Start: 2020-01-23 | End: 2020-01-23

## 2020-01-23 RX ORDER — ACETAMINOPHEN 325 MG/1
975 TABLET ORAL ONCE
Status: COMPLETED | OUTPATIENT
Start: 2020-01-23 | End: 2020-01-23

## 2020-01-23 RX ORDER — AMOXICILLIN 250 MG
1-2 CAPSULE ORAL 2 TIMES DAILY
Qty: 30 TABLET | Refills: 0 | Status: SHIPPED | OUTPATIENT
Start: 2020-01-23 | End: 2020-02-14

## 2020-01-23 RX ORDER — FENTANYL CITRATE 50 UG/ML
25-50 INJECTION, SOLUTION INTRAMUSCULAR; INTRAVENOUS
Status: DISCONTINUED | OUTPATIENT
Start: 2020-01-23 | End: 2020-01-23 | Stop reason: HOSPADM

## 2020-01-23 RX ORDER — LIDOCAINE HYDROCHLORIDE 20 MG/ML
INJECTION, SOLUTION INFILTRATION; PERINEURAL PRN
Status: DISCONTINUED | OUTPATIENT
Start: 2020-01-23 | End: 2020-01-23

## 2020-01-23 RX ORDER — ONDANSETRON 2 MG/ML
4 INJECTION INTRAMUSCULAR; INTRAVENOUS EVERY 30 MIN PRN
Status: DISCONTINUED | OUTPATIENT
Start: 2020-01-23 | End: 2020-01-23 | Stop reason: HOSPADM

## 2020-01-23 RX ORDER — METHOCARBAMOL 750 MG/1
750 TABLET, FILM COATED ORAL
Status: COMPLETED | OUTPATIENT
Start: 2020-01-23 | End: 2020-01-23

## 2020-01-23 RX ORDER — DIAZEPAM 5 MG
5 TABLET ORAL EVERY 6 HOURS PRN
Qty: 30 TABLET | Refills: 0 | Status: SHIPPED | OUTPATIENT
Start: 2020-01-23 | End: 2020-02-21

## 2020-01-23 RX ORDER — EPHEDRINE SULFATE 50 MG/ML
INJECTION, SOLUTION INTRAMUSCULAR; INTRAVENOUS; SUBCUTANEOUS PRN
Status: DISCONTINUED | OUTPATIENT
Start: 2020-01-23 | End: 2020-01-23

## 2020-01-23 RX ORDER — OXYCODONE HYDROCHLORIDE 5 MG/1
5 TABLET ORAL EVERY 4 HOURS PRN
Status: DISCONTINUED | OUTPATIENT
Start: 2020-01-23 | End: 2020-01-23 | Stop reason: HOSPADM

## 2020-01-23 RX ORDER — ONDANSETRON 2 MG/ML
INJECTION INTRAMUSCULAR; INTRAVENOUS PRN
Status: DISCONTINUED | OUTPATIENT
Start: 2020-01-23 | End: 2020-01-23

## 2020-01-23 RX ORDER — CEFAZOLIN SODIUM 1 G/3ML
1 INJECTION, POWDER, FOR SOLUTION INTRAMUSCULAR; INTRAVENOUS SEE ADMIN INSTRUCTIONS
Status: DISCONTINUED | OUTPATIENT
Start: 2020-01-23 | End: 2020-01-23 | Stop reason: HOSPADM

## 2020-01-23 RX ORDER — ONDANSETRON 4 MG/1
4 TABLET, ORALLY DISINTEGRATING ORAL EVERY 30 MIN PRN
Status: DISCONTINUED | OUTPATIENT
Start: 2020-01-23 | End: 2020-01-23 | Stop reason: HOSPADM

## 2020-01-23 RX ORDER — MEPERIDINE HYDROCHLORIDE 25 MG/ML
12.5 INJECTION INTRAMUSCULAR; INTRAVENOUS; SUBCUTANEOUS
Status: DISCONTINUED | OUTPATIENT
Start: 2020-01-23 | End: 2020-01-23 | Stop reason: HOSPADM

## 2020-01-23 RX ORDER — LIDOCAINE 40 MG/G
CREAM TOPICAL
Status: DISCONTINUED | OUTPATIENT
Start: 2020-01-23 | End: 2020-01-23 | Stop reason: HOSPADM

## 2020-01-23 RX ORDER — NALOXONE HYDROCHLORIDE 0.4 MG/ML
.1-.4 INJECTION, SOLUTION INTRAMUSCULAR; INTRAVENOUS; SUBCUTANEOUS
Status: DISCONTINUED | OUTPATIENT
Start: 2020-01-23 | End: 2020-01-23 | Stop reason: HOSPADM

## 2020-01-23 RX ORDER — HYDROCODONE BITARTRATE AND ACETAMINOPHEN 5; 325 MG/1; MG/1
1-2 TABLET ORAL EVERY 6 HOURS PRN
Qty: 60 TABLET | Refills: 0 | Status: SHIPPED | OUTPATIENT
Start: 2020-01-23 | End: 2020-06-19

## 2020-01-23 RX ADMIN — PHENYLEPHRINE HYDROCHLORIDE 150 MCG: 10 INJECTION INTRAVENOUS at 07:22

## 2020-01-23 RX ADMIN — PROPOFOL 300 MG: 10 INJECTION, EMULSION INTRAVENOUS at 06:53

## 2020-01-23 RX ADMIN — OXYCODONE HYDROCHLORIDE 10 MG: 5 TABLET ORAL at 10:32

## 2020-01-23 RX ADMIN — HYDROMORPHONE HYDROCHLORIDE 0.3 MG: 1 INJECTION, SOLUTION INTRAMUSCULAR; INTRAVENOUS; SUBCUTANEOUS at 08:22

## 2020-01-23 RX ADMIN — FENTANYL CITRATE 50 MCG: 50 INJECTION INTRAMUSCULAR; INTRAVENOUS at 09:36

## 2020-01-23 RX ADMIN — SUGAMMADEX 200 MG: 100 INJECTION, SOLUTION INTRAVENOUS at 08:39

## 2020-01-23 RX ADMIN — ONDANSETRON 4 MG: 2 INJECTION INTRAMUSCULAR; INTRAVENOUS at 08:20

## 2020-01-23 RX ADMIN — FENTANYL CITRATE 50 MCG: 50 INJECTION INTRAMUSCULAR; INTRAVENOUS at 09:03

## 2020-01-23 RX ADMIN — HYDROMORPHONE HYDROCHLORIDE 0.2 MG: 1 INJECTION, SOLUTION INTRAMUSCULAR; INTRAVENOUS; SUBCUTANEOUS at 08:17

## 2020-01-23 RX ADMIN — FENTANYL CITRATE 50 MCG: 50 INJECTION INTRAMUSCULAR; INTRAVENOUS at 09:15

## 2020-01-23 RX ADMIN — FENTANYL CITRATE 25 MCG: 50 INJECTION, SOLUTION INTRAMUSCULAR; INTRAVENOUS at 08:40

## 2020-01-23 RX ADMIN — GABAPENTIN 300 MG: 300 CAPSULE ORAL at 06:12

## 2020-01-23 RX ADMIN — HYDROMORPHONE HYDROCHLORIDE 0.5 MG: 1 INJECTION, SOLUTION INTRAMUSCULAR; INTRAVENOUS; SUBCUTANEOUS at 10:14

## 2020-01-23 RX ADMIN — KETOROLAC TROMETHAMINE 30 MG: 30 INJECTION, SOLUTION INTRAMUSCULAR at 08:22

## 2020-01-23 RX ADMIN — LIDOCAINE HYDROCHLORIDE 100 MG: 20 INJECTION, SOLUTION INFILTRATION; PERINEURAL at 06:53

## 2020-01-23 RX ADMIN — FENTANYL CITRATE 50 MCG: 50 INJECTION, SOLUTION INTRAMUSCULAR; INTRAVENOUS at 08:45

## 2020-01-23 RX ADMIN — ACETAMINOPHEN 975 MG: 325 TABLET, FILM COATED ORAL at 06:12

## 2020-01-23 RX ADMIN — Medication 50 MG: at 07:06

## 2020-01-23 RX ADMIN — PHENYLEPHRINE HYDROCHLORIDE 100 MCG: 10 INJECTION INTRAVENOUS at 07:12

## 2020-01-23 RX ADMIN — Medication 5 MG: at 07:10

## 2020-01-23 RX ADMIN — PHENYLEPHRINE HYDROCHLORIDE 150 MCG: 10 INJECTION INTRAVENOUS at 07:26

## 2020-01-23 RX ADMIN — DEXAMETHASONE SODIUM PHOSPHATE 10 MG: 4 INJECTION, SOLUTION INTRAMUSCULAR; INTRAVENOUS at 07:18

## 2020-01-23 RX ADMIN — METHOCARBAMOL 750 MG: 750 TABLET ORAL at 12:55

## 2020-01-23 RX ADMIN — CEFAZOLIN SODIUM 2 G: 2 INJECTION, SOLUTION INTRAVENOUS at 07:08

## 2020-01-23 RX ADMIN — SODIUM CHLORIDE, POTASSIUM CHLORIDE, SODIUM LACTATE AND CALCIUM CHLORIDE: 600; 310; 30; 20 INJECTION, SOLUTION INTRAVENOUS at 06:34

## 2020-01-23 RX ADMIN — Medication 5 MG: at 08:02

## 2020-01-23 RX ADMIN — FENTANYL CITRATE 25 MCG: 50 INJECTION, SOLUTION INTRAMUSCULAR; INTRAVENOUS at 08:26

## 2020-01-23 RX ADMIN — ROCURONIUM BROMIDE 20 MG: 10 INJECTION INTRAVENOUS at 07:33

## 2020-01-23 RX ADMIN — MIDAZOLAM 2 MG: 1 INJECTION INTRAMUSCULAR; INTRAVENOUS at 06:46

## 2020-01-23 RX ADMIN — FENTANYL CITRATE 100 MCG: 50 INJECTION, SOLUTION INTRAMUSCULAR; INTRAVENOUS at 06:53

## 2020-01-23 RX ADMIN — HYDROMORPHONE HYDROCHLORIDE 0.5 MG: 1 INJECTION, SOLUTION INTRAMUSCULAR; INTRAVENOUS; SUBCUTANEOUS at 08:52

## 2020-01-23 RX ADMIN — ROCURONIUM BROMIDE 20 MG: 10 INJECTION INTRAVENOUS at 07:23

## 2020-01-23 RX ADMIN — ROCURONIUM BROMIDE 50 MG: 10 INJECTION INTRAVENOUS at 06:53

## 2020-01-23 ASSESSMENT — MIFFLIN-ST. JEOR: SCORE: 1826

## 2020-01-23 ASSESSMENT — PAIN DESCRIPTION - DESCRIPTORS: DESCRIPTORS: SHARP

## 2020-01-23 NOTE — OP NOTE
DATE OF SURGERY: 1/23/2020    PREOPERATIVE DIAGNOSIS: Lumbar radiculopathy and lumbar stenosis           POSTOPERATIVE DIAGNOSIS: Same    PROCEDURES:  1. L4 and L5 laminectomy and partial medial facetectomies and foraminotomies for decompression of the L4 and L5 nerve roots.  2. Right L4-5 discectomy.    PRIMARY SURGEON: Juvenal Dexter MD    FIRST ASSISTANT: TORSTEN Ireland, there was no qualified resident available, the assistant was necessary for retraction, visualization, and wound closure.    ANESTHESIA: General Endotracheal    COMPLICATIONS:  None.    SPECIMENS: None.    ESTIMATED BLOOD LOSS: 25 mL    INDICATIONS:                          Jas Patel is a 49 year old male who elected surgical treatment, and understood the indications for this surgery, as well as its risks, benefits, and alternatives as documented in the pre-operative H&P.  Specifically, we reviewed the risks and benefits of the surgery in detail. The risks include, but are not limited to, the general risks associated with anesthesia, including death, pulmonary embolism, DVT, stroke, myocardial infarction, pneumonia, and urinary tract infection. Additional risks specific to the surgery include the risk of infection, dural tear with resultant CSF leak which might necessitate placement of a drain or revision surgery or could result in headaches, nerve injury resulting in weakness or paralysis, risk of adjacent segment disease, the risks of vascular injury, need for revision surgery in the future due to one of the above issues, or risk of incomplete symptom relief. Jas Patel understands the risks of the surgery and wishes to proceed.  No Guarantees were given.       DESCRIPTION OF PROCEDURE:           Jas Patel was taken to the operating  room, where the Anesthesiology Service induced satisfactory general anesthesia.  Ancef was given IV.  Venous thromboembolic prophylaxis was performed with sequential devices.  A Patino  catheter was placed under standard sterile techniques.  The patient was placed prone on an open OSI frame with the abdomen hanging free and all bony prominences well padded.  The low back was then prepped and draped in its entirety in the usual sterile fashion.  We then held a multidisciplinary time out in which we verified the patient, procedure, antibiotics, and operative plan.  All team members were in agreement.    Digital radiography was brought into the sterile field to obtain a true lateral view and needles were placed to italo the intended point of incision.  We made a midline incision and a bilateral subperiosteal exposure was performed of the L4 through L5 spinous processes and medial lamina.  A needle was placed into the medial facet joint at the caudal most level and a final image was then obtained to verify our position.     The decompression was performed in the same fashion at each level sequentially including the L4 and L5 levels which resulted in the decompression of the L4 and L5 nerve roots.      For each level, the spinous process was removed with a rongeur. The dorsal cortex of the lamina was then thinned with the rongeur.  We palpated the lateral border of the pars to verify the planned width of the decompression.  I used a cautery to italo out the planned limits of the resection to provide a visual reference.  A 4mm round bur was then used to remove the remaining dorsal cortical and cancellous layers.  Eventually, the ligamentum flavum was uncovered by the bur in the midline.  The proximal origin of the ligamentum flavum  and epidural fat were identified. A curette was used to split and elevate the flavum in the midline, exposing the epidural fat underneath.  Kerrison punches were then used to resect the flavum down to the caudad laminar edge.  At this point the central decompression was complete, and I turned my attention to the partial medial facetectomy.  A 5mm strait osteotome was used to  resect the remaining overhanging medial facet.  Where necessary the resection was completed with a kerrison.  This was continued laterally until the medial wall of the pedicle was readily palpable.  I then completed the foraminotomies.  A prakash was used to trace out the edge of the pedicle.  I then introduced a 2mm kerrison into the foramen below the pedicle, keeping the shoe of the kerrison facing the nerve root.  Using multiple bites in this way, I was able to remove the remaining facet capsule and osteophytes that were compressing the exiting nerve root.  In the same fashion, I was able to reach out into the foramen above the pedicle and remove any compression on the exiting nerve root above. This process was performed sequentially at each of the levels noted.       In performing the decompression, I found that the right traversing L5 root was quite tented over a disc herniation.  I took quite a bit of time to elevate this and mobilize the nerve root off of the disc.  Once identified, I incised the annulus and removed 3cc of loose disc material.  After this the nerve seemed quite free.    The wound was then thoroughly irrigated.  Hemostasis was achieved.  A hemovac drain was not placed.  The wound was closed in layers with vicryl suture, followed by monocryl and dermabond for the skin.  A sterile dressing was applied. The patient was turned supine, extubated, and returned to the recovery area in stable condition.      I was present and scrubbed for the critical portions of the procedure including the exposure and decompression.    Juvenal Dexter MD

## 2020-01-23 NOTE — DISCHARGE INSTRUCTIONS
Same-Day Surgery   Adult Discharge Orders & Instructions     For 24 hours after surgery:  1. Get plenty of rest.  A responsible adult must stay with you for at least 24 hours after you leave the hospital.   2. Pain medication can slow your reflexes. Do not drive or use heavy equipment.  If you have weakness or tingling, don't drive or use heavy equipment until this feeling goes away.  3. Mixing alcohol and pain medication can cause dizziness and slow your breathing. It can even be fatal. Do not drink alcohol while taking pain medication.  4. Avoid strenuous or risky activities.  Ask for help when climbing stairs.   5. You may feel lightheaded.  If so, sit for a few minutes before standing.  Have someone help you get up.   6. If you have nausea (feel sick to your stomach), drink only clear liquids such as apple juice, ginger ale, broth or 7-Up.  Rest may also help.  Be sure to drink enough fluids.  Move to a regular diet as you feel able. Take pain medications with a small amount of solid food, such as toast or crackers, to avoid nausea.   7. A slight fever is normal. Call the doctor if your fever is over 100 F (37.7 C) (taken under the tongue) or lasts longer than 24 hours.  8. You may have a dry mouth, muscle aches, trouble sleeping or a sore throat.  These symptoms should go away after 24 hours.  9. Do not make important or legal decisions.   Pain Management:      1. Take pain medication (if prescribed) for pain as directed by your physician.        2. WARNING: If the pain medication you have been prescribed contains Tylenol  (acetaminophen), DO NOT take additional doses of Tylenol (acetaminophen).     Call your doctor for any of the followin.  Signs of infection (fever, growing tenderness at the surgery site, severe pain, a large amount of drainage or bleeding, foul-smelling drainage, redness, swelling).    2.  It has been over 8 to 10 hours since surgery and you are still not able to urinate (pee).    3.   Headache for over 24 hours.    4.  Numbness, tingling or weakness the day after surgery (if you had spinal anesthesia).  To contact a doctor, call _____________________________________ or:      [phone] and ask for the Resident On Call for:          __________________________________________ (answered 24 hours a day)      Emergency Department:  [place] [facility]: [phone]  [place] [facility]: [phone]               Rev. 10/2014

## 2020-01-23 NOTE — BRIEF OP NOTE
[facility], [place]    Brief Operative Note    Pre-operative diagnosis: Lumbar radiculopathy [M54.16]  Post-operative diagnosis Same as pre-operative diagnosis    Procedure: Procedure(s):  Lumbar 4 to 5 Decompression and Discectomy  Surgeon: Surgeon(s) and Role:     * Juvenal Dexter MD - Primary     * Gerald Peralta PA-C - Assisting  Anesthesia: General   Estimated blood loss: Less than 10 ml  Drains: None  Specimens: * No specimens in log *  Findings:   None.  Complications: None.  Implants: * No implants in log *      Assessment and Plan: Jas Patel is a 49 year old male with PMH including lumbar radiculopathy, moderate major depression, generalized anxiety disorder, GERD, BPH, chronic pain syndrome, benign essential tremor, low back pain now s/p Procedure(s):  Lumbar 4 to 5 Decompression and Discectomy on 1/23/20 with Dr. Dexter.     Isacc primary  Activity: Up with assist until independent. No excessive bending or twisting. No lifting >10 lbs x 6 weeks. No Brooke lift for transfers.   Weight bearing status: WBAT.  Pain management: Transition from IV to PO as tolerated. No NSAIDs   Diet: Begin with clear fluids and progress diet as tolerated.   DVT prophylaxis: SCDs only. No chemical DVT ppx needed.  Dressings: Subcutaneous sutures, Dermabond with Tegaderm over 4 x 4.  Follow-up: Clinic with Dr. Dexter in 6 weeks with repeat x-rays.   Disposition: Pending progress with therapies, pain control on orals, and medical stability, anticipate discharge to home on POD #0.

## 2020-01-23 NOTE — ANESTHESIA CARE TRANSFER NOTE
Patient: Jas Patel    Procedure(s):  Lumbar 4 to 5 Decompression and Discectomy    Diagnosis: Lumbar radiculopathy [M54.16]  Diagnosis Additional Information: No value filed.    Anesthesia Type:   General     Note:  Airway :Face Mask  Patient transferred to:PACU  Comments: Regular respirations and patent airway. VSS. IV patent and infusing. Alert and oriented, moving all extremities. Some pain on arrival to PACU, dilaudid given, Pt now resting comfortably. Report given to RN  Handoff Report: Identifed the Patient, Identified the Reponsible Provider, Reviewed the pertinent medical history, Discussed the surgical course, Reviewed Intra-OP anesthesia mangement and issues during anesthesia, Set expectations for post-procedure period and Allowed opportunity for questions and acknowledgement of understanding      Vitals: (Last set prior to Anesthesia Care Transfer)    CRNA VITALS  1/23/2020 0815 - 1/23/2020 0853      1/23/2020             Resp Rate (observed):  (!) 4                Electronically Signed By: RAFAEL Bermudez CRNA  January 23, 2020  8:53 AM

## 2020-01-23 NOTE — ANESTHESIA POSTPROCEDURE EVALUATION
Anesthesia POST Procedure Evaluation    Patient: Jas Patel   MRN:     [id number] Gender:   male   Age:    49 year old :      1970        Preoperative Diagnosis: Lumbar radiculopathy [M54.16]   Procedure(s):  Lumbar 4 to 5 Decompression and Discectomy   Postop Comments: No value filed.       Anesthesia Type:  Not documented  General    Reportable Event: NO     PAIN: Uncomplicated   Sign Out status: Comfortable, Well controlled pain     PONV: No PONV   Sign Out status:  No Nausea or Vomiting     Neuro/Psych: Uneventful perioperative course   Sign Out Status: Preoperative baseline; Age appropriate mentation     Airway/Resp.: Uneventful perioperative course   Sign Out Status: Non labored breathing, age appropriate RR; Resp. Status within EXPECTED Parameters     CV: Uneventful perioperative course   Sign Out status: Appropriate BP and perfusion indices; Appropriate HR/Rhythm     Disposition:   Sign Out in:  PACU  Disposition:  Phase II; Home  Recovery Course: Uneventful  Follow-Up: Not required           Last Anesthesia Record Vitals:  CRNA VITALS  2020 0815 - 2020 0915      2020             Resp Rate (observed):  (!) 4          Last PACU Vitals:  Vitals Value Taken Time   /59 2020  9:45 AM   Temp 36.5  C (97.7  F) 2020  9:19 AM   Pulse 75 2020  9:45 AM   Resp 11 2020  9:45 AM   SpO2 97 % 2020  9:58 AM   Temp src     NIBP     Pulse     SpO2     Resp     Temp     Ht Rate     Temp 2     Vitals shown include unvalidated device data.      Electronically Signed By: Ricki Wiggins MD, 2020, 9:59 AM

## 2020-01-25 ENCOUNTER — TELEPHONE (OUTPATIENT)
Dept: ORTHOPEDICS | Facility: CLINIC | Age: 50
End: 2020-01-25

## 2020-01-25 DIAGNOSIS — M48.062 SPINAL STENOSIS OF LUMBAR REGION WITH NEUROGENIC CLAUDICATION: Primary | ICD-10-CM

## 2020-01-25 RX ORDER — OXYCODONE HYDROCHLORIDE 5 MG/1
5 TABLET ORAL EVERY 6 HOURS PRN
Qty: 20 TABLET | Refills: 0 | Status: SHIPPED | OUTPATIENT
Start: 2020-01-25 | End: 2020-01-31

## 2020-01-25 NOTE — TELEPHONE ENCOUNTER
I received a call that the narco was not working for the patient.  He does have a prior pain contract with an outside clinic.  I feel it is reasonable to provide a short term course of Oxy to get him through the first week of post-operative pain and have agreed to provide him 20 tablets of oxycodone.  Further refills of narcotic doses should go through his outside clinic with which he has a pain contract.    Juvenal Dexter MD

## 2020-01-30 ENCOUNTER — TELEPHONE (OUTPATIENT)
Dept: ORTHOPEDICS | Facility: CLINIC | Age: 50
End: 2020-01-30

## 2020-01-30 DIAGNOSIS — M48.062 SPINAL STENOSIS OF LUMBAR REGION WITH NEUROGENIC CLAUDICATION: Primary | ICD-10-CM

## 2020-01-30 RX ORDER — METHOCARBAMOL 500 MG/1
500 TABLET, FILM COATED ORAL 3 TIMES DAILY PRN
Qty: 60 TABLET | Refills: 0 | Status: SHIPPED | OUTPATIENT
Start: 2020-01-30 | End: 2020-03-19

## 2020-01-30 NOTE — TELEPHONE ENCOUNTER
[facility] Call Center    Phone Message    May a detailed message be left on voicemail: yes    Reason for Call: Medication Refill Request    Has the patient contacted the pharmacy for the refill? Yes   Name of medication being requested: oxyCODONE (ROXICODONE) 5 MG tablet  Provider who prescribed the medication: Isacc  Pharmacy: [facility] on file  Date medication is needed: 1/30/2020     Pt stated that he is hoping that the Rx can be changed to every 4hrs as needed. Pt stated every 6 hours is not working for him. Please follow up when completed. Thank you     Action Taken: Message routed to:  [facility]

## 2020-01-30 NOTE — TELEPHONE ENCOUNTER
[facility] Call Center    Phone Message    May a detailed message be left on voicemail: no    Reason for Call: Other: Pt requests a script for a better muscle relaxer; Pt is interested in getting robaxin medication which he said has worked for him. Pt says the valium he was given before was not very effective. Pt would like the medication called/submitted to [facility]. Please call Pt back.     Action Taken: Message routed to:  [facility]

## 2020-01-30 NOTE — TELEPHONE ENCOUNTER
RN returned patient's call. Explained to patient we expect him to start weaning off meds hence we stretch it to every 6 hours instead of 4. RN did advise patient that he can take the oxycodone with robaxin and then take tynelol if necessary.  Patient then asked when can he take ibuprofen, RN told him to hold it for at least 6 months due to delay of healing and have an impact on kidney. Patient verbalized understanding.    Murray Parker RN

## 2020-01-30 NOTE — TELEPHONE ENCOUNTER
Per patient request, RN send a Robaxin script for patient.    Pt is notified.    Murray Parker RN

## 2020-01-31 ENCOUNTER — TELEPHONE (OUTPATIENT)
Dept: ORTHOPEDICS | Facility: CLINIC | Age: 50
End: 2020-01-31

## 2020-01-31 DIAGNOSIS — M48.062 SPINAL STENOSIS OF LUMBAR REGION WITH NEUROGENIC CLAUDICATION: ICD-10-CM

## 2020-01-31 DIAGNOSIS — G89.18 POST-OPERATIVE PAIN: Primary | ICD-10-CM

## 2020-01-31 RX ORDER — OXYCODONE HYDROCHLORIDE 5 MG/1
5 TABLET ORAL EVERY 4 HOURS PRN
Qty: 45 TABLET | Refills: 0 | Status: SHIPPED | OUTPATIENT
Start: 2020-01-31 | End: 2020-02-06

## 2020-01-31 NOTE — TELEPHONE ENCOUNTER
RN called and left VM to patient. Reminded patient that he can take the oxycodone with his robaxin and the pharmacy has the script and meds are ready to be picked up.  RN provided call back number in case he has questions or concerns.    Murray Parker RN

## 2020-01-31 NOTE — TELEPHONE ENCOUNTER
RN received phone call from patient. RN was going through patient's notes and Dr. Dexter refilled his meds post surgery but for only 20 tabs and later med refill has to go through his pain provider due to him being on pain contract.  However, typically the protocol is that post surgery, the surgery team will refill patient's meds for at least 6 weeks until the post op appt. Later then patient can reach out to his pain provider since he is under the pain contract. RN explained to patient and apologized for the miscommunication. Patient was very grateful and was glad we are able to refill his med.  RN later reach out to Gosia Winter PA-C to sign and authorize patient's oxycodone.    Murray Parker RN

## 2020-02-03 ENCOUNTER — TELEPHONE (OUTPATIENT)
Dept: ORTHOPEDICS | Facility: CLINIC | Age: 50
End: 2020-02-03

## 2020-02-03 NOTE — TELEPHONE ENCOUNTER
[facility] Call Center    Phone Message    May a detailed message be left on voicemail: yes     Reason for Call: Symptoms or Concerns     If patient has red-flag symptoms, warm transfer to triage line    Current symptom or concern: mild leg pain    Symptoms have been present for:  1 day(s)    Has patient previously been seen for this? Yes    By : Dr. Dexter    Date: 1/23/20    Are there any new or worsening symptoms? Yes: mild leg pain, pt requesting call back from [facility]      Action Taken: Message routed to:  [facility]o    Travel Screening: Not Applicable

## 2020-02-03 NOTE — TELEPHONE ENCOUNTER
RN returned patient's call. Told patient his symptoms are normal. Part of the healing process. If the pain is persistent and coupled by numbness and tingling, then RN offered to him to come to clinic sooner than his 6 weeks post op. Patient verbalized understanding.    Murray Parker RN

## 2020-02-06 ENCOUNTER — MYC MEDICAL ADVICE (OUTPATIENT)
Dept: ORTHOPEDICS | Facility: CLINIC | Age: 50
End: 2020-02-06

## 2020-02-06 DIAGNOSIS — G89.18 POST-OPERATIVE PAIN: ICD-10-CM

## 2020-02-06 DIAGNOSIS — M48.062 SPINAL STENOSIS OF LUMBAR REGION WITH NEUROGENIC CLAUDICATION: ICD-10-CM

## 2020-02-06 RX ORDER — OXYCODONE HYDROCHLORIDE 5 MG/1
5 TABLET ORAL EVERY 4 HOURS PRN
Qty: 45 TABLET | Refills: 0 | Status: SHIPPED | OUTPATIENT
Start: 2020-02-06 | End: 2020-02-14

## 2020-02-06 NOTE — TELEPHONE ENCOUNTER
RN sent med refill request to Gosia MANTILLA to sign and authorize.    Pt is notified via [employer] along with other previous advise.    Murray Parker RN

## 2020-02-07 NOTE — PROGRESS NOTES
Discharge Note    Progress reporting period is from initial evaluation date (please see noted date below) to Dec 18, 2019.  Linked Episodes   Type: Episode: Status: Noted: Resolved: Last update: Updated by:   PHYSICAL THERAPY Lumbar Active 11/13/2019 12/18/2019  1:15 PM Dennys Hess, PT      Comments:       Jas failed to follow up and current status is unknown.  Please see information below for last relevant information on current status.  Patient seen for 5 visits.    SUBJECTIVE  Subjective changes noted by patient:  Patient reports feeling about the same overall. A little less pain in right foot. Most pain is located in right low back and right buttock. Seeing spine specialist on Friday. Patient is concerned he might need surgery.   .  Current pain level is  .     Previous pain level was  4/10.   Changes in function:  Yes (See Goal flowsheet attached for changes in current functional level)  Adverse reaction to treatment or activity: None    OBJECTIVE  Changes noted in objective findings: lumbar AROM: FL = min loss and increased right leg pain, EXT = min loss and increased lbp, left and right SG = min loss; +slump on right, lumbar mytome L4 = 4/5     ASSESSMENT/PLAN  Diagnosis: Lumbar pain with R sided sciatica   Updated problem list and treatment plan:     STG/LTGs have been met or progress has been made towards goals:  Yes, please see goal flowsheet for most current information  Assessment of Progress: current status is unknown.    Last current status: Pt has not made progress   Self Management Plans:  HEP  I have re-evaluated this patient and find that the nature, scope, duration and intensity of the therapy is appropriate for the medical condition of the patient.  Jas continues to require the following intervention to meet STG and LTG's:  HEP.    Recommendations:  Discharge with current home program.  Patient to follow up with MD as needed.    Please refer to the daily flowsheet for treatment today,  total treatment time and time spent performing 1:1 timed codes.

## 2020-02-13 DIAGNOSIS — M48.062 SPINAL STENOSIS OF LUMBAR REGION WITH NEUROGENIC CLAUDICATION: ICD-10-CM

## 2020-02-13 DIAGNOSIS — G89.18 POST-OPERATIVE PAIN: Primary | ICD-10-CM

## 2020-02-13 RX ORDER — METHYLPREDNISOLONE 4 MG
TABLET, DOSE PACK ORAL
Qty: 21 TABLET | Refills: 0 | Status: SHIPPED | OUTPATIENT
Start: 2020-02-13 | End: 2020-03-19

## 2020-02-13 NOTE — TELEPHONE ENCOUNTER
Per Dr. Dexter's order, patient is to start a medrol dosepak and then return to see him in clinic.  RN relayed the plan to patient via [employer].    Murray Parker RN

## 2020-02-14 ENCOUNTER — MYC REFILL (OUTPATIENT)
Dept: OTHER | Age: 50
End: 2020-02-14

## 2020-02-14 DIAGNOSIS — M54.16 LUMBAR RADICULOPATHY: Primary | ICD-10-CM

## 2020-02-14 DIAGNOSIS — M54.16 LUMBAR RADICULOPATHY: ICD-10-CM

## 2020-02-14 DIAGNOSIS — M48.062 SPINAL STENOSIS OF LUMBAR REGION WITH NEUROGENIC CLAUDICATION: ICD-10-CM

## 2020-02-14 DIAGNOSIS — G89.18 POST-OPERATIVE PAIN: ICD-10-CM

## 2020-02-14 RX ORDER — OXYCODONE HYDROCHLORIDE 5 MG/1
5 TABLET ORAL EVERY 6 HOURS PRN
Qty: 45 TABLET | Refills: 0 | Status: SHIPPED | OUTPATIENT
Start: 2020-02-14 | End: 2020-02-26

## 2020-02-14 RX ORDER — AMOXICILLIN 250 MG
1-2 CAPSULE ORAL 2 TIMES DAILY
Qty: 30 TABLET | Refills: 0 | Status: SHIPPED | OUTPATIENT
Start: 2020-02-14 | End: 2020-03-19

## 2020-02-14 RX ORDER — DOCUSATE SODIUM 100 MG/1
100 CAPSULE, LIQUID FILLED ORAL 2 TIMES DAILY
Qty: 20 CAPSULE | Refills: 0 | Status: SHIPPED | OUTPATIENT
Start: 2020-02-14 | End: 2020-03-19

## 2020-02-14 RX ORDER — ONDANSETRON 4 MG/1
4-8 TABLET, ORALLY DISINTEGRATING ORAL EVERY 8 HOURS PRN
Qty: 20 TABLET | Refills: 0 | Status: SHIPPED | OUTPATIENT
Start: 2020-02-14 | End: 2020-06-19

## 2020-02-14 NOTE — TELEPHONE ENCOUNTER
RN sent med refill request to Gosia Winter PA-C to sign and authorize.    Patient it notified via [employer] with instructions to start weaning off and start stretching it as tolerated.    Murray Parker RN

## 2020-02-19 DIAGNOSIS — M54.16 LUMBAR RADICULOPATHY: Primary | ICD-10-CM

## 2020-02-20 DIAGNOSIS — M54.16 LUMBAR RADICULOPATHY: Primary | ICD-10-CM

## 2020-02-21 ENCOUNTER — OFFICE VISIT (OUTPATIENT)
Dept: ORTHOPEDICS | Facility: CLINIC | Age: 50
End: 2020-02-21
Payer: COMMERCIAL

## 2020-02-21 ENCOUNTER — ANCILLARY PROCEDURE (OUTPATIENT)
Dept: MRI IMAGING | Facility: CLINIC | Age: 50
End: 2020-02-21
Attending: ORTHOPAEDIC SURGERY
Payer: COMMERCIAL

## 2020-02-21 ENCOUNTER — ANCILLARY PROCEDURE (OUTPATIENT)
Dept: GENERAL RADIOLOGY | Facility: CLINIC | Age: 50
End: 2020-02-21
Attending: ORTHOPAEDIC SURGERY
Payer: COMMERCIAL

## 2020-02-21 ENCOUNTER — TELEPHONE (OUTPATIENT)
Dept: ORTHOPEDICS | Facility: CLINIC | Age: 50
End: 2020-02-21

## 2020-02-21 VITALS — BODY MASS INDEX: 25.41 KG/M2 | HEIGHT: 74 IN | WEIGHT: 198 LBS

## 2020-02-21 DIAGNOSIS — M54.16 LUMBAR RADICULOPATHY: Primary | ICD-10-CM

## 2020-02-21 DIAGNOSIS — M48.062 SPINAL STENOSIS OF LUMBAR REGION WITH NEUROGENIC CLAUDICATION: ICD-10-CM

## 2020-02-21 DIAGNOSIS — M54.16 LUMBAR RADICULOPATHY: ICD-10-CM

## 2020-02-21 DIAGNOSIS — M51.26 RECURRENT HERNIATION OF LUMBAR DISC: ICD-10-CM

## 2020-02-21 RX ORDER — DIAZEPAM 5 MG
TABLET ORAL
Qty: 1 TABLET | Refills: 0 | Status: SHIPPED | OUTPATIENT
Start: 2020-02-21 | End: 2020-03-19

## 2020-02-21 RX ORDER — GABAPENTIN 100 MG/1
100 CAPSULE ORAL 3 TIMES DAILY PRN
Qty: 120 CAPSULE | Refills: 0 | Status: SHIPPED | OUTPATIENT
Start: 2020-02-21 | End: 2020-03-03

## 2020-02-21 ASSESSMENT — ENCOUNTER SYMPTOMS
DIZZINESS: 0
WEIGHT LOSS: 0
NECK PAIN: 1
SEIZURES: 0
HALLUCINATIONS: 0
MUSCLE WEAKNESS: 1
ARTHRALGIAS: 1
MEMORY LOSS: 0
POLYPHAGIA: 0
INCREASED ENERGY: 0
NIGHT SWEATS: 0
ALTERED TEMPERATURE REGULATION: 0
STIFFNESS: 1
CHILLS: 0
WEIGHT GAIN: 0
MUSCLE CRAMPS: 1
DECREASED APPETITE: 1
INSOMNIA: 1
DISTURBANCES IN COORDINATION: 0
NERVOUS/ANXIOUS: 1
PANIC: 1
HEADACHES: 0
JOINT SWELLING: 0
LOSS OF CONSCIOUSNESS: 0
FEVER: 0
BACK PAIN: 1
MYALGIAS: 1
POLYDIPSIA: 0
FATIGUE: 1
DEPRESSION: 1

## 2020-02-21 ASSESSMENT — MIFFLIN-ST. JEOR: SCORE: 1822.87

## 2020-02-21 NOTE — TELEPHONE ENCOUNTER
RN returned call and spoke with Rosalina. Told Rosalina the valium is a one time thing for patient's injection. Gabapentin and oxycodone will remain the same. Rosalina verbalized understanding.    Murray Parker RN      [facility] Call Center    Phone Message    May a detailed message be left on voicemail: yes     Reason for Call: Medication Question or concern regarding medication   Prescription Clarification  Name of Medication: diazepam 5 MG PO tablet, gabapentin 100 MG PO capsule, and oxyCODONE (ROXICODONE) 5 MG tablet  Prescribing Provider: In order; TORSTEN Rivers, Dr. Dexter, and TORSTEN Dickinson   Pharmacy: [facility] [id number]   What on the order needs clarification? Rosalina with  pharmacy states there is a drug interaction between both the diazepam and gabapentin when combined with the oxycodone. Rosalina states she is needing a provider approval to override this, per insurance regulations. Please advise.    Action Taken: Message routed to:  [facility]o    Travel Screening: Not Applicable

## 2020-02-21 NOTE — PROGRESS NOTES
Spine Surgery Return Clinic Visit      Chief Complaint:   RECHECK (4 week POP L4-5 decompression )      Interval HPI:  Symptom Profile Including: location of symptoms, onset, severity, exacerbating/alleviating factors, previous treatments:        Jas Patel is a 49 year old male who presents today for 6-week follow-up visit after L4-5 decompression and discectomy performed on 1/23/2020.  Patient reports that he was doing significantly better after his surgery for about 9 days.  He woke up on Sunday morning of day 10 and started noticing a similar pain developing from low back to right gluteal wrapping around to inguinal and down to his knee, lateral tibia and tingling with decreased sensation to bottom of his foot, in the same pattern as preop.  Leg pain is far greater than back pain.  Much milder symptoms are also apparent on the left side  He does report a slip on the ice and a fall about 3 days later but did not note any worsening of symptoms at that time.  He had been tapering his pain medications, but increasing pain has been continuing to take oxycodone 5 mg tablets about every 6 hours.  He is having difficulty tapering given his current situation.          Past Medical History:     Past Medical History:   Diagnosis Date     Benign essential tremor 11/2/2016     BPH (benign prostatic hyperplasia) 10/28/2015     Gastroesophageal reflux disease without esophagitis 10/28/2015     Generalized anxiety disorder 10/26/2011     Herniated disc     in neck     Lumbar radiculopathy 12/20/2019     Moderate major depression (H) 11/22/2010            Past Surgical History:     Past Surgical History:   Procedure Laterality Date     DECOMPRESSION LUMBAR ONE LEVEL N/A 1/23/2020    Procedure: Lumbar 4 to 5 Decompression and Discectomy;  Surgeon: Juvenal Dexter MD;  Location: [facility] OR     DRAIN SKIN ABSCESS COMPLIC      left thigh I and D            Social History:     Social History     Tobacco Use     Smoking  "status: Former Smoker     Packs/day: 0.20     Years: 10.00     Pack years: 2.00     Types: Cigarettes     Start date: 1/28/2015     Smokeless tobacco: Never Used   Substance Use Topics     Alcohol use: Yes     Comment: 1 drink a day            Family History:     Family History   Problem Relation Age of Onset     Hypertension Father      C.A.D. Maternal Grandfather      Hypertension Maternal Grandfather      Cerebrovascular Disease Other         great uncle     Diabetes Paternal Uncle         type 2     Diabetes Other         great aunt     Prostate Cancer Other         great uncle moms side     Alzheimer Disease Other         great uncle moms side            Allergies:     Allergies   Allergen Reactions     No Known Drug Allergies             Medications:     Current Outpatient Medications   Medication     amitriptyline (ELAVIL) 25 MG tablet     Cholecalciferol (VITAMIN D3 PO)     citalopram (CELEXA) 40 MG tablet     diazepam (VALIUM) 5 MG tablet     docusate sodium (COLACE) 100 MG capsule     HYDROcodone-acetaminophen (LORTAB) 5-325 MG tablet     loratadine (CLARITIN) 10 MG tablet     methocarbamol (ROBAXIN) 500 MG tablet     methylPREDNISolone (MEDROL DOSEPAK) 4 MG tablet therapy pack     omeprazole (PRILOSEC) 20 MG DR capsule     ondansetron (ZOFRAN-ODT) 4 MG ODT tab     oxybutynin ER (DITROPAN-XL) 10 MG 24 hr tablet     oxyCODONE (ROXICODONE) 5 MG tablet     polyethylene glycol (MIRALAX/GLYCOLAX) packet     propranolol (INDERAL) 10 MG tablet     senna-docusate (SENOKOT-S/PERICOLACE) 8.6-50 MG tablet     tamsulosin (FLOMAX) 0.4 MG capsule     No current facility-administered medications for this visit.              Review of Systems:   A focused musculoskeletal and neurologic ROS was performed with pertinent positives and negatives noted in the HPI.  Additional systems were also reviewed and are documented at the bottom of the note.         Physical Exam:   Vitals: Ht 1.88 m (6' 2\")   Wt 89.8 kg (198 lb)   BMI " 25.42 kg/m    Musculoskeletal, Neurologic, and Spine:   Spine Surgery Return Clinic Visit    Patient is in no acute distress in the exam room.  He is alert and oriented x3.  He stands with a normal erect posture.  Well-healed midline lumbar scar  He exhibits benign essential tremor  Transitional moves are performed with light accessory use of the arms.  He ambulates with a normal heel toe gait.  He can stand on his toes but is unable to raise his forefoot or great toe on the right against body weight.     Lumbar Spine:    Appearance - No gross stepoffs or deformities    Motor -     L2-3: Hip flexion 5/5 R and 5/5 L strength          L3/4:  Knee extension R 5/5 and L 5/5 strength         L4/5:  Foot dorsiflexion R 4/5 L 5/5 and       EHL dorsiflexion R 4/5 L 5/5 strength         S1:  Plantarflexion/Peroneal Muscles  R 5/5 and L 5/5 strength    Sensation: intact to light touch L3-S1 distribution BLE, EXCEPT: Decreased sensation to right lateral calf and medial and lateral right foot.     ROM: He can forward flex to bring fingertips to his knees.  This elicits pain down his leg through gluteals to right lateral tibia.  He can extend about 20 degrees without difficulty.  He can lean 30 degrees bilaterally and rotate 45 degrees bilaterally.      Neurologic:      REFLEXES Left Right   Biceps 1+ 1+   Triceps 1+ 1+   Brachioradialis 1+ 1+   Patella 1+ 1+   Ankle jerk 1+ 1+   Babinski No upgoing great toe No upgoing great toe   Clonus 0 beats 0 beats     Hip Exam:   Inguinal pain and gluteal pain with right hip log roll and no tenderness over the greater trochanters.    Nerve tension sign positive on the right with radiating pain to right lateral tibia.  Femoral stretch negative bilaterally.  Tenderness: Minimal at lumbar paraspinal musculature and lumbosacral junction.  Moderate at right deep gluteals.  Nontender at sacroiliac joints.    Alignment:  Patient stands with a neutral standing sagittal balance.         Imaging:    We ordered and independently reviewed new AP and lateral lumbar radiographs at this clinic visit. The results were discussed with the patient. Findings include:   5 lumbar vertebral bodies in generally good alignment.  Multilevel degenerative changes most pronounced at L5-S1 with moderate disc space loss.  Lower lumbar facet arthropathy.  Calcifications of abdominal aorta.  Evidence of bilateral hip arthritis, left greater than right.  MRI of the lumbar spine obtained 2/20/2020 demonstrates:  Postsurgical changes of laminectomy at L4-5.  Small fluid collection with subcutaneous edema noted.  Large posterior disc extrusion at L4-5, slightly right-sided   L5-S1: small posterior disc bulge with minimal abutment of traversing left S1 nerve root.  Moderate to severe bilateral foraminal stenosis.       Assessment and Plan:     49 year old male with new L4-5 disc herniation after prior L4-5 decompression.    Staff statement:  Repeat MRI today shows recurrence of the previous disc herniation.  He is having similar symptoms to before surgery.  He did have a 10-day symptom-free interval after the operation and we documented significant disc removal at the time of his surgery so I do not think this is residual disc, but rather it is likely a repeat herniation.  I would like him to try a short course of conservative management.  We discussed that if things do not improve he might be a further candidate for surgery.  I would like him back into therapy and try another injection as well as gabapentin.  I will see him back in a few weeks to assess his progress.      Attending MD (Dr. Juvenal Dexter) :  I reviewed and verified the history and physical exam of the patient and discussed the patient's management with the other clinical providers involved in this patient's care including any involved residents or physicians assistants. I reviewed the above note and agree with the documented findings and plan of care, which were  communicated to the patient.      Juvenal Dexter MD      Respectfully,  Juvenal Dexter MD  Spine Surgery  [facility]    Answers for HPI/ROS submitted by the patient on 2/21/2020   General Symptoms: Yes  Skin Symptoms: No  HENT Symptoms: No  EYE SYMPTOMS: No  HEART SYMPTOMS: No  LUNG SYMPTOMS: No  INTESTINAL SYMPTOMS: No  URINARY SYMPTOMS: No  REPRODUCTIVE SYMPTOMS: No  SKELETAL SYMPTOMS: Yes  BLOOD SYMPTOMS: No  NERVOUS SYSTEM SYMPTOMS: Yes  MENTAL HEALTH SYMPTOMS: Yes  Fever: No  Loss of appetite: Yes  Weight loss: No  Weight gain: No  Fatigue: Yes  Night sweats: No  Chills: No  Increased stress: Yes  Excessive hunger: No  Excessive thirst: No  Feeling hot or cold when others believe the temperature is normal: No  Loss of height: No  Post-operative complications: Yes  Surgical site pain: Yes  Hallucinations: No  Change in or Loss of Energy: No  Hyperactivity: No  Confusion: No  Back pain: Yes  Muscle aches: Yes  Neck pain: Yes  Swollen joints: No  Joint pain: Yes  Bone pain: No  Muscle cramps: Yes  Muscle weakness: Yes  Joint stiffness: Yes  Bone fracture: No  Trouble with coordination: No  Dizziness or trouble with balance: No  Fainting or black-out spells: No  Memory loss: No  Headache: No  Seizures: No  Nervous or Anxious: Yes  Depression: Yes  Trouble sleeping: Yes  Mood changes: No  Panic attacks: Yes

## 2020-02-21 NOTE — TELEPHONE ENCOUNTER
Per patient request, RN send Valium one time refill for patient for pre injection. Valium 5mg. Send refill to Reynold Pruett to sign and authorize.    Murray Parker RN

## 2020-02-21 NOTE — NURSING NOTE
RN faxed PT orders to [facility] and [facility].  Refill Gabapentin.  Also Valium for prior to injection to Reynold Pruett to sign and authorize.    Murray Parker RN

## 2020-02-21 NOTE — NURSING NOTE
"Reason For Visit:   Chief Complaint   Patient presents with     RECHECK     4 week POP L4-5 decompression        Primary MD: Lola Perez  Ref. MD: Moses   Date of surgery: Dr. Dexter   Type of surgery: Dr. Dexter .  Smoker: No  Request smoking cessation information: No    Ht 1.88 m (6' 2\")   Wt 89.8 kg (198 lb)   BMI 25.42 kg/m           Oswestry (LI) Questionnaire    OSWESTRY DISABILITY INDEX 12/20/2019   Count 8   Sum 18   Oswestry Score (%) 45   Some recent data might be hidden            Neck Disability Index (NDI) Questionnaire    No flowsheet data found.                Promis 10 Assessment    No flowsheet data found.             Deejay Pierce, ATC  "

## 2020-02-21 NOTE — LETTER
2/21/2020       RE: Jas Patel  [address]  [facility] [address]     Dear Colleague,    Thank you for referring your patient, Jas Patel, to the [facility] at [facility] Please see a copy of my visit note below.    Spine Surgery Return Clinic Visit      Chief Complaint:   RECHECK (4 week POP L4-5 decompression )      Interval HPI:  Symptom Profile Including: location of symptoms, onset, severity, exacerbating/alleviating factors, previous treatments:        Jas Patel is a 49 year old male who presents today for 6-week follow-up visit after L4-5 decompression and discectomy performed on 1/23/2020.  Patient reports that he was doing significantly better after his surgery for about 9 days.  He woke up on Sunday morning of day 10 and started noticing a similar pain developing from low back to right gluteal wrapping around to inguinal and down to his knee, lateral tibia and tingling with decreased sensation to bottom of his foot, in the same pattern as preop.  Leg pain is far greater than back pain.  Much milder symptoms are also apparent on the left side  He does report a slip on the ice and a fall about 3 days later but did not note any worsening of symptoms at that time.  He had been tapering his pain medications, but increasing pain has been continuing to take oxycodone 5 mg tablets about every 6 hours.  He is having difficulty tapering given his current situation.          Past Medical History:     Past Medical History:   Diagnosis Date     Benign essential tremor 11/2/2016     BPH (benign prostatic hyperplasia) 10/28/2015     Gastroesophageal reflux disease without esophagitis 10/28/2015     Generalized anxiety disorder 10/26/2011     Herniated disc     in neck     Lumbar radiculopathy 12/20/2019     Moderate major depression (H) 11/22/2010            Past Surgical History:     Past Surgical History:   Procedure Laterality Date      DECOMPRESSION LUMBAR ONE LEVEL N/A 1/23/2020    Procedure: Lumbar 4 to 5 Decompression and Discectomy;  Surgeon: Juvenal Dexter MD;  Location: [facility]     DRAIN SKIN ABSCESS COMPLIC      left thigh I and D            Social History:     Social History     Tobacco Use     Smoking status: Former Smoker     Packs/day: 0.20     Years: 10.00     Pack years: 2.00     Types: Cigarettes     Start date: 1/28/2015     Smokeless tobacco: Never Used   Substance Use Topics     Alcohol use: Yes     Comment: 1 drink a day            Family History:     Family History   Problem Relation Age of Onset     Hypertension Father      C.A.D. Maternal Grandfather      Hypertension Maternal Grandfather      Cerebrovascular Disease Other         great uncle     Diabetes Paternal Uncle         type 2     Diabetes Other         great aunt     Prostate Cancer Other         great uncle moms side     Alzheimer Disease Other         great uncle moms side            Allergies:     Allergies   Allergen Reactions     No Known Drug Allergies             Medications:     Current Outpatient Medications   Medication     amitriptyline (ELAVIL) 25 MG tablet     Cholecalciferol (VITAMIN D3 PO)     citalopram (CELEXA) 40 MG tablet     diazepam (VALIUM) 5 MG tablet     docusate sodium (COLACE) 100 MG capsule     HYDROcodone-acetaminophen (LORTAB) 5-325 MG tablet     loratadine (CLARITIN) 10 MG tablet     methocarbamol (ROBAXIN) 500 MG tablet     methylPREDNISolone (MEDROL DOSEPAK) 4 MG tablet therapy pack     omeprazole (PRILOSEC) 20 MG DR capsule     ondansetron (ZOFRAN-ODT) 4 MG ODT tab     oxybutynin ER (DITROPAN-XL) 10 MG 24 hr tablet     oxyCODONE (ROXICODONE) 5 MG tablet     polyethylene glycol (MIRALAX/GLYCOLAX) packet     propranolol (INDERAL) 10 MG tablet     senna-docusate (SENOKOT-S/PERICOLACE) 8.6-50 MG tablet     tamsulosin (FLOMAX) 0.4 MG capsule     No current facility-administered medications for this visit.              Review of  "Systems:   A focused musculoskeletal and neurologic ROS was performed with pertinent positives and negatives noted in the HPI.  Additional systems were also reviewed and are documented at the bottom of the note.         Physical Exam:   Vitals: Ht 1.88 m (6' 2\")   Wt 89.8 kg (198 lb)   BMI 25.42 kg/m     Musculoskeletal, Neurologic, and Spine:   Spine Surgery Return Clinic Visit    Patient is in no acute distress in the exam room.  He is alert and oriented x3.  He stands with a normal erect posture.  Well-healed midline lumbar scar  He exhibits benign essential tremor  Transitional moves are performed with light accessory use of the arms.  He ambulates with a normal heel toe gait.  He can stand on his toes but is unable to raise his forefoot or great toe on the right against body weight.     Lumbar Spine:    Appearance - No gross stepoffs or deformities    Motor -     L2-3: Hip flexion 5/5 R and 5/5 L strength          L3/4:  Knee extension R 5/5 and L 5/5 strength         L4/5:  Foot dorsiflexion R 4/5 L 5/5 and       EHL dorsiflexion R 4/5 L 5/5 strength         S1:  Plantarflexion/Peroneal Muscles  R 5/5 and L 5/5 strength    Sensation: intact to light touch L3-S1 distribution BLE, EXCEPT: Decreased sensation to right lateral calf and medial and lateral right foot.     ROM: He can forward flex to bring fingertips to his knees.  This elicits pain down his leg through gluteals to right lateral tibia.  He can extend about 20 degrees without difficulty.  He can lean 30 degrees bilaterally and rotate 45 degrees bilaterally.      Neurologic:      REFLEXES Left Right   Biceps 1+ 1+   Triceps 1+ 1+   Brachioradialis 1+ 1+   Patella 1+ 1+   Ankle jerk 1+ 1+   Babinski No upgoing great toe No upgoing great toe   Clonus 0 beats 0 beats     Hip Exam:   Inguinal pain and gluteal pain with right hip log roll and no tenderness over the greater trochanters.    Nerve tension sign positive on the right with radiating pain to " right lateral tibia.  Femoral stretch negative bilaterally.  Tenderness: Minimal at lumbar paraspinal musculature and lumbosacral junction.  Moderate at right deep gluteals.  Nontender at sacroiliac joints.    Alignment:  Patient stands with a neutral standing sagittal balance.         Imaging:   We ordered and independently reviewed new AP and lateral lumbar radiographs at this clinic visit. The results were discussed with the patient. Findings include:   5 lumbar vertebral bodies in generally good alignment.  Multilevel degenerative changes most pronounced at L5-S1 with moderate disc space loss.  Lower lumbar facet arthropathy.  Calcifications of abdominal aorta.  Evidence of bilateral hip arthritis, left greater than right.  MRI of the lumbar spine obtained 2/20/2020 demonstrates:  Postsurgical changes of laminectomy at L4-5.  Small fluid collection with subcutaneous edema noted.  Large posterior disc extrusion at L4-5, slightly right-sided   L5-S1: small posterior disc bulge with minimal abutment of traversing left S1 nerve root.  Moderate to severe bilateral foraminal stenosis.     Assessment and Plan:     49 year old male with new L4-5 disc herniation after prior L4-5 decompression.    Staff statement:  Repeat MRI today shows recurrence of the previous disc herniation.  He is having similar symptoms to before surgery.  He did have a 10-day symptom-free interval after the operation and we documented significant disc removal at the time of his surgery so I do not think this is residual disc, but rather it is likely a repeat herniation.  I would like him to try a short course of conservative management.  We discussed that if things do not improve he might be a further candidate for surgery.  I would like him back into therapy and try another injection as well as gabapentin.  I will see him back in a few weeks to assess his progress.      Attending MD (Dr. Juvenal Dexter) :  I reviewed and verified the  history and physical exam of the patient and discussed the patient's management with the other clinical providers involved in this patient's care including any involved residents or physicians assistants. I reviewed the above note and agree with the documented findings and plan of care, which were communicated to the patient.      Juvenal Dexter MD

## 2020-02-25 DIAGNOSIS — M54.16 LUMBAR RADICULOPATHY: Primary | ICD-10-CM

## 2020-02-26 DIAGNOSIS — G89.18 POST-OPERATIVE PAIN: ICD-10-CM

## 2020-02-26 DIAGNOSIS — M48.062 SPINAL STENOSIS OF LUMBAR REGION WITH NEUROGENIC CLAUDICATION: ICD-10-CM

## 2020-02-26 RX ORDER — OXYCODONE HYDROCHLORIDE 5 MG/1
5 TABLET ORAL EVERY 6 HOURS PRN
Qty: 45 TABLET | Refills: 0 | Status: SHIPPED | OUTPATIENT
Start: 2020-02-26 | End: 2020-03-09

## 2020-02-26 NOTE — TELEPHONE ENCOUNTER
RN sent med refill to Reynold Peralta to sign and authorize.  Patient is preparing for a fusion after having a recurrences of disc herniation from fall.    Patient is notified.    Murray Parker RN

## 2020-03-03 ENCOUNTER — NURSE TRIAGE (OUTPATIENT)
Dept: NURSING | Facility: CLINIC | Age: 50
End: 2020-03-03

## 2020-03-03 ENCOUNTER — OFFICE VISIT (OUTPATIENT)
Dept: URGENT CARE | Facility: URGENT CARE | Age: 50
End: 2020-03-03
Payer: COMMERCIAL

## 2020-03-03 VITALS
HEIGHT: 74 IN | WEIGHT: 190 LBS | OXYGEN SATURATION: 98 % | SYSTOLIC BLOOD PRESSURE: 112 MMHG | DIASTOLIC BLOOD PRESSURE: 66 MMHG | RESPIRATION RATE: 16 BRPM | TEMPERATURE: 98.2 F | BODY MASS INDEX: 24.38 KG/M2 | HEART RATE: 63 BPM

## 2020-03-03 DIAGNOSIS — L02.32 BOIL, BUTTOCK: Primary | ICD-10-CM

## 2020-03-03 PROCEDURE — 99213 OFFICE O/P EST LOW 20 MIN: CPT | Performed by: NURSE PRACTITIONER

## 2020-03-03 RX ORDER — CEPHALEXIN 500 MG/1
500 CAPSULE ORAL 3 TIMES DAILY
Qty: 30 CAPSULE | Refills: 0 | Status: SHIPPED | OUTPATIENT
Start: 2020-03-03 | End: 2020-06-19

## 2020-03-03 ASSESSMENT — ENCOUNTER SYMPTOMS
NEUROLOGICAL NEGATIVE: 1
FEVER: 0
CARDIOVASCULAR NEGATIVE: 1
CHILLS: 0
MUSCULOSKELETAL NEGATIVE: 1

## 2020-03-03 ASSESSMENT — MIFFLIN-ST. JEOR: SCORE: 1786.58

## 2020-03-03 NOTE — PROGRESS NOTES
HPI  Jas Patel 49 year old presents to the urgent care with chief complaint of a draining boil on his right buttock.  He states that he noticed it about a week and a half ago and it has been draining now for 2 or 3 days.  He reports that this is a recurrent problem moderate for 5 years.  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  He has not initiated any treatments or home remedies prior to his arrival in the urgent care.    Review of Systems   Constitutional: Negative for chills and fever.   Cardiovascular: Negative.    Musculoskeletal: Negative.    Skin:        Draining boil right buttock   Neurological: Negative.    Endo/Heme/Allergies: Negative.      Past Medical History:   Diagnosis Date     Benign essential tremor 11/2/2016     BPH (benign prostatic hyperplasia) 10/28/2015     Gastroesophageal reflux disease without esophagitis 10/28/2015     Generalized anxiety disorder 10/26/2011     Herniated disc     in neck     Lumbar radiculopathy 12/20/2019     Moderate major depression (H) 11/22/2010     Patient Active Problem List   Diagnosis     CARDIOVASCULAR SCREENING; LDL GOAL LESS THAN 160     Moderate major depression (H)     Generalized anxiety disorder     Gastroesophageal reflux disease without esophagitis     BPH (benign prostatic hyperplasia)     Chronic pain syndrome     Benign essential tremor     Lumbago     Lumbar radiculopathy      Patient Active Problem List   Diagnosis     CARDIOVASCULAR SCREENING; LDL GOAL LESS THAN 160     Moderate major depression (H)     Generalized anxiety disorder     Gastroesophageal reflux disease without esophagitis     BPH (benign prostatic hyperplasia)     Chronic pain syndrome     Benign essential tremor     Lumbago     Lumbar radiculopathy     Past Surgical History:   Procedure Laterality Date     DECOMPRESSION LUMBAR ONE LEVEL N/A 1/23/2020    Procedure: Lumbar 4 to 5 Decompression and Discectomy;  Surgeon: Juvenal Dexter MD;  Location:   "OR     DRAIN SKIN ABSCESS COMPLIC      left thigh I and D     Allergies   Allergen Reactions     No Known Drug Allergies      Current Outpatient Medications   Medication     amitriptyline (ELAVIL) 25 MG tablet     cephALEXin (KEFLEX) 500 MG capsule     Cholecalciferol (VITAMIN D3 PO)     citalopram (CELEXA) 40 MG tablet     oxybutynin ER (DITROPAN-XL) 10 MG 24 hr tablet     propranolol (INDERAL) 10 MG tablet     senna-docusate (SENOKOT-S/PERICOLACE) 8.6-50 MG tablet     diazepam 5 MG PO tablet     docusate sodium (COLACE) 100 MG capsule     HYDROcodone-acetaminophen (LORTAB) 5-325 MG tablet     loratadine (CLARITIN) 10 MG tablet     methocarbamol (ROBAXIN) 500 MG tablet     methylPREDNISolone (MEDROL DOSEPAK) 4 MG tablet therapy pack     omeprazole (PRILOSEC) 20 MG DR capsule     ondansetron (ZOFRAN-ODT) 4 MG ODT tab     oxyCODONE (ROXICODONE) 5 MG tablet     polyethylene glycol (MIRALAX/GLYCOLAX) packet     tamsulosin (FLOMAX) 0.4 MG capsule     No current facility-administered medications for this visit.          Physical Exam  Vitals signs and nursing note reviewed.   Constitutional:       General: He is not in acute distress.     Comments: /66   Pulse 63   Temp 98.2  F (36.8  C) (Oral)   Resp 16   Ht 1.88 m (6' 2\")   Wt 86.2 kg (190 lb)   SpO2 98%   BMI 24.39 kg/m       HENT:      Head: Normocephalic.   Cardiovascular:      Rate and Rhythm: Normal rate.   Pulmonary:      Effort: Pulmonary effort is normal.   Skin:     Findings: Lesion and rash present. Rash is pustular.          Neurological:      Mental Status: He is alert and oriented to person, place, and time.       Assessment:  1. Boil, buttock        Plan:  Orders Placed This Encounter     cephALEXin (KEFLEX) 500 MG capsule   Warm moist packs. Start antibiotic tonight  Instructions regarding self-care of patient/child reviewed.   Written instructions provided in after visit summary and reviewed.  Patient instructed to see primary care provider " for new or persistent symptoms.   Red flag symptoms reviewed and patient has been instructed to seek emergent care  Please contact pharmacy for medication questions.  Patient instructed to take medications as directed on package.      Nel Rodriguez, DNP, APRN, CNP

## 2020-03-03 NOTE — TELEPHONE ENCOUNTER
"Patient states has a \"quarter sized cyst or boil\" on buttocks x 1 week that has been getting worse.  Describes having an episode of \"chills\" 1 week ago.   States 2 days ago boil increased in size to \"an egg.\" Used warm compresses. Since has noted drainage from site with foul smelling pus.  Currently afebrile.   Describes site as tender to touch a \"2\" on a 1-10 pain scale.    Caller requests information re scope of practice at [facility] and if appropriate with Patient's symptoms.    Protocol-  Boil (Skin Abscess)- Adult  Care advice reviewed.   Disposition-  See Physician within 24 hours  Caller states understanding of the recommended disposition.   Caller plans to go to [facility].   Advised to call back if further questions or concerns.     KATHY PooleN RN  [facility]     Reason for Disposition    Boil > 2 inches across (> 5 cm; larger than a golf ball or ping pong ball)    Additional Information    Negative: [1] Widespread rash AND [2] bright red, sunburn-like AND [3] too weak to stand    Negative: Sounds like a life-threatening emergency to the triager    Negative: Widespread red rash    Negative: Black (necrotic) color or blisters develop in wound    Negative: Patient sounds very sick or weak to the triager    Negative: SEVERE pain (e.g., excruciating)    Negative: Red streak from area of infection    Negative: Fever > 100.5 F (38.1 C)    Protocols used: BOIL (SKIN ABSCESS)-A-AH    "

## 2020-03-03 NOTE — PATIENT INSTRUCTIONS
Patient Education     Understanding Carbuncles    A carbuncle is a painful boil under the skin. It happens when a group of hair follicles become infected. Follicles are the tiny holes from which hair grows out of your skin.  How to say it  Griselda   What causes carbuncles?  A carbuncle is caused by an infection with the bacteria Staphylococcus aureus. They are common on areas of the body where friction and sweat occur. They usually appear on the back of the neck, back, and thighs. This type of infection can also happen when the skin is injured, such as by a cut or bug bite.  The bacteria that causes carbuncles can spread easily from person to person. People at higher risk for boils are those with diabetes or a weak immune system. Drug users who use needles are also more likely to get them.  Symptoms of carbuncles  A carbuncle starts as a small painful bump. But it can grow quickly. It may become:    Red    Swollen    Tender  Carbuncles may ooze pus. They may also cause fever and a general feeling of illness.  Treatment for carbuncles  A carbuncle may heal on its own in a few weeks. But the pus within it needs to come out first. Treatment options include:    Warm compress. Putting a warm, wet washcloth on the boil will help the pus drain out. You should never try to pop a boil. That can cause the infection to spread.    Surgical drainage. If the boil doesn t drain on its own, your healthcare provider may need to cut into it.    Antibiotics. In some cases, oral antibiotics may be prescribed to fight the infection, especially if the carbuncle returns. You will likely have to take the medicine for 5 to 7 days. You may need to take it longer for a severe case.    Good hygiene. Proper handwashing can prevent boils from spreading and coming back. Also wash things that have been in contact with the carbuncle in hot water. This includes items such as clothing and towels.  Complications of carbuncles  The main  complication of a carbuncle is the spreading of the infection. The bacteria can infect the heart and bone. It can also lead to septic shock, an infection of the entire body.  When to call your healthcare provider  Call your healthcare provider right away if you have any of these:    Fever of 100.4 F (38 C) or higher, or as directed    Redness, swelling, or fluid leaking from a carbuncle that gets worse    Pain that gets worse    Symptoms that don t get better, or get worse    New symptoms   Date Last Reviewed: 5/1/2016 2000-2019 [employer]. [address] [place] [address]. All rights reserved. This information is not intended as a substitute for professional medical care. Always follow your healthcare professional's instructions.

## 2020-03-06 ENCOUNTER — TRANSFERRED RECORDS (OUTPATIENT)
Dept: HEALTH INFORMATION MANAGEMENT | Facility: CLINIC | Age: 50
End: 2020-03-06

## 2020-03-09 DIAGNOSIS — G89.18 POST-OPERATIVE PAIN: ICD-10-CM

## 2020-03-09 DIAGNOSIS — M48.062 SPINAL STENOSIS OF LUMBAR REGION WITH NEUROGENIC CLAUDICATION: ICD-10-CM

## 2020-03-09 RX ORDER — OXYCODONE HYDROCHLORIDE 5 MG/1
5 TABLET ORAL EVERY 6 HOURS PRN
Qty: 45 TABLET | Refills: 0 | Status: SHIPPED | OUTPATIENT
Start: 2020-03-09 | End: 2020-03-20

## 2020-03-09 NOTE — TELEPHONE ENCOUNTER
RN sent med refill request to Gosia Flores to sign and authorize.    RN notified patient via [employer].

## 2020-03-17 ENCOUNTER — ANCILLARY PROCEDURE (OUTPATIENT)
Dept: GENERAL RADIOLOGY | Facility: CLINIC | Age: 50
End: 2020-03-17
Attending: ORTHOPAEDIC SURGERY
Payer: COMMERCIAL

## 2020-03-17 ENCOUNTER — OFFICE VISIT (OUTPATIENT)
Dept: ORTHOPEDICS | Facility: CLINIC | Age: 50
End: 2020-03-17
Payer: COMMERCIAL

## 2020-03-17 VITALS — DIASTOLIC BLOOD PRESSURE: 84 MMHG | SYSTOLIC BLOOD PRESSURE: 138 MMHG | HEART RATE: 72 BPM

## 2020-03-17 DIAGNOSIS — M54.16 LUMBAR RADICULOPATHY: Primary | ICD-10-CM

## 2020-03-17 DIAGNOSIS — M48.062 SPINAL STENOSIS OF LUMBAR REGION WITH NEUROGENIC CLAUDICATION: ICD-10-CM

## 2020-03-17 ASSESSMENT — ENCOUNTER SYMPTOMS
MEMORY LOSS: 0
PARALYSIS: 0
DISTURBANCES IN COORDINATION: 0
JOINT SWELLING: 0
NUMBNESS: 1
SEIZURES: 0
SPEECH CHANGE: 0
BACK PAIN: 1
STIFFNESS: 1
HEADACHES: 0
MUSCLE WEAKNESS: 1
MYALGIAS: 1
WEAKNESS: 1
MUSCLE CRAMPS: 1
TREMORS: 1
TINGLING: 1
LOSS OF CONSCIOUSNESS: 0
NECK PAIN: 1
ARTHRALGIAS: 1
DIZZINESS: 0

## 2020-03-17 ASSESSMENT — PAIN SCALES - GENERAL: PAINLEVEL: SEVERE PAIN (7)

## 2020-03-17 NOTE — PROGRESS NOTES
Reason For Visit:   Chief Complaint   Patient presents with     Spine - RECHECK     Follow Up     3 week post op        Primary MD: Lola Perez  Ref. MD:             There were no vitals taken for this visit.         Oswestry (LI) Questionnaire    OSWESTRY DISABILITY INDEX 3/17/2020   Count 9   Sum 20   Oswestry Score (%) 44.44   Some recent data might be hidden            Neck Disability Index (NDI) Questionnaire    No flowsheet data found.                Promis 10 Assessment    PROMIS 10 3/17/2020   In general, would you say your health is: Very good   In general, would you say your quality of life is: Very good   In general, how would you rate your physical health? Good   In general, how would you rate your mental health, including your mood and your ability to think? Good   In general, how would you rate your satisfaction with your social activities and relationships? Good   In general, please rate how well you carry out your usual social activities and roles Fair   To what extent are you able to carry out your everyday physical activities such as walking, climbing stairs, carrying groceries, or moving a chair? Moderately   How often have you been bothered by emotional problems such as feeling anxious, depressed or irritable? Rarely   How would you rate your fatigue on average? Mild   How would you rate your pain on average?   0 = No Pain  to  10 = Worst Imaginable Pain 7   In general, would you say your health is: 4   In general, would you say your quality of life is: 4   In general, how would you rate your physical health? 3   In general, how would you rate your mental health, including your mood and your ability to think? 3   In general, how would you rate your satisfaction with your social activities and relationships? 3   In general, please rate how well you carry out your usual social activities and roles. (This includes activities at home, at work and in your community, and responsibilities as  a parent, child, spouse, employee, friend, etc.) 2   To what extent are you able to carry out your everyday physical activities such as walking, climbing stairs, carrying groceries, or moving a chair? 3   In the past 7 days, how often have you been bothered by emotional problems such as feeling anxious, depressed, or irritable? 2   In the past 7 days, how would you rate your fatigue on average? 2   In the past 7 days, how would you rate your pain on average, where 0 means no pain, and 10 means worst imaginable pain? 7   Global Mental Health Score 14   Global Physical Health Score 12   PROMIS TOTAL - SUBSCORES 26   Some recent data might be hidden                Mook Solis CMA    Answers for HPI/ROS submitted by the patient on 3/17/2020   General Symptoms: No  Skin Symptoms: No  HENT Symptoms: No  EYE SYMPTOMS: No  HEART SYMPTOMS: No  LUNG SYMPTOMS: No  INTESTINAL SYMPTOMS: No  URINARY SYMPTOMS: No  REPRODUCTIVE SYMPTOMS: No  SKELETAL SYMPTOMS: Yes  BLOOD SYMPTOMS: No  NERVOUS SYSTEM SYMPTOMS: Yes  MENTAL HEALTH SYMPTOMS: No  Back pain: Yes  Muscle aches: Yes  Neck pain: Yes  Swollen joints: No  Joint pain: Yes  Bone pain: No  Muscle cramps: Yes  Muscle weakness: Yes  Joint stiffness: Yes  Bone fracture: No  Trouble with coordination: No  Dizziness or trouble with balance: No  Fainting or black-out spells: No  Memory loss: No  Headache: No  Seizures: No  Speech problems: No  Tingling: Yes  Tremor: Yes  Weakness: Yes  Difficulty walking: No  Paralysis: No  Numbness: Yes

## 2020-03-17 NOTE — LETTER
3/17/2020       RE: Jas Patel  [address]  [facility] [address]     Dear Colleague,    Thank you for referring your patient, Jas Patel, to the [facility] at [facility]. Please see a copy of my visit note below.    Reason For Visit:   Chief Complaint   Patient presents with     Spine - RECHECK     Follow Up     3 week post op        Primary MD: Lola Perez  Ref. MD:             There were no vitals taken for this visit.         Oswestry (LI) Questionnaire    OSWESTRY DISABILITY INDEX 3/17/2020   Count 9   Sum 20   Oswestry Score (%) 44.44   Some recent data might be hidden            Neck Disability Index (NDI) Questionnaire    No flowsheet data found.                Promis 10 Assessment    PROMIS 10 3/17/2020   In general, would you say your health is: Very good   In general, would you say your quality of life is: Very good   In general, how would you rate your physical health? Good   In general, how would you rate your mental health, including your mood and your ability to think? Good   In general, how would you rate your satisfaction with your social activities and relationships? Good   In general, please rate how well you carry out your usual social activities and roles Fair   To what extent are you able to carry out your everyday physical activities such as walking, climbing stairs, carrying groceries, or moving a chair? Moderately   How often have you been bothered by emotional problems such as feeling anxious, depressed or irritable? Rarely   How would you rate your fatigue on average? Mild   How would you rate your pain on average?   0 = No Pain  to  10 = Worst Imaginable Pain 7   In general, would you say your health is: 4   In general, would you say your quality of life is: 4   In general, how would you rate your physical health? 3   In general, how would you rate your mental health, including your mood and your ability to  think? 3   In general, how would you rate your satisfaction with your social activities and relationships? 3   In general, please rate how well you carry out your usual social activities and roles. (This includes activities at home, at work and in your community, and responsibilities as a parent, child, spouse, employee, friend, etc.) 2   To what extent are you able to carry out your everyday physical activities such as walking, climbing stairs, carrying groceries, or moving a chair? 3   In the past 7 days, how often have you been bothered by emotional problems such as feeling anxious, depressed, or irritable? 2   In the past 7 days, how would you rate your fatigue on average? 2   In the past 7 days, how would you rate your pain on average, where 0 means no pain, and 10 means worst imaginable pain? 7   Global Mental Health Score 14   Global Physical Health Score 12   PROMIS TOTAL - SUBSCORES 26   Some recent data might be hidden      Mook Solis CMA    Spine Surgery Return Clinic Visit      Chief Complaint:   RECHECK of the Spine and Follow Up (3 week post op )      Interval HPI:  Symptom Profile Including: location of symptoms, onset, severity, exacerbating/alleviating factors, previous treatments:        Jas Patel is a 49 year old male who returns again today in follow-up status post previous lumbar decompression at L4-5 which was performed on January 23.  To review his history, he had a 10-day period of complete symptom relief.  He then sustained a fall.  Subsequently all of the pain came back in his right leg.  I saw him in early February for this and we obtained a repeat MRI which showed a recurrent disc herniation at that time.  I try to treat this conservatively and we started him on gabapentin but he had severe nausea and headaches and he had to stop this.  We also tried an epidural injection and physical therapy but he has had continued symptoms.  He cannot work and is really quite disabled by  these recurrent pain symptoms and he feels like there is numbness and weakness coming into the right leg.  He returns today to discuss a possible revision discectomy given this persistent disabling pain.            Past Medical History:     Past Medical History:   Diagnosis Date     Benign essential tremor 11/2/2016     BPH (benign prostatic hyperplasia) 10/28/2015     Gastroesophageal reflux disease without esophagitis 10/28/2015     Generalized anxiety disorder 10/26/2011     Herniated disc     in neck     Lumbar radiculopathy 12/20/2019     Moderate major depression (H) 11/22/2010            Past Surgical History:     Past Surgical History:   Procedure Laterality Date     DECOMPRESSION LUMBAR ONE LEVEL N/A 1/23/2020    Procedure: Lumbar 4 to 5 Decompression and Discectomy;  Surgeon: Juvenal Dexter MD;  Location: [facility] OR     DRAIN SKIN ABSCESS COMPLIC      left thigh I and D            Social History:     Social History     Tobacco Use     Smoking status: Former Smoker     Packs/day: 0.20     Years: 10.00     Pack years: 2.00     Types: Cigarettes     Start date: 1/28/2015     Smokeless tobacco: Never Used   Substance Use Topics     Alcohol use: Yes     Comment: 1 drink a day            Family History:     Family History   Problem Relation Age of Onset     Hypertension Father      C.A.D. Maternal Grandfather      Hypertension Maternal Grandfather      Cerebrovascular Disease Other         great uncle     Diabetes Paternal Uncle         type 2     Diabetes Other         great aunt     Prostate Cancer Other         great uncle moms side     Alzheimer Disease Other         great uncle moms side            Allergies:     Allergies   Allergen Reactions     No Known Drug Allergies             Medications:     Current Outpatient Medications   Medication     amitriptyline (ELAVIL) 25 MG tablet     Cholecalciferol (VITAMIN D3 PO)     citalopram (CELEXA) 40 MG tablet     diazepam 5 MG PO tablet     docusate  sodium (COLACE) 100 MG capsule     HYDROcodone-acetaminophen (LORTAB) 5-325 MG tablet     loratadine (CLARITIN) 10 MG tablet     omeprazole (PRILOSEC) 20 MG DR capsule     oxybutynin ER (DITROPAN-XL) 10 MG 24 hr tablet     oxyCODONE (ROXICODONE) 5 MG tablet     propranolol (INDERAL) 10 MG tablet     tamsulosin (FLOMAX) 0.4 MG capsule     methocarbamol (ROBAXIN) 500 MG tablet     methylPREDNISolone (MEDROL DOSEPAK) 4 MG tablet therapy pack     ondansetron (ZOFRAN-ODT) 4 MG ODT tab     polyethylene glycol (MIRALAX/GLYCOLAX) packet     senna-docusate (SENOKOT-S/PERICOLACE) 8.6-50 MG tablet     No current facility-administered medications for this visit.              Review of Systems:   A focused musculoskeletal and neurologic ROS was performed with pertinent positives and negatives noted in the HPI.  Additional systems were also reviewed and are documented at the bottom of the note.         Physical Exam:   Vitals: /84 (BP Location: Left arm, Patient Position: Sitting, Cuff Size: Adult Regular)   Pulse 72   Musculoskeletal, Neurologic, and Spine:            Lumbar Spine:    Appearance - No gross stepoffs or deformities    Motor -     L2-3: Hip flexion 5/5 R and 5/5 L strength          L3/4:  Knee extension R 5/5 and L 5/5 strength         L4/5:  Foot dorsiflexion R 4/5 L 5/5 and       EHL dorsiflexion R 4/5 L 5/5 strength         S1:  Plantarflexion/Peroneal Muscles  R 5/5 and L 5/5 strength    Sensation: intact to light touch L3-S1 distribution BLE, diminished right L5          Hip Exam:  No pain with hip log roll and no tenderness over the greater trochanters.    Alignment:  Patient stands with a neutral standing sagittal balance.           Imaging:   We ordered and independently reviewed new radiographs at this clinic visit. The results were discussed with the patient. Findings include:     March 17 2020 AP and lateral lumbar radiographs show moderate diffuse lumbar degenerative changes with overall  preserved alignment    I reviewed again his February 21, 2020 MRI which shows recurrent disc herniation at L4-5 with severe impingement of the right greater than left traversing L5 nerve roots       Assessment and Plan:     49 year old male with recurrent L4-5 disc herniation and severe radicular complaints.    We have tried to treat this recurrent herniation with gabapentin, injections and therapy and he continues to be quite disabled.  He does have some weakness on exam and he feels like things are getting worse rather than better.  Given the severity of the symptoms I do think it would be reasonable to consider a revision decompression and discectomy at this time.    Risks of this surgery include risk of infection, risk of dural tear resulting in CSF leak which might result in headaches, or possible need for lumbar drain, or possible revision surgery in the setting of a persistent leak. Risk of seroma or hematoma requiring revision surgery. Possible nerve root injury resulting in numbness weakness or paralysis into the leg. Possible radiculitis which could result in similar symptoms or could result in significant neurogenic type pain into the leg. Risk of incomplete decompression which might require revision surgery in the future.  Risk of pars fracture or postoperative instability requiring conversion to a fusion in the future. Risk of adjacent segment problems requiring surgery in the future. Risk of incomplete relief of symptoms possibly requiring revision surgery in the future. There is a risk of blood clots in the legs or the lungs.  Furthermore, although rare, there are risks of major vessel or major organ injury from the surgery, and risks of the anesthetic including stroke heart attack and death.    I explained to higher than normal risk of dural tear given that this is a revision surgery.  He does understand and he wishes to proceed.    In addition to this I spent some time talking about the recent viral  outbreak.  There is some risk of coming into the hospital with social exposure that he might be exposed to the recent virus outbreak.  However we do not know how long the virus outbreak is going to last.  Given his severe symptoms, with weakness and inability to work and given the fact that this is an outpatient surgery and unlikely to occupy an inpatient hospital bed, he and I discussed that it would be reasonable to proceed in spite of the recent viral outbreak.  He understands these risks.     Respectfully,  Juvenal Dexter MD  Spine Surgery  [facility]

## 2020-03-17 NOTE — PROGRESS NOTES
Spine Surgery Return Clinic Visit      Chief Complaint:   RECHECK of the Spine and Follow Up (3 week post op )      Interval HPI:  Symptom Profile Including: location of symptoms, onset, severity, exacerbating/alleviating factors, previous treatments:        Jas Patel is a 49 year old male who returns again today in follow-up status post previous lumbar decompression at L4-5 which was performed on January 23.  To review his history, he had a 10-day period of complete symptom relief.  He then sustained a fall.  Subsequently all of the pain came back in his right leg.  I saw him in early February for this and we obtained a repeat MRI which showed a recurrent disc herniation at that time.  I try to treat this conservatively and we started him on gabapentin but he had severe nausea and headaches and he had to stop this.  We also tried an epidural injection and physical therapy but he has had continued symptoms.  He cannot work and is really quite disabled by these recurrent pain symptoms and he feels like there is numbness and weakness coming into the right leg.  He returns today to discuss a possible revision discectomy given this persistent disabling pain.            Past Medical History:     Past Medical History:   Diagnosis Date     Benign essential tremor 11/2/2016     BPH (benign prostatic hyperplasia) 10/28/2015     Gastroesophageal reflux disease without esophagitis 10/28/2015     Generalized anxiety disorder 10/26/2011     Herniated disc     in neck     Lumbar radiculopathy 12/20/2019     Moderate major depression (H) 11/22/2010            Past Surgical History:     Past Surgical History:   Procedure Laterality Date     DECOMPRESSION LUMBAR ONE LEVEL N/A 1/23/2020    Procedure: Lumbar 4 to 5 Decompression and Discectomy;  Surgeon: Juvenal Dexter MD;  Location: [facility] OR     DRAIN SKIN ABSCESS COMPLIC      left thigh I and D            Social History:     Social History     Tobacco Use     Smoking  status: Former Smoker     Packs/day: 0.20     Years: 10.00     Pack years: 2.00     Types: Cigarettes     Start date: 1/28/2015     Smokeless tobacco: Never Used   Substance Use Topics     Alcohol use: Yes     Comment: 1 drink a day            Family History:     Family History   Problem Relation Age of Onset     Hypertension Father      C.A.D. Maternal Grandfather      Hypertension Maternal Grandfather      Cerebrovascular Disease Other         great uncle     Diabetes Paternal Uncle         type 2     Diabetes Other         great aunt     Prostate Cancer Other         great uncle moms side     Alzheimer Disease Other         great uncle moms side            Allergies:     Allergies   Allergen Reactions     No Known Drug Allergies             Medications:     Current Outpatient Medications   Medication     amitriptyline (ELAVIL) 25 MG tablet     Cholecalciferol (VITAMIN D3 PO)     citalopram (CELEXA) 40 MG tablet     diazepam 5 MG PO tablet     docusate sodium (COLACE) 100 MG capsule     HYDROcodone-acetaminophen (LORTAB) 5-325 MG tablet     loratadine (CLARITIN) 10 MG tablet     omeprazole (PRILOSEC) 20 MG DR capsule     oxybutynin ER (DITROPAN-XL) 10 MG 24 hr tablet     oxyCODONE (ROXICODONE) 5 MG tablet     propranolol (INDERAL) 10 MG tablet     tamsulosin (FLOMAX) 0.4 MG capsule     methocarbamol (ROBAXIN) 500 MG tablet     methylPREDNISolone (MEDROL DOSEPAK) 4 MG tablet therapy pack     ondansetron (ZOFRAN-ODT) 4 MG ODT tab     polyethylene glycol (MIRALAX/GLYCOLAX) packet     senna-docusate (SENOKOT-S/PERICOLACE) 8.6-50 MG tablet     No current facility-administered medications for this visit.              Review of Systems:   A focused musculoskeletal and neurologic ROS was performed with pertinent positives and negatives noted in the HPI.  Additional systems were also reviewed and are documented at the bottom of the note.         Physical Exam:   Vitals: /84 (BP Location: Left arm, Patient Position:  Sitting, Cuff Size: Adult Regular)   Pulse 72   Musculoskeletal, Neurologic, and Spine:            Lumbar Spine:    Appearance - No gross stepoffs or deformities    Motor -     L2-3: Hip flexion 5/5 R and 5/5 L strength          L3/4:  Knee extension R 5/5 and L 5/5 strength         L4/5:  Foot dorsiflexion R 4/5 L 5/5 and       EHL dorsiflexion R 4/5 L 5/5 strength         S1:  Plantarflexion/Peroneal Muscles  R 5/5 and L 5/5 strength    Sensation: intact to light touch L3-S1 distribution BLE, diminished right L5          Hip Exam:  No pain with hip log roll and no tenderness over the greater trochanters.    Alignment:  Patient stands with a neutral standing sagittal balance.           Imaging:   We ordered and independently reviewed new radiographs at this clinic visit. The results were discussed with the patient. Findings include:     March 17 2020 AP and lateral lumbar radiographs show moderate diffuse lumbar degenerative changes with overall preserved alignment    I reviewed again his February 21, 2020 MRI which shows recurrent disc herniation at L4-5 with severe impingement of the right greater than left traversing L5 nerve roots       Assessment and Plan:     49 year old male with recurrent L4-5 disc herniation and severe radicular complaints.    We have tried to treat this recurrent herniation with gabapentin, injections and therapy and he continues to be quite disabled.  He does have some weakness on exam and he feels like things are getting worse rather than better.  Given the severity of the symptoms I do think it would be reasonable to consider a revision decompression and discectomy at this time.    Risks of this surgery include risk of infection, risk of dural tear resulting in CSF leak which might result in headaches, or possible need for lumbar drain, or possible revision surgery in the setting of a persistent leak. Risk of seroma or hematoma requiring revision surgery. Possible nerve root injury  resulting in numbness weakness or paralysis into the leg. Possible radiculitis which could result in similar symptoms or could result in significant neurogenic type pain into the leg. Risk of incomplete decompression which might require revision surgery in the future.  Risk of pars fracture or postoperative instability requiring conversion to a fusion in the future. Risk of adjacent segment problems requiring surgery in the future. Risk of incomplete relief of symptoms possibly requiring revision surgery in the future. There is a risk of blood clots in the legs or the lungs.  Furthermore, although rare, there are risks of major vessel or major organ injury from the surgery, and risks of the anesthetic including stroke heart attack and death.    I explained to higher than normal risk of dural tear given that this is a revision surgery.  He does understand and he wishes to proceed.    In addition to this I spent some time talking about the recent viral outbreak.  There is some risk of coming into the hospital with social exposure that he might be exposed to the recent virus outbreak.  However we do not know how long the virus outbreak is going to last.  Given his severe symptoms, with weakness and inability to work and given the fact that this is an outpatient surgery and unlikely to occupy an inpatient hospital bed, he and I discussed that it would be reasonable to proceed in spite of the recent viral outbreak.  He understands these risks.     Respectfully,  Juvenal Dexter MD  Spine Surgery  [facility]    Answers for HPI/ROS submitted by the patient on 3/17/2020   General Symptoms: No  Skin Symptoms: No  HENT Symptoms: No  EYE SYMPTOMS: No  HEART SYMPTOMS: No  LUNG SYMPTOMS: No  INTESTINAL SYMPTOMS: No  URINARY SYMPTOMS: No  REPRODUCTIVE SYMPTOMS: No  SKELETAL SYMPTOMS: Yes  BLOOD SYMPTOMS: No  NERVOUS SYSTEM SYMPTOMS: Yes  MENTAL HEALTH SYMPTOMS: No  Back pain: Yes  Muscle aches: Yes  Neck pain:  Yes  Swollen joints: No  Joint pain: Yes  Bone pain: No  Muscle cramps: Yes  Muscle weakness: Yes  Joint stiffness: Yes  Bone fracture: No  Trouble with coordination: No  Dizziness or trouble with balance: No  Fainting or black-out spells: No  Memory loss: No  Headache: No  Seizures: No  Speech problems: No  Tingling: Yes  Tremor: Yes  Weakness: Yes  Difficulty walking: No  Paralysis: No  Numbness: Yes

## 2020-03-17 NOTE — NURSING NOTE
Teaching Flowsheet   Relevant Diagnosis: Lumbar radiculopathy  Spinal stenosis of lumbar region with neurogenic claudication  Teaching Topic: Revision Lumbar 4 to 5 decompression.    [facility] [place] with Dr Juvenal Dexter, anticipating to have surgery Monday 3-23-20.  Patient to have PAC clinic visit.  BMI 24.39  Surgery scheduler informed and she is awaiting approval from prior auth/insurance.    Person(s) involved in teaching:   Patient     Motivation Level:  Asks Questions: Yes  Eager to Learn: Yes  Cooperative: Yes  Receptive (willing/able to accept information): Yes  Any cultural factors/Rastafarian beliefs that may influence understanding or compliance? No       Patient demonstrates understanding of the following:  Reason for the appointment, diagnosis and treatment plan: Yes  Knowledge of proper use of medications and conditions for which they are ordered (with special attention to potential side effects or drug interactions): Yes  Which situations necessitate calling provider and whom to contact: Yes       Teaching Concerns Addressed:   Proper use and care of  (medical equip, care aids, etc.): Yes  Nutritional needs and diet plan: Yes  Pain management techniques: Yes  Wound Care: Yes  How and/when to access community resources: Yes     Instructional Materials Used/Given:   Preoperative teaching packet, antibacterial soap.  Meme Hooper RN

## 2020-03-18 ENCOUNTER — HOSPITAL ENCOUNTER (OUTPATIENT)
Facility: AMBULATORY SURGERY CENTER | Age: 50
End: 2020-03-18
Attending: ORTHOPAEDIC SURGERY
Payer: COMMERCIAL

## 2020-03-18 ENCOUNTER — TELEPHONE (OUTPATIENT)
Dept: URGENT CARE | Facility: URGENT CARE | Age: 50
End: 2020-03-18

## 2020-03-18 ENCOUNTER — TELEPHONE (OUTPATIENT)
Dept: ORTHOPEDICS | Facility: CLINIC | Age: 50
End: 2020-03-18

## 2020-03-18 PROBLEM — M48.062 SPINAL STENOSIS OF LUMBAR REGION WITH NEUROGENIC CLAUDICATION: Status: ACTIVE | Noted: 2020-03-18

## 2020-03-18 PROBLEM — M54.16 LUMBAR RADICULOPATHY: Status: ACTIVE | Noted: 2019-12-20

## 2020-03-18 NOTE — TELEPHONE ENCOUNTER
LM reminding patient of their PAC appt on 3/19/2020 and to please bring a list of all daily medications. As well, informed them that no  will be on site and no visitors are allowed to their appointment.

## 2020-03-18 NOTE — TELEPHONE ENCOUNTER
Phoned patient to schedule his PAC visit in preparation for surgery with Dr Dexter on 3/23/20 at the [facility]. I left Jas my direct number to call when he is able. [phone].

## 2020-03-18 NOTE — TELEPHONE ENCOUNTER
FUTURE VISIT INFORMATION      SURGERY INFORMATION:    Date: 3/23/20    Location: [facility]    Surgeon:  Juvenal Dexter MD     Anesthesia Type:  General    Procedure: Revision Lumbar 4 to 5 decompression     Consult: OV 3/17/20- Isacc    RECORDS REQUESTED FROM:       [facility]    Most recent EKG+ [id number]    Most recent Cardiac Stress Test: 19

## 2020-03-19 ENCOUNTER — PRE VISIT (OUTPATIENT)
Dept: SURGERY | Facility: CLINIC | Age: 50
End: 2020-03-19

## 2020-03-19 ENCOUNTER — ANESTHESIA EVENT (OUTPATIENT)
Dept: SURGERY | Facility: CLINIC | Age: 50
End: 2020-03-19
Payer: COMMERCIAL

## 2020-03-19 ENCOUNTER — OFFICE VISIT (OUTPATIENT)
Dept: SURGERY | Facility: CLINIC | Age: 50
End: 2020-03-19
Payer: COMMERCIAL

## 2020-03-19 VITALS
HEART RATE: 80 BPM | HEIGHT: 74 IN | OXYGEN SATURATION: 99 % | BODY MASS INDEX: 25.08 KG/M2 | SYSTOLIC BLOOD PRESSURE: 114 MMHG | WEIGHT: 195.4 LBS | TEMPERATURE: 98.4 F | DIASTOLIC BLOOD PRESSURE: 81 MMHG | RESPIRATION RATE: 14 BRPM

## 2020-03-19 DIAGNOSIS — M48.062 SPINAL STENOSIS, LUMBAR REGION WITH NEUROGENIC CLAUDICATION: ICD-10-CM

## 2020-03-19 DIAGNOSIS — G89.4 CHRONIC PAIN SYNDROME: ICD-10-CM

## 2020-03-19 DIAGNOSIS — Z01.818 PREOP EXAMINATION: Primary | ICD-10-CM

## 2020-03-19 DIAGNOSIS — Z01.818 PREOP EXAMINATION: ICD-10-CM

## 2020-03-19 DIAGNOSIS — M54.16 LUMBAR RADICULOPATHY: ICD-10-CM

## 2020-03-19 LAB
ANION GAP SERPL CALCULATED.3IONS-SCNC: 4 MMOL/L (ref 3–14)
BUN SERPL-MCNC: 8 MG/DL (ref 7–30)
CALCIUM SERPL-MCNC: 8.7 MG/DL (ref 8.5–10.1)
CHLORIDE SERPL-SCNC: 106 MMOL/L (ref 94–109)
CO2 SERPL-SCNC: 27 MMOL/L (ref 20–32)
CREAT SERPL-MCNC: 0.88 MG/DL (ref 0.66–1.25)
ERYTHROCYTE [DISTWIDTH] IN BLOOD BY AUTOMATED COUNT: 12.2 % (ref 10–15)
GFR SERPL CREATININE-BSD FRML MDRD: >90 ML/MIN/{1.73_M2}
GLUCOSE SERPL-MCNC: 119 MG/DL (ref 70–99)
HCT VFR BLD AUTO: 41.9 % (ref 40–53)
HGB BLD-MCNC: 14.3 G/DL (ref 13.3–17.7)
MCH RBC QN AUTO: 31.3 PG (ref 26.5–33)
MCHC RBC AUTO-ENTMCNC: 34.1 G/DL (ref 31.5–36.5)
MCV RBC AUTO: 92 FL (ref 78–100)
PLATELET # BLD AUTO: 281 10E9/L (ref 150–450)
POTASSIUM SERPL-SCNC: 4.6 MMOL/L (ref 3.4–5.3)
RBC # BLD AUTO: 4.57 10E12/L (ref 4.4–5.9)
SODIUM SERPL-SCNC: 137 MMOL/L (ref 133–144)
WBC # BLD AUTO: 8 10E9/L (ref 4–11)

## 2020-03-19 RX ORDER — ACETAMINOPHEN 500 MG
500-1000 TABLET ORAL EVERY 6 HOURS PRN
COMMUNITY
End: 2020-08-17

## 2020-03-19 RX ORDER — CEFAZOLIN SODIUM 1 G/50ML
1 SOLUTION INTRAVENOUS SEE ADMIN INSTRUCTIONS
Status: CANCELLED | OUTPATIENT
Start: 2020-03-19

## 2020-03-19 RX ORDER — GABAPENTIN 300 MG/1
300 CAPSULE ORAL
Status: CANCELLED | OUTPATIENT
Start: 2020-03-19

## 2020-03-19 RX ORDER — CEFAZOLIN SODIUM 2 G/50ML
2 SOLUTION INTRAVENOUS
Status: CANCELLED | OUTPATIENT
Start: 2020-03-19

## 2020-03-19 RX ORDER — ACETAMINOPHEN 325 MG/1
975 TABLET ORAL ONCE
Status: CANCELLED | OUTPATIENT
Start: 2020-03-19 | End: 2020-03-19

## 2020-03-19 ASSESSMENT — MIFFLIN-ST. JEOR: SCORE: 1811.08

## 2020-03-19 ASSESSMENT — PAIN SCALES - GENERAL: PAINLEVEL: MODERATE PAIN (5)

## 2020-03-19 ASSESSMENT — PATIENT HEALTH QUESTIONNAIRE - PHQ9: SUM OF ALL RESPONSES TO PHQ QUESTIONS 1-9: 2

## 2020-03-19 ASSESSMENT — LIFESTYLE VARIABLES: TOBACCO_USE: 1

## 2020-03-19 NOTE — H&P
Pre-Operative H & P     CC:  Preoperative exam to assess for increased cardiopulmonary risk while undergoing surgery and anesthesia.    Date of Encounter: 3/19/2020  Primary Care Physician:  Lola Perez  Associated diagnosis: lumbar radiculopathy, spinal stenosis of lumbar region    HPI  Jas Patel is a 49 year old male who presents for pre-operative H & P in preparation for a Revision Lumbar 4 to 5 decompression on 3/23/20 by Dr. Dexter at [facility].     Jas Patel is a 49 year old male with GERD, tremor, BPH, anxiety and depression that has low back pain, lumbar radiculopathy and lumbar spinal stenosis with neurogenic claudication.  He had a lumbar injury this past fall when using an inversion table.  He underwent a one level lumbar decompression with Dr. Dexter on 1/23/20 and symptoms improved for 8 days, but then he started having RLE radiculopathy symptoms again and then on POD 14 had a fall which didn't help.  He followed up with Dr. Dexter and conservative measures of injections, physical therapy and medrol dosepak were attempted, but not helpful.  The above listed surgery has now been recommended for treatment.       History is obtained from the patient and the medical record.  .     Past Medical History  Past Medical History:   Diagnosis Date     Benign essential tremor 11/2/2016     BPH (benign prostatic hyperplasia) 10/28/2015     BPH (benign prostatic hyperplasia)      Gastroesophageal reflux disease without esophagitis 10/28/2015     Generalized anxiety disorder 10/26/2011     GERD (gastroesophageal reflux disease)      Herniated disc     in neck     Lumbar radiculopathy 12/20/2019     Moderate major depression (H) 11/22/2010       Past Surgical History  Past Surgical History:   Procedure Laterality Date     DECOMPRESSION LUMBAR ONE LEVEL N/A 1/23/2020    Procedure: Lumbar 4 to 5 Decompression and Discectomy;  Surgeon: Juvenal Dexter MD;   Location: [facility] OR     DRAIN SKIN ABSCESS COMPLIC      left thigh I and D x 4 in the 1990's     TONSILLECTOMY         Hx of Blood transfusions/reactions: none     Hx of abnormal bleeding or anti-platelet use: none      Steroid use in the last year: Medrol dosepak x 2.  Last was in Feb 2020.     Personal or FH with difficulty with Anesthesia:  Patient has a history of difficult intubation, but has no family history of anesthesia complications.        Prior to Admission Medications  Current Outpatient Medications   Medication Sig Dispense Refill     acetaminophen (TYLENOL) 500 MG tablet Take 500-1,000 mg by mouth every 6 hours as needed for mild pain (PT last dose 3.19.2020)       amitriptyline (ELAVIL) 25 MG tablet TAKE ONE TABLET BY MOUTH AT BEDTIME 90 tablet 3     Cholecalciferol (VITAMIN D3 PO) Take 4,000 Units by mouth every morning        citalopram (CELEXA) 40 MG tablet Take 1 tablet (40 mg) by mouth daily (Patient taking differently: Take 40 mg by mouth every morning ) 90 tablet 3     loratadine (CLARITIN) 10 MG tablet Take 10 mg by mouth every morning 1 tab daily       omeprazole (PRILOSEC) 20 MG DR capsule Take 1 capsule (20 mg) by mouth daily (Patient taking differently: Take 20 mg by mouth every morning ) 90 capsule 3     oxybutynin ER (DITROPAN-XL) 10 MG 24 hr tablet Take 1 tablet (10 mg) by mouth daily (Patient taking differently: Take 10 mg by mouth daily (with dinner) ) 90 tablet 3     oxyCODONE (ROXICODONE) 5 MG tablet Take 1 tablet (5 mg) by mouth every 6 hours as needed for moderate to severe pain or severe pain 45 tablet 0     propranolol (INDERAL) 10 MG tablet TAKE ONE TABLET BY MOUTH TWICE A DAY AS NEEDED FOR TREMOR (Patient taking differently: Take 10 mg by mouth every morning TAKE ONE TABLET BY MOUTH TWICE A DAY AS NEEDED FOR TREMOR) 180 tablet 3     tamsulosin (FLOMAX) 0.4 MG capsule Take 2 capsules (0.8 mg) by mouth daily (Patient taking differently: Take 0.8 mg by mouth daily (with dinner) )  180 capsule 3     HYDROcodone-acetaminophen (LORTAB) 5-325 MG tablet Take 1-2 tablets by mouth every 6 hours as needed for severe pain 60 tablet 0     ondansetron (ZOFRAN-ODT) 4 MG ODT tab Take 1-2 tablets (4-8 mg) by mouth every 8 hours as needed for nausea (Patient not taking: Reported on 3/17/2020) 20 tablet 0       Allergies  Allergies   Allergen Reactions     No Known Drug Allergies        Social History  Social History     Socioeconomic History     Marital status:      Spouse name: Marcelina     Number of children: 2     Years of education: 16     Highest education level: Not on file   Occupational History     Occupation: RN     Employer: [employer]   Social Needs     Financial resource strain: Not on file     Food insecurity     Worry: Not on file     Inability: Not on file     Transportation needs     Medical: Not on file     Non-medical: Not on file   Tobacco Use     Smoking status: Former Smoker     Packs/day: 0.20     Years: 10.00     Pack years: 2.00     Types: Cigarettes     Start date: 1/28/2015     Smokeless tobacco: Never Used   Substance and Sexual Activity     Alcohol use: Yes     Alcohol/week: 0.0 - 7.0 standard drinks     Drug use: No     Sexual activity: Yes     Partners: Female     Birth control/protection: Post-menopausal   Lifestyle     Physical activity     Days per week: Not on file     Minutes per session: Not on file     Stress: Not on file   Relationships     Social connections     Talks on phone: Not on file     Gets together: Not on file     Attends Mu-ism service: Not on file     Active member of club or organization: Not on file     Attends meetings of clubs or organizations: Not on file     Relationship status: Not on file     Intimate partner violence     Fear of current or ex partner: Not on file     Emotionally abused: Not on file     Physically abused: Not on file     Forced sexual activity: Not on file   Other Topics Concern     Parent/sibling w/ CABG, MI or angioplasty  before 65F 55M? No   Social History Narrative     Not on file       Family History  Family History   Problem Relation Age of Onset     No Known Problems Mother      Hypertension Father      Valvular heart disease Father         aortic valve replacement     C.A.D. Maternal Grandfather      Hypertension Maternal Grandfather      No Known Problems Brother      No Known Problems Sister      Other - See Comments Sister         adopted     Cerebrovascular Disease Other         great uncle     Diabetes Paternal Uncle         type 2     Diabetes Other         great aunt     Prostate Cancer Other         great uncle moms side     Alzheimer Disease Other         great uncle moms side           ROS/MED HX  The complete review of systems is negative other than noted in the HPI or here.   ENT/Pulmonary:     (+)CALLI risk factors snores loudly, tobacco use, Past use 0.2 packs/day  , . .    Neurologic:     (+)other neuro tremor    Cardiovascular:  - neg cardiovascular ROS   (+) ----. : . . . :. . Previous cardiac testing Echodate:10/9/2019results:date: results: date: results: date: results:          METS/Exercise Tolerance:  >4 METS   Hematologic:  - neg hematologic  ROS       Musculoskeletal:   (+)  other musculoskeletal- lumbar radiculopathy, lumbar spinal stenosis with neurogenic claudication.  C5-6 disc herniation (neck and left shoulder pain)      GI/Hepatic:     (+) GERD Asymptomatic on medication,       Renal/Genitourinary:     (+) BPH,       Endo:  - neg endo ROS       Psychiatric:     (+) psychiatric history anxiety and depression      Infectious Disease:  - neg infectious disease ROS       Malignancy:      - no malignancy   Other:    (+) H/O Chronic Pain,H/O chronic opiod use ,                        PHYSICAL EXAM:   Mental Status/Neuro: A/A/O; Age Appropriate   Airway: Facies: Feasible  Mallampati: I  Mouth/Opening: Full  TM distance: > 6 cm  Neck ROM: Full   Respiratory: Auscultation: CTAB     Resp. Rate: Normal    "  Resp. Effort: Normal      CV: Rhythm: Regular  Rate: Age appropriate  Heart: Normal Sounds  Edema: None   Comments: 3 left lower broken teeth.       Dental: Details                  Temp: 98.4  F (36.9  C) Temp src: Oral BP: 114/81 Pulse: 80   Resp: 14 SpO2: 99 %         195 lbs 6.4 oz  6' 2\"   Body mass index is 25.09 kg/m .       Physical Exam  Constitutional: Awake, alert, cooperative, no apparent distress, and appears stated age.  Eyes: Pupils equal, round and reactive to light, extra ocular muscles intact, sclera clear, conjunctiva normal.  HENT: Normocephalic, oral pharynx with moist mucus membranes.  Dentition -3 left lower broken teeth. No goiter appreciated.   Respiratory: Clear to auscultation bilaterally, no crackles or wheezing.  Cardiovascular: Regular rate and rhythm, normal S1 and S2, and no murmur noted.  Carotids +2, no bruits. No edema. Palpable pulses to radial  DP and PT arteries.   GI: Normal bowel sounds, soft, non-distended, non-tender, no masses palpated, no hepatosplenomegaly.   Lymph/Hematologic: No cervical lymphadenopathy and no supraclavicular lymphadenopathy.  Genitourinary:  deferred  Skin: Warm and dry.     Musculoskeletal: Full ROM of neck. There is no redness, warmth, or swelling of the exposed joints. Gross motor strength is normal.    Neurologic: Awake, alert, oriented to name, place and time. Cranial nerves II-XII are grossly intact. Gait is normal.   Neuropsychiatric: Calm, cooperative. Normal affect.     Labs: (personally reviewed)   Component      Latest Ref Rng & Units 3/19/2020   Sodium      133 - 144 mmol/L 137   Potassium      3.4 - 5.3 mmol/L 4.6   Chloride      94 - 109 mmol/L 106   Carbon Dioxide      20 - 32 mmol/L 27   Anion Gap      3 - 14 mmol/L 4   Glucose      70 - 99 mg/dL 119 (H)   Urea Nitrogen      7 - 30 mg/dL 8   Creatinine      0.66 - 1.25 mg/dL 0.88   GFR Estimate      >60 mL/min/1.73:m2 >90   GFR Estimate If Black      >60 mL/min/1.73:m2 >90   Calcium   "    8.5 - 10.1 mg/dL 8.7   WBC      4.0 - 11.0 10e9/L 8.0   RBC Count      4.4 - 5.9 10e12/L 4.57   Hemoglobin      13.3 - 17.7 g/dL 14.3   Hematocrit      40.0 - 53.0 % 41.9   MCV      78 - 100 fl 92   MCH      26.5 - 33.0 pg 31.3   MCHC      31.5 - 36.5 g/dL 34.1   RDW      10.0 - 15.0 % 12.2   Platelet Count      150 - 450 10e9/L 281         [id number]  NSR  Stress test: 2019  Interpretation Summary  This was a normal stress echocardiogram with no evidence of stress-induced  ischemia. Normal resting LV function with EF of approximately 60-65%; normal  response to exercise with increase to approximately 70-75%. No stress induced  regional wall motion abnormalities. No ECG evidence of ischemia. No  significant valvular disease noted on routine screening color flow Doppler and  pulsed Doppler examination. Normal functional capacity. No resting wall motion  abnormalities.  Exercise was stopped due to leg fatigue.  The patient did not exhibit any symptoms during exercise.  Normal blood pressure response with stress.  Normal heart rate response to exercise.          Outside records reviewed from: Care Everywhere      ASSESSMENT and PLAN  Jas Patel is a 49 year old male scheduled for a Revision Lumbar 4 to 5 decompression on 3/23/20 by Dr. Dexter in treatment of lumbar radiculopathy and spinal stenosis of the lumbar region with neurogenic claudication.  PAC referral for risk assessment and optimization for anesthesia with comorbid conditions of: GERD, tremor, BPH, anxiety and depression.     Pre-operative considerations:  1.  Cardiac:  Functional status- METS >4.  Up until October before having a back injury he was using his treadclimber for 30 minutes regularly for exercise.  He has no known cardiac history and had a negative stress test in Oct 2019.  Intermediate risk surgery with 0.4% risk of major adverse cardiac event.   2.  Pulm:  Airway feasible. He reports a history of having one surgery in the 's in  which he was told he was a difficult intubation, but just had surgery here in January and the anesthesia notes italo intubation as easy.  CALLI risk: low.  He quit smoking in 2018.    3.  GI:  Risk of PONV score = 2.  If > 2, anti-emetic intervention recommended.  4. Musculoskeletal:  He has low back pain, neck pain and left shoulder pain.  He takes a total of 20mg of oxycodone per day currently for pain management.   Morphine equivalent dosing = 30mg.  5. :  He is on flomax for BPH.  He denies any symptoms of urinary retention.  Monitor closely post-op.     VTE risk: 0.5%    Patient is optimized and is acceptable candidate for the proposed procedure.  No further diagnostic evaluation is needed.       Lindy Barrera DNP, RN, APRN  Preoperative Assessment Center  [facility]  [facility] and [facility]  Phone: [phone]  Fax: [phone]

## 2020-03-19 NOTE — RESULT ENCOUNTER NOTE
Stefani Mark,    Your test results are attached. Both of your labs are fine and good for surgery.         Lindy Barrera DNP, RN, ANP-C

## 2020-03-19 NOTE — PATIENT INSTRUCTIONS
Preparing for Your Surgery      Name:  Jas Patel   MRN:  [id number]   :  1970   Today's Date:  3/19/2020     Arriving for surgery:  Surgery date:  3/23/20  Arrival time:  8:00 am  Due to the COVID 19 crisis, we are trying to keep our patients safe from others who might have respiratory illnesses so the hospital is implementing a no visitor policy.  Please come to:     [facility]  [address] [place] [id number]     Parking is available in front of the Clinics and Surgery Center building from 5:30AM to 8:00PM.  -  Proceed to the 5th floor to check into the Ambulatory Surgery Center.              >> There will be patient concierges on the 1st and 5th floor, for assistance or an escort, if you would like.              >> Please call [phone] with any questions.    What can I eat or drink?  -  You may have solid food or milk products until 8 hours prior to your surgery (midnight).  -  You may have water, apple juice or 7up/Sprite until 2 hours prior to your surgery (7:30 am).    Which medicines can I take?  Hold Aspirin, vitamins and supplements one week prior to surgery.  Hold Ibuprofen for 24 hours and/or Naproxen for 48 hours prior to surgery.     -  Please take these medications the day of surgery:  Acetaminophen (Tylenol)  Citalopram (Celexa)  Loratadine (Claritin)  Omeprazole (Prilosec)  Oxycodone (Roxicodone)  Propranolol (Inderal)  Hydrocodone-Acetaminophen (Lortab)    How do I prepare myself?  -  Take two showers: one the night before surgery; and one the morning of surgery.         Use Scrubcare or Hibiclens to wash from neck down, leave soap on your skin for up to one minute.  Do not get soap in your eyes or ears.  You may use your own shampoo and conditioner; no other hair products.   -  Do NOT use lotion, powder, deodorant, or antiperspirant the day of your surgery.  -  Do NOT wear any jewelry.  -  Do not bring your own medications to the  hospital.  -  Bring your ID and insurance card.    -If you are scheduled to go home the Same Day as surgery you must have a responsible adult as a  and to stay with you overnight the first 24 hours after surgery.     Questions or Concerns:      -If you are scheduled at the Ambulatory Surgery Center and have questions or concerns regarding the day of surgery please call [phone].    -If you have health changes between today and your surgery please call your surgeon. For questions after surgery please call your surgeons office.       AFTER YOUR SURGERY  Breathing exercises   Breathing exercises help you recover faster. Take deep breaths and let the air out slowly. This will:     Help you wake up after surgery.    Help prevent complications like pneumonia.  Preventing complications will help you go home sooner.   We may give you a breathing device (incentive spirometer) to encourage you to breathe deeply.   Nausea and vomiting   You may feel sick to your stomach after surgery; if so, let your nurse know.    Pain control:  After surgery, you may have pain. Our goal is to help you manage your pain. Pain medicine will help you feel comfortable enough to do activities that will help you heal.  These activities may include breathing exercises, walking and physical therapy.   To help your health care team treat your pain we will ask: 1) If you have pain  2) where it is located 3) describe your pain in your words  Methods of pain control include medications given by mouth, vein or by nerve block for some surgeries.  Sequential Compression Device (SCD):  You may need to wear SCD S (also called pneumo boots)on your legs or feet. These are wraps connected to a machine that pumps in air and releases it. The repeated pumping helps prevent blood clots from forming.

## 2020-03-19 NOTE — ANESTHESIA PREPROCEDURE EVALUATION
"Anesthesia Pre-Procedure Evaluation    Patient: Jas Patel   MRN:     [id number] Gender:   male   Age:    49 year old :      1970        Preoperative Diagnosis: Lumbar radiculopathy [M54.16]  Spinal stenosis of lumbar region with neurogenic claudication [M48.062]   Procedure(s):  Revision Lumbar 4 to 5 decompression     LABS:  CBC:   Lab Results   Component Value Date    WBC 6.8 2019    WBC 7.5 2008    HGB 15.3 2019    HGB 16.3 2008    HCT 44.2 2019    HCT 46.8 2008     2019     2008     BMP:   Lab Results   Component Value Date     2020     (L) 2019    POTASSIUM 4.0 2019    POTASSIUM 4.7 2016    CHLORIDE 100 2019    CHLORIDE 106 2016    CO2 27 2019    CO2 25 2016    BUN 12 2019    BUN 11 2016    CR 0.94 2019    CR 0.84 2016     (H) 2020     (H) 2019     COAGS: No results found for: PTT, INR, FIBR  POC: No results found for: BGM, HCG, HCGS  OTHER:   Lab Results   Component Value Date    HIWOT 8.8 2019    ALBUMIN 3.8 (L) 01/15/2013    PROTTOTAL 7.1 01/15/2013    ALT 35 01/15/2013    AST 25 01/15/2013    ALKPHOS 92 01/15/2013    BILITOTAL 0.2 01/15/2013    LIPASE 74 2008        Preop Vitals    BP Readings from Last 3 Encounters:   20 114/81   20 138/84   20 112/66    Pulse Readings from Last 3 Encounters:   20 80   20 72   20 63      Resp Readings from Last 3 Encounters:   20 14   20 16   20 16    SpO2 Readings from Last 3 Encounters:   20 99%   20 98%   20 98%      Temp Readings from Last 1 Encounters:   20 98.4  F (36.9  C) (Oral)    Ht Readings from Last 1 Encounters:   20 1.88 m (6' 2\")      Wt Readings from Last 1 Encounters:   20 88.6 kg (195 lb 6.4 oz)    Estimated body mass index is 25.09 kg/m  as calculated from the following:    " "Height as of this encounter: 1.88 m (6' 2\").    Weight as of this encounter: 88.6 kg (195 lb 6.4 oz).     LDA:  Peripheral IV 10/09/19 Distal;Left Upper arm (Active)   Number of days: 162        Past Medical History:   Diagnosis Date     Benign essential tremor 11/2/2016     BPH (benign prostatic hyperplasia) 10/28/2015     BPH (benign prostatic hyperplasia)      Gastroesophageal reflux disease without esophagitis 10/28/2015     Generalized anxiety disorder 10/26/2011     GERD (gastroesophageal reflux disease)      Herniated disc     in neck     Lumbar radiculopathy 12/20/2019     Moderate major depression (H) 11/22/2010      Past Surgical History:   Procedure Laterality Date     DECOMPRESSION LUMBAR ONE LEVEL N/A 1/23/2020    Procedure: Lumbar 4 to 5 Decompression and Discectomy;  Surgeon: Juvenal Dexter MD;  Location: [facility] OR     DRAIN SKIN ABSCESS COMPLIC      left thigh I and D      Allergies   Allergen Reactions     No Known Drug Allergies         Anesthesia Evaluation     . Pt has had prior anesthetic. Type: General    History of anesthetic complications   - difficult intubation        ROS/MED HX    ENT/Pulmonary:     (+)CALLI risk factors snores loudly, tobacco use, Past use 0.2 packs/day  , . .    Neurologic:     (+)other neuro tremor    Cardiovascular:  - neg cardiovascular ROS   (+) ----. : . . . :. . Previous cardiac testing Echodate:10/9/2019results:Interpretation Summary  This was a normal stress echocardiogram with no evidence of stress-induced  ischemia. Normal resting LV function with EF of approximately 60-65%; normal  response to exercise with increase to approximately 70-75%. No stress induced  regional wall motion abnormalities. No ECG evidence of ischemia. No  significant valvular disease noted on routine screening color flow Doppler and  pulsed Doppler examination. Normal functional capacity. No resting wall motion  abnormalities.date: results: date: results: date: results:        "   METS/Exercise Tolerance:  >4 METS   Hematologic:  - neg hematologic  ROS       Musculoskeletal:   (+)  other musculoskeletal- lumbar radiculopathy, lumbar spinal stenosis with neurogenic claudication.  C5-6 disc herniation (neck and left shoulder pain)      GI/Hepatic:     (+) GERD Asymptomatic on medication,       Renal/Genitourinary:     (+) BPH,       Endo:  - neg endo ROS       Psychiatric:     (+) psychiatric history anxiety and depression      Infectious Disease:  - neg infectious disease ROS       Malignancy:      - no malignancy   Other:    (+) H/O Chronic Pain,H/O chronic opiod use ,                        PHYSICAL EXAM:   Mental Status/Neuro: A/A/O; Age Appropriate   Airway: Facies: Feasible  Mallampati: I  Mouth/Opening: Full  TM distance: > 6 cm  Neck ROM: Full   Respiratory: Auscultation: CTAB     Resp. Rate: Normal     Resp. Effort: Normal      CV: Rhythm: Regular  Rate: Age appropriate  Heart: Normal Sounds  Edema: None   Comments: 3 left lower broken teeth.       Dental: Details                Assessment:   ASA SCORE: 3    H&P: History and physical reviewed and following examination; no interval change.         Plan:   Anes. Type:  General   Pre-Medication: None   Induction:  IV (Standard)   Airway: ETT; Oral   Access/Monitoring: PIV   Maintenance: Balanced     Postop Plan:   Postop Pain: Opioids  Postop Sedation/Airway: Not planned     PONV Management:   Adult Risk Factors:, Postop Opioids   Prevention: Ondansetron     CONSENT: Direct conversation   Plan and risks discussed with: Patient   Blood Products: Consented (ALL Blood Products)                PAC Discussion and Assessment    ASA Classification: 2  Case is suitable for: ASC  Anesthetic techniques and relevant risks discussed: GA  Invasive monitoring and risk discussed:   Types:   Possibility and Risk of blood transfusion discussed:   NPO instructions given:   Additional anesthetic preparation and risks discussed:   Needs early admission to  pre-op area:   Other:     PAC Resident/NP Anesthesia Assessment:  Jas Patel is a 49 year old male scheduled for a Revision Lumbar 4 to 5 decompression on 3/23/20 by Dr. Dexter in treatment of lumbar radiculopathy and spinal stenosis of the lumbar region with neurogenic claudication.  PAC referral for risk assessment and optimization for anesthesia with comorbid conditions of: GERD, tremor, BPH, anxiety and depression.     Pre-operative considerations:  1.  Cardiac:  Functional status- METS >4.  Up until October before having a back injury he was using his treadclimber for 30 minutes regularly for exercise.  He has no known cardiac history and had a negative stress test in Oct 2019.  Intermediate risk surgery with 0.4% risk of major adverse cardiac event.   2.  Pulm:  Airway feasible. He reports a history of having one surgery in the 1990's in which he was told he was a difficult intubation, but just had surgery here in January and the anesthesia notes italo intubation as easy.  CALLI risk: low.  He quit smoking in 2018.    3.  GI:  Risk of PONV score = 2.  If > 2, anti-emetic intervention recommended.  4. Musculoskeletal:  He has low back pain, neck pain and left shoulder pain.  He takes a total of 20mg of oxycodone per day currently for pain management.   Morphine equivalent dosing = 30mg.  5. :  He is on flomax for BPH.  He denies any symptoms of urinary retention.  Monitor closely post-op.     VTE risk: 0.5%    Patient is optimized and is acceptable candidate for the proposed procedure.  No further diagnostic evaluation is needed.     **For further details of assessment, testing, and physical exam please see H and P completed on same date.          Lindy Barrera DNP, RN, APRN      Reviewed and Signed by PAC Mid-Level Provider/Resident  Mid-Level Provider/Resident: Lindy Barrera DNP, RN, APRN  Date: 3/19/20  Time: 1249    Attending Anesthesiologist Anesthesia Assessment:        Anesthesiologist:   Date:    Time:   Pass/Fail:   Disposition:     PAC Pharmacist Assessment:        Pharmacist:   Date:   Time:    RAFAEL Hernandez CNP

## 2020-03-20 DIAGNOSIS — G89.18 POST-OPERATIVE PAIN: ICD-10-CM

## 2020-03-20 DIAGNOSIS — M48.062 SPINAL STENOSIS OF LUMBAR REGION WITH NEUROGENIC CLAUDICATION: ICD-10-CM

## 2020-03-20 RX ORDER — OXYCODONE HYDROCHLORIDE 5 MG/1
5 TABLET ORAL EVERY 6 HOURS PRN
Qty: 45 TABLET | Refills: 0 | Status: SHIPPED | OUTPATIENT
Start: 2020-03-20 | End: 2020-06-19

## 2020-03-20 NOTE — TELEPHONE ENCOUNTER
See My Chart messages.    I called pt & notified him that the location of his surgery has changed to [place] & he stated he knows where that is & has our map with address.  Refilled Oxycodone preop signed by  today 3-20-20.   Call back prn.  Pt. Agreed.  W.O.V.O.R.B./Luz Maria Banks RN.

## 2020-03-23 ENCOUNTER — ANESTHESIA (OUTPATIENT)
Dept: SURGERY | Facility: CLINIC | Age: 50
End: 2020-03-23
Payer: COMMERCIAL

## 2020-03-23 ENCOUNTER — HOSPITAL ENCOUNTER (OUTPATIENT)
Facility: CLINIC | Age: 50
Discharge: HOME | End: 2020-03-23
Attending: ORTHOPAEDIC SURGERY | Admitting: ORTHOPAEDIC SURGERY
Payer: COMMERCIAL

## 2020-03-23 ENCOUNTER — APPOINTMENT (OUTPATIENT)
Dept: GENERAL RADIOLOGY | Facility: CLINIC | Age: 50
End: 2020-03-23
Attending: ORTHOPAEDIC SURGERY
Payer: COMMERCIAL

## 2020-03-23 VITALS
TEMPERATURE: 97.9 F | HEIGHT: 74 IN | HEART RATE: 80 BPM | SYSTOLIC BLOOD PRESSURE: 132 MMHG | OXYGEN SATURATION: 94 % | WEIGHT: 193.56 LBS | BODY MASS INDEX: 24.84 KG/M2 | RESPIRATION RATE: 16 BRPM | DIASTOLIC BLOOD PRESSURE: 92 MMHG

## 2020-03-23 DIAGNOSIS — M54.16 LUMBAR RADICULOPATHY: ICD-10-CM

## 2020-03-23 DIAGNOSIS — M48.062 SPINAL STENOSIS OF LUMBAR REGION WITH NEUROGENIC CLAUDICATION: ICD-10-CM

## 2020-03-23 LAB
ABO + RH BLD: NORMAL
ABO + RH BLD: NORMAL
BLD GP AB SCN SERPL QL: NORMAL
BLOOD BANK CMNT PATIENT-IMP: NORMAL
GLUCOSE SERPL-MCNC: 118 MG/DL (ref 70–99)
SPECIMEN EXP DATE BLD: NORMAL

## 2020-03-23 PROCEDURE — 86901 BLOOD TYPING SEROLOGIC RH(D): CPT | Performed by: PHYSICIAN ASSISTANT

## 2020-03-23 PROCEDURE — 27210995 ZZH RX 272: Performed by: ORTHOPAEDIC SURGERY

## 2020-03-23 PROCEDURE — 25000125 ZZHC RX 250: Performed by: NURSE ANESTHETIST, CERTIFIED REGISTERED

## 2020-03-23 PROCEDURE — 36000062 ZZH SURGERY LEVEL 4 1ST 30 MIN - UMMC: Performed by: ORTHOPAEDIC SURGERY

## 2020-03-23 PROCEDURE — 37000008 ZZH ANESTHESIA TECHNICAL FEE, 1ST 30 MIN: Performed by: ORTHOPAEDIC SURGERY

## 2020-03-23 PROCEDURE — 25000128 H RX IP 250 OP 636: Performed by: ANESTHESIOLOGY

## 2020-03-23 PROCEDURE — 71000014 ZZH RECOVERY PHASE 1 LEVEL 2 FIRST HR: Performed by: ORTHOPAEDIC SURGERY

## 2020-03-23 PROCEDURE — 86900 BLOOD TYPING SEROLOGIC ABO: CPT | Performed by: PHYSICIAN ASSISTANT

## 2020-03-23 PROCEDURE — 82947 ASSAY GLUCOSE BLOOD QUANT: CPT | Performed by: ANESTHESIOLOGY

## 2020-03-23 PROCEDURE — 36000064 ZZH SURGERY LEVEL 4 EA 15 ADDTL MIN - UMMC: Performed by: ORTHOPAEDIC SURGERY

## 2020-03-23 PROCEDURE — 25000132 ZZH RX MED GY IP 250 OP 250 PS 637: Performed by: PHYSICIAN ASSISTANT

## 2020-03-23 PROCEDURE — 25000128 H RX IP 250 OP 636: Performed by: PHYSICIAN ASSISTANT

## 2020-03-23 PROCEDURE — 71000015 ZZH RECOVERY PHASE 1 LEVEL 2 EA ADDTL HR: Performed by: ORTHOPAEDIC SURGERY

## 2020-03-23 PROCEDURE — 25000128 H RX IP 250 OP 636: Performed by: NURSE ANESTHETIST, CERTIFIED REGISTERED

## 2020-03-23 PROCEDURE — 25000565 ZZH ISOFLURANE, EA 15 MIN: Performed by: ORTHOPAEDIC SURGERY

## 2020-03-23 PROCEDURE — 40000170 ZZH STATISTIC PRE-PROCEDURE ASSESSMENT II: Performed by: ORTHOPAEDIC SURGERY

## 2020-03-23 PROCEDURE — 27210794 ZZH OR GENERAL SUPPLY STERILE: Performed by: ORTHOPAEDIC SURGERY

## 2020-03-23 PROCEDURE — 71000027 ZZH RECOVERY PHASE 2 EACH 15 MINS: Performed by: ORTHOPAEDIC SURGERY

## 2020-03-23 PROCEDURE — 86850 RBC ANTIBODY SCREEN: CPT | Performed by: PHYSICIAN ASSISTANT

## 2020-03-23 PROCEDURE — 40000985 XR LUMBAR SPINE PORT 1 VW

## 2020-03-23 PROCEDURE — 25000132 ZZH RX MED GY IP 250 OP 250 PS 637: Performed by: ANESTHESIOLOGY

## 2020-03-23 PROCEDURE — 25000128 H RX IP 250 OP 636: Performed by: ORTHOPAEDIC SURGERY

## 2020-03-23 PROCEDURE — 37000009 ZZH ANESTHESIA TECHNICAL FEE, EACH ADDTL 15 MIN: Performed by: ORTHOPAEDIC SURGERY

## 2020-03-23 PROCEDURE — 25000301 ZZH OR RX SURGIFLO W/THROMBIN KIT 2ML 1991 OPNP: Performed by: ORTHOPAEDIC SURGERY

## 2020-03-23 PROCEDURE — 36415 COLL VENOUS BLD VENIPUNCTURE: CPT | Performed by: PHYSICIAN ASSISTANT

## 2020-03-23 PROCEDURE — 25800030 ZZH RX IP 258 OP 636: Performed by: NURSE ANESTHETIST, CERTIFIED REGISTERED

## 2020-03-23 PROCEDURE — 25000125 ZZHC RX 250: Performed by: ORTHOPAEDIC SURGERY

## 2020-03-23 RX ORDER — HYDROMORPHONE HYDROCHLORIDE 1 MG/ML
.3-.5 INJECTION, SOLUTION INTRAMUSCULAR; INTRAVENOUS; SUBCUTANEOUS EVERY 10 MIN PRN
Status: DISCONTINUED | OUTPATIENT
Start: 2020-03-23 | End: 2020-03-23 | Stop reason: HOSPADM

## 2020-03-23 RX ORDER — NALOXONE HYDROCHLORIDE 0.4 MG/ML
.1-.4 INJECTION, SOLUTION INTRAMUSCULAR; INTRAVENOUS; SUBCUTANEOUS
Status: DISCONTINUED | OUTPATIENT
Start: 2020-03-23 | End: 2020-03-23 | Stop reason: HOSPADM

## 2020-03-23 RX ORDER — VANCOMYCIN HYDROCHLORIDE 1 G/20ML
INJECTION, POWDER, LYOPHILIZED, FOR SOLUTION INTRAVENOUS PRN
Status: DISCONTINUED | OUTPATIENT
Start: 2020-03-23 | End: 2020-03-23 | Stop reason: HOSPADM

## 2020-03-23 RX ORDER — CEFAZOLIN SODIUM 2 G/100ML
2 INJECTION, SOLUTION INTRAVENOUS
Status: COMPLETED | OUTPATIENT
Start: 2020-03-23 | End: 2020-03-23

## 2020-03-23 RX ORDER — OXYCODONE HYDROCHLORIDE 10 MG/1
10 TABLET ORAL EVERY 6 HOURS PRN
Status: COMPLETED | OUTPATIENT
Start: 2020-03-23 | End: 2020-03-23

## 2020-03-23 RX ORDER — SODIUM CHLORIDE, SODIUM LACTATE, POTASSIUM CHLORIDE, CALCIUM CHLORIDE 600; 310; 30; 20 MG/100ML; MG/100ML; MG/100ML; MG/100ML
INJECTION, SOLUTION INTRAVENOUS CONTINUOUS PRN
Status: DISCONTINUED | OUTPATIENT
Start: 2020-03-23 | End: 2020-03-23

## 2020-03-23 RX ORDER — DIAZEPAM 5 MG
5 TABLET ORAL EVERY 6 HOURS PRN
Qty: 45 TABLET | Refills: 0 | Status: SHIPPED | OUTPATIENT
Start: 2020-03-23 | End: 2020-06-19

## 2020-03-23 RX ORDER — GABAPENTIN 100 MG/1
300 CAPSULE ORAL
Status: COMPLETED | OUTPATIENT
Start: 2020-03-23 | End: 2020-03-23

## 2020-03-23 RX ORDER — LIDOCAINE HYDROCHLORIDE 20 MG/ML
INJECTION, SOLUTION INFILTRATION; PERINEURAL PRN
Status: DISCONTINUED | OUTPATIENT
Start: 2020-03-23 | End: 2020-03-23

## 2020-03-23 RX ORDER — SODIUM CHLORIDE, SODIUM LACTATE, POTASSIUM CHLORIDE, CALCIUM CHLORIDE 600; 310; 30; 20 MG/100ML; MG/100ML; MG/100ML; MG/100ML
INJECTION, SOLUTION INTRAVENOUS CONTINUOUS
Status: DISCONTINUED | OUTPATIENT
Start: 2020-03-23 | End: 2020-03-23 | Stop reason: HOSPADM

## 2020-03-23 RX ORDER — KETOROLAC TROMETHAMINE 30 MG/ML
INJECTION, SOLUTION INTRAMUSCULAR; INTRAVENOUS PRN
Status: DISCONTINUED | OUTPATIENT
Start: 2020-03-23 | End: 2020-03-23

## 2020-03-23 RX ORDER — BUPIVACAINE HYDROCHLORIDE AND EPINEPHRINE 5; 5 MG/ML; UG/ML
INJECTION, SOLUTION PERINEURAL PRN
Status: DISCONTINUED | OUTPATIENT
Start: 2020-03-23 | End: 2020-03-23 | Stop reason: HOSPADM

## 2020-03-23 RX ORDER — FENTANYL CITRATE 50 UG/ML
INJECTION, SOLUTION INTRAMUSCULAR; INTRAVENOUS PRN
Status: DISCONTINUED | OUTPATIENT
Start: 2020-03-23 | End: 2020-03-23

## 2020-03-23 RX ORDER — ONDANSETRON 2 MG/ML
4 INJECTION INTRAMUSCULAR; INTRAVENOUS EVERY 30 MIN PRN
Status: DISCONTINUED | OUTPATIENT
Start: 2020-03-23 | End: 2020-03-23 | Stop reason: HOSPADM

## 2020-03-23 RX ORDER — GABAPENTIN 300 MG/1
600 CAPSULE ORAL ONCE
Status: DISCONTINUED | OUTPATIENT
Start: 2020-03-23 | End: 2020-03-23 | Stop reason: HOSPADM

## 2020-03-23 RX ORDER — LIDOCAINE 40 MG/G
CREAM TOPICAL
Status: DISCONTINUED | OUTPATIENT
Start: 2020-03-23 | End: 2020-03-23 | Stop reason: HOSPADM

## 2020-03-23 RX ORDER — HYDROCODONE BITARTRATE AND ACETAMINOPHEN 5; 325 MG/1; MG/1
1 TABLET ORAL
Status: DISCONTINUED | OUTPATIENT
Start: 2020-03-23 | End: 2020-03-23 | Stop reason: HOSPADM

## 2020-03-23 RX ORDER — CEFAZOLIN SODIUM 1 G/3ML
1 INJECTION, POWDER, FOR SOLUTION INTRAMUSCULAR; INTRAVENOUS SEE ADMIN INSTRUCTIONS
Status: DISCONTINUED | OUTPATIENT
Start: 2020-03-23 | End: 2020-03-23 | Stop reason: HOSPADM

## 2020-03-23 RX ORDER — ACETAMINOPHEN 325 MG/1
975 TABLET ORAL ONCE
Status: DISCONTINUED | OUTPATIENT
Start: 2020-03-23 | End: 2020-03-23 | Stop reason: HOSPADM

## 2020-03-23 RX ORDER — ONDANSETRON 2 MG/ML
INJECTION INTRAMUSCULAR; INTRAVENOUS PRN
Status: DISCONTINUED | OUTPATIENT
Start: 2020-03-23 | End: 2020-03-23

## 2020-03-23 RX ORDER — ACETAMINOPHEN 325 MG/1
975 TABLET ORAL ONCE
Status: COMPLETED | OUTPATIENT
Start: 2020-03-23 | End: 2020-03-23

## 2020-03-23 RX ORDER — KETAMINE HYDROCHLORIDE 10 MG/ML
INJECTION INTRAMUSCULAR; INTRAVENOUS PRN
Status: DISCONTINUED | OUTPATIENT
Start: 2020-03-23 | End: 2020-03-23

## 2020-03-23 RX ORDER — MEPERIDINE HYDROCHLORIDE 25 MG/ML
12.5 INJECTION INTRAMUSCULAR; INTRAVENOUS; SUBCUTANEOUS
Status: DISCONTINUED | OUTPATIENT
Start: 2020-03-23 | End: 2020-03-23 | Stop reason: HOSPADM

## 2020-03-23 RX ORDER — HYDROCODONE BITARTRATE AND ACETAMINOPHEN 5; 325 MG/1; MG/1
1-2 TABLET ORAL EVERY 4 HOURS PRN
Qty: 10 TABLET | Refills: 0 | Status: SHIPPED | OUTPATIENT
Start: 2020-03-23 | End: 2020-03-24

## 2020-03-23 RX ORDER — AMOXICILLIN 250 MG
1-2 CAPSULE ORAL 2 TIMES DAILY
Qty: 30 TABLET | Refills: 0 | Status: SHIPPED | OUTPATIENT
Start: 2020-03-23 | End: 2020-06-19

## 2020-03-23 RX ORDER — FENTANYL CITRATE 50 UG/ML
25-50 INJECTION, SOLUTION INTRAMUSCULAR; INTRAVENOUS
Status: DISCONTINUED | OUTPATIENT
Start: 2020-03-23 | End: 2020-03-23 | Stop reason: HOSPADM

## 2020-03-23 RX ORDER — ONDANSETRON 4 MG/1
4 TABLET, ORALLY DISINTEGRATING ORAL EVERY 30 MIN PRN
Status: DISCONTINUED | OUTPATIENT
Start: 2020-03-23 | End: 2020-03-23 | Stop reason: HOSPADM

## 2020-03-23 RX ORDER — DEXAMETHASONE SODIUM PHOSPHATE 4 MG/ML
INJECTION, SOLUTION INTRA-ARTICULAR; INTRALESIONAL; INTRAMUSCULAR; INTRAVENOUS; SOFT TISSUE PRN
Status: DISCONTINUED | OUTPATIENT
Start: 2020-03-23 | End: 2020-03-23

## 2020-03-23 RX ORDER — ONDANSETRON 4 MG/1
4-8 TABLET, ORALLY DISINTEGRATING ORAL EVERY 8 HOURS PRN
Qty: 4 TABLET | Refills: 0 | Status: SHIPPED | OUTPATIENT
Start: 2020-03-23 | End: 2020-06-19

## 2020-03-23 RX ORDER — PROPOFOL 10 MG/ML
INJECTION, EMULSION INTRAVENOUS PRN
Status: DISCONTINUED | OUTPATIENT
Start: 2020-03-23 | End: 2020-03-23

## 2020-03-23 RX ORDER — ACETAMINOPHEN 325 MG/1
650 TABLET ORAL
Status: DISCONTINUED | OUTPATIENT
Start: 2020-03-23 | End: 2020-03-23 | Stop reason: HOSPADM

## 2020-03-23 RX ADMIN — LIDOCAINE HYDROCHLORIDE 100 MG: 20 INJECTION, SOLUTION INFILTRATION; PERINEURAL at 07:26

## 2020-03-23 RX ADMIN — HYDROMORPHONE HYDROCHLORIDE 0.5 MG: 1 INJECTION, SOLUTION INTRAMUSCULAR; INTRAVENOUS; SUBCUTANEOUS at 09:28

## 2020-03-23 RX ADMIN — CEFAZOLIN 2 G: 10 INJECTION, POWDER, FOR SOLUTION INTRAVENOUS at 07:36

## 2020-03-23 RX ADMIN — DEXAMETHASONE SODIUM PHOSPHATE 4 MG: 4 INJECTION, SOLUTION INTRAMUSCULAR; INTRAVENOUS at 07:42

## 2020-03-23 RX ADMIN — ROCURONIUM BROMIDE 40 MG: 10 INJECTION INTRAVENOUS at 07:28

## 2020-03-23 RX ADMIN — Medication 10 MG: at 08:30

## 2020-03-23 RX ADMIN — FENTANYL CITRATE 100 MCG: 50 INJECTION, SOLUTION INTRAMUSCULAR; INTRAVENOUS at 07:24

## 2020-03-23 RX ADMIN — Medication 10 MG: at 08:08

## 2020-03-23 RX ADMIN — GABAPENTIN 300 MG: 300 CAPSULE ORAL at 06:06

## 2020-03-23 RX ADMIN — Medication 60 MG: at 07:26

## 2020-03-23 RX ADMIN — ONDANSETRON 4 MG: 2 INJECTION INTRAMUSCULAR; INTRAVENOUS at 08:33

## 2020-03-23 RX ADMIN — HYDROMORPHONE HYDROCHLORIDE 0.5 MG: 1 INJECTION, SOLUTION INTRAMUSCULAR; INTRAVENOUS; SUBCUTANEOUS at 10:05

## 2020-03-23 RX ADMIN — PROPOFOL 300 MG: 10 INJECTION, EMULSION INTRAVENOUS at 07:24

## 2020-03-23 RX ADMIN — ACETAMINOPHEN 975 MG: 325 TABLET, FILM COATED ORAL at 06:06

## 2020-03-23 RX ADMIN — ROCURONIUM BROMIDE 10 MG: 10 INJECTION INTRAVENOUS at 07:47

## 2020-03-23 RX ADMIN — KETOROLAC TROMETHAMINE 30 MG: 30 INJECTION, SOLUTION INTRAMUSCULAR at 09:05

## 2020-03-23 RX ADMIN — MIDAZOLAM 2 MG: 1 INJECTION INTRAMUSCULAR; INTRAVENOUS at 07:20

## 2020-03-23 RX ADMIN — SODIUM CHLORIDE, POTASSIUM CHLORIDE, SODIUM LACTATE AND CALCIUM CHLORIDE: 600; 310; 30; 20 INJECTION, SOLUTION INTRAVENOUS at 07:20

## 2020-03-23 RX ADMIN — SUGAMMADEX 200 MG: 100 INJECTION, SOLUTION INTRAVENOUS at 09:16

## 2020-03-23 RX ADMIN — OXYCODONE HYDROCHLORIDE 10 MG: 5 TABLET ORAL at 10:34

## 2020-03-23 RX ADMIN — HYDROMORPHONE HYDROCHLORIDE 0.5 MG: 1 INJECTION, SOLUTION INTRAMUSCULAR; INTRAVENOUS; SUBCUTANEOUS at 09:45

## 2020-03-23 RX ADMIN — ROCURONIUM BROMIDE 20 MG: 10 INJECTION INTRAVENOUS at 08:26

## 2020-03-23 RX ADMIN — FENTANYL CITRATE 50 MCG: 50 INJECTION, SOLUTION INTRAMUSCULAR; INTRAVENOUS at 09:02

## 2020-03-23 RX ADMIN — Medication 20 MG: at 07:47

## 2020-03-23 RX ADMIN — FENTANYL CITRATE 50 MCG: 50 INJECTION, SOLUTION INTRAMUSCULAR; INTRAVENOUS at 08:35

## 2020-03-23 ASSESSMENT — MIFFLIN-ST. JEOR: SCORE: 1802.75

## 2020-03-23 NOTE — DISCHARGE INSTRUCTIONS
After Back Surgery: Going Home    The sooner you become active, the sooner you ll get back to your normal routine. At the same time, you need to protect your healing back. Increase your activity level at a slow but steady pace. You may also see a physical therapist during this time. Follow any guidelines your doctor or physical therapist gives you.  Your first few weeks  You ll likely feel weak and tired at first, but you should feel a little stronger each day. Your incision may be sore. You may also feel some pain, tingling, or numbness in your back or legs. All of these symptoms should decrease as your nerves heal. Keep moving as much as you can without making your pain increase. Take your pain medicine as prescribed to keep the pain from becoming intolerable.  Your walking program  Walking is the best exercise for you after back surgery. It strengthens your back and leg muscles, increases your endurance, and relieves stress. Start by walking around the house. Build up to several walks a day. You may find it helpful to set a goal. Talk to your doctor or physical therapist about setting a safe, realistic goal for yourself.  Preventing setbacks  Increased pain for more than 2 hours after an activity means you ve done too much too soon. When you feel pain, slow down and pay more attention to your posture and movements. By about the sixth week after your surgery, you should be well on the way to healing. But continue to let pain be a warning to slow down.     When to call your healthcare provider  Call your doctor right away if you:    Feel persistent or severe pain, weakness, or numbness in your back or legs    Notice drainage, swelling, or increased redness around your incision or if there is a bad smell    Have a fever, severe headache, or extreme tiredness    Have trouble breathing or chest pain    Have problems controlling your bladder or bowels    Have swelling, pain, redness, or tenderness in your calf,  ankle, or foot   Date Last Reviewed: 5/1/2018 2000-2019 [employer]. [address] [place] [address], [place] [address]. All rights reserved. This information is not intended as a substitute for professional medical care. Always follow your healthcare professional's instructions.      Same-Day Surgery   Adult Discharge Orders & Instructions     For 24 hours after surgery:  1. Get plenty of rest.  A responsible adult must stay with you for at least 24 hours after you leave the hospital.   2. Pain medication can slow your reflexes. Do not drive or use heavy equipment.  If you have weakness or tingling, don't drive or use heavy equipment until this feeling goes away.  3. Mixing alcohol and pain medication can cause dizziness and slow your breathing. It can even be fatal. Do not drink alcohol while taking pain medication.  4. Avoid strenuous or risky activities.  Ask for help when climbing stairs.   5. You may feel lightheaded.  If so, sit for a few minutes before standing.  Have someone help you get up.   6. If you have nausea (feel sick to your stomach), drink only clear liquids such as apple juice, ginger ale, broth or 7-Up.  Rest may also help.  Be sure to drink enough fluids.  Move to a regular diet as you feel able. Take pain medications with a small amount of solid food, such as toast or crackers, to avoid nausea.   7. A slight fever is normal. Call the doctor if your fever is over 100 F (37.7 C) (taken under the tongue) or lasts longer than 24 hours.  8. You may have a dry mouth, muscle aches, trouble sleeping or a sore throat.  These symptoms should go away after 24 hours.  9. Do not make important or legal decisions.   Pain Management:      1. Take pain medication (if prescribed) for pain as directed by your physician.        2. WARNING: If the pain medication you have been prescribed contains Tylenol  (acetaminophen), DO NOT take additional doses of Tylenol (acetaminophen).     Call your doctor for  any of the followin.  Signs of infection (fever, growing tenderness at the surgery site, severe pain, a large amount of drainage or bleeding, foul-smelling drainage, redness, swelling).    2.  It has been over 8 to 10 hours since surgery and you are still not able to urinate (pee).    3.  Headache for over 24 hours.    4.  Numbness, tingling or weakness the day after surgery (if you had spinal anesthesia).  To contact a doctor, call _____________________________________ or:      [phone] and ask for the Resident On Call for:          __________________________________________ (answered 24 hours a day)      Emergency Department:  [facility]: [phone]  [place] [facility]: [phone]               Rev. 10/2014

## 2020-03-23 NOTE — ANESTHESIA CARE TRANSFER NOTE
Patient: Jas Patel    Procedure(s):  Revision Lumbar 4 to 5 decompression    Diagnosis: Lumbar radiculopathy [M54.16]  Spinal stenosis of lumbar region with neurogenic claudication [M48.062]  Diagnosis Additional Information: No value filed.    Anesthesia Type:   General     Note:  Airway :Face Mask  Patient transferred to:PACU  Handoff Report: Identifed the Patient, Identified the Reponsible Provider, Reviewed the pertinent medical history, Discussed the surgical course, Reviewed Intra-OP anesthesia mangement and issues during anesthesia, Set expectations for post-procedure period and Allowed opportunity for questions and acknowledgement of understanding      Vitals: (Last set prior to Anesthesia Care Transfer)    CRNA VITALS  3/23/2020 0851 - 3/23/2020 0927      3/23/2020             Pulse:  86    SpO2:  94 %    Resp Rate (observed):  (!) 40                Electronically Signed By: RAFAEL Mckeon CRNA  March 23, 2020  9:27 AM

## 2020-03-23 NOTE — OR NURSING
1020 md Jaci paged re pt question for more than 1 norco for post op pain management.  1025 md Jaci to bedside to talk with patient  1030 md Heidy called. Pt requesting oxy 10 mg for 1 time dose. Md made aware of conversation between jaci and patient. Order received from md heidy and ok to sign out as well.

## 2020-03-23 NOTE — OP NOTE
DATE OF SURGERY: 3/23/2020    PREOPERATIVE DIAGNOSIS: Lumbar radiculopathy   Spinal stenosis of lumbar region with neurogenic claudication         POSTOPERATIVE DIAGNOSIS: Same    PROCEDURES:  22-Modifier: This was a revision procedure.  There was dense adherent scar tissue in the spinal canal.  It took quite a bit of extra time to dissect through this safely and identify the traversing L5 nerve root and thus I have added a 22 modifier to the entire procedure.  The scar did increase the complexity of the dissection and increase the risk of a dural tear, which fortunately we did not have, but this lengthened the procedure.  1. Revision Lumbar 4 to 5 decompression and right discectomy    PRIMARY SURGEON: Juvenal Dexter MD    FIRST ASSISTANT: None    ANESTHESIA: General Endotracheal    COMPLICATIONS:  None.    SPECIMENS: None.    ESTIMATED BLOOD LOSS: 10 mL    INDICATIONS:                          Jas Patel is a 49 year old male who elected surgical treatment, and understood the indications for this surgery, as well as its risks, benefits, and alternatives as documented in the pre-operative H&P.  Specifically, we reviewed the risks and benefits of the surgery in detail. The risks include, but are not limited to, the general risks associated with anesthesia, including death, pulmonary embolism, DVT, stroke, myocardial infarction, pneumonia, and urinary tract infection. Additional risks specific to the surgery include the risk of infection, dural tear with resultant CSF leak which might necessitate placement of a drain or revision surgery or could result in headaches, nerve injury resulting in weakness or paralysis, risk of adjacent segment disease, the risks of vascular injury, need for revision surgery in the future due to one of the above issues, or risk of incomplete symptom relief. Jas Patel understands the risks of the surgery and wishes to proceed.  No Guarantees were given.       DESCRIPTION OF  PROCEDURE:           Jas Patel was taken to the operating  room, where the Anesthesiology Service induced satisfactory general anesthesia.  Ancef was given IV.  Venous thromboembolic prophylaxis was performed with sequential devices.  A Patino catheter was placed under standard sterile techniques.  The patient was placed prone on an open OSI frame with the abdomen hanging free and all bony prominences well padded.  The low back was then prepped and draped in its entirety in the usual sterile fashion.  We then held a multidisciplinary time out in which we verified the patient, procedure, antibiotics, and operative plan.  All team members were in agreement.     We made a midline incision and a bilateral subperiosteal exposure was performed of the L4 through L5 spinous processes and medial lamina.  A needle was placed into the medial facet joint at the caudal most level and a final portable X-Ray image was then obtained to verify our position.     I then went about using a large curved curette to clean off the lamina of the scar.  As noted above there was quite a bit of adherent scar tissue.  I was able to work through this and identify the leading edge of the lamina.  I then undercut the lamina with a curette and detached the scar tissue from it, removing a lot of scar tissue in a piecemeal fashion with a rondure.  Eventually I was able to work into the lateral recess bilaterally with a curette.  I also used a 2 mm and 3 mm Kerrison to resect a small amount of the additional L4 lamina and L5 lamina and I also worked out with a 2 mm Kerrison into the foramen between L4 and L5 to make sure that things were freed up laterally and that I could visualize the lateral portion of the disc herniation as well as the exiting L4 nerve root.  Again the dura was quite adherent to the disc space beneath.  I focused on the right side where the disc herniation was concentrated and I was able to identify the traversing L5 nerve  root I detached it from quite a bit of scar tissue up against the L5 pedicle.  I then worked underneath it again with a curved curette and a Harrold.  I was able to identify about 2 cc of scarred disc material.  I had to peel this off of the nerve with a curved curette.  I then identified the disc space.  I mobilized the dura off of this and again it was quite stuck ventrally but taking great care we were able to avoid any spinal fluid leaks fortunately.  Once this was mobilized I then entered the disc space with a 15 blade.  Again there is about 1/2 cc more of disc material in this region.  I was unable to pass a Harrold from the L4 endplate to the L5 endplates and I could see the ventral bone.  I had removed all of the soft tissue between the dura and the bone.  I was able to pass the Harrold quite freely ventrally underneath the dura all the way across to the left side as well as down along the exiting and traversing nerve roots.  In doing this, I was able to verify that the exiting L4 and traversing L5 nerve roots were both quite free.    The wound was then thoroughly irrigated.  Hemostasis was achieved.  A hemovac drain was not placed.  The wound was closed in layers with vicryl suture, followed by monocryl and dermabond for the skin.  A sterile dressing was applied. The patient was turned supine, extubated, and returned to the recovery area in stable condition.      I was present and scrubbed for the critical portions of the procedure including the exposure and decompression.    Juvenal Dexter MD

## 2020-03-23 NOTE — ANESTHESIA POSTPROCEDURE EVALUATION
Anesthesia POST Procedure Evaluation    Patient: Jas Patel   MRN:     [id number] Gender:   male   Age:    49 year old :      1970        Preoperative Diagnosis: Lumbar radiculopathy [M54.16]  Spinal stenosis of lumbar region with neurogenic claudication [M48.062]   Procedure(s):  Revision Lumbar 4 to 5 decompression   Postop Comments: No value filed.     Anesthesia Type: General          Postop Pain Control: Uneventful            Sign Out: Well controlled pain   PONV: No   Neuro/Psych: Uneventful            Sign Out: Acceptable/Baseline neuro status   Airway/Respiratory: Uneventful            Sign Out: Acceptable/Baseline resp. status   CV/Hemodynamics: Uneventful            Sign Out: Acceptable CV status   Other NRE: NONE   DID A NON-ROUTINE EVENT OCCUR? No         Last Anesthesia Record Vitals:  CRNA VITALS  3/23/2020 0851 - 3/23/2020 0951      3/23/2020             EKG:  Sinus rhythm          Last PACU Vitals:  Vitals Value Taken Time   /86 3/23/2020 10:45 AM   Temp 36.3  C (97.4  F) 3/23/2020 10:45 AM   Pulse 78 3/23/2020 10:45 AM   Resp 6 3/23/2020 10:49 AM   SpO2 93 % 3/23/2020 10:49 AM   Temp src     NIBP     Pulse     SpO2     Resp     Temp     Ht Rate     Temp 2     Vitals shown include unvalidated device data.      Electronically Signed By: Sen Stinson MD, 2020, 10:49 AM

## 2020-03-24 ENCOUNTER — MYC REFILL (OUTPATIENT)
Dept: FAMILY MEDICINE | Facility: CLINIC | Age: 50
End: 2020-03-24

## 2020-03-24 ENCOUNTER — MYC MEDICAL ADVICE (OUTPATIENT)
Dept: ORTHOPEDICS | Facility: CLINIC | Age: 50
End: 2020-03-24

## 2020-03-24 DIAGNOSIS — M48.062 SPINAL STENOSIS OF LUMBAR REGION WITH NEUROGENIC CLAUDICATION: ICD-10-CM

## 2020-03-24 DIAGNOSIS — G47.00 INSOMNIA, UNSPECIFIED TYPE: ICD-10-CM

## 2020-03-24 DIAGNOSIS — M54.16 LUMBAR RADICULOPATHY: ICD-10-CM

## 2020-03-24 DIAGNOSIS — G89.18 POST-OPERATIVE PAIN: Primary | ICD-10-CM

## 2020-03-24 DIAGNOSIS — G89.18 POST-OPERATIVE PAIN: ICD-10-CM

## 2020-03-24 RX ORDER — HYDROCODONE BITARTRATE AND ACETAMINOPHEN 5; 325 MG/1; MG/1
1-2 TABLET ORAL EVERY 4 HOURS PRN
Qty: 60 TABLET | Refills: 0 | Status: SHIPPED | OUTPATIENT
Start: 2020-03-24 | End: 2020-03-30

## 2020-03-24 NOTE — TELEPHONE ENCOUNTER
RN sent med refill to [facility] to sign and authorize.  Patient is notified via [employer].    Murray Parker RN

## 2020-03-24 NOTE — TELEPHONE ENCOUNTER
"Requested Prescriptions   Pending Prescriptions Disp Refills     amitriptyline (ELAVIL) 25 MG tablet 90 tablet 3     Sig: TAKE ONE TABLET BY MOUTH AT BEDTIME       Tricyclic Agents ( Annual appt and no PHQ9) Failed - 3/24/2020 11:04 AM        Failed - Blood Pressure under 140/90 in past 12 mos     BP Readings from Last 3 Encounters:   03/23/20 (!) 132/92   03/19/20 114/81   03/17/20 138/84                 Passed - Recent (12 mo) or future (30 days) visit within authorizing provider's specialty     Patient has had an office visit with the authorizing provider or a provider within the authorizing providers department within the previous 12 mos or has a future within next 30 days. See \"Patient Info\" tab in inbasket, or \"Choose Columns\" in Meds & Orders section of the refill encounter.              Passed - Medication is active on med list        Passed - Patient is age 18 or older         Refills on file - sent update        "

## 2020-03-30 RX ORDER — HYDROCODONE BITARTRATE AND ACETAMINOPHEN 5; 325 MG/1; MG/1
1-2 TABLET ORAL EVERY 4 HOURS PRN
Qty: 50 TABLET | Refills: 0 | Status: SHIPPED | OUTPATIENT
Start: 2020-03-30 | End: 2020-04-06

## 2020-04-06 ENCOUNTER — MYC REFILL (OUTPATIENT)
Dept: ORTHOPEDICS | Facility: CLINIC | Age: 50
End: 2020-04-06

## 2020-04-06 DIAGNOSIS — M48.062 SPINAL STENOSIS OF LUMBAR REGION WITH NEUROGENIC CLAUDICATION: ICD-10-CM

## 2020-04-06 DIAGNOSIS — G89.18 POST-OPERATIVE PAIN: ICD-10-CM

## 2020-04-06 DIAGNOSIS — M54.16 LUMBAR RADICULOPATHY: ICD-10-CM

## 2020-04-06 RX ORDER — HYDROCODONE BITARTRATE AND ACETAMINOPHEN 5; 325 MG/1; MG/1
1-2 TABLET ORAL EVERY 4 HOURS PRN
Qty: 50 TABLET | Refills: 0 | Status: SHIPPED | OUTPATIENT
Start: 2020-04-06 | End: 2020-04-10

## 2020-04-08 DIAGNOSIS — F41.1 GENERALIZED ANXIETY DISORDER: ICD-10-CM

## 2020-04-08 DIAGNOSIS — G25.0 BENIGN ESSENTIAL TREMOR: ICD-10-CM

## 2020-04-09 ENCOUNTER — MYC REFILL (OUTPATIENT)
Dept: FAMILY MEDICINE | Facility: CLINIC | Age: 50
End: 2020-04-09

## 2020-04-09 DIAGNOSIS — F41.1 GENERALIZED ANXIETY DISORDER: ICD-10-CM

## 2020-04-09 DIAGNOSIS — G25.0 BENIGN ESSENTIAL TREMOR: ICD-10-CM

## 2020-04-09 RX ORDER — PROPRANOLOL HYDROCHLORIDE 10 MG/1
TABLET ORAL
Qty: 180 TABLET | Refills: 3 | Status: CANCELLED | OUTPATIENT
Start: 2020-04-09

## 2020-04-09 RX ORDER — PROPRANOLOL HYDROCHLORIDE 10 MG/1
TABLET ORAL
Qty: 180 TABLET | Refills: 1 | Status: SHIPPED | OUTPATIENT
Start: 2020-04-09 | End: 2020-11-16

## 2020-04-09 NOTE — TELEPHONE ENCOUNTER
Routing refill request to provider for review/approval because:  --Last blood pressure elevated on day of surgery.  --Last office visit 8/30/19.  --Med is for tremor and anxiety.              BP Readings from Last 6 Encounters:   03/23/20 (!) 132/92   03/19/20 114/81   03/17/20 138/84   03/03/20 112/66   01/23/20 139/80   12/27/19 128/87

## 2020-04-10 ENCOUNTER — MYC REFILL (OUTPATIENT)
Dept: ORTHOPEDICS | Facility: CLINIC | Age: 50
End: 2020-04-10

## 2020-04-10 DIAGNOSIS — M54.16 LUMBAR RADICULOPATHY: ICD-10-CM

## 2020-04-10 DIAGNOSIS — G89.18 POST-OPERATIVE PAIN: ICD-10-CM

## 2020-04-10 DIAGNOSIS — M48.062 SPINAL STENOSIS OF LUMBAR REGION WITH NEUROGENIC CLAUDICATION: ICD-10-CM

## 2020-04-10 RX ORDER — HYDROCODONE BITARTRATE AND ACETAMINOPHEN 5; 325 MG/1; MG/1
1-2 TABLET ORAL EVERY 4 HOURS PRN
Qty: 50 TABLET | Refills: 0 | OUTPATIENT
Start: 2020-04-10

## 2020-04-10 RX ORDER — HYDROCODONE BITARTRATE AND ACETAMINOPHEN 5; 325 MG/1; MG/1
1-2 TABLET ORAL EVERY 4 HOURS PRN
Qty: 50 TABLET | Refills: 0 | Status: SHIPPED | OUTPATIENT
Start: 2020-04-10 | End: 2020-04-15

## 2020-04-10 NOTE — TELEPHONE ENCOUNTER
Called to check in on patient today. He says he is still having quite a bit of leg pain, similar to that from before surgery. His incisional back pain is slowly improving. However, he had an episode of stomach flu earlier this week and vomitted quite a bit so has flared things up again. Reassured patient that he is still only 3 weeks out from revision surgery, and would expect things to slowly improve over next few weeks. The nerves that were impinged by disc herniation are probably still inflamed and should continue to calm down with time.  Will refill Norco 5-325mg , 1-2 tabs every 4 hours today. Encouraged him to start to try to wean off as tolerated. He says he takes every 4-5 hours and will start to try to take 1 tab vs. 2 tabs at a time.     Gosia Flores PA-C on 4/10/2020 at 10:45 AM

## 2020-04-15 ENCOUNTER — MYC REFILL (OUTPATIENT)
Dept: ORTHOPEDICS | Facility: CLINIC | Age: 50
End: 2020-04-15

## 2020-04-15 DIAGNOSIS — M54.16 LUMBAR RADICULOPATHY: ICD-10-CM

## 2020-04-15 DIAGNOSIS — G89.18 POST-OPERATIVE PAIN: ICD-10-CM

## 2020-04-15 DIAGNOSIS — M48.062 SPINAL STENOSIS OF LUMBAR REGION WITH NEUROGENIC CLAUDICATION: ICD-10-CM

## 2020-04-15 RX ORDER — HYDROCODONE BITARTRATE AND ACETAMINOPHEN 5; 325 MG/1; MG/1
1-2 TABLET ORAL EVERY 4 HOURS PRN
Qty: 50 TABLET | Refills: 0 | Status: SHIPPED | OUTPATIENT
Start: 2020-04-15 | End: 2020-04-20

## 2020-04-20 ENCOUNTER — MYC REFILL (OUTPATIENT)
Dept: ORTHOPEDICS | Facility: CLINIC | Age: 50
End: 2020-04-20

## 2020-04-20 ENCOUNTER — MYC REFILL (OUTPATIENT)
Dept: FAMILY MEDICINE | Facility: CLINIC | Age: 50
End: 2020-04-20

## 2020-04-20 DIAGNOSIS — M48.062 SPINAL STENOSIS OF LUMBAR REGION WITH NEUROGENIC CLAUDICATION: ICD-10-CM

## 2020-04-20 DIAGNOSIS — G89.18 POST-OPERATIVE PAIN: ICD-10-CM

## 2020-04-20 DIAGNOSIS — F41.1 GENERALIZED ANXIETY DISORDER: ICD-10-CM

## 2020-04-20 DIAGNOSIS — M54.16 LUMBAR RADICULOPATHY: ICD-10-CM

## 2020-04-20 RX ORDER — HYDROCODONE BITARTRATE AND ACETAMINOPHEN 5; 325 MG/1; MG/1
1-2 TABLET ORAL EVERY 4 HOURS PRN
Qty: 50 TABLET | Refills: 0 | Status: SHIPPED | OUTPATIENT
Start: 2020-04-20 | End: 2020-04-24

## 2020-04-22 RX ORDER — CITALOPRAM HYDROBROMIDE 40 MG/1
40 TABLET ORAL DAILY
Qty: 90 TABLET | Refills: 0 | Status: SHIPPED | OUTPATIENT
Start: 2020-04-22 | End: 2020-06-19

## 2020-04-22 NOTE — TELEPHONE ENCOUNTER
Routing refill request to provider for review/approval because:  --New interaction warning.

## 2020-05-01 ENCOUNTER — MYC REFILL (OUTPATIENT)
Dept: ORTHOPEDICS | Facility: CLINIC | Age: 50
End: 2020-05-01

## 2020-05-01 DIAGNOSIS — G89.18 POST-OPERATIVE PAIN: ICD-10-CM

## 2020-05-01 DIAGNOSIS — M48.062 SPINAL STENOSIS OF LUMBAR REGION WITH NEUROGENIC CLAUDICATION: ICD-10-CM

## 2020-05-01 DIAGNOSIS — M54.16 LUMBAR RADICULOPATHY: ICD-10-CM

## 2020-05-01 RX ORDER — HYDROCODONE BITARTRATE AND ACETAMINOPHEN 5; 325 MG/1; MG/1
1-2 TABLET ORAL EVERY 6 HOURS PRN
Qty: 50 TABLET | Refills: 0 | Status: SHIPPED | OUTPATIENT
Start: 2020-05-01 | End: 2020-05-11

## 2020-05-04 ENCOUNTER — TELEPHONE (OUTPATIENT)
Dept: ORTHOPEDICS | Facility: CLINIC | Age: 50
End: 2020-05-04

## 2020-05-04 NOTE — TELEPHONE ENCOUNTER
RN returned patient's call. RN gave Altagracia Darell's number fax and e-mail to patient. RN also told patient that to play it safe, he should cancel tmr's appt and then just reschedule with Dr. Dexter once his insurance is sorted out. Patient expressed understanding, will get hold of Altagracia today.    Murray Parker RN      [facility]    Phone Message    May a detailed message be left on voicemail: yes     Reason for Call: Other:   Pt found out on Friday that he lost his insurance. Pt is working on recovering that. Pt is filing for extension of benefits and have forms that he needs to email to Dr Dexter to fill out asap. Pt wondering if he can send these to Isacc's email?     Pt also wondering about his appt tomorrow as well since he lost his insurance. Pt would like to know if he should keep the appt.     Please follow-up asap.     Action Taken: Other:  ortho    Travel Screening: Not Applicable

## 2020-05-11 ENCOUNTER — MYC REFILL (OUTPATIENT)
Dept: ORTHOPEDICS | Facility: CLINIC | Age: 50
End: 2020-05-11

## 2020-05-11 DIAGNOSIS — G89.18 POST-OPERATIVE PAIN: ICD-10-CM

## 2020-05-11 DIAGNOSIS — M48.062 SPINAL STENOSIS OF LUMBAR REGION WITH NEUROGENIC CLAUDICATION: Primary | ICD-10-CM

## 2020-05-11 DIAGNOSIS — M48.062 SPINAL STENOSIS OF LUMBAR REGION WITH NEUROGENIC CLAUDICATION: ICD-10-CM

## 2020-05-11 DIAGNOSIS — M54.16 LUMBAR RADICULOPATHY: ICD-10-CM

## 2020-05-11 RX ORDER — TRAMADOL HYDROCHLORIDE 50 MG/1
50 TABLET ORAL EVERY 6 HOURS PRN
Qty: 40 TABLET | Refills: 0 | Status: SHIPPED | OUTPATIENT
Start: 2020-05-11 | End: 2020-05-21

## 2020-05-11 NOTE — TELEPHONE ENCOUNTER
Per patient's request, RN send med refill of Tramadol to [facility] M[facility]ILL[facility] to sign and authorize.  Patient previously has been taking Norco and graduating down to Tramadol is good and RN told patient that is a good plan.    Murray Parker RN

## 2020-05-12 ENCOUNTER — DOCUMENTATION ONLY (OUTPATIENT)
Dept: CARE COORDINATION | Facility: CLINIC | Age: 50
End: 2020-05-12

## 2020-05-12 RX ORDER — HYDROCODONE BITARTRATE AND ACETAMINOPHEN 5; 325 MG/1; MG/1
1-2 TABLET ORAL EVERY 6 HOURS PRN
Qty: 50 TABLET | Refills: 0 | Status: SHIPPED | OUTPATIENT
Start: 2020-05-12 | End: 2020-06-19

## 2020-05-14 DIAGNOSIS — M54.16 LUMBAR RADICULOPATHY: Primary | ICD-10-CM

## 2020-05-15 ENCOUNTER — MYC MEDICAL ADVICE (OUTPATIENT)
Dept: ORTHOPEDICS | Facility: CLINIC | Age: 50
End: 2020-05-15

## 2020-05-15 ENCOUNTER — VIRTUAL VISIT (OUTPATIENT)
Dept: ORTHOPEDICS | Facility: CLINIC | Age: 50
End: 2020-05-15

## 2020-05-15 DIAGNOSIS — M48.062 SPINAL STENOSIS OF LUMBAR REGION WITH NEUROGENIC CLAUDICATION: Primary | ICD-10-CM

## 2020-05-15 NOTE — NURSING NOTE
Reason For Visit:   Chief Complaint   Patient presents with     Follow Up     6 week POP revision DOS 3/23/20       There were no vitals taken for this visit.         Deejay Pierce, ATC

## 2020-05-15 NOTE — PROGRESS NOTES
"Jas Patel is a 50 year old male who is being evaluated via a billable telephone visit.      The patient has been notified of following:     \"This telephone visit will be conducted via a call between you and your physician/provider. We have found that certain health care needs can be provided without the need for a physical exam.  This service lets us provide the care you need with a short phone conversation.  If a prescription is necessary we can send it directly to your pharmacy.  If lab work is needed we can place an order for that and you can then stop by our lab to have the test done at a later time.    Telephone visits are billed at different rates depending on your insurance coverage. During this emergency period, for some insurers they may be billed the same as an in-person visit.  Please reach out to your insurance provider with any questions.    If during the course of the call the physician/provider feels a telephone visit is not appropriate, you will not be charged for this service.\"    Patient has given verbal consent for Telephone visit?  Yes    What phone number would you like to be contacted at? home    How would you like to obtain your AVS? Nimisha    Phone call duration:8 minutes    I called and spoke with Jas today.  He feels like over the last couple of weeks he has made a significant recovery and the leg symptoms are basically gone at this time.  He describes a mild backache which for him is expected at this point in his recovery.  He has been taking Tylenol.  He did add some tramadol occasionally but overall he is happy with his pain control at this time.    His wound is healed well per his report.  He and I discussed return to activities.  At this point I think he can begin advancing things as tolerated.  We discussed a gradual return with a focus on low impact cardio and core strengthening and he has a combination stairmaster and ski machine at home and I said that sounded like a good " low impact cardio activity.  In terms of work, he has been out of work now for several months and he is worried about ramping up too soon because he will lose his disability and he does not want to go back until he is sure that he will not need the disability and that he can work full-time so I suggested a gradual return.  He is going to do the increased cardio activities over the next week or 2.  After that he will then return to work part-time or on light duty for up to 2 weeks, and then hopefully return full-time without restrictions in 1 month.  I told him he could contact our office for a work letter and if he needs it to say something different that we could be a little bit flexible within the general guidelines to help him get back to his job and he was interested in doing this.    Otherwise from a clinical standpoint he can follow-up on an as-needed basis and encouraged him to contact me with any questions or concerns.    Juvenal Dexter MD

## 2020-05-20 ENCOUNTER — TELEPHONE (OUTPATIENT)
Dept: ORTHOPEDICS | Facility: CLINIC | Age: 50
End: 2020-05-20

## 2020-05-20 ENCOUNTER — MYC MEDICAL ADVICE (OUTPATIENT)
Dept: ORTHOPEDICS | Facility: CLINIC | Age: 50
End: 2020-05-20

## 2020-05-20 NOTE — TELEPHONE ENCOUNTER
[facility] Call Center    Phone Message    May a detailed message be left on voicemail: yes     Reason for Call: Other:    Pt would like to speak to Isacc's nurse about a plan to return to work. Pt has questions about restrictions and how to ease himself back into work. Pt would like this information so pt can contact his disability person to get process started.       Action Taken: Other:  ortho    Travel Screening: Not Applicable

## 2020-05-20 NOTE — LETTER
Return to Work  May 20, 2020     Seen today: no    Patient:  Jas Patel  :   1970  MRN:     [id number]  Physician: FRANTZ RIVERA EDGARDO Patel may return to work on Date: 20.    Patient limitations:  Half days progress as tolerated per patient     Electronically signed by Frantz Rivera MD

## 2020-05-21 ENCOUNTER — MYC REFILL (OUTPATIENT)
Dept: ORTHOPEDICS | Facility: CLINIC | Age: 50
End: 2020-05-21

## 2020-05-21 DIAGNOSIS — M48.062 SPINAL STENOSIS OF LUMBAR REGION WITH NEUROGENIC CLAUDICATION: ICD-10-CM

## 2020-05-21 DIAGNOSIS — G89.18 POST-OPERATIVE PAIN: ICD-10-CM

## 2020-05-21 RX ORDER — TRAMADOL HYDROCHLORIDE 50 MG/1
50 TABLET ORAL EVERY 6 HOURS PRN
Qty: 40 TABLET | Refills: 0 | Status: SHIPPED | OUTPATIENT
Start: 2020-05-21 | End: 2020-06-19

## 2020-05-21 NOTE — TELEPHONE ENCOUNTER
See 2 phone messages.  See dictation. Pt. Was called back yesterday 5-20-20 & Deejay  checked with  & completed work note as requested & faxed to [facility] fax#[phone]  & postal mailed to pt per his request.  Call back prn. Pt agreed.   BULMARO./Luz Maria Banks RN.

## 2020-05-21 NOTE — TELEPHONE ENCOUNTER
See 2 phone messages.  See dictation. Pt. Was called back yesterday 5-20-20 & Deejay  checked with  & completed work note as requested & faxed to [facility] fax#[phone]  & postal mailed to pt per his request.  Call back prn. Pt agreed.   BULMARO./Luz Maria Banks RN.

## 2020-06-19 ENCOUNTER — VIRTUAL VISIT (OUTPATIENT)
Dept: FAMILY MEDICINE | Facility: CLINIC | Age: 50
End: 2020-06-19
Payer: COMMERCIAL

## 2020-06-19 ENCOUNTER — VIRTUAL VISIT (OUTPATIENT)
Dept: FAMILY MEDICINE | Facility: CLINIC | Age: 50
End: 2020-06-19
Payer: OTHER MISCELLANEOUS

## 2020-06-19 VITALS — WEIGHT: 179.9 LBS | BODY MASS INDEX: 23.1 KG/M2

## 2020-06-19 VITALS — BODY MASS INDEX: 23.1 KG/M2 | WEIGHT: 179.9 LBS

## 2020-06-19 DIAGNOSIS — M54.2 CHRONIC NECK PAIN: ICD-10-CM

## 2020-06-19 DIAGNOSIS — F41.1 GENERALIZED ANXIETY DISORDER: Primary | ICD-10-CM

## 2020-06-19 DIAGNOSIS — G89.29 CHRONIC NECK PAIN: ICD-10-CM

## 2020-06-19 DIAGNOSIS — N40.1 BENIGN NON-NODULAR PROSTATIC HYPERPLASIA WITH LOWER URINARY TRACT SYMPTOMS: ICD-10-CM

## 2020-06-19 DIAGNOSIS — G89.4 CHRONIC PAIN SYNDROME: Primary | ICD-10-CM

## 2020-06-19 DIAGNOSIS — R35.0 URINARY FREQUENCY: ICD-10-CM

## 2020-06-19 DIAGNOSIS — K21.9 GASTROESOPHAGEAL REFLUX DISEASE WITHOUT ESOPHAGITIS: ICD-10-CM

## 2020-06-19 DIAGNOSIS — G25.0 BENIGN ESSENTIAL TREMOR: ICD-10-CM

## 2020-06-19 DIAGNOSIS — M48.062 SPINAL STENOSIS OF LUMBAR REGION WITH NEUROGENIC CLAUDICATION: ICD-10-CM

## 2020-06-19 DIAGNOSIS — M25.551 HIP PAIN, RIGHT: ICD-10-CM

## 2020-06-19 DIAGNOSIS — F32.1 MODERATE MAJOR DEPRESSION (H): ICD-10-CM

## 2020-06-19 DIAGNOSIS — M54.16 LUMBAR RADICULOPATHY: ICD-10-CM

## 2020-06-19 PROCEDURE — 99213 OFFICE O/P EST LOW 20 MIN: CPT | Mod: 95 | Performed by: NURSE PRACTITIONER

## 2020-06-19 PROCEDURE — 99214 OFFICE O/P EST MOD 30 MIN: CPT | Mod: TEL | Performed by: NURSE PRACTITIONER

## 2020-06-19 PROCEDURE — 96127 BRIEF EMOTIONAL/BEHAV ASSMT: CPT | Mod: 59 | Performed by: NURSE PRACTITIONER

## 2020-06-19 RX ORDER — TRAMADOL HYDROCHLORIDE 50 MG/1
50 TABLET ORAL 2 TIMES DAILY PRN
Qty: 56 TABLET | Refills: 0 | Status: SHIPPED | OUTPATIENT
Start: 2020-07-17 | End: 2020-07-17

## 2020-06-19 RX ORDER — CITALOPRAM HYDROBROMIDE 40 MG/1
40 TABLET ORAL DAILY
Qty: 90 TABLET | Refills: 3 | Status: SHIPPED | OUTPATIENT
Start: 2020-06-19 | End: 2021-06-11

## 2020-06-19 RX ORDER — HYDROCODONE BITARTRATE AND ACETAMINOPHEN 5; 325 MG/1; MG/1
1 TABLET ORAL DAILY PRN
Qty: 28 TABLET | Refills: 0 | Status: SHIPPED | OUTPATIENT
Start: 2020-06-19 | End: 2020-06-19

## 2020-06-19 RX ORDER — HYDROCODONE BITARTRATE AND ACETAMINOPHEN 5; 325 MG/1; MG/1
1 TABLET ORAL DAILY PRN
Qty: 28 TABLET | Refills: 0 | Status: SHIPPED | OUTPATIENT
Start: 2020-07-17 | End: 2020-07-17

## 2020-06-19 RX ORDER — TRAMADOL HYDROCHLORIDE 50 MG/1
50 TABLET ORAL 2 TIMES DAILY PRN
Qty: 56 TABLET | Refills: 0 | Status: SHIPPED | OUTPATIENT
Start: 2020-06-19 | End: 2020-06-19

## 2020-06-19 ASSESSMENT — ANXIETY QUESTIONNAIRES
1. FEELING NERVOUS, ANXIOUS, OR ON EDGE: NOT AT ALL
6. BECOMING EASILY ANNOYED OR IRRITABLE: NOT AT ALL
7. FEELING AFRAID AS IF SOMETHING AWFUL MIGHT HAPPEN: NOT AT ALL
5. BEING SO RESTLESS THAT IT IS HARD TO SIT STILL: NOT AT ALL
GAD7 TOTAL SCORE: 0
2. NOT BEING ABLE TO STOP OR CONTROL WORRYING: NOT AT ALL
IF YOU CHECKED OFF ANY PROBLEMS ON THIS QUESTIONNAIRE, HOW DIFFICULT HAVE THESE PROBLEMS MADE IT FOR YOU TO DO YOUR WORK, TAKE CARE OF THINGS AT HOME, OR GET ALONG WITH OTHER PEOPLE: NOT DIFFICULT AT ALL
3. WORRYING TOO MUCH ABOUT DIFFERENT THINGS: NOT AT ALL

## 2020-06-19 ASSESSMENT — PATIENT HEALTH QUESTIONNAIRE - PHQ9
5. POOR APPETITE OR OVEREATING: NOT AT ALL
SUM OF ALL RESPONSES TO PHQ QUESTIONS 1-9: 2

## 2020-06-19 NOTE — PROGRESS NOTES
"Jas Patel is a 50 year old male who is being evaluated via a billable telephone visit.      The patient has been notified of following:     \"This telephone visit will be conducted via a call between you and your physician/provider. We have found that certain health care needs can be provided without the need for a physical exam.  This service lets us provide the care you need with a short phone conversation.  If a prescription is necessary we can send it directly to your pharmacy.  If lab work is needed we can place an order for that and you can then stop by our lab to have the test done at a later time.    Telephone visits are billed at different rates depending on your insurance coverage. During this emergency period, for some insurers they may be billed the same as an in-person visit.  Please reach out to your insurance provider with any questions.    If during the course of the call the physician/provider feels a telephone visit is not appropriate, you will not be charged for this service.\"    Patient has given verbal consent for Telephone visit?  Yes    What phone number would you like to be contacted at? [phone]    How would you like to obtain your AVS? Nimisha Gil     Jas Patel is a 50 year old male who presents via phone visit today for the following health issues:    HPI     Depression and Anxiety Follow-Up    How are you doing with your depression since your last visit? No change    How are you doing with your anxiety since your last visit?  No change    Are you having other symptoms that might be associated with depression or anxiety? No    Have you had a significant life event? OTHER: recent surgery, insirance lapsed for a period of time     Do you have any concerns with your use of alcohol or other drugs? No    Social History     Tobacco Use     Smoking status: Former Smoker     Packs/day: 0.20     Years: 10.00     Pack years: 2.00     Types: Cigarettes     Start date: 1/28/2015 "     Smokeless tobacco: Never Used   Substance Use Topics     Alcohol use: Yes     Alcohol/week: 0.0 - 7.0 standard drinks     Drug use: No     PHQ 8/30/2019 3/19/2020 6/19/2020   PHQ-9 Total Score 2 2 2   Q9: Thoughts of better off dead/self-harm past 2 weeks Not at all Not at all Not at all     DONTE-7 SCORE 3/5/2019 8/30/2019 6/19/2020   Total Score - - -   Total Score - 3 (minimal anxiety) -   Total Score 8 3 0     Last PHQ-9 6/19/2020   1.  Little interest or pleasure in doing things 0   2.  Feeling down, depressed, or hopeless 0   3.  Trouble falling or staying asleep, or sleeping too much 1   4.  Feeling tired or having little energy 1   5.  Poor appetite or overeating 0   6.  Feeling bad about yourself 0   7.  Trouble concentrating 0   8.  Moving slowly or restless 0   Q9: Thoughts of better off dead/self-harm past 2 weeks 0   PHQ-9 Total Score 2   Difficulty at work, home, or with people Not difficult at all     DONTE-7  6/19/2020   1. Feeling nervous, anxious, or on edge 0   2. Not being able to stop or control worrying 0   3. Worrying too much about different things 0   4. Trouble relaxing 0   5. Being so restless that it is hard to sit still 0   6. Becoming easily annoyed or irritable 0   7. Feeling afraid, as if something awful might happen 0   DONTE-7 Total Score 0   If you checked any problems, how difficult have they made it for you to do your work, take care of things at home, or get along with other people? Not difficult at all       Suicide Assessment Five-step Evaluation and Treatment (SAFE-T)      How many servings of fruits and vegetables do you eat daily?  2-3    On average, how many sweetened beverages do you drink each day (Examples: soda, juice, sweet tea, etc.  Do NOT count diet or artificially sweetened beverages)?   2 cups of sweetened coffee    How many days per week do you exercise enough to make your heart beat faster? 4    How many minutes a day do you exercise enough to make your heart  beat faster? 30 - 60    How many days per week do you miss taking your medication? 0      See work comp visit from today,\now fully recovered from spine surgery x 2.     Low back is good.  Has some problem with his right hip.  Should address another time.  Plan to follow up in the future to address right hip pain.  Ongoing since low back pain.  Its fairly well controlled with current medication regimen.  Pain is to right buttocks and groin.  Worse at the end of the day.        MMD/DONTE-  On celexa for anxiety/depression, feels mood is well controlled, denies side effects.  increased anxiety after surgery and with managing reoccurrence of symptoms.  Lost insurance short term due to disability after surgery, this really triggered his anxiety.  on prozac [id number], lexapro 2010.       BPH- Taking flomax and oxybutinyn for BPH.  stopped oxybutynin a few weeks ago and did not notice any worsening of symptoms, it really dried him out.  Does cont to have some urgency and frequency.      Essential tremor-  using propranolol as needed, very effective.  Also helping with anxiety.  no associated dizziness or lightheadedness.  He takes it every morning and occasionally in the evening.  no dizziness or lightheadedness.    GERD- controlled on PPI, has tried stepping down to H2 blocker with worsening symptoms.    Takes amitriptyline for sleep.  Dry mouth is better since stopping oxybutynin.  Tried going without it and noticed he didn't sleep as well.    Down to 180lb, some decreased appetite related to anxiety but also watching what hes eating, cut out alcohol x 2mo.  Got back on tread climber this week.      [place]  zoster  Lipids and A1C        Patient Active Problem List   Diagnosis     CARDIOVASCULAR SCREENING; LDL GOAL LESS THAN 160     Moderate major depression (H)     Generalized anxiety disorder     Gastroesophageal reflux disease without esophagitis     BPH (benign prostatic hyperplasia)     Chronic pain syndrome     Benign  essential tremor     Lumbago     Lumbar radiculopathy     Spinal stenosis of lumbar region with neurogenic claudication     Past Surgical History:   Procedure Laterality Date     DECOMPRESSION LUMBAR ONE LEVEL N/A 1/23/2020    Procedure: Lumbar 4 to 5 Decompression and Discectomy;  Surgeon: Juvenal Dexter MD;  Location: [facility] OR     DECOMPRESSION LUMBAR ONE LEVEL N/A 3/23/2020    Procedure: Revision Lumbar 4 to 5 decompression;  Surgeon: Juvenal Dexter MD;  Location: [facility] OR     DRAIN SKIN ABSCESS COMPLIC      left thigh I and D x 4 in the 1990's     TONSILLECTOMY         Social History     Tobacco Use     Smoking status: Former Smoker     Packs/day: 0.20     Years: 10.00     Pack years: 2.00     Types: Cigarettes     Start date: 1/28/2015     Smokeless tobacco: Never Used   Substance Use Topics     Alcohol use: Yes     Alcohol/week: 0.0 - 7.0 standard drinks     Family History   Problem Relation Age of Onset     No Known Problems Mother      Hypertension Father      Valvular heart disease Father         aortic valve replacement     C.A.D. Maternal Grandfather      Hypertension Maternal Grandfather      No Known Problems Brother      No Known Problems Sister      Other - See Comments Sister         adopted     Cerebrovascular Disease Other         great uncle     Diabetes Paternal Uncle         type 2     Diabetes Other         great aunt     Prostate Cancer Other         great uncle moms side     Alzheimer Disease Other         great uncle moms side         Current Outpatient Medications   Medication Sig Dispense Refill     acetaminophen (TYLENOL) 500 MG tablet Take 500-1,000 mg by mouth every 6 hours as needed for mild pain (PT last dose 3.19.2020)       amitriptyline (ELAVIL) 25 MG tablet TAKE ONE TABLET BY MOUTH AT BEDTIME 90 tablet 3     Cholecalciferol (VITAMIN D3 PO) Take 4,000 Units by mouth every morning        citalopram (CELEXA) 40 MG tablet Take 1 tablet (40 mg) by mouth daily 90  tablet 3     loratadine (CLARITIN) 10 MG tablet Take 10 mg by mouth every morning 1 tab daily       omeprazole (PRILOSEC) 20 MG DR capsule Take 1 capsule (20 mg) by mouth daily (Patient taking differently: Take 20 mg by mouth every morning ) 90 capsule 3     propranolol (INDERAL) 10 MG tablet TAKE ONE TABLET BY MOUTH TWICE A DAY AS NEEDED FOR TREMOR 180 tablet 1     tamsulosin (FLOMAX) 0.4 MG capsule Take 2 capsules (0.8 mg) by mouth daily (Patient taking differently: Take 0.8 mg by mouth daily (with dinner) ) 180 capsule 3     [START ON 7/17/2020] HYDROcodone-acetaminophen (NORCO) 5-325 MG tablet Take 1 tablet by mouth daily as needed for severe pain #28 tabs to last 4 weeks 28 tablet 0     [START ON 7/17/2020] traMADol (ULTRAM) 50 MG tablet Take 1 tablet (50 mg) by mouth 2 times daily as needed for moderate to severe pain #56 tabs to last 4 weeks 56 tablet 0       Reviewed and updated as needed this visit by Provider         Review of Systems   Constitutional, HEENT, cardiovascular, pulmonary, gi and gu systems are negative, except as otherwise noted.       Objective   Reported vitals:  Wt 81.6 kg (179 lb 14.4 oz)   BMI 23.10 kg/m     healthy, alert and no distress  PSYCH: Alert and oriented times 3; coherent speech, normal   rate and volume, able to articulate logical thoughts, able   to abstract reason, no tangential thoughts, no hallucinations   or delusions  His affect is normal  RESP: No cough, no audible wheezing, able to talk in full sentences  Remainder of exam unable to be completed due to telephone visits    Diagnostic Test Results:  Labs reviewed in Epic        Assessment/Plan:  (F41.1) Generalized anxiety disorder  (primary encounter  (F32.1) Moderate major depression (H) diagnosis)  Comment: controlled on current regimen despite multiple acute stressors  Plan: citalopram (CELEXA) 40 MG tablet        Cont celexa, monitor, if stable can consider dose reduction in the future    (R35.0) Urinary  frequency  (N40.1) Benign non-nodular prostatic hyperplasia with lower urinary tract symptoms  Comment: flomax is helpful but ongoing symptoms.  Stopped oxybutynin, not effective and dried him out  Plan: UROLOGY ADULT REFERRAL        Cont flomax, he is interested in urology follow up     (M25.551) Hip pain, right  Comment:   Plan: plans to follow up with Dr. Isacc carranza regarding this    (K21.9) Gastroesophageal reflux disease without esophagitis  Comment:   Plan: controlled on PPI, cont.  Has failed step down therapy in the past     (G25.0) Benign essential tremor  Comment:   Plan: cont propanolol    (M54.16) Lumbar radiculopathy  (M48.062) Spinal stenosis of lumbar region with neurogenic claudication  Comment:   Plan: recovered from surgery and plans to return to full duty at work next week         No follow-ups on file.      Phone call duration:  11 minutes    RAFAEL Fournier CNP

## 2020-06-19 NOTE — PATIENT INSTRUCTIONS
1.  Refilled tramadol and norco, follow up with evisit in 3 months.  Christine will mail out controlled substance agreement

## 2020-06-19 NOTE — LETTER
[facility] [place]  06/19/20    Patient: Jas Patel  YOB: 1970  Medical Record Number: [id number]                                                                  Opioid / Opioid Plus Controlled Substance Agreement    I understand that my care provider has prescribed an opioid (narcotic) controlled substance to help manage my condition(s). I am taking this medicine to help me function or work. I know this is strong medicine, and that it can cause serious side effects. Opioid medicine can be sedating, addicting and may cause a dependency on the drug. They can affect my ability to drive or think, and cause depression. They need to be taken exactly as prescribed. Combining opioids with certain medicines or chemicals (such as cocaine, sedatives and tranquilizers, sleeping pills, meth) can be dangerous or even fatal. Also, if I stop opioids suddenly, I may have severe withdrawal symptoms. Last, I understand that opioids do not work for all types of pain nor for all patients. If not helpful, I may be asked to stop them.        The risks, benefits, and side effects of these medicine(s) were explained to me. I agree that:    1. I will take part in other treatments as advised by my care team. This may be psychiatry or counseling, physical therapy, behavioral therapy, group treatment or a referral to a pain clinic. I will reduce or stop my medicine when my care team tells me to do so.  2. I will take my medicines as prescribed. I will not change the dose or schedule unless my care team tells me to. There will be no refills if I  run out early.   I may be contactedwithout warning and asked to complete a urine drug test or pill count at any time.   3. I will keep all my appointments, and understand this is part of the monitoring of opioids. My care team may require an office visit for EVERY opioid/controlled substance refill. If I miss appointments or don t follow instructions, my care team may stop my  medicine.  4. I will not ask other providers to prescribe controlled substances, and I will not accept controlled substances from other people. If I need another prescribed controlled substance for a new reason, I will tell my care team within 1 business day.  5. I will use one pharmacy to fill all of my controlled substance prescriptions, and it is up to me to make sure that I do not run out of my medicines on weekends or holidays. If my care team is willing to refill my opioid prescription without a visit, I must request refills only during office hours, refills may take up to 3 days to process, and it may take up to 5 to 7 days for my medicine to be mailed and ready at my pharmacy. Prescriptions will not be mailed anywhere except my pharmacy.        [id number]  Rev 12/18         Registration to scan to EHR                             Page 1 of 2               Controlled Substance Agreement Opioid        [facility]  06/19/20  Patient: Jas Patel  YOB: 1970  Medical Record Number: [id number]                                                                  6. I am responsible for my prescriptions. If the medicine/prescription is lost or stolen, it will not be replaced. I also agree not to share controlled substance medicines with anyone.  7. I agree to not use ANY illegal or recreational drugs. This includes marijuana, cocaine, bath salts or other drugs. I agree not to use alcohol unless my care team says I may.          I agree to give urine samples whenever asked. If I don t give a urine sample, the care team may stop my medicine.    8. If I enroll in the [place] Medical Marijuana program, I will tell my care team. I will also sign an agreement to share my medical records with my care team.   9. I will bring in my list of medicines (or my medicine bottles) each time I come to the clinic.   10. I will tell my care team right away if I become pregnant or have a new medical problem  treated outside of my regular clinic.  11. I understand that this medicine can affect my thinking and judgment. It may be unsafe for me to drive, use machinery and do dangerous tasks. I will not do any of these things until I know how the medicine affects me. If my dose changes, I will wait to see how it affects me. I will contact my care team if I have concerns about medicine side effects.    I understand that if I do not follow any of the conditions above, my prescriptions or treatment may be stopped.      I agree that my provider, clinic care team, and pharmacy may work with any city, state or federal law enforcement agency that investigates the misuse, sale, or other diversion of my controlled medicine. I will allow my provider to discuss my care with or share a copy of this agreement with any other treating provider, pharmacy or emergency room where I receive care. I agree to give up (waive) any right of privacy or confidentiality with respect to these consents.     I have read this agreement and have asked questions about anything I did not understand.      ________________________________________________________________________  Patient signature - Date/Time -  Jas Patel                                      ________________________________________________________________________  Witness signature                                                            ________________________________________________________________________  Provider signature - RAFAEL Fournier CNP      [id number]  Rev 12/18         Registration to scan to EHR                         Page 2 of 2                   Controlled Substance Agreement Opioid           Page 1 of 2  Opioid Pain Medicines (also known as Narcotics)  What You Need to Know    What are opioids?   Opioids are pain medicines that must be prescribed by a doctor.  They are also known as narcotics.    Examples are:     morphine (MS Contin, Amy)    oxycodone  (Oxycontin)    oxycodone and acetaminophen (Percocet)    hydrocodone and acetaminophen (Vicodin, Norco)     fentanyl patch (Duragesic)     hydromorphone (Dilaudid)     methadone     What do opioids do well?   Opioids are best for short-term pain after a surgery or injury. They also work well for cancer pain. Unlike other pain medicines, they do not cause liver or kidney failure or ulcers. They may help some people with long-lasting (chronic) pain.     What do opioids NOT do well?   Opioids never get rid of pain entirely, and they do not work well for most patients with chronic pain. Opioids do not reduce swelling, one of the causes of pain. They also don t work well for nerve pain.                           For informational purposes only.  Not to replace the advice of your care provider.  Copyright 201 [facility]. All right reserved. [employer] [id number] 02/18.      Page 2 of 2    Risks and side effects   Talk to your doctor before you start or decide to keep taking one of these medicines. Side effects include:    Lowering your breathing rate enough to cause death    Overdose, including death, especially if taking higher than prescribed doses    Long-term opioid use    Worse depression symptoms; less pleasure in things you usually enjoy    Feeling tired or sluggish    Slower thoughts or cloudy thinking    Being more sensitive to pain over time; pain is harder to control    Trouble sleeping or restless sleep    Changes in hormone levels (for example, less testosterone)    Changes in sex drive or ability to have sex    Constipation    Unsafe driving    Itching and sweating    Feeling dizzy    Nausea, vomiting and dry mouth    What else should I know about opioids?  When someone takes opioids for too long or too often, they become dependent. This means that if you stop or reduce the medicine too quickly, you will have withdrawal symptoms.    Dependence is not the same as addiction. Addiction is when  people keep using a substance that harms their body, their mind or their relations with others. If you have a history of drug or alcohol abuse, taking opioids can cause a relapse.    Over time, opioids don t work as well. Most people will need higher and higher doses. The higher the dose, the more serious the side effects. We don t know the long-term effects of opioids.      Prescribed opioids aren't the best way to manage chronic pain    Other ways to manage pain include:      Ibuprofen or acetaminophen.  You should always try this first.      Treat health problems that may be causing pain.      acupuncture or massage, deep breathing, meditation, visual imagery, aromatherapy.      Use heat or ice at the pain site      Physical therapy and exercise      Stop smoking      See a counselor or therapist                                                  People who have used opioids for a long time may have a lower quality of life, worse depression, higher levels of pain and more visits to doctors.    Never share your opioids with others. Be sure to store opioids in a secure place, locked if possible.Young children can easily swallow them and overdose.     You can overdose on opioids.  Signs of overdose include decrease or loss of consciousness, slowed breathing, trouble waking and blue lips.  If someone is worried about overdose, they should call 911.    If you are at risk for overdose, you may get naloxone (Narcan, a medicine that reverses the effects of opioids.  If you overdose, a friend or family member can give you Narcan while waiting for the ambulance.  They need to know the signs of overdose and how to give Narcan.    While you're taking opioids:    Don't use alcohol or street drugs. Taking them together can cause death.    Don't take any of these medicines unless your doctor says its okay.  Taking these with opioids can cause death.    Benzodiazepines (such as lorazepam         or diazepam)    Muscle relaxers  (such as cyclobenzaprine)    sleeping pills    other opioids    Safe disposal of opioids  Find your area drug take-back program, your pharmacy mail-back program, buy a special disposal bag (such as Deterra) from your pharmacy or flush them down the toilet.  Use the guidelines at:  www.fda.gov/drugs/resourcesforyou

## 2020-06-19 NOTE — PROGRESS NOTES
"Jas Patel is a 50 year old male who is being evaluated via a billable telephone visit.      The patient has been notified of following:     \"This telephone visit will be conducted via a call between you and your physician/provider. We have found that certain health care needs can be provided without the need for a physical exam.  This service lets us provide the care you need with a short phone conversation.  If a prescription is necessary we can send it directly to your pharmacy.  If lab work is needed we can place an order for that and you can then stop by our lab to have the test done at a later time.    Telephone visits are billed at different rates depending on your insurance coverage. During this emergency period, for some insurers they may be billed the same as an in-person visit.  Please reach out to your insurance provider with any questions.    If during the course of the call the physician/provider feels a telephone visit is not appropriate, you will not be charged for this service.\"    Patient has given verbal consent for Telephone visit?  Yes    What phone number would you like to be contacted at? [phone]    How would you like to obtain your AVS? MyChart    Subjective     Jas Patel is a 50 year old male who presents via phone visit today for the following health issues:    HPI     Chronic Pain Follow-Up       Type / Location of Pain: Neck pain  Analgesia/pain control:       Recent changes:  same      Overall control: Tolerable with discomfort  Activity level/function:      Daily activities:  Able to do all daily activities    Work:  Pain does not limit any  work  Adverse effects:  No  Adherance    Taking medication as directed?  Yes    Participating in other treatments: yes  Risk Factors:    Sleep:  Fair    Mood/anxiety:  controlled    Recent family or social stressors:  Opened new surgery center with work    Other aggravating factors: none  PHQ-9 SCORE 3/5/2019 8/30/2019 3/19/2020 "   PHQ-9 Total Score - - -   PHQ-9 Total Score MyChart - 2 (Minimal depression) -   PHQ-9 Total Score 4 2 2     DONTE-7 SCORE 3/28/2018 3/5/2019 8/30/2019   Total Score - - -   Total Score - - 3 (minimal anxiety)   Total Score 4 8 3     No flowsheet data found.  Encounter-Level CSA - 03/28/2018:    Controlled Substance Agreement - Scan on 4/12/2018  2:57 PM: CONTROLLED SUBSTANCE AGREEMENT     Encounter-Level CSA - 10/28/2015:    Controlled Substance Agreement - Scan on 10/29/2015 11:24 AM: CONTROLLED SUBSTANCE AGREEMENT     Patient-Level CSA:    Controlled Substance Agreement - Opioid - Scan on 9/3/2019  6:46 AM       Interval history:  previously followed with me for chronic neck pain relate dto work injury while lifting a patient 9/2011.  previously on regimen of 800mg ibuprofen TID, tramadol 50mg BID, and norco 5-325mg usually about 1/d. Has done PT in the past, still doing exercise at home which are helpful. Also seeing chiropractor. Has been told he is not a surgical candidate. Working currently in day surgery pre/postop as a nurse.  Had trigger point injections and BOY injection which helped with numbness/tingling.  Last MRI done 2011 at [facility].    Underwent spine surgery this winter and pain management had been taken over by ortho during this time period.  Now wanting to transfer back to me.      low back pain, lumbar radiculopathy and lumbar spinal stenosis with neurogenic claudication.  He had a lumbar injury this past fall when using an inversion table.  He underwent a one level lumbar decompression with Dr. Dexter on 1/23/20 and symptoms improved for 8 days, but then he started having RLE radiculopathy symptoms again and then on POD 14 had a fall.  Did not improve with conservative management and ultimately underwent Revision Lumbar 4 to 5 decompression on 3/23/20 by Dr. Dexter.  He had good outcome from this surgery.  Last visit with ortho 5/2020 with plan to return to work at light duty x 2 weeks then full  time without restrictions in 1mo.    28 nocro  56 tramadol    Last fill tramadol #40 tabs 5/21  Last fill norco #50 tabs 5/12      Update today:  No changes to chronic neck pain.    Ibuprofen 800mg three times a day instead of twice a day.    Tramadol 50mg twice a day  norco at bedtime    He is on light duty at work.  hes on screening clinic for covid, not having to do any lifting.  Plans to go back to full duty end of next week.    Dyana is living on her own, lost her job due to covid but making more on unemployment.  She should be going back to work soon (catering company).  Crow is doing okay, pandemic has been hard on him with social isolation.        Patient Active Problem List   Diagnosis     CARDIOVASCULAR SCREENING; LDL GOAL LESS THAN 160     Moderate major depression (H)     Generalized anxiety disorder     Gastroesophageal reflux disease without esophagitis     BPH (benign prostatic hyperplasia)     Chronic pain syndrome     Benign essential tremor     Lumbago     Lumbar radiculopathy     Spinal stenosis of lumbar region with neurogenic claudication     Past Surgical History:   Procedure Laterality Date     DECOMPRESSION LUMBAR ONE LEVEL N/A 1/23/2020    Procedure: Lumbar 4 to 5 Decompression and Discectomy;  Surgeon: Juvenal Dexter MD;  Location: [facility] OR     DECOMPRESSION LUMBAR ONE LEVEL N/A 3/23/2020    Procedure: Revision Lumbar 4 to 5 decompression;  Surgeon: Juvenal Dexter MD;  Location: [facility] OR     DRAIN SKIN ABSCESS COMPLIC      left thigh I and D x 4 in the 1990's     TONSILLECTOMY         Social History     Tobacco Use     Smoking status: Former Smoker     Packs/day: 0.20     Years: 10.00     Pack years: 2.00     Types: Cigarettes     Start date: 1/28/2015     Smokeless tobacco: Never Used   Substance Use Topics     Alcohol use: Yes     Alcohol/week: 0.0 - 7.0 standard drinks     Family History   Problem Relation Age of Onset     No Known Problems Mother      Hypertension  Father      Valvular heart disease Father         aortic valve replacement     C.A.D. Maternal Grandfather      Hypertension Maternal Grandfather      No Known Problems Brother      No Known Problems Sister      Other - See Comments Sister         adopted     Cerebrovascular Disease Other         great uncle     Diabetes Paternal Uncle         type 2     Diabetes Other         great aunt     Prostate Cancer Other         great uncle moms side     Alzheimer Disease Other         great uncle moms side         Current Outpatient Medications   Medication Sig Dispense Refill     acetaminophen (TYLENOL) 500 MG tablet Take 500-1,000 mg by mouth every 6 hours as needed for mild pain (PT last dose 3.19.2020)       amitriptyline (ELAVIL) 25 MG tablet TAKE ONE TABLET BY MOUTH AT BEDTIME 90 tablet 3     Cholecalciferol (VITAMIN D3 PO) Take 4,000 Units by mouth every morning        HYDROcodone-acetaminophen (NORCO) 5-325 MG tablet Take 1-2 tablets by mouth every 6 hours as needed for severe pain (note frequency change, begin wean to one tablet q 4-6 hours) 50 tablet 0     loratadine (CLARITIN) 10 MG tablet Take 10 mg by mouth every morning 1 tab daily       omeprazole (PRILOSEC) 20 MG DR capsule Take 1 capsule (20 mg) by mouth daily (Patient taking differently: Take 20 mg by mouth every morning ) 90 capsule 3     propranolol (INDERAL) 10 MG tablet TAKE ONE TABLET BY MOUTH TWICE A DAY AS NEEDED FOR TREMOR 180 tablet 1     tamsulosin (FLOMAX) 0.4 MG capsule Take 2 capsules (0.8 mg) by mouth daily (Patient taking differently: Take 0.8 mg by mouth daily (with dinner) ) 180 capsule 3     traMADol (ULTRAM) 50 MG tablet Take 1 tablet (50 mg) by mouth every 6 hours as needed for moderate to severe pain 40 tablet 0     citalopram (CELEXA) 40 MG tablet Take 1 tablet (40 mg) by mouth daily 90 tablet 3       Reviewed and updated as needed this visit by Provider         Review of Systems   Constitutional, HEENT, cardiovascular, pulmonary, gi  and gu systems are negative, except as otherwise noted.       Objective   Reported vitals:  There were no vitals taken for this visit.   healthy, alert and no distress  PSYCH: Alert and oriented times 3; coherent speech, normal   rate and volume, able to articulate logical thoughts, able   to abstract reason, no tangential thoughts, no hallucinations   or delusions  His affect is normal  RESP: No cough, no audible wheezing, able to talk in full sentences  Remainder of exam unable to be completed due to telephone visits    Diagnostic Test Results:  Admission on 03/23/2020, Discharged on 03/23/2020   Component Date Value Ref Range Status     ABO 03/23/2020 B   Final     RH(D) 03/23/2020 Pos   Final     Antibody Screen 03/23/2020 Neg   Final     Test Valid Only At 03/23/2020 [facility]      Final     Specimen Expires 03/23/2020 03/26/2020   Final     Glucose 03/23/2020 118* 70 - 99 mg/dL Final             Assessment/Plan:  (G89.4) Chronic pain syndrome  (primary encounter diagnosis)  (M54.2,  G89.29) Chronic neck pain  Comment: opioid dose back to preop baseline.  symptoms stable on current regimen, current treatment plan is appropriate.  MPM reviewed without suspicious activity.  + urine drug screen for marijuana last visit, pt instructed needs to abstain to continue on opioids  Plan: traMADol (ULTRAM) 50 MG tablet,         HYDROcodone-acetaminophen (NORCO) 5-325 MG         tablet        Due for CSA, will mail out today.  Refilled x 2mo and NVoicePayt message sent to myself to prompt august fill.  Ok for evisit for 3mo fill in sept (discussed I will be on maternity leave).  Plan for office visit for fill in 6mo.            No follow-ups on file.      Phone call duration:  10 minutes    RAFAEL Fournier CNP

## 2020-06-19 NOTE — PATIENT INSTRUCTIONS
1.  Lets continue on the current dose of citalopram for your mood, I have refilled it.  We can consdier dose reduction in the future if things continue to be stable.    2.  I took the oxybutynin off your med list.  Follow up with uriology for ongoing symptoms    3.  Will plan for labs and colonoscopy once the pandemic settles down

## 2020-06-20 ASSESSMENT — ANXIETY QUESTIONNAIRES: GAD7 TOTAL SCORE: 0

## 2020-07-17 DIAGNOSIS — M54.2 CHRONIC NECK PAIN: ICD-10-CM

## 2020-07-17 DIAGNOSIS — G89.29 CHRONIC NECK PAIN: ICD-10-CM

## 2020-07-17 DIAGNOSIS — G89.4 CHRONIC PAIN SYNDROME: ICD-10-CM

## 2020-07-17 RX ORDER — TRAMADOL HYDROCHLORIDE 50 MG/1
50 TABLET ORAL 2 TIMES DAILY PRN
Qty: 56 TABLET | Refills: 0 | Status: ON HOLD | OUTPATIENT
Start: 2020-08-14 | End: 2020-09-13

## 2020-07-17 RX ORDER — HYDROCODONE BITARTRATE AND ACETAMINOPHEN 5; 325 MG/1; MG/1
1 TABLET ORAL DAILY PRN
Qty: 28 TABLET | Refills: 0 | Status: ON HOLD | OUTPATIENT
Start: 2020-08-14 | End: 2020-09-13

## 2020-07-21 ENCOUNTER — MYC MEDICAL ADVICE (OUTPATIENT)
Dept: ORTHOPEDICS | Facility: CLINIC | Age: 50
End: 2020-07-21

## 2020-07-28 ENCOUNTER — VIRTUAL VISIT (OUTPATIENT)
Dept: ORTHOPEDICS | Facility: CLINIC | Age: 50
End: 2020-07-28
Payer: COMMERCIAL

## 2020-07-28 DIAGNOSIS — M48.062 SPINAL STENOSIS OF LUMBAR REGION WITH NEUROGENIC CLAUDICATION: Primary | ICD-10-CM

## 2020-07-28 DIAGNOSIS — M51.26 RECURRENT HERNIATION OF LUMBAR DISC: ICD-10-CM

## 2020-07-28 NOTE — PROGRESS NOTES
"Jas Patel is a 50 year old male who is being evaluated via a billable telephone visit.      The patient has been notified of following:     \"This telephone visit will be conducted via a call between you and your physician/provider. We have found that certain health care needs can be provided without the need for a physical exam.  This service lets us provide the care you need with a short phone conversation.  If a prescription is necessary we can send it directly to your pharmacy.  If lab work is needed we can place an order for that and you can then stop by our lab to have the test done at a later time.    Telephone visits are billed at different rates depending on your insurance coverage. During this emergency period, for some insurers they may be billed the same as an in-person visit.  Please reach out to your insurance provider with any questions.    If during the course of the call the physician/provider feels a telephone visit is not appropriate, you will not be charged for this service.\"    Patient has given verbal consent for Telephone visit?  Yes    What phone number would you like to be contacted at?  Home    How would you like to obtain your AVS? Nimisha    I called and spoke with Jas.  He was doing better and better up until about 3 weeks ago.  At that time he tried to return to full-time work and he felt a fairly sudden shift.  Over the last 3 weeks things have progressed quite a bit and he is now having recurrent severe pain into the right buttock and thigh.  It feels like in the same distribution as his previous 2 disc herniations.  He is status post an L4-5 decompression, which was done twice, for previous disc issues.    He denies any cauda equina type symptoms but really is dealing with quite severe leg pain.  He has a pain contract and has been taking oral medications including Lortab.    We discussed options at this point.  None of the previous injections have helped him.  So we agreed not " to repeat those.    I am going to send him another referral to physical therapy as this may be an insurance requirement if he does need another procedure.  Going to have him get a lumbar MRI with contrast to evaluate for recurrent disc herniation.  Also going to see him in clinic next week so I can perform an exam.  I did tell him that if he needs a third surgery in that area it would likely be a fusion at this point and he does understand that possibility and has been preparing for it.    Phone call duration: 12 minutes    Juvenal Dexter MD

## 2020-07-28 NOTE — NURSING NOTE
Reason For Visit:   Chief Complaint   Patient presents with     Follow Up     2 month follow up L4-5 decompression        There were no vitals taken for this visit.         Deejay Pierce, ATC

## 2020-07-28 NOTE — LETTER
"    7/28/2020         RE: Jas Patel  [address]  [facility] [address]        Dear Colleague,    Thank you for referring your patient, Jas Patel, to the [facility]. Please see a copy of my visit note below.    Jas Patel is a 50 year old male who is being evaluated via a billable telephone visit.      The patient has been notified of following:     \"This telephone visit will be conducted via a call between you and your physician/provider. We have found that certain health care needs can be provided without the need for a physical exam.  This service lets us provide the care you need with a short phone conversation.  If a prescription is necessary we can send it directly to your pharmacy.  If lab work is needed we can place an order for that and you can then stop by our lab to have the test done at a later time.    Telephone visits are billed at different rates depending on your insurance coverage. During this emergency period, for some insurers they may be billed the same as an in-person visit.  Please reach out to your insurance provider with any questions.    If during the course of the call the physician/provider feels a telephone visit is not appropriate, you will not be charged for this service.\"    Patient has given verbal consent for Telephone visit?  Yes    What phone number would you like to be contacted at?  Home    How would you like to obtain your AVS? Nimisha    I called and spoke with Jas.  He was doing better and better up until about 3 weeks ago.  At that time he tried to return to full-time work and he felt a fairly sudden shift.  Over the last 3 weeks things have progressed quite a bit and he is now having recurrent severe pain into the right buttock and thigh.  It feels like in the same distribution as his previous 2 disc herniations.  He is status post an L4-5 decompression, which was done twice, for previous disc issues.    He denies any cauda equina " type symptoms but really is dealing with quite severe leg pain.  He has a pain contract and has been taking oral medications including Lortab.    We discussed options at this point.  None of the previous injections have helped him.  So we agreed not to repeat those.    I am going to send him another referral to physical therapy as this may be an insurance requirement if he does need another procedure.  Going to have him get a lumbar MRI with contrast to evaluate for recurrent disc herniation.  Also going to see him in clinic next week so I can perform an exam.  I did tell him that if he needs a third surgery in that area it would likely be a fusion at this point and he does understand that possibility and has been preparing for it.    Phone call duration: 12 minutes    Juvenal Dexter MD

## 2020-07-30 ENCOUNTER — TRANSFERRED RECORDS (OUTPATIENT)
Dept: HEALTH INFORMATION MANAGEMENT | Facility: CLINIC | Age: 50
End: 2020-07-30

## 2020-08-11 ENCOUNTER — OFFICE VISIT (OUTPATIENT)
Dept: ORTHOPEDICS | Facility: CLINIC | Age: 50
End: 2020-08-11
Payer: COMMERCIAL

## 2020-08-11 VITALS — BODY MASS INDEX: 24.77 KG/M2 | WEIGHT: 193 LBS | HEIGHT: 74 IN

## 2020-08-11 DIAGNOSIS — M51.26 RECURRENT HERNIATION OF LUMBAR DISC: Primary | ICD-10-CM

## 2020-08-11 DIAGNOSIS — Z11.59 ENCOUNTER FOR SCREENING FOR OTHER VIRAL DISEASES: Primary | ICD-10-CM

## 2020-08-11 DIAGNOSIS — M48.062 SPINAL STENOSIS OF LUMBAR REGION WITH NEUROGENIC CLAUDICATION: ICD-10-CM

## 2020-08-11 ASSESSMENT — MIFFLIN-ST. JEOR: SCORE: 1795.19

## 2020-08-11 NOTE — NURSING NOTE
Teaching Flowsheet   Relevant Diagnosis: L4 L5 TLIF    Teaching Topic: Pre op teaching     Person(s) involved in teaching:   Patient     Motivation Level:  Asks Questions: Yes  Eager to Learn: Yes  Cooperative: Yes  Receptive (willing/able to accept information): Yes  Any cultural factors/Zoroastrianism beliefs that may influence understanding or compliance? No       Patient demonstrates understanding of the following:  Reason for the appointment, diagnosis and treatment plan: Yes  Knowledge of proper use of medications and conditions for which they are ordered (with special attention to potential side effects or drug interactions): Yes  Which situations necessitate calling provider and whom to contact: Yes       Teaching Concerns Addressed: RN discussed all aspects of pre op surgery with patient. Patient has had  Recurrent disc herniation and this time he is signing up to do a fusion. Patient is a  of this surgery. Patient will get a lumbar CT. Patient wants to do it in [facility] as patient works there. Patient will get a new LI today, Deejay assisting. Patient bre RN once the imaging is completed. Patient wants 9/10 for his surgery date. Jania will schedule PAC appt as well.       Proper use and care of meds (medical equip, care aids, etc.): Yes  Nutritional needs and diet plan: Yes  Pain management techniques: Yes  Wound Care: Yes  How and/when to access community resources: Yes     Instructional Materials Used/Given: Pre op packet and antibacterial soap.     Time spent with patient: 15 minutes.

## 2020-08-11 NOTE — LETTER
8/11/2020         RE: Jas Patel  [address]  [facility] [address]        Dear Colleague,    Thank you for referring your patient, Jas Patel, to the [facility]. Please see a copy of my visit note below.    Spine Surgery Return Clinic Visit      Chief Complaint:   RECHECK (follow up after MRI )      Interval HPI:  Symptom Profile Including: location of symptoms, onset, severity, exacerbating/alleviating factors, previous treatments:        Jas Patel is a 50 year old male who returns again today in follow-up.  He has had 2 previous lumbar decompression and discectomy surgeries, each of which provided him significant relief for a number of weeks and he then returned to work.  Each time returning to work he seemed to have a recurrence of symptoms and he has had a number of MRIs showing recurrent disc herniations over time.  I last spoke with him July 28, and he was having again disabling symptoms at that time and I sent him for an MRI with and without contrast to further work-up the symptoms.  We also ordered a return to physical therapy and refilled his oral medication regimen.    Today he reports persistent right lower extremity pain located to his right buttocks with radiation to his right groin, lateral thigh + leg, and dorsal + plantar foot.  Endorses associated tingling radiating down the lateral aspect of his right lower extremity and plantar foot.  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  He states his symptoms are intolerable and frequently wake him up at night.  He recently saw his chiropractor and did not experience any relief.  Denies bowel or bladder dysfunction.            Past Medical History:     Past Medical History:   Diagnosis Date     Benign essential tremor  11/2/2016     BPH (benign prostatic hyperplasia) 10/28/2015     BPH (benign prostatic hyperplasia)      Gastroesophageal reflux disease without esophagitis 10/28/2015     Generalized anxiety disorder 10/26/2011     GERD (gastroesophageal reflux disease)      Herniated disc     in neck     Lumbar radiculopathy 12/20/2019     Moderate major depression (H) 11/22/2010            Past Surgical History:     Past Surgical History:   Procedure Laterality Date     DECOMPRESSION LUMBAR ONE LEVEL N/A 1/23/2020    Procedure: Lumbar 4 to 5 Decompression and Discectomy;  Surgeon: Juvenal Dexter MD;  Location: [facility] OR     DECOMPRESSION LUMBAR ONE LEVEL N/A 3/23/2020    Procedure: Revision Lumbar 4 to 5 decompression;  Surgeon: Juvenal Dexter MD;  Location: [facility] OR     DRAIN SKIN ABSCESS COMPLIC      left thigh I and D x 4 in the 1990's     TONSILLECTOMY              Social History:     Social History     Tobacco Use     Smoking status: Former Smoker     Packs/day: 0.20     Years: 10.00     Pack years: 2.00     Types: Cigarettes     Start date: 1/28/2015     Smokeless tobacco: Never Used   Substance Use Topics     Alcohol use: Yes     Alcohol/week: 0.0 - 7.0 standard drinks            Family History:     Family History   Problem Relation Age of Onset     No Known Problems Mother      Hypertension Father      Valvular heart disease Father         aortic valve replacement     C.A.D. Maternal Grandfather      Hypertension Maternal Grandfather      No Known Problems Brother      No Known Problems Sister      Other - See Comments Sister         adopted     Cerebrovascular Disease Other         great uncle     Diabetes Paternal Uncle         type 2     Diabetes Other         great aunt     Prostate Cancer Other         great uncle moms side     Alzheimer Disease Other         great uncle moms side            Allergies:     Allergies   Allergen Reactions     No Known Drug Allergies             Medications:  "    Current Outpatient Medications   Medication     acetaminophen (TYLENOL) 500 MG tablet     amitriptyline (ELAVIL) 25 MG tablet     Cholecalciferol (VITAMIN D3 PO)     citalopram (CELEXA) 40 MG tablet     [START ON 8/14/2020] HYDROcodone-acetaminophen (NORCO) 5-325 MG tablet     loratadine (CLARITIN) 10 MG tablet     omeprazole (PRILOSEC) 20 MG DR capsule     propranolol (INDERAL) 10 MG tablet     tamsulosin (FLOMAX) 0.4 MG capsule     [START ON 8/14/2020] traMADol (ULTRAM) 50 MG tablet     No current facility-administered medications for this visit.              Review of Systems:   A focused musculoskeletal and neurologic ROS was performed with pertinent positives and negatives noted in the HPI.  Additional systems were also reviewed and are documented at the bottom of the note.         Physical Exam:   Vitals: Ht 1.88 m (6' 2\")   Wt 87.5 kg (193 lb)   BMI 24.78 kg/m    Musculoskeletal, Neurologic, and Spine:   Non-antalgic gait without use of assistive devices.        Lumbar Spine:    Appearance -well-healed posterior midline incision    Palpation -nontender to palpation    Motor -     L2-3: Hip flexion L 5/5 and R 4+/5 strength - patient states right sided weakness is pain-related          L4:  Knee extension L and R 5/5 strength         L5:  Foot / EHL dorsiflexion L and R 5/5 strength         S1:  Plantarflexion  L and R 5/5 strength    Sensation: Decreased sensation to light touch in the right S1 distribution and over the dorsum of the right foot; otherwise SILT in L3-L5 distributions.  Intact to light touch L3-S1 distribution in LLE      Special tests -     Straight leg raise - Negative     MEENAKSHI - Negative     Pain with hip ROM - Negative.  No pain with logroll     Facet loading - Negative      Neurologic:      REFLEXES Left Right   Patella 2+ 2+   Ankle jerk 2+ 2+   Clonus 0 beats 0 beats               Imaging:   We ordered and independently reviewed new radiographs at this clinic visit. The results " were discussed with the patient. Findings include:    July 30, 2020 lumbar MRI with and without contrast.  Shows evidence of previous attempted laminectomy and discectomy at L4-5 with presumed recurrent disc herniation on the right side at L4-5 displacing the traversing L5 nerve root.  He has some mild degenerative changes at other levels.  There is a small seroma in the dorsal epidural space       Assessment and Plan:     50 year old male with third time recurrent disc herniation at L4-5 again with severe radicular complaints which are keeping him out of work.    At this point he has had 3 recurrent disc herniations.  Each of his 2 previous lumbar decompression surgeries helped him for a number of months, and then he had recurrent symptoms, so I think his clinical history is most consistent with serially recurrent disc herniations rather than a residual disc fragment that was left behind.  Nonetheless, regardless of the etiology, I think at this point the only remaining surgical option for him would be an L4-5 fusion type surgery.  I think in order to get lateral enough to the scarred epidural space and safely remove the remaining disc fragment it would be necessary to remove the entire right-sided facet joint, and once the entire joint is removed it would then be necessary to fuse the segment in order to achieve stability.    Risks of this surgery include risk of infection, risk of dural tear resulting in CSF leak which might result in headaches, or possible need for lumbar drain, or possible revision surgery in the setting of a persistent leak. Risk of hematoma or seroma resulting in wound complications.  Possible nerve root injury resulting in numbness weakness or paralysis into the arms or legs. Possible radiculitis which could result in similar symptoms or could result in significant neurogenic type pain. There is also a risk of delayed onset nerve root pals or radiculitis, which I explained is potentially due  to stretch injury or possibly due to delayed onset swelling, and is sometimes temporary but can also be permanent.  Risk of incomplete decompression which might require revision surgery in the future.  Risk of adjacent segment problems requiring surgery in the future. Risk of incomplete relief of symptoms possibly requiring revision surgery in the future. Furthermore, although rare, there are risks of major vessel injury such as to the major vessels anteriorly or to the bowel from the surgery.  Sometimes this can happen if an instrument is passed anteriorly through the disc space. There is a risk of nonunion which might require revision surgery in the future, and risk of hardware complications from the instrumentation.  I do use imaging to help guide the placement of the instrumentation, but even with this there is a chance of implant malposition or problem.  There is a risk of blood clots in the legs or the lungs.  Lastly, although rare, there are certainly risks of the anesthetic including stroke heart attack and death.      I explained that we used Stealth navigation with an O-Arm to place the instrumentation.  This is a form of CT-guided navigation for the screws.  We take an intraoperative CT scan which provides an accurate view of the bony anatomy and we then place the screws using the imaging guidance and then take a second CT scan to help verify the screw position. So the benefit of this technology is a high accuracy for screw placement.    We talked about the expected hospital stay and postoperative recovery.  I explained an increased risk of dural tear given the history of previous decompression and suspected epidural scarring.    We discussed the expected postoperative recovery and outcomes and I explained that he may not get full symptom relief.  He does understand and wishes to proceed.  We will get things arranged for him.         Attending MD (Dr. Juvenal Dexter) :  I reviewed and verified the  history and physical exam of the patient and discussed the patient's management with the other clinical providers involved in this patient's care including any involved residents or physicians assistants. I reviewed the above note and agree with the documented findings and plan of care, which were communicated to the patient.      Juvenal Dexter MD

## 2020-08-11 NOTE — PROGRESS NOTES
Spine Surgery Return Clinic Visit      Chief Complaint:   RECHECK (follow up after MRI )      Interval HPI:  Symptom Profile Including: location of symptoms, onset, severity, exacerbating/alleviating factors, previous treatments:        Jas Patel is a 50 year old male who returns again today in follow-up.  He has had 2 previous lumbar decompression and discectomy surgeries, each of which provided him significant relief for a number of weeks and he then returned to work.  Each time returning to work he seemed to have a recurrence of symptoms and he has had a number of MRIs showing recurrent disc herniations over time.  I last spoke with him July 28, and he was having again disabling symptoms at that time and I sent him for an MRI with and without contrast to further work-up the symptoms.  We also ordered a return to physical therapy and refilled his oral medication regimen.    Today he reports persistent right lower extremity pain located to his right buttocks with radiation to his right groin, lateral thigh + leg, and dorsal + plantar foot.  Endorses associated tingling radiating down the lateral aspect of his right lower extremity and plantar foot.  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  He states his symptoms are intolerable and frequently wake him up at night.  He recently saw his chiropractor and did not experience any relief.  Denies bowel or bladder dysfunction.            Past Medical History:     Past Medical History:   Diagnosis Date     Benign essential tremor 11/2/2016     BPH (benign prostatic hyperplasia) 10/28/2015     BPH (benign prostatic hyperplasia)      Gastroesophageal reflux disease without esophagitis 10/28/2015     Generalized anxiety disorder 10/26/2011     GERD (gastroesophageal reflux disease)       Herniated disc     in neck     Lumbar radiculopathy 12/20/2019     Moderate major depression (H) 11/22/2010            Past Surgical History:     Past Surgical History:   Procedure Laterality Date     DECOMPRESSION LUMBAR ONE LEVEL N/A 1/23/2020    Procedure: Lumbar 4 to 5 Decompression and Discectomy;  Surgeon: Juvenal Dexter MD;  Location: [facility]     DECOMPRESSION LUMBAR ONE LEVEL N/A 3/23/2020    Procedure: Revision Lumbar 4 to 5 decompression;  Surgeon: Juvenal Dexter MD;  Location: [facility] OR     DRAIN SKIN ABSCESS COMPLIC      left thigh I and D x 4 in the 1990's     TONSILLECTOMY              Social History:     Social History     Tobacco Use     Smoking status: Former Smoker     Packs/day: 0.20     Years: 10.00     Pack years: 2.00     Types: Cigarettes     Start date: 1/28/2015     Smokeless tobacco: Never Used   Substance Use Topics     Alcohol use: Yes     Alcohol/week: 0.0 - 7.0 standard drinks            Family History:     Family History   Problem Relation Age of Onset     No Known Problems Mother      Hypertension Father      Valvular heart disease Father         aortic valve replacement     C.A.D. Maternal Grandfather      Hypertension Maternal Grandfather      No Known Problems Brother      No Known Problems Sister      Other - See Comments Sister         adopted     Cerebrovascular Disease Other         great uncle     Diabetes Paternal Uncle         type 2     Diabetes Other         great aunt     Prostate Cancer Other         great uncle moms side     Alzheimer Disease Other         great uncle moms side            Allergies:     Allergies   Allergen Reactions     No Known Drug Allergies             Medications:     Current Outpatient Medications   Medication     acetaminophen (TYLENOL) 500 MG tablet     amitriptyline (ELAVIL) 25 MG tablet     Cholecalciferol (VITAMIN D3 PO)     citalopram (CELEXA) 40 MG tablet     [START ON 8/14/2020] HYDROcodone-acetaminophen (NORCO)  "5-325 MG tablet     loratadine (CLARITIN) 10 MG tablet     omeprazole (PRILOSEC) 20 MG DR capsule     propranolol (INDERAL) 10 MG tablet     tamsulosin (FLOMAX) 0.4 MG capsule     [START ON 8/14/2020] traMADol (ULTRAM) 50 MG tablet     No current facility-administered medications for this visit.              Review of Systems:   A focused musculoskeletal and neurologic ROS was performed with pertinent positives and negatives noted in the HPI.  Additional systems were also reviewed and are documented at the bottom of the note.         Physical Exam:   Vitals: Ht 1.88 m (6' 2\")   Wt 87.5 kg (193 lb)   BMI 24.78 kg/m    Musculoskeletal, Neurologic, and Spine:   Non-antalgic gait without use of assistive devices.        Lumbar Spine:    Appearance -well-healed posterior midline incision    Palpation -nontender to palpation    Motor -     L2-3: Hip flexion L 5/5 and R 4+/5 strength - patient states right sided weakness is pain-related          L4:  Knee extension L and R 5/5 strength         L5:  Foot / EHL dorsiflexion L and R 5/5 strength         S1:  Plantarflexion  L and R 5/5 strength    Sensation: Decreased sensation to light touch in the right S1 distribution and over the dorsum of the right foot; otherwise SILT in L3-L5 distributions.  Intact to light touch L3-S1 distribution in LLE      Special tests -     Straight leg raise - Negative     MEENAKSHI - Negative     Pain with hip ROM - Negative.  No pain with logroll     Facet loading - Negative      Neurologic:      REFLEXES Left Right   Patella 2+ 2+   Ankle jerk 2+ 2+   Clonus 0 beats 0 beats               Imaging:   We ordered and independently reviewed new radiographs at this clinic visit. The results were discussed with the patient. Findings include:    July 30, 2020 lumbar MRI with and without contrast.  Shows evidence of previous attempted laminectomy and discectomy at L4-5 with presumed recurrent disc herniation on the right side at L4-5 displacing the " traversing L5 nerve root.  He has some mild degenerative changes at other levels.  There is a small seroma in the dorsal epidural space       Assessment and Plan:     50 year old male with third time recurrent disc herniation at L4-5 again with severe radicular complaints which are keeping him out of work.    At this point he has had 3 recurrent disc herniations.  Each of his 2 previous lumbar decompression surgeries helped him for a number of months, and then he had recurrent symptoms, so I think his clinical history is most consistent with serially recurrent disc herniations rather than a residual disc fragment that was left behind.  Nonetheless, regardless of the etiology, I think at this point the only remaining surgical option for him would be an L4-5 fusion type surgery.  I think in order to get lateral enough to the scarred epidural space and safely remove the remaining disc fragment it would be necessary to remove the entire right-sided facet joint, and once the entire joint is removed it would then be necessary to fuse the segment in order to achieve stability.    Risks of this surgery include risk of infection, risk of dural tear resulting in CSF leak which might result in headaches, or possible need for lumbar drain, or possible revision surgery in the setting of a persistent leak. Risk of hematoma or seroma resulting in wound complications.  Possible nerve root injury resulting in numbness weakness or paralysis into the arms or legs. Possible radiculitis which could result in similar symptoms or could result in significant neurogenic type pain. There is also a risk of delayed onset nerve root pals or radiculitis, which I explained is potentially due to stretch injury or possibly due to delayed onset swelling, and is sometimes temporary but can also be permanent.  Risk of incomplete decompression which might require revision surgery in the future.  Risk of adjacent segment problems requiring surgery in  the future. Risk of incomplete relief of symptoms possibly requiring revision surgery in the future. Furthermore, although rare, there are risks of major vessel injury such as to the major vessels anteriorly or to the bowel from the surgery.  Sometimes this can happen if an instrument is passed anteriorly through the disc space. There is a risk of nonunion which might require revision surgery in the future, and risk of hardware complications from the instrumentation.  I do use imaging to help guide the placement of the instrumentation, but even with this there is a chance of implant malposition or problem.  There is a risk of blood clots in the legs or the lungs.  Lastly, although rare, there are certainly risks of the anesthetic including stroke heart attack and death.      I explained that we used Stealth navigation with an O-Arm to place the instrumentation.  This is a form of CT-guided navigation for the screws.  We take an intraoperative CT scan which provides an accurate view of the bony anatomy and we then place the screws using the imaging guidance and then take a second CT scan to help verify the screw position. So the benefit of this technology is a high accuracy for screw placement.    We talked about the expected hospital stay and postoperative recovery.  I explained an increased risk of dural tear given the history of previous decompression and suspected epidural scarring.    We discussed the expected postoperative recovery and outcomes and I explained that he may not get full symptom relief.  He does understand and wishes to proceed.  We will get things arranged for him.         Attending MD (Dr. Juvenal Dexter) :  I reviewed and verified the history and physical exam of the patient and discussed the patient's management with the other clinical providers involved in this patient's care including any involved residents or physicians assistants. I reviewed the above note and agree with the  documented findings and plan of care, which were communicated to the patient.      Juvenal Dexter MD        Respectfully,  Juvenal Dexter MD  Spine Surgery  [facility]

## 2020-08-11 NOTE — NURSING NOTE
"Reason For Visit:   Chief Complaint   Patient presents with     RECHECK     follow up after MRI        Primary MD: Lola Perez  Ref. MD: Moses     ?  No  Date of surgery: Dr. Dexter   Type of surgery: Dr. Dexter .  Smoker: No  Request smoking cessation information: No    Ht 1.88 m (6' 2\")   Wt 87.5 kg (193 lb)   BMI 24.78 kg/m           Oswestry (LI) Questionnaire    OSWESTRY DISABILITY INDEX 3/17/2020   Count 9   Sum 20   Oswestry Score (%) 44.44   Some recent data might be hidden            Neck Disability Index (NDI) Questionnaire    No flowsheet data found.                Promis 10 Assessment    PROMIS 10 3/17/2020   In general, would you say your health is: Very good   In general, would you say your quality of life is: Very good   In general, how would you rate your physical health? Good   In general, how would you rate your mental health, including your mood and your ability to think? Good   In general, how would you rate your satisfaction with your social activities and relationships? Good   In general, please rate how well you carry out your usual social activities and roles Fair   To what extent are you able to carry out your everyday physical activities such as walking, climbing stairs, carrying groceries, or moving a chair? Moderately   How often have you been bothered by emotional problems such as feeling anxious, depressed or irritable? Rarely   How would you rate your fatigue on average? Mild   How would you rate your pain on average?   0 = No Pain  to  10 = Worst Imaginable Pain 7   In general, would you say your health is: 4   In general, would you say your quality of life is: 4   In general, how would you rate your physical health? 3   In general, how would you rate your mental health, including your mood and your ability to think? 3   In general, how would you rate your satisfaction with your social activities and relationships? 3   In general, please rate how well you " carry out your usual social activities and roles. (This includes activities at home, at work and in your community, and responsibilities as a parent, child, spouse, employee, friend, etc.) 2   To what extent are you able to carry out your everyday physical activities such as walking, climbing stairs, carrying groceries, or moving a chair? 3   In the past 7 days, how often have you been bothered by emotional problems such as feeling anxious, depressed, or irritable? 2   In the past 7 days, how would you rate your fatigue on average? 2   In the past 7 days, how would you rate your pain on average, where 0 means no pain, and 10 means worst imaginable pain? 7   Global Mental Health Score 14   Global Physical Health Score 12   PROMIS TOTAL - SUBSCORES 26   Some recent data might be hidden                Deejay Pierce ATC

## 2020-08-12 ENCOUNTER — TELEPHONE (OUTPATIENT)
Dept: ORTHOPEDICS | Facility: CLINIC | Age: 50
End: 2020-08-12

## 2020-08-12 ENCOUNTER — PRE VISIT (OUTPATIENT)
Dept: SURGERY | Facility: CLINIC | Age: 50
End: 2020-08-12

## 2020-08-12 NOTE — TELEPHONE ENCOUNTER
Patient is scheduled for surgery with Dr. Dexter    Spoke or left message with: Patient    Date of Surgery: 9/10/20    Location: [place]    Post op: 6 & 12 weeks    Pre-op with surgeon (if applicable): Complete    H&P: Scheduled with PAC 8/17/20    Additional imaging/appointments: N/A    Surgery packet: Received in clinic     Additional comments: N/A

## 2020-08-12 NOTE — TELEPHONE ENCOUNTER
FUTURE VISIT INFORMATION      SURGERY INFORMATION:    Date: 9/10/20    Location: [facility] OR    Surgeon:  Juvenal Dexter MD     Anesthesia Type:  General    Procedure: Lumbar 4 to 5 instrumented posterior interbody fusion and mata tabor osteotomy with O-Arm/Stealth Navigation, use of allograft, local autograft, and right iliac crest autograft     Consult: OV 20    RECORDS REQUESTED FROM:       Primary Care Provider: Lola Perez APRN CNP - [place]    Most recent EKG+ Tracin19    Most recent ECHO: 2001- [facility]    Most recent Cardiac Stress Test: 10/9/19

## 2020-08-17 ENCOUNTER — VIRTUAL VISIT (OUTPATIENT)
Dept: SURGERY | Facility: CLINIC | Age: 50
End: 2020-08-17
Payer: COMMERCIAL

## 2020-08-17 ENCOUNTER — ANESTHESIA EVENT (OUTPATIENT)
Dept: SURGERY | Facility: CLINIC | Age: 50
DRG: 454 | End: 2020-08-17
Payer: COMMERCIAL

## 2020-08-17 DIAGNOSIS — Z01.818 PREOP EXAMINATION: Primary | ICD-10-CM

## 2020-08-17 ASSESSMENT — LIFESTYLE VARIABLES: TOBACCO_USE: 1

## 2020-08-17 ASSESSMENT — PAIN SCALES - GENERAL: PAINLEVEL: SEVERE PAIN (6)

## 2020-08-17 ASSESSMENT — ENCOUNTER SYMPTOMS: SEIZURES: 0

## 2020-08-17 NOTE — H&P
Pre-Operative H & P     CC:  Preoperative exam to assess for increased cardiopulmonary risk while undergoing surgery and anesthesia.    Date of Encounter: 8/17/2020  Primary Care Physician:  Lola Perez  Reason for Visit: Recurrent herniation of lumbar disc; Spinal stenosis of lumbar region with neurogenic claudication     VIDEO-VISIT DETAILS    Type of service:  Video Visit    Patient verbally consented to video service today:  YES    Video Start Time: 1335  Video End Time (time video stopped): 1347    Originating Location (pt. Location): Home    Distant Location (provider location):  [facility] PREOPERATIVE ASSESSMENT CENTER    Mode of Communication:  Video Conference via [employer]    Jas Patel is a 49 y/o male who presents for pre-operative H&P in preparation for Lumbar 4 to 5 instrumented posterior interbody fusion and mata tabor osteotomy with O-Arm/Stealth Navigation, use of allograft, local autograft, and right iliac crest autograft with Juvenal Dexter MD on 9/10/20 at [facility] for treatment of Recurrent herniation of lumbar disc; Spinal stenosis of lumbar region with neurogenic claudication.      Mr. Patel has had 2 previous lumbar decompression and discectomy surgeries, each of which provided him significant relief for a number of weeks.  Each time returning to work, he seemed to have a recurrence of symptoms. He has had a number of MRIs showing recurrent disc herniations over time.       He endorses persistent right lower extremity pain located to his right buttocks with radiation to his right groin, lateral thigh + leg, and dorsal + plantar foot.  Also has associated tingling radiating down the lateral aspect of his right lower extremity and plantar foot.  He states his symptoms are intolerable and frequently wake him up at night.  Denies bowel or bladder dysfunction.      Recent imaging shows evidence of previous attempted  laminectomy and discectomy at L4-5 with presumed recurrent disc herniation on the right side at L4-5 displacing the traversing L5 nerve root.  He has some mild degenerative changes at other levels.  There is a small seroma in the dorsal epidural space. He now presents for the above procedure.      PMH is also significant for GERD, benign essential tremor, BPH, anxiety and depression, hx C5-C6 herniation.      History was obtained from patient & chart review.     Past Medical History  Past Medical History:   Diagnosis Date     Benign essential tremor 11/2/2016     BPH (benign prostatic hyperplasia) 10/28/2015     BPH (benign prostatic hyperplasia)      Gastroesophageal reflux disease without esophagitis 10/28/2015     Generalized anxiety disorder 10/26/2011     GERD (gastroesophageal reflux disease)      Herniated disc     in neck     Lumbar radiculopathy 12/20/2019     Moderate major depression (H) 11/22/2010       Past Surgical History  Past Surgical History:   Procedure Laterality Date     DECOMPRESSION LUMBAR ONE LEVEL N/A 1/23/2020    Procedure: Lumbar 4 to 5 Decompression and Discectomy;  Surgeon: Juvenal Dexter MD;  Location: [facility] OR     DECOMPRESSION LUMBAR ONE LEVEL N/A 3/23/2020    Procedure: Revision Lumbar 4 to 5 decompression;  Surgeon: Juvenal Dexter MD;  Location: [facility] OR     DRAIN SKIN ABSCESS COMPLIC      left thigh I and D x 4 in the 1990's     TONSILLECTOMY         Hx of Blood transfusions/reactions: no     Hx of abnormal bleeding or anti-platelet use: no    Menstrual history: No LMP for male patient.:      Steroid use in the last year: yes, lumbar injection & oral prednisone for back pain, last dose 3/2020    Personal or FH with difficulty with Anesthesia:  no    Prior to Admission Medications  Current Outpatient Medications   Medication Sig Dispense Refill     amitriptyline (ELAVIL) 25 MG tablet TAKE ONE TABLET BY MOUTH AT BEDTIME (Patient taking differently: Take 25 mg by  mouth At Bedtime TAKE ONE TABLET BY MOUTH AT BEDTIME) 90 tablet 3     Cholecalciferol (VITAMIN D3 PO) Take 4,000 Units by mouth every morning        citalopram (CELEXA) 40 MG tablet Take 1 tablet (40 mg) by mouth daily (Patient taking differently: Take 40 mg by mouth every morning ) 90 tablet 3     HEMP OIL OR EXTRACT OR OTHER CBD CANNABINOID, NOT MEDICAL CANNABIS, every evening       HYDROcodone-acetaminophen (NORCO) 5-325 MG tablet Take 1 tablet by mouth daily as needed for severe pain #28 tabs to last 4 weeks 28 tablet 0     loratadine (CLARITIN) 10 MG tablet Take 10 mg by mouth every morning 1 tab daily       omeprazole (PRILOSEC) 20 MG DR capsule Take 1 capsule (20 mg) by mouth daily (Patient taking differently: Take 20 mg by mouth every morning ) 90 capsule 3     propranolol (INDERAL) 10 MG tablet TAKE ONE TABLET BY MOUTH TWICE A DAY AS NEEDED FOR TREMOR (Patient taking differently: Take 10 mg by mouth 2 times daily TAKE ONE TABLET BY MOUTH TWICE A DAY AS NEEDED FOR TREMOR) 180 tablet 1     tamsulosin (FLOMAX) 0.4 MG capsule Take 2 capsules (0.8 mg) by mouth daily (Patient taking differently: Take 0.8 mg by mouth daily (with dinner) ) 180 capsule 3     traMADol (ULTRAM) 50 MG tablet Take 1 tablet (50 mg) by mouth 2 times daily as needed for moderate to severe pain #56 tabs to last 4 weeks 56 tablet 0       Allergies  Allergies   Allergen Reactions     No Known Drug Allergies        Social History  Social History     Socioeconomic History     Marital status:      Spouse name: Marcelina     Number of children: 2     Years of education: 16     Highest education level: Not on file   Occupational History     Occupation: RN     Employer[place] [employer] [place]   Social Needs     Financial resource strain: Not on file     Food insecurity     Worry: Not on file     Inability: Not on file     Transportation needs     Medical: Not on file     Non-medical: Not on file   Tobacco Use     Smoking status: Former Smoker     Packs/day:  0.20     Years: 10.00     Pack years: 2.00     Types: Cigarettes     Start date: 2005     Last attempt to quit: 2017     Years since quitting: 3.6     Smokeless tobacco: Never Used   Substance and Sexual Activity     Alcohol use: Yes     Alcohol/week: 0.0 - 7.0 standard drinks     Drug use: No     Sexual activity: Yes     Partners: Female     Birth control/protection: Post-menopausal   Lifestyle     Physical activity     Days per week: Not on file     Minutes per session: Not on file     Stress: Not on file   Relationships     Social connections     Talks on phone: Not on file     Gets together: Not on file     Attends Faith service: Not on file     Active member of club or organization: Not on file     Attends meetings of clubs or organizations: Not on file     Relationship status: Not on file     Intimate partner violence     Fear of current or ex partner: Not on file     Emotionally abused: Not on file     Physically abused: Not on file     Forced sexual activity: Not on file   Other Topics Concern     Parent/sibling w/ CABG, MI or angioplasty before 65F 55M? No   Social History Narrative     Not on file       Family History  Family History   Problem Relation Age of Onset     No Known Problems Mother      Hypertension Father      Valvular heart disease Father         aortic valve replacement     C.A.D. Maternal Grandfather      Hypertension Maternal Grandfather      No Known Problems Brother      No Known Problems Sister      Other - See Comments Sister         adopted     Cerebrovascular Disease Other         great uncle     Diabetes Paternal Uncle         type 2     Diabetes Other         great aunt     Prostate Cancer Other         great uncle moms side     Alzheimer Disease Other         great uncle moms side     Anesthesia Reaction No family hx of      Deep Vein Thrombosis (DVT) No family hx of          Preop Vitals    BP Readings from Last 3 Encounters:   03/23/20 (!) 132/92   03/19/20 114/81   03/17/20  "138/84    Pulse Readings from Last 3 Encounters:   03/23/20 80   03/19/20 80   03/17/20 72      Resp Readings from Last 3 Encounters:   03/23/20 16   03/19/20 14   03/03/20 16    SpO2 Readings from Last 3 Encounters:   03/23/20 94%   03/19/20 99%   03/03/20 98%      Temp Readings from Last 1 Encounters:   03/23/20 97.9  F (36.6  C) (Oral)    Ht Readings from Last 1 Encounters:   08/11/20 1.88 m (6' 2\")      Wt Readings from Last 1 Encounters:   08/11/20 87.5 kg (193 lb)    Estimated body mass index is 24.78 kg/m  as calculated from the following:    Height as of 8/11/20: 1.88 m (6' 2\").    Weight as of 8/11/20: 87.5 kg (193 lb).       ROS/MED HX  The complete review of systems is negative other than noted in the HPI or here.  Patient denies recent illness, fever and respiratory infection during past month.    ENT/Pulmonary:     (+)tobacco use, Past use 2 pk yr hx, quit 2017 packs/day  , . .   (-) asthma and sleep apnea   Neurologic: Comment: Benign essential tremor - taking propanolol    (+)neuropathy - mild RLE numbness,    (-) seizures and CVA   Cardiovascular:  - neg cardiovascular ROS   (+) ----. : . . . :. . Previous cardiac testing date:results:Stress Testdate:12/2019 results:ECG reviewed date:8/2019 results: date: results:          METS/Exercise Tolerance: Comment: Activity limited due to back pain 3 - Able to walk 1-2 blocks without stopping   Hematologic:  - neg hematologic  ROS      (-) history of blood clots and History of Transfusion   Musculoskeletal: Comment: Lumbar surgeries in 1/2020 & 3/2020    B/L knee arthritis    Hx C5-C6 herniation        GI/Hepatic:     (+) GERD Symptomatic,      (-) liver disease   Renal/Genitourinary:  - ROS Renal section negative       Endo: Comment: Had prednisone & lumbar injections for LBP, last dose in March     (-) Type II DM   Psychiatric:     (+) psychiatric history anxiety and depression      Infectious Disease:  - neg infectious disease ROS       Malignancy:      - " no malignancy   Other: Comment: Taking Norco 5-325 (1 tab/day) & tramadol for pain     (+) H/O Chronic Pain,H/O chronic opiod use ,                PHYSICAL EXAM:   Mental Status/Neuro: A/A/O; Age Appropriate   Airway:    Respiratory:    CV:    Comments:                                          0 lbs 0 oz  Data Unavailable   There is no height or weight on file to calculate BMI.    Physical Exam  Constitutional: Awake, alert, cooperative, no apparent distress, and appears stated age.  Neurologic: Awake, alert, oriented to name, place and time.   Neuropsychiatric: Calm, cooperative. Normal affect. Answers questions appropriately.    ** Patient's visit was done virtually today due to the Covid 19 pandemic.  A full physical exam was not completed.  Please refer to the physical examination documented by the anesthesiologist in the anesthesia record on the day of surgery. **    PRIOR LABS/DIAGNOSTIC STUDIES:   All labs and imaging personally reviewed      CT SPINE LUMBAR W/O 20  Impression:    1. Postoperative changes of L4-5 laminectomy. Recurrent disc herniation on the right with possible right L5 nerve root impingement. This was better seen on comparison MRI.  2. Degenerative disc changes L3-4 and L5-S1. Mild to moderate foraminal narrowing at these levels without change. Possible left S1 nerve root impingement in lateral recess at L5-S1.    [id number]   NSR      Stress test: 2019   Interpretation Summary   This was a normal stress echocardiogram with no evidence of stress-induced   ischemia. Normal resting LV function with EF of approximately 60-65%; normal   response to exercise with increase to approximately 70-75%. No stress induced   regional wall motion abnormalities. No ECG evidence of ischemia. No   significant valvular disease noted on routine screening color flow Doppler and   pulsed Doppler examination. Normal functional capacity. No resting wall motion   abnormalities.   Exercise was stopped due to  leg fatigue.   The patient did not exhibit any symptoms during exercise.   Normal blood pressure response with stress.   Normal heart rate response to exercise.     LABS:  CBC:   Lab Results   Component Value Date    WBC 8.0 03/19/2020    WBC 6.8 12/27/2019    HGB 14.3 03/19/2020    HGB 15.3 12/27/2019    HCT 41.9 03/19/2020    HCT 44.2 12/27/2019     03/19/2020     12/27/2019     BMP:   Lab Results   Component Value Date     03/19/2020     01/23/2020    POTASSIUM 4.6 03/19/2020    POTASSIUM 4.0 12/27/2019    CHLORIDE 106 03/19/2020    CHLORIDE 100 12/27/2019    CO2 27 03/19/2020    CO2 27 12/27/2019    BUN 8 03/19/2020    BUN 12 12/27/2019    CR 0.88 03/19/2020    CR 0.94 12/27/2019     (H) 03/23/2020     (H) 03/19/2020     COAGS: No results found for: PTT, INR, FIBR  POC: No results found for: BGM, HCG, HCGS  OTHER:   Lab Results   Component Value Date    HIWOT 8.7 03/19/2020    ALBUMIN 3.8 (L) 01/15/2013    PROTTOTAL 7.1 01/15/2013    ALT 35 01/15/2013    AST 25 01/15/2013    ALKPHOS 92 01/15/2013    BILITOTAL 0.2 01/15/2013    LIPASE 74 02/08/2008          Labs today:  None  (Will order BMP & CBC for DOS)  COVID19 testing ordered by Dr. Dexter, to be completed within 4 days of DOS    Outside records reviewed from: Care Everywhere    ASSESSMENT and PLAN  Jas Patel is a 50 year old male scheduled to undergo Lumbar 4 to 5 instrumented posterior interbody fusion and mata tabor osteotomy with O-Arm/Stealth Navigation, use of allograft, local autograft, and right iliac crest autograft with Juvenal Dexter MD on 9/10/20 at [facility] for treatment of Recurrent herniation of lumbar disc; Spinal stenosis of lumbar region with neurogenic claudication. .      Pt has had prior anesthetic.     No history of anesthetic complications    He has the following specific operative considerations:   # CALLI 1/8 = low risk  # VTE risk: 0.5%  #  Risk of PONV score = 2.  If > 2, anti-emetic intervention recommended.  # Anesthesia considerations:  Refer to PAC assessment in anesthesia records      CARDIAC: METS 3,  Activity limited due to back pain 3 - Able to walk 1-2 blocks without stopping     # RCRI : No serious cardiac risks.  0.4% risk of major adverse cardiac event.     #  Stress test 12/2019:  No ischemia       PULMONARY:     # Former smoker, 2 pk yr hx, quit 2015    # No asthma or inhaler use    GI/:  GERD, asymptomatic on prilosec     # BPH    ENDO: BMI 25    # No DM    # Had prednisone & lumbar injections for LBP, last dose in March      ORTHO:  No TMJ    # s/p L4-L5 decompression 1/23/20, s/p revision 3/23/20    # Hx C5-C6 herniation    NEURO/PSYCH:     # Benign essential tremor, taking propanolol    Patient is optimized and is acceptable candidate for the proposed procedure. No further diagnostic evaluation is needed.    ** Patient's visit was done virtually today due to the Covid 19 pandemic.  A full physical exam was not completed.  Please refer to the physical examination documented by the anesthesiologist in the anesthesia record on the day of surgery. **    Arrival time, NPO, shower and medication instructions provided by nursing staff today.  Preparing For Your Surgery handout given.        Emerald Serrano PA-C  Preoperative Assessment Center  [facility]  Clinic and Surgery Center  Phone: [phone]  Fax: [phone]

## 2020-08-17 NOTE — PROGRESS NOTES
"Jas Patel is a 50 year old male who is being evaluated via a billable video visit.      The patient has been notified of following:     \"This video visit will be conducted via a call between you and your physician/provider. We have found that certain health care needs can be provided without the need for an in-person physical exam.  This service lets us provide the care you need with a video conversation.  If a prescription is necessary we can send it directly to your pharmacy.  If lab work is needed we can place an order for that and you can then stop by our lab to have the test done at a later time.    Video visits are billed at different rates depending on your insurance coverage.  Please reach out to your insurance provider with any questions.    If during the course of the call the physician/provider feels a video visit is not appropriate, you will not be charged for this service.\"    Patient has given verbal consent for Video visit? Yes  How would you like to obtain your AVS? MyChart    Will anyone else be joining your video visit? No    CHAPO Guzman LPN          "

## 2020-08-17 NOTE — PATIENT INSTRUCTIONS
Preparing for Your Surgery      Name:  Jas Patel   MRN:  [id number]   :  1970   Today's Date:  2020       Arriving for surgery:  Surgery date:  9/10/20  Arrival time:  9 am    Restrictions due to COVID 19:  Patients are allowed one visitor in the pre-op period  All visitors must wear a mask  No visitors under 18  No ill visitors   parking is not available     Please come to:     [facility] [address]  [address] [place] [address]  [id number]  -Proceed to the 3rd floor, check in at the Adult Surgery Waiting Lounge. [phone]    If an escort is needed stop at the Information Desk in the lobby. Inform the information person that you are here for surgery. An escort to the Adult Surgery Waiting Lounge will be provided.    What can I eat or drink?  -  You may eat and drink normally for up to 8 hours before your surgery. (Until 3:35 am)  -  You may have clear liquids until 2 hours before surgery. (Until 9 am- Stop on arrival to hospital)  Examples of clear liquids:  Water  Clear broth  Juices (apple, white grape, white cranberry  and cider) without pulp  Noncarbonated, powder based beverages  (lemonade and Asad-Aid)  Sodas (Sprite, 7-Up, ginger ale and seltzer)  Coffee or tea (without milk or cream)  Gatorade    -  No Alcohol for at least 24 hours before surgery     Which medicines can I take?  Hold Aspirin for 7 days before surgery.   Hold Multivitamins for 7 days before surgery.  Hold Supplements for 7 days before surgery.  Hold Ibuprofen (Advil, Motrin) for 1 day before surgery--unless otherwise directed by surgeon.  Hold Naproxen (Aleve) for 4 days before surgery.    -  DO NOT take these medications the day of surgery:  Vitamin D3    -  PLEASE TAKE these medications the day of surgery:  Citalopram (Celexa), Loratadine (Claritin), Omeprazole (Prilosec), Propranolol (Inderal),   Hydrocodone- Acetaminophen (Norco) if needed  Tramadol if needed    How do I prepare  myself?  - Please take 2 showers before surgery using Scrubcare or Hibiclens soap.    Use this soap only from the neck to your toes.     Leave the soap on your skin for one minute--then rinse thoroughly.      You may use your own shampoo and conditioner; no other hair products.   - Please remove all jewelry and body piercings.  - No lotions, deodorants or fragrance.  - Bring your ID and insurance card.    - All patients are required to have a Covid-19 test within 4 days of surgery/procedure.      -Patients will be contacted by the [facility] scheduling team within 1 week of surgery to make an appointment.      - Patients may call the Scheduling team at [phone] if they have not been scheduled within 4 days of  surgery.      ALL PATIENTS GOING HOME THE SAME DAY OF SURGERY ARE REQUIRED TO HAVE A RESPONSIBLE ADULT TO DRIVE AND BE IN ATTENDANCE WITH THEM FOR 24 HOURS FOLLOWING SURGERY     Questions or Concerns:    - For any questions regarding the day of surgery or your hospital stay, please contact the Pre Admission Nursing Office at [phone].       - If you have health changes between today and your surgery please call your surgeon.       For questions after surgery please call your surgeons office.     AFTER YOUR SURGERY  Breathing exercises   Breathing exercises help you recover faster. Take deep breaths and let the air out slowly. This will:     Help you wake up after surgery.    Help prevent complications like pneumonia.  Preventing complications will help you go home sooner.   Nausea and vomiting   You may feel sick to your stomach after surgery; if so, let your nurse know.    Pain control:  After surgery, you may have pain. Our goal is to help you manage your pain. Pain medicine will help you feel comfortable enough to do activities that will help you heal.  These activities may include breathing exercises, walking and physical therapy.   To help your health care team treat your pain we will ask:  1) If you have pain  2) where it is located 3) describe your pain in your words  Methods of pain control include medications given by mouth, vein or by nerve block for some surgeries.  Sequential Compression Device (SCD):  You may need to wear SCD S (also called pneumo boots)on your legs or feet. These are wraps connected to a machine that pumps in air and releases it. The repeated pumping helps prevent blood clots from forming.

## 2020-08-17 NOTE — ANESTHESIA PREPROCEDURE EVALUATION
"Anesthesia Pre-Procedure Evaluation    Patient: Jas Patel   MRN:     [id number] Gender:   male   Age:    50 year old :      1970        Preoperative Diagnosis: Recurrent herniation of lumbar disc [M51.26]  Spinal stenosis of lumbar region with neurogenic claudication [M48.062]   Procedure(s):  Lumbar 4 to 5 instrumented posterior interbody fusion and amta tabor osteotomy with O-Arm/Stealth Navigation, use of allograft, local autograft, and right iliac crest autograft     LABS:  CBC:   Lab Results   Component Value Date    WBC 8.0 2020    WBC 6.8 2019    HGB 14.3 2020    HGB 15.3 2019    HCT 41.9 2020    HCT 44.2 2019     2020     2019     BMP:   Lab Results   Component Value Date     2020     2020    POTASSIUM 4.6 2020    POTASSIUM 4.0 2019    CHLORIDE 106 2020    CHLORIDE 100 2019    CO2 27 2020    CO2 27 2019    BUN 8 2020    BUN 12 2019    CR 0.88 2020    CR 0.94 2019     (H) 2020     (H) 2020     COAGS: No results found for: PTT, INR, FIBR  POC: No results found for: BGM, HCG, HCGS  OTHER:   Lab Results   Component Value Date    HIWOT 8.7 2020    ALBUMIN 3.8 (L) 01/15/2013    PROTTOTAL 7.1 01/15/2013    ALT 35 01/15/2013    AST 25 01/15/2013    ALKPHOS 92 01/15/2013    BILITOTAL 0.2 01/15/2013    LIPASE 74 2008        Preop Vitals    BP Readings from Last 3 Encounters:   20 (!) 132/92   20 114/81   20 138/84    Pulse Readings from Last 3 Encounters:   20 80   20 80   20 72      Resp Readings from Last 3 Encounters:   20 16   20 14   03/03/20 16    SpO2 Readings from Last 3 Encounters:   20 94%   20 99%   20 98%      Temp Readings from Last 1 Encounters:   20 97.9  F (36.6  C) (Oral)    Ht Readings from Last 1 Encounters:   20 1.88 m (6' 2\") " "     Wt Readings from Last 1 Encounters:   08/11/20 87.5 kg (193 lb)    Estimated body mass index is 24.78 kg/m  as calculated from the following:    Height as of 8/11/20: 1.88 m (6' 2\").    Weight as of 8/11/20: 87.5 kg (193 lb).     LDA:  Peripheral IV 10/09/19 Distal;Left Upper arm (Active)   Number of days: 313        Past Medical History:   Diagnosis Date     Benign essential tremor 11/2/2016     BPH (benign prostatic hyperplasia) 10/28/2015     BPH (benign prostatic hyperplasia)      Gastroesophageal reflux disease without esophagitis 10/28/2015     Generalized anxiety disorder 10/26/2011     GERD (gastroesophageal reflux disease)      Herniated disc     in neck     Lumbar radiculopathy 12/20/2019     Moderate major depression (H) 11/22/2010      Past Surgical History:   Procedure Laterality Date     DECOMPRESSION LUMBAR ONE LEVEL N/A 1/23/2020    Procedure: Lumbar 4 to 5 Decompression and Discectomy;  Surgeon: Juvenal Dexter MD;  Location: [facility] OR     DECOMPRESSION LUMBAR ONE LEVEL N/A 3/23/2020    Procedure: Revision Lumbar 4 to 5 decompression;  Surgeon: Juvenal Dexter MD;  Location: [facility] OR     DRAIN SKIN ABSCESS COMPLIC      left thigh I and D x 4 in the 1990's     TONSILLECTOMY        Allergies   Allergen Reactions     No Known Drug Allergies         Anesthesia Evaluation     . Pt has had prior anesthetic.     No history of anesthetic complications          ROS/MED HX    ENT/Pulmonary:     (+)tobacco use, Past use 2 pk yr hx, quit 2017 packs/day  , . .   (-) asthma and sleep apnea   Neurologic: Comment: Benign essential tremor - taking propanolol    (+)neuropathy - mild RLE numbness,    (-) seizures and CVA   Cardiovascular:  - neg cardiovascular ROS   (+) ----. : . . . :. . Previous cardiac testing date:results:Stress Testdate:12/2019 results:ECG reviewed date:8/2019 results: date: results:          METS/Exercise Tolerance: Comment: Activity limited due to back pain 3 - Able to " walk 1-2 blocks without stopping   Hematologic:  - neg hematologic  ROS      (-) history of blood clots and History of Transfusion   Musculoskeletal: Comment: Lumbar surgeries in 1/2020 & 3/2020    B/L knee arthritis    Hx C5-C6 herniation        GI/Hepatic:     (+) GERD Symptomatic,      (-) liver disease   Renal/Genitourinary:  - ROS Renal section negative       Endo: Comment: Had prednisone & lumbar injections for LBP, last dose in March     (-) Type II DM   Psychiatric:     (+) psychiatric history anxiety and depression      Infectious Disease:  - neg infectious disease ROS       Malignancy:      - no malignancy   Other: Comment: Taking Norco 5-325 (1 tab/day) & tramadol for pain     (+) H/O Chronic Pain,H/O chronic opiod use ,                        PHYSICAL EXAM:   Mental Status/Neuro: A/A/O; Age Appropriate   Airway:    Respiratory:    CV:    Comments:                      Assessment:   ASA SCORE: 2    H&P: History and physical reviewed and following examination; no interval change.         Plan:   Anes. Type:  General   Pre-Medication: None   Induction:  IV (Standard)   Airway: ETT; Oral; CMAC/VL   Access/Monitoring: PIV; 2nd PIV   Maintenance: Balanced     Postop Plan:   Postop Pain: Opioids  Postop Sedation/Airway: Not planned  Disposition: Inpatient/Admit     PONV Management:   Adult Risk Factors:, Postop Opioids   Prevention: Ondansetron, Dexamethasone                PAC Discussion and Assessment    ASA Classification: 2  Case is suitable for: [facility]  Anesthetic techniques and relevant risks discussed: GA  Invasive monitoring and risk discussed: No  Types:   Possibility and Risk of blood transfusion discussed: No  NPO instructions given:   Additional anesthetic preparation and risks discussed:   Needs early admission to pre-op area:   Other:     PAC Resident/NP Anesthesia Assessment:  Jas EDGARDO Jorge is a 50 year old male scheduled to undergo Lumbar 4 to 5 instrumented posterior interbody fusion and  mata tabor osteotomy with O-Arm/Stealth Navigation, use of allograft, local autograft, and right iliac crest autograft with Juvenal Dexter MD on 9/10/20 at [facility] for treatment of Recurrent herniation of lumbar disc; Spinal stenosis of lumbar region with neurogenic claudication. .      Pt has had prior anesthetic.     No history of anesthetic complications    He has the following specific operative considerations:   # CALLI 1/8 = low risk  # VTE risk: 0.5%  # Risk of PONV score = 2.  If > 2, anti-emetic intervention recommended.  # Anesthesia considerations:  Refer to PAC assessment in anesthesia records      CARDIAC: METS 3,  Activity limited due to back pain 3 - Able to walk 1-2 blocks without stopping     # RCRI : No serious cardiac risks.  0.4% risk of major adverse cardiac event.     #  Stress test 12/2019:  No ischemia       PULMONARY:     # Former smoker, 2 pk yr hx, quit 2015    # No asthma or inhaler use    GI/:  GERD, asymptomatic on prilosec     # BPH    ENDO: BMI 25    # No DM    # Had prednisone & lumbar injections for LBP, last dose in March      ORTHO:  No TMJ    # s/p L4-L5 decompression 1/23/20, s/p revision 3/23/20    # Hx C5-C6 herniation    NEURO/PSYCH:     # Benign essential tremor, taking propanolol    Patient is optimized and is acceptable candidate for the proposed procedure. No further diagnostic evaluation is needed.    ** Patient's visit was done virtually today due to the Covid 19 pandemic.  A full physical exam was not completed.  Please refer to the physical examination documented by the anesthesiologist in the anesthesia record on the day of surgery. **      Reviewed and Signed by PAC Mid-Level Provider/Resident  Mid-Level Provider/Resident: Emerald Serrano PA-C  Date: 8/17/20  Time: 1514    Attending Anesthesiologist Anesthesia Assessment:        Anesthesiologist:   Date:   Time:   Pass/Fail:   Disposition:     PAC Pharmacist  Assessment:        Pharmacist:   Date:   Time:    Emerald Serrano PA-C

## 2020-09-08 DIAGNOSIS — Z11.59 ENCOUNTER FOR SCREENING FOR OTHER VIRAL DISEASES: ICD-10-CM

## 2020-09-08 LAB
SARS-COV-2 RNA SPEC QL NAA+PROBE: NOT DETECTED
SPECIMEN SOURCE: NORMAL

## 2020-09-10 ENCOUNTER — ANESTHESIA (OUTPATIENT)
Dept: SURGERY | Facility: CLINIC | Age: 50
DRG: 454 | End: 2020-09-10
Payer: COMMERCIAL

## 2020-09-10 ENCOUNTER — HOSPITAL ENCOUNTER (INPATIENT)
Facility: CLINIC | Age: 50
LOS: 3 days | Discharge: HOME | DRG: 454 | End: 2020-09-13
Attending: ORTHOPAEDIC SURGERY | Admitting: ORTHOPAEDIC SURGERY
Payer: COMMERCIAL

## 2020-09-10 ENCOUNTER — APPOINTMENT (OUTPATIENT)
Dept: GENERAL RADIOLOGY | Facility: CLINIC | Age: 50
DRG: 454 | End: 2020-09-10
Attending: ORTHOPAEDIC SURGERY
Payer: COMMERCIAL

## 2020-09-10 DIAGNOSIS — M48.062 SPINAL STENOSIS OF LUMBAR REGION WITH NEUROGENIC CLAUDICATION: ICD-10-CM

## 2020-09-10 DIAGNOSIS — M51.26 RECURRENT HERNIATION OF LUMBAR DISC: ICD-10-CM

## 2020-09-10 PROBLEM — Z98.890 S/P SPINAL SURGERY: Status: ACTIVE | Noted: 2020-09-10

## 2020-09-10 LAB
ABO + RH BLD: NORMAL
ABO + RH BLD: NORMAL
ANION GAP SERPL CALCULATED.3IONS-SCNC: 4 MMOL/L (ref 3–14)
BLD GP AB SCN SERPL QL: NORMAL
BLOOD BANK CMNT PATIENT-IMP: NORMAL
BUN SERPL-MCNC: 13 MG/DL (ref 7–30)
CALCIUM SERPL-MCNC: 8.6 MG/DL (ref 8.5–10.1)
CHLORIDE SERPL-SCNC: 105 MMOL/L (ref 94–109)
CO2 SERPL-SCNC: 28 MMOL/L (ref 20–32)
CREAT SERPL-MCNC: 0.94 MG/DL (ref 0.66–1.25)
ERYTHROCYTE [DISTWIDTH] IN BLOOD BY AUTOMATED COUNT: 12.4 % (ref 10–15)
GFR SERPL CREATININE-BSD FRML MDRD: >90 ML/MIN/{1.73_M2}
GLUCOSE SERPL-MCNC: 142 MG/DL (ref 70–99)
HCT VFR BLD AUTO: 44.7 % (ref 40–53)
HGB BLD-MCNC: 15.1 G/DL (ref 13.3–17.7)
MCH RBC QN AUTO: 31.7 PG (ref 26.5–33)
MCHC RBC AUTO-ENTMCNC: 33.8 G/DL (ref 31.5–36.5)
MCV RBC AUTO: 94 FL (ref 78–100)
PLATELET # BLD AUTO: 294 10E9/L (ref 150–450)
POTASSIUM SERPL-SCNC: 4 MMOL/L (ref 3.4–5.3)
RBC # BLD AUTO: 4.77 10E12/L (ref 4.4–5.9)
SODIUM SERPL-SCNC: 137 MMOL/L (ref 133–144)
SPECIMEN EXP DATE BLD: NORMAL
WBC # BLD AUTO: 8.5 10E9/L (ref 4–11)

## 2020-09-10 PROCEDURE — 80048 BASIC METABOLIC PNL TOTAL CA: CPT | Performed by: PHYSICIAN ASSISTANT

## 2020-09-10 PROCEDURE — 25000128 H RX IP 250 OP 636: Performed by: STUDENT IN AN ORGANIZED HEALTH CARE EDUCATION/TRAINING PROGRAM

## 2020-09-10 PROCEDURE — 01NB0ZZ RELEASE LUMBAR NERVE, OPEN APPROACH: ICD-10-PCS | Performed by: ORTHOPAEDIC SURGERY

## 2020-09-10 PROCEDURE — P9041 ALBUMIN (HUMAN),5%, 50ML: HCPCS | Performed by: NURSE ANESTHETIST, CERTIFIED REGISTERED

## 2020-09-10 PROCEDURE — 0SB20ZZ EXCISION OF LUMBAR VERTEBRAL DISC, OPEN APPROACH: ICD-10-PCS | Performed by: ORTHOPAEDIC SURGERY

## 2020-09-10 PROCEDURE — 99222 1ST HOSP IP/OBS MODERATE 55: CPT | Performed by: HOSPITALIST

## 2020-09-10 PROCEDURE — 86901 BLOOD TYPING SEROLOGIC RH(D): CPT | Performed by: PHYSICIAN ASSISTANT

## 2020-09-10 PROCEDURE — 25800030 ZZH RX IP 258 OP 636: Performed by: PHYSICIAN ASSISTANT

## 2020-09-10 PROCEDURE — 0SG0071 FUSION OF LUMBAR VERTEBRAL JOINT WITH AUTOLOGOUS TISSUE SUBSTITUTE, POSTERIOR APPROACH, POSTERIOR COLUMN, OPEN APPROACH: ICD-10-PCS | Performed by: ORTHOPAEDIC SURGERY

## 2020-09-10 PROCEDURE — 25000125 ZZHC RX 250: Performed by: ORTHOPAEDIC SURGERY

## 2020-09-10 PROCEDURE — 85027 COMPLETE CBC AUTOMATED: CPT | Performed by: PHYSICIAN ASSISTANT

## 2020-09-10 PROCEDURE — 0QS004Z REPOSITION LUMBAR VERTEBRA WITH INTERNAL FIXATION DEVICE, OPEN APPROACH: ICD-10-PCS | Performed by: ORTHOPAEDIC SURGERY

## 2020-09-10 PROCEDURE — 25000132 ZZH RX MED GY IP 250 OP 250 PS 637

## 2020-09-10 PROCEDURE — 25000132 ZZH RX MED GY IP 250 OP 250 PS 637: Performed by: HOSPITALIST

## 2020-09-10 PROCEDURE — 25000128 H RX IP 250 OP 636: Performed by: ORTHOPAEDIC SURGERY

## 2020-09-10 PROCEDURE — 25000125 ZZHC RX 250: Performed by: PHYSICIAN ASSISTANT

## 2020-09-10 PROCEDURE — 27210794 ZZH OR GENERAL SUPPLY STERILE: Performed by: ORTHOPAEDIC SURGERY

## 2020-09-10 PROCEDURE — 25000132 ZZH RX MED GY IP 250 OP 250 PS 637: Performed by: PHYSICIAN ASSISTANT

## 2020-09-10 PROCEDURE — 36000076 ZZH SURGERY LEVEL 6 EA 15 ADDTL MIN - UMMC: Performed by: ORTHOPAEDIC SURGERY

## 2020-09-10 PROCEDURE — 25000128 H RX IP 250 OP 636

## 2020-09-10 PROCEDURE — 86850 RBC ANTIBODY SCREEN: CPT | Performed by: PHYSICIAN ASSISTANT

## 2020-09-10 PROCEDURE — C1713 ANCHOR/SCREW BN/BN,TIS/BN: HCPCS | Performed by: ORTHOPAEDIC SURGERY

## 2020-09-10 PROCEDURE — 27210995 ZZH RX 272: Performed by: ORTHOPAEDIC SURGERY

## 2020-09-10 PROCEDURE — 12000001 ZZH R&B MED SURG/OB UMMC

## 2020-09-10 PROCEDURE — 36415 COLL VENOUS BLD VENIPUNCTURE: CPT | Performed by: PHYSICIAN ASSISTANT

## 2020-09-10 PROCEDURE — 25000128 H RX IP 250 OP 636: Performed by: NURSE ANESTHETIST, CERTIFIED REGISTERED

## 2020-09-10 PROCEDURE — 40000170 ZZH STATISTIC PRE-PROCEDURE ASSESSMENT II: Performed by: ORTHOPAEDIC SURGERY

## 2020-09-10 PROCEDURE — 0SG00AJ FUSION OF LUMBAR VERTEBRAL JOINT WITH INTERBODY FUSION DEVICE, POSTERIOR APPROACH, ANTERIOR COLUMN, OPEN APPROACH: ICD-10-PCS | Performed by: ORTHOPAEDIC SURGERY

## 2020-09-10 PROCEDURE — 25000125 ZZHC RX 250: Performed by: NURSE ANESTHETIST, CERTIFIED REGISTERED

## 2020-09-10 PROCEDURE — C1762 CONN TISS, HUMAN(INC FASCIA): HCPCS | Performed by: ORTHOPAEDIC SURGERY

## 2020-09-10 PROCEDURE — 37000008 ZZH ANESTHESIA TECHNICAL FEE, 1ST 30 MIN: Performed by: ORTHOPAEDIC SURGERY

## 2020-09-10 PROCEDURE — 37000009 ZZH ANESTHESIA TECHNICAL FEE, EACH ADDTL 15 MIN: Performed by: ORTHOPAEDIC SURGERY

## 2020-09-10 PROCEDURE — 8E0WXBZ COMPUTER ASSISTED PROCEDURE OF TRUNK REGION: ICD-10-PCS | Performed by: ORTHOPAEDIC SURGERY

## 2020-09-10 PROCEDURE — 25000565 ZZH ISOFLURANE, EA 15 MIN: Performed by: ORTHOPAEDIC SURGERY

## 2020-09-10 PROCEDURE — 86900 BLOOD TYPING SEROLOGIC ABO: CPT | Performed by: PHYSICIAN ASSISTANT

## 2020-09-10 PROCEDURE — 25800030 ZZH RX IP 258 OP 636: Performed by: NURSE ANESTHETIST, CERTIFIED REGISTERED

## 2020-09-10 PROCEDURE — 25000128 H RX IP 250 OP 636: Performed by: PHYSICIAN ASSISTANT

## 2020-09-10 PROCEDURE — 40000278 XR SURGERY CARM FLUORO LESS THAN 5 MIN: Mod: TC

## 2020-09-10 PROCEDURE — 0QB20ZZ EXCISION OF RIGHT PELVIC BONE, OPEN APPROACH: ICD-10-PCS | Performed by: ORTHOPAEDIC SURGERY

## 2020-09-10 PROCEDURE — 71000016 ZZH RECOVERY PHASE 1 LEVEL 3 FIRST HR: Performed by: ORTHOPAEDIC SURGERY

## 2020-09-10 PROCEDURE — 99207 ZZC CONSULT E&M CHANGED TO INITIAL LEVEL: CPT | Performed by: HOSPITALIST

## 2020-09-10 PROCEDURE — 71000017 ZZH RECOVERY PHASE 1 LEVEL 3 EA ADDTL HR: Performed by: ORTHOPAEDIC SURGERY

## 2020-09-10 PROCEDURE — 36000074 ZZH SURGERY LEVEL 6 1ST 30 MIN - UMMC: Performed by: ORTHOPAEDIC SURGERY

## 2020-09-10 PROCEDURE — 27810169 ZZH OR IMPLANT GENERAL: Performed by: ORTHOPAEDIC SURGERY

## 2020-09-10 DEVICE — IMP ROD MEDT SOLERA CVD 5.5X35MM CHR 1555501035: Type: IMPLANTABLE DEVICE | Site: SPINE LUMBAR | Status: FUNCTIONAL

## 2020-09-10 DEVICE — SPACER 4011222 12 DEG 12X22
Type: IMPLANTABLE DEVICE | Site: SPINE LUMBAR | Status: FUNCTIONAL
Brand: CAPSTONE CONTROL™ SPINAL SYSTEM

## 2020-09-10 DEVICE — GRAFT BONE CRUSH CANC 30ML 400080: Type: IMPLANTABLE DEVICE | Site: HIP | Status: FUNCTIONAL

## 2020-09-10 DEVICE — IMP SCR SET MEDT SOLERA BREAK OFF 5.5MM TI 5540030: Type: IMPLANTABLE DEVICE | Site: SPINE LUMBAR | Status: FUNCTIONAL

## 2020-09-10 DEVICE — IMP ROD MEDT SOLERA CVD 5.5X40MM CHR 1555501040: Type: IMPLANTABLE DEVICE | Site: SPINE LUMBAR | Status: FUNCTIONAL

## 2020-09-10 DEVICE — IMP SCR MEDT 5.5/6.0MM SOLERA 6.5X50MM MA 55840006550: Type: IMPLANTABLE DEVICE | Site: SPINE LUMBAR | Status: FUNCTIONAL

## 2020-09-10 DEVICE — IMP SCR MEDT 5.5/6.0MM SOLERA 7.5X50MM MA 55840007550: Type: IMPLANTABLE DEVICE | Site: SPINE LUMBAR | Status: FUNCTIONAL

## 2020-09-10 RX ORDER — CEFAZOLIN SODIUM 1 G/3ML
1 INJECTION, POWDER, FOR SOLUTION INTRAMUSCULAR; INTRAVENOUS SEE ADMIN INSTRUCTIONS
Status: DISCONTINUED | OUTPATIENT
Start: 2020-09-10 | End: 2020-09-10 | Stop reason: HOSPADM

## 2020-09-10 RX ORDER — ACETAMINOPHEN 325 MG/1
975 TABLET ORAL ONCE
Status: COMPLETED | OUTPATIENT
Start: 2020-09-10 | End: 2020-09-10

## 2020-09-10 RX ORDER — CEFAZOLIN SODIUM 2 G/100ML
2 INJECTION, SOLUTION INTRAVENOUS
Status: COMPLETED | OUTPATIENT
Start: 2020-09-10 | End: 2020-09-10

## 2020-09-10 RX ORDER — METOCLOPRAMIDE HYDROCHLORIDE 5 MG/ML
10 INJECTION INTRAMUSCULAR; INTRAVENOUS EVERY 6 HOURS PRN
Status: DISCONTINUED | OUTPATIENT
Start: 2020-09-10 | End: 2020-09-13 | Stop reason: HOSPADM

## 2020-09-10 RX ORDER — GABAPENTIN 100 MG/1
300 CAPSULE ORAL
Status: DISCONTINUED | OUTPATIENT
Start: 2020-09-10 | End: 2020-09-10 | Stop reason: HOSPADM

## 2020-09-10 RX ORDER — METOCLOPRAMIDE 10 MG/1
10 TABLET ORAL EVERY 6 HOURS PRN
Status: DISCONTINUED | OUTPATIENT
Start: 2020-09-10 | End: 2020-09-13 | Stop reason: HOSPADM

## 2020-09-10 RX ORDER — ACETAMINOPHEN 325 MG/1
975 TABLET ORAL EVERY 8 HOURS
Status: COMPLETED | OUTPATIENT
Start: 2020-09-10 | End: 2020-09-13

## 2020-09-10 RX ORDER — LIDOCAINE 40 MG/G
CREAM TOPICAL
Status: DISCONTINUED | OUTPATIENT
Start: 2020-09-10 | End: 2020-09-13 | Stop reason: HOSPADM

## 2020-09-10 RX ORDER — CEFAZOLIN SODIUM 1 G/3ML
1 INJECTION, POWDER, FOR SOLUTION INTRAMUSCULAR; INTRAVENOUS EVERY 8 HOURS
Status: DISCONTINUED | OUTPATIENT
Start: 2020-09-10 | End: 2020-09-13

## 2020-09-10 RX ORDER — TAMSULOSIN HYDROCHLORIDE 0.4 MG/1
0.8 CAPSULE ORAL
Status: DISCONTINUED | OUTPATIENT
Start: 2020-09-10 | End: 2020-09-13 | Stop reason: HOSPADM

## 2020-09-10 RX ORDER — NALOXONE HYDROCHLORIDE 0.4 MG/ML
.1-.4 INJECTION, SOLUTION INTRAMUSCULAR; INTRAVENOUS; SUBCUTANEOUS
Status: DISCONTINUED | OUTPATIENT
Start: 2020-09-10 | End: 2020-09-13 | Stop reason: HOSPADM

## 2020-09-10 RX ORDER — OXYCODONE HYDROCHLORIDE 5 MG/1
5-10 TABLET ORAL
Status: DISCONTINUED | OUTPATIENT
Start: 2020-09-10 | End: 2020-09-11

## 2020-09-10 RX ORDER — CITALOPRAM HYDROBROMIDE 40 MG/1
40 TABLET ORAL DAILY
Status: DISCONTINUED | OUTPATIENT
Start: 2020-09-11 | End: 2020-09-13 | Stop reason: HOSPADM

## 2020-09-10 RX ORDER — ONDANSETRON 2 MG/ML
4 INJECTION INTRAMUSCULAR; INTRAVENOUS EVERY 6 HOURS PRN
Status: DISCONTINUED | OUTPATIENT
Start: 2020-09-10 | End: 2020-09-13 | Stop reason: HOSPADM

## 2020-09-10 RX ORDER — HYDROMORPHONE HYDROCHLORIDE 1 MG/ML
.3-.5 INJECTION, SOLUTION INTRAMUSCULAR; INTRAVENOUS; SUBCUTANEOUS EVERY 5 MIN PRN
Status: DISCONTINUED | OUTPATIENT
Start: 2020-09-10 | End: 2020-09-10 | Stop reason: HOSPADM

## 2020-09-10 RX ORDER — NALOXONE HYDROCHLORIDE 0.4 MG/ML
.1-.4 INJECTION, SOLUTION INTRAMUSCULAR; INTRAVENOUS; SUBCUTANEOUS
Status: DISCONTINUED | OUTPATIENT
Start: 2020-09-10 | End: 2020-09-10

## 2020-09-10 RX ORDER — ONDANSETRON 2 MG/ML
4 INJECTION INTRAMUSCULAR; INTRAVENOUS EVERY 30 MIN PRN
Status: DISCONTINUED | OUTPATIENT
Start: 2020-09-10 | End: 2020-09-10 | Stop reason: HOSPADM

## 2020-09-10 RX ORDER — PROCHLORPERAZINE 25 MG
25 SUPPOSITORY, RECTAL RECTAL EVERY 12 HOURS PRN
Status: DISCONTINUED | OUTPATIENT
Start: 2020-09-10 | End: 2020-09-13 | Stop reason: HOSPADM

## 2020-09-10 RX ORDER — ONDANSETRON 4 MG/1
4 TABLET, ORALLY DISINTEGRATING ORAL EVERY 6 HOURS PRN
Status: DISCONTINUED | OUTPATIENT
Start: 2020-09-10 | End: 2020-09-13 | Stop reason: HOSPADM

## 2020-09-10 RX ORDER — MAGNESIUM HYDROXIDE 1200 MG/15ML
LIQUID ORAL PRN
Status: DISCONTINUED | OUTPATIENT
Start: 2020-09-10 | End: 2020-09-10 | Stop reason: HOSPADM

## 2020-09-10 RX ORDER — AMOXICILLIN 250 MG
2 CAPSULE ORAL 2 TIMES DAILY
Status: DISCONTINUED | OUTPATIENT
Start: 2020-09-10 | End: 2020-09-13 | Stop reason: HOSPADM

## 2020-09-10 RX ORDER — ONDANSETRON 4 MG/1
4 TABLET, ORALLY DISINTEGRATING ORAL EVERY 30 MIN PRN
Status: DISCONTINUED | OUTPATIENT
Start: 2020-09-10 | End: 2020-09-10 | Stop reason: HOSPADM

## 2020-09-10 RX ORDER — ACETAMINOPHEN 325 MG/1
650 TABLET ORAL EVERY 4 HOURS PRN
Status: DISCONTINUED | OUTPATIENT
Start: 2020-09-13 | End: 2020-09-13 | Stop reason: HOSPADM

## 2020-09-10 RX ORDER — HYDROXYZINE HYDROCHLORIDE 25 MG/1
25 TABLET, FILM COATED ORAL EVERY 6 HOURS PRN
Status: DISCONTINUED | OUTPATIENT
Start: 2020-09-10 | End: 2020-09-13 | Stop reason: HOSPADM

## 2020-09-10 RX ORDER — PROPOFOL 10 MG/ML
INJECTION, EMULSION INTRAVENOUS PRN
Status: DISCONTINUED | OUTPATIENT
Start: 2020-09-10 | End: 2020-09-10

## 2020-09-10 RX ORDER — PROPRANOLOL HYDROCHLORIDE 10 MG/1
10 TABLET ORAL 2 TIMES DAILY
Status: DISCONTINUED | OUTPATIENT
Start: 2020-09-10 | End: 2020-09-13 | Stop reason: HOSPADM

## 2020-09-10 RX ORDER — VANCOMYCIN HYDROCHLORIDE 1 G/20ML
INJECTION, POWDER, LYOPHILIZED, FOR SOLUTION INTRAVENOUS PRN
Status: DISCONTINUED | OUTPATIENT
Start: 2020-09-10 | End: 2020-09-10 | Stop reason: HOSPADM

## 2020-09-10 RX ORDER — MULTIPLE VITAMINS W/ MINERALS TAB 9MG-400MCG
1 TAB ORAL DAILY
COMMUNITY

## 2020-09-10 RX ORDER — SODIUM CHLORIDE, SODIUM LACTATE, POTASSIUM CHLORIDE, CALCIUM CHLORIDE 600; 310; 30; 20 MG/100ML; MG/100ML; MG/100ML; MG/100ML
INJECTION, SOLUTION INTRAVENOUS CONTINUOUS
Status: DISCONTINUED | OUTPATIENT
Start: 2020-09-10 | End: 2020-09-10 | Stop reason: HOSPADM

## 2020-09-10 RX ORDER — LABETALOL HYDROCHLORIDE 5 MG/ML
10 INJECTION, SOLUTION INTRAVENOUS
Status: DISCONTINUED | OUTPATIENT
Start: 2020-09-10 | End: 2020-09-10 | Stop reason: HOSPADM

## 2020-09-10 RX ORDER — LIDOCAINE HYDROCHLORIDE 20 MG/ML
INJECTION, SOLUTION INFILTRATION; PERINEURAL PRN
Status: DISCONTINUED | OUTPATIENT
Start: 2020-09-10 | End: 2020-09-10

## 2020-09-10 RX ORDER — NALOXONE HYDROCHLORIDE 0.4 MG/ML
.1-.4 INJECTION, SOLUTION INTRAMUSCULAR; INTRAVENOUS; SUBCUTANEOUS
Status: ACTIVE | OUTPATIENT
Start: 2020-09-10 | End: 2020-09-11

## 2020-09-10 RX ORDER — ALBUMIN, HUMAN INJ 5% 5 %
SOLUTION INTRAVENOUS CONTINUOUS PRN
Status: DISCONTINUED | OUTPATIENT
Start: 2020-09-10 | End: 2020-09-10

## 2020-09-10 RX ORDER — LIDOCAINE 4 G/G
1 PATCH TOPICAL
Status: DISCONTINUED | OUTPATIENT
Start: 2020-09-11 | End: 2020-09-13 | Stop reason: HOSPADM

## 2020-09-10 RX ORDER — AMOXICILLIN 250 MG
1 CAPSULE ORAL 2 TIMES DAILY
Status: DISCONTINUED | OUTPATIENT
Start: 2020-09-10 | End: 2020-09-13 | Stop reason: HOSPADM

## 2020-09-10 RX ORDER — SODIUM CHLORIDE, SODIUM LACTATE, POTASSIUM CHLORIDE, CALCIUM CHLORIDE 600; 310; 30; 20 MG/100ML; MG/100ML; MG/100ML; MG/100ML
INJECTION, SOLUTION INTRAVENOUS CONTINUOUS PRN
Status: DISCONTINUED | OUTPATIENT
Start: 2020-09-10 | End: 2020-09-10

## 2020-09-10 RX ORDER — LORATADINE 10 MG/1
10 TABLET ORAL EVERY MORNING
Status: DISCONTINUED | OUTPATIENT
Start: 2020-09-11 | End: 2020-09-13 | Stop reason: HOSPADM

## 2020-09-10 RX ORDER — PROCHLORPERAZINE MALEATE 10 MG
10 TABLET ORAL EVERY 6 HOURS PRN
Status: DISCONTINUED | OUTPATIENT
Start: 2020-09-10 | End: 2020-09-13 | Stop reason: HOSPADM

## 2020-09-10 RX ORDER — POLYETHYLENE GLYCOL 3350 17 G/17G
17 POWDER, FOR SOLUTION ORAL 2 TIMES DAILY
Status: DISCONTINUED | OUTPATIENT
Start: 2020-09-10 | End: 2020-09-13 | Stop reason: HOSPADM

## 2020-09-10 RX ORDER — FENTANYL CITRATE 50 UG/ML
25-50 INJECTION, SOLUTION INTRAMUSCULAR; INTRAVENOUS
Status: DISCONTINUED | OUTPATIENT
Start: 2020-09-10 | End: 2020-09-10 | Stop reason: HOSPADM

## 2020-09-10 RX ORDER — ONDANSETRON 2 MG/ML
INJECTION INTRAMUSCULAR; INTRAVENOUS PRN
Status: DISCONTINUED | OUTPATIENT
Start: 2020-09-10 | End: 2020-09-10

## 2020-09-10 RX ORDER — EPHEDRINE SULFATE 50 MG/ML
INJECTION, SOLUTION INTRAMUSCULAR; INTRAVENOUS; SUBCUTANEOUS PRN
Status: DISCONTINUED | OUTPATIENT
Start: 2020-09-10 | End: 2020-09-10

## 2020-09-10 RX ORDER — FENTANYL CITRATE 50 UG/ML
INJECTION, SOLUTION INTRAMUSCULAR; INTRAVENOUS PRN
Status: DISCONTINUED | OUTPATIENT
Start: 2020-09-10 | End: 2020-09-10

## 2020-09-10 RX ORDER — SODIUM CHLORIDE 9 MG/ML
INJECTION, SOLUTION INTRAVENOUS
Status: DISPENSED
Start: 2020-09-10 | End: 2020-09-11

## 2020-09-10 RX ADMIN — Medication 2 G: at 12:06

## 2020-09-10 RX ADMIN — ROCURONIUM BROMIDE 20 MG: 10 INJECTION INTRAVENOUS at 13:59

## 2020-09-10 RX ADMIN — TAMSULOSIN HYDROCHLORIDE 0.8 MG: 0.4 CAPSULE ORAL at 19:52

## 2020-09-10 RX ADMIN — TRANEXAMIC ACID 2550 MG: 100 INJECTION, SOLUTION INTRAVENOUS at 12:48

## 2020-09-10 RX ADMIN — ALBUMIN HUMAN: 0.05 INJECTION, SOLUTION INTRAVENOUS at 13:36

## 2020-09-10 RX ADMIN — CEFAZOLIN 1 G: 1 INJECTION, POWDER, FOR SOLUTION INTRAMUSCULAR; INTRAVENOUS at 14:01

## 2020-09-10 RX ADMIN — ONDANSETRON 4 MG: 2 INJECTION INTRAMUSCULAR; INTRAVENOUS at 15:15

## 2020-09-10 RX ADMIN — ROCURONIUM BROMIDE 20 MG: 10 INJECTION INTRAVENOUS at 14:39

## 2020-09-10 RX ADMIN — TRANEXAMIC ACID 10 MG/KG/HR: 1 INJECTION, SOLUTION INTRAVENOUS at 13:18

## 2020-09-10 RX ADMIN — FENTANYL CITRATE 25 MCG: 0.05 INJECTION, SOLUTION INTRAMUSCULAR; INTRAVENOUS at 16:20

## 2020-09-10 RX ADMIN — MIDAZOLAM 2 MG: 1 INJECTION INTRAMUSCULAR; INTRAVENOUS at 12:06

## 2020-09-10 RX ADMIN — HYDROMORPHONE HYDROCHLORIDE 0.4 MG: 1 INJECTION, SOLUTION INTRAMUSCULAR; INTRAVENOUS; SUBCUTANEOUS at 17:27

## 2020-09-10 RX ADMIN — SODIUM CHLORIDE, POTASSIUM CHLORIDE, SODIUM LACTATE AND CALCIUM CHLORIDE: 600; 310; 30; 20 INJECTION, SOLUTION INTRAVENOUS at 13:26

## 2020-09-10 RX ADMIN — ROCURONIUM BROMIDE 50 MG: 10 INJECTION INTRAVENOUS at 12:21

## 2020-09-10 RX ADMIN — PHENYLEPHRINE HYDROCHLORIDE 100 MCG: 10 INJECTION INTRAVENOUS at 12:54

## 2020-09-10 RX ADMIN — MIDAZOLAM 2 MG: 1 INJECTION INTRAMUSCULAR; INTRAVENOUS at 12:12

## 2020-09-10 RX ADMIN — PHENYLEPHRINE HYDROCHLORIDE 100 MCG: 10 INJECTION INTRAVENOUS at 15:00

## 2020-09-10 RX ADMIN — LIDOCAINE HYDROCHLORIDE 100 MG: 20 INJECTION, SOLUTION INFILTRATION; PERINEURAL at 12:21

## 2020-09-10 RX ADMIN — FENTANYL CITRATE 25 MCG: 0.05 INJECTION, SOLUTION INTRAMUSCULAR; INTRAVENOUS at 16:35

## 2020-09-10 RX ADMIN — SODIUM CHLORIDE, POTASSIUM CHLORIDE, SODIUM LACTATE AND CALCIUM CHLORIDE: 600; 310; 30; 20 INJECTION, SOLUTION INTRAVENOUS at 12:11

## 2020-09-10 RX ADMIN — FENTANYL CITRATE 25 MCG: 50 INJECTION, SOLUTION INTRAMUSCULAR; INTRAVENOUS at 15:28

## 2020-09-10 RX ADMIN — FENTANYL CITRATE 25 MCG: 50 INJECTION, SOLUTION INTRAMUSCULAR; INTRAVENOUS at 15:32

## 2020-09-10 RX ADMIN — PHENYLEPHRINE HYDROCHLORIDE 100 MCG: 10 INJECTION INTRAVENOUS at 15:08

## 2020-09-10 RX ADMIN — Medication 5 MG: at 12:54

## 2020-09-10 RX ADMIN — ACETAMINOPHEN 975 MG: 325 TABLET, FILM COATED ORAL at 10:12

## 2020-09-10 RX ADMIN — Medication: at 17:20

## 2020-09-10 RX ADMIN — DOCUSATE SODIUM AND SENNOSIDES 2 TABLET: 8.6; 5 TABLET, FILM COATED ORAL at 19:53

## 2020-09-10 RX ADMIN — PROPRANOLOL HYDROCHLORIDE 10 MG: 10 TABLET ORAL at 19:53

## 2020-09-10 RX ADMIN — HYDROMORPHONE HYDROCHLORIDE 0.5 MG: 1 INJECTION, SOLUTION INTRAMUSCULAR; INTRAVENOUS; SUBCUTANEOUS at 14:49

## 2020-09-10 RX ADMIN — ACETAMINOPHEN 975 MG: 325 TABLET, FILM COATED ORAL at 18:13

## 2020-09-10 RX ADMIN — CEFAZOLIN 1 G: 1 INJECTION, POWDER, FOR SOLUTION INTRAMUSCULAR; INTRAVENOUS at 22:04

## 2020-09-10 RX ADMIN — PHENYLEPHRINE HYDROCHLORIDE 50 MCG: 10 INJECTION INTRAVENOUS at 13:47

## 2020-09-10 RX ADMIN — HYDROMORPHONE HYDROCHLORIDE 0.3 MG: 1 INJECTION, SOLUTION INTRAMUSCULAR; INTRAVENOUS; SUBCUTANEOUS at 16:49

## 2020-09-10 RX ADMIN — ROCURONIUM BROMIDE 30 MG: 10 INJECTION INTRAVENOUS at 14:59

## 2020-09-10 RX ADMIN — FENTANYL CITRATE 50 MCG: 0.05 INJECTION, SOLUTION INTRAMUSCULAR; INTRAVENOUS at 16:47

## 2020-09-10 RX ADMIN — HYDROMORPHONE HYDROCHLORIDE 0.5 MG: 1 INJECTION, SOLUTION INTRAMUSCULAR; INTRAVENOUS; SUBCUTANEOUS at 13:01

## 2020-09-10 RX ADMIN — SUGAMMADEX 180 MG: 100 INJECTION, SOLUTION INTRAVENOUS at 15:50

## 2020-09-10 RX ADMIN — Medication 5 MG: at 13:19

## 2020-09-10 RX ADMIN — PROPOFOL 140 MG: 10 INJECTION, EMULSION INTRAVENOUS at 12:21

## 2020-09-10 RX ADMIN — ROCURONIUM BROMIDE 30 MG: 10 INJECTION INTRAVENOUS at 12:58

## 2020-09-10 RX ADMIN — FENTANYL CITRATE 100 MCG: 50 INJECTION, SOLUTION INTRAMUSCULAR; INTRAVENOUS at 12:16

## 2020-09-10 RX ADMIN — ROCURONIUM BROMIDE 20 MG: 10 INJECTION INTRAVENOUS at 13:17

## 2020-09-10 RX ADMIN — HYDROMORPHONE HYDROCHLORIDE 0.3 MG: 1 INJECTION, SOLUTION INTRAMUSCULAR; INTRAVENOUS; SUBCUTANEOUS at 16:56

## 2020-09-10 RX ADMIN — FENTANYL CITRATE 50 MCG: 50 INJECTION, SOLUTION INTRAMUSCULAR; INTRAVENOUS at 16:04

## 2020-09-10 RX ADMIN — AMITRIPTYLINE HYDROCHLORIDE 25 MG: 25 TABLET, FILM COATED ORAL at 22:07

## 2020-09-10 RX ADMIN — PHENYLEPHRINE HYDROCHLORIDE 100 MCG: 10 INJECTION INTRAVENOUS at 13:24

## 2020-09-10 ASSESSMENT — ACTIVITIES OF DAILY LIVING (ADL)
TRANSFERRING: 0-->INDEPENDENT
FALL_HISTORY_WITHIN_LAST_SIX_MONTHS: NO
TOILETING: 0-->INDEPENDENT
DRESS: 0-->INDEPENDENT
BATHING: 0-->INDEPENDENT
COGNITION: 0 - NO COGNITION ISSUES REPORTED
AMBULATION: 0-->INDEPENDENT
SWALLOWING: 0-->SWALLOWS FOODS/LIQUIDS WITHOUT DIFFICULTY
RETIRED_COMMUNICATION: 0-->UNDERSTANDS/COMMUNICATES WITHOUT DIFFICULTY
RETIRED_EATING: 0-->INDEPENDENT

## 2020-09-10 ASSESSMENT — MIFFLIN-ST. JEOR: SCORE: 1770.75

## 2020-09-10 NOTE — ANESTHESIA CARE TRANSFER NOTE
Patient: Jas Patel    Procedure(s):  Lumbar 4 to 5 instrumented posterior interbody fusion and mata tabor osteotomy with O-Arm/Stealth Navigation, use of allograft, local autograft,  and right iliac crest autograft    Diagnosis: Recurrent herniation of lumbar disc [M51.26]  Spinal stenosis of lumbar region with neurogenic claudication [M48.062]  Diagnosis Additional Information: No value filed.    Anesthesia Type:   General     Note:  Airway :Face Mask  Patient transferred to:PACU  Handoff Report: Identifed the Patient, Identified the Reponsible Provider, Reviewed the pertinent medical history, Discussed the surgical course, Reviewed Intra-OP anesthesia mangement and issues during anesthesia, Set expectations for post-procedure period and Allowed opportunity for questions and acknowledgement of understanding      Vitals: (Last set prior to Anesthesia Care Transfer)    CRNA VITALS  9/10/2020 1536 - 9/10/2020 1612      9/10/2020             Temp:  36.8  C (98.2  F)     ax                Electronically Signed By: RAFAEL Slade CRNA  September 10, 2020  4:12 PM

## 2020-09-10 NOTE — ANESTHESIA POSTPROCEDURE EVALUATION
Anesthesia POST Procedure Evaluation    Patient: Jas Patel   MRN:     [id number] Gender:   male   Age:    50 year old :      1970        Preoperative Diagnosis: Recurrent herniation of lumbar disc [M51.26]  Spinal stenosis of lumbar region with neurogenic claudication [M48.062]   Procedure(s):  Lumbar 4 to 5 instrumented posterior interbody fusion and mata tabor osteotomy with O-Arm/Stealth Navigation, use of allograft, local autograft,  and right iliac crest autograft   Postop Comments: No value filed.     Anesthesia Type: General          Postop Pain Control: Uneventful            Sign Out: Well controlled pain   PONV: No   Neuro/Psych: Uneventful            Sign Out: Acceptable/Baseline neuro status   Airway/Respiratory: Uneventful            Sign Out: Acceptable/Baseline resp. status   CV/Hemodynamics: Uneventful            Sign Out: Acceptable CV status   Other NRE: NONE   DID A NON-ROUTINE EVENT OCCUR? No         Last Anesthesia Record Vitals:  CRNA VITALS  9/10/2020 1536 - 9/10/2020 1636      9/10/2020             Temp:  36.8  C (98.2  F)     ax          Last PACU Vitals:  Vitals Value Taken Time   /89 9/10/2020  6:00 PM   Temp 36.6  C (97.9  F) 9/10/2020  5:30 PM   Pulse 87 9/10/2020  6:15 PM   Resp 9 9/10/2020  6:15 PM   SpO2 100 % 9/10/2020  6:15 PM   Temp src     NIBP     Pulse     SpO2     Resp     Temp 36.8  C (98.2  F) 9/10/2020  4:09 PM   Ht Rate     Temp 2     Vitals shown include unvalidated device data.      Electronically Signed By: Sen Stinson MD, September 10, 2020, 6:16 PM

## 2020-09-10 NOTE — OR NURSING
"Pt waking up again and stating \"pain is worse and I need pain medication\".  Medicated for pain with respiratory rte over 10 per minute.  "

## 2020-09-10 NOTE — OR NURSING
"Pt waking up and states \"pain in back is intolerable\".  Pt falling back to sleep with respiratory rate below 10 per minute.  Will continue to monitor.    "

## 2020-09-10 NOTE — PROGRESS NOTES
Orthopaedic Post-op Check    S:  Patient seen in PACU. Sleepy from anestheisa but following commands and answering questions. Doing well post-operatively.  Reports pain is under good control with minimal nausea/vomiting.  No new numbness/tingling.    O:    General:  Pain is well controlled, no acute distress  Resp:  Breathing normally on room air  MSK:  Dressing C/D/I  Motor: 5/5 strength in  tibialis anterior, gastroc/soleus, EHL   Sensory: SILT to superficial peroneal, deep peroneal, saphenous, sural, and tibial nerve distribution  Cardiovascular: palpable DP and TP, foot warm and well perfused    A/P:    Jas Patel is a 50 year old male who is POD#0 status-post L4-5 TLIF/PSF with Dr. Dexter.  Doing well in the immediate post-op period.    Continue cares as ordered.    Hudson Galvan MD  Orthopedic Surgery, PGY-1  Pager: [phone]  4:27 PM  September 10, 2020

## 2020-09-10 NOTE — BRIEF OP NOTE
[facility], [place]    Brief Operative Note    Pre-operative diagnosis: Recurrent herniation of lumbar disc [M51.26]  Spinal stenosis of lumbar region with neurogenic claudication [M48.062]  Post-operative diagnosis Same as pre-operative diagnosis    Procedure: Procedure(s):  Lumbar 4 to 5 instrumented posterior interbody fusion and mata tabor osteotomy with O-Arm/Stealth Navigation, use of allograft, local autograft,  and right iliac crest autograft  Surgeon: Surgeon(s) and Role:  Panel 1:     * Juvenal Dexter MD - Primary  Panel 2:     * Juvenal Dexter MD - Primary  Anesthesia: General   Estimated blood loss: 150 ml  Drains: Hemovac and Brice-Gonzalez  Specimens: * No specimens in log *  Findings:   None.  Complications: None.  Implants:   Implant Name Type Inv. Item Serial No.  Lot No. LRB No. Used Action   GRAFT BONE CRUSH CANC 30ML [id number] Bone/Tissue/Biologic GRAFT BONE CRUSH CANC 30ML [id number] [employer]  Right 1 Implanted   GRAFT BONE CRUSH CANC 30ML [id number] Bone/Tissue/Biologic GRAFT BONE CRUSH CANC 30ML [id number] [employer]CULOSKELETAL [employer]  N/A 1 Implanted   IMP SCR SET MEDT SOLERA BREAK OFF 5.5MM TI [id number] Metallic Hardware/[place] IMP SCR SET MEDT SOLERA BREAK OFF 5.5MM TI [id number]  [employer]  N/A 4 Implanted   IMP SCR MEDT 5.5/6.0MM SOLERA 7.5X50MM MA [id number] Metallic Hardware/[place] IMP SCR MEDT 5.5/6.0MM SOLERA 7.5X50MM MA [id number]  [employer]  N/A 3 Implanted   IMP SCR MEDT 5.5/6.0MM SOLERA 6.5X50MM MA [id number] Metallic Hardware/[place] IMP SCR MEDT 5.5/6.0MM SOLERA 6.5X50MM MA [id number]  [employer]  N/A 1 Implanted   IMP SPACER MEDT CAPSTONE CONTROL [id number] 12DEG [id number] Metallic Hardware/[place] IMP SPACER MEDT CAPSTONE CONTROL [id number] 12DEG [id number]  [employer] [id number] N/A 1 Implanted   IMP SPACER MEDT CAPSTONE CONTROL [id number] 12DEG [id number] Metallic Hardware/[place] IMP SPACER  MEDT CAPSTONE CONTROL [id number] 12DEG [id number]  [employer] [id number] N/A 1 Implanted   IMP SHAUN MEDT SOLERA CVD 5.5X40MM CHR [id number] Metallic Hardware/[place] IMP SHAUN MEDT SOLERA CVD 5.5X40MM CHR [id number]  [employer]  N/A 1 Implanted   IMP SHAUN MEDT SOLERA CVD 5.5X35MM CHR [id number] Metallic Hardware/[place] IMP SHAUN MEDT SOLERA CVD 5.5X35MM CHR [id number]  [employer]  N/A 1 Implanted       Ortho Primary  Activity: Up with assist until independent. No excessive bending or twisting. No lifting >10 lbs x 6 weeks. No Brooke lift for transfers.   Weight bearing status: WBAT.  Pain management: Transition from IV to PO as tolerated. No NSAIDs. Dilaudid PCA  Antibiotics: Ancef until deep drain (ELSI) removed  Diet: Begin with clear fluids and progress diet as tolerated.   DVT prophylaxis: SCDs only. No chemical DVT ppx needed.  Imaging: XR upright scoli PTDC - ordered.  Labs: labs PRN  Bracing/Splinting: None  Dressings: Keep 4x4s and tegaderm c/d/i x 7 days.  Drains: Document output per shift, will be discontinued at Orthopedic Surgery discretion.  Patino catheter: Remove POD#1.   Physical Therapy/Occupational Therapy: Eval and treat.  Consults: Hospitalist.  Follow-up: Clinic with Dr. Dexter in 6 weeks with repeat x-rays.   Disposition: Pending progress with therapies, pain control on orals, and medical stability.    Hudson Galvan MD  Orthopedic Surgery, PGY-1  Pager: [phone]  4:07 PM  September 10, 2020

## 2020-09-10 NOTE — OR NURSING
Pt more awake.  PCA placed in line with instructions.  Pt is a nurse.  Tolerates PO.  Neuro checks unchanged from admission in PACU.  Meets discharge criteria.

## 2020-09-10 NOTE — OP NOTE
DATE OF SURGERY: 9/10/2020    PREOPERATIVE DIAGNOSIS: Recurrent herniation of lumbar disc , Spinal stenosis of lumbar region with neurogenic claudication            POSTOPERATIVE DIAGNOSIS: Same    PROCEDURES:  22 modifier.  The patient has had a previous central decompression at this level, with dense adherent disc material and scar along the right lateral recess, which should require more extensive dissection and removal of bone including a portion of the right medial L5 pedicle and vertebral body in that area to try to get lateral and under the scar disc material, and I have added a 22 modifier to reflect this increased complexity.  1. Cottrell Oslis Osteotomy Lumbar 4-5 for regional deformity correction.  2. Transforaminal lumbar interbody fusion at L4-5, anterior and posterior fusion through a single approach.  3. L4-5 Capstone PEEK Cage.  4. L4 through L5 Posterior Spinal Fusion.  5. L4 through L5 Posterior Spinal Instrumentation, Medtronic Solera.  6. [place] and use of Iliac Crest Autograft, which was harvested through the same incision, but a separate fascial incision.  7. Use of O-Arm navigational guidance.  8. Use of Allograft and local autograft.    PRIMARY SURGEON: Juvenal Dexter MD    FIRST ASSISTANT: [place] PGY1.    ANESTHESIA: General Endotracheal    COMPLICATIONS:  None.    SPECIMENS: None.    ESTIMATED BLOOD LOSS: 150cc    INDICATIONS:                          Jas Patel is a 50 year old male who elected surgical treatment, and understood the indications for this surgery, as well as its risks, benefits, and alternatives as documented in the pre-operative H&P.  Specifically, we reviewed the risks and benefits of the surgery in detail. The risks include, but are not limited to, the general risks associated with anesthesia, including death, pulmonary embolism, DVT, stroke, myocardial infarction, pneumonia, and urinary tract infection. Additional risks specific to the surgery include the  risk of infection, failure to heal (non-union), dural tear with resultant CSF leak which might necessitate placement of a drain or revision surgery or could result in headaches, nerve injury resulting in weakness or paralysis, risk of adjacent segment disease, the risks of vascular injury, need for revision surgery in the future due to one of the above issues, or risk of incomplete symptom relief. Jas Patel understands the risks of the surgery and wishes to proceed. No guarantees were given.    DESCRIPTION OF PROCEDURE:           Jas Patel was taken to the operating  room, where the Anesthesiology Service induced satisfactory general anesthesia.  Ancef was given IV.  Venous thromboembolic prophylaxis was performed with sequential devices.  A Patino catheter was placed under standard sterile techniques.  The patient was placed prone on an open OSI frame with the abdomen hanging free and all bony prominences well padded.  The low back was then prepped and draped in its entirety in the usual sterile fashion. We then held a multidisciplinary time out in which we verified the patient, procedure, antibiotics, and operative plan.  All team members were in agreement.    The O-Arm was brought in and draped.  We took a pre-operative needle localization x-ray using fluroscopy, which helped to guide our incision.  The incision was carried out from L4 to L5 with subperiosteal dissection out to the tips of the transverse processes. Intraoperative radiographic localization of the levels was again confirmed using fluoroscopy.      I harvested iliac crest autograft from the patient's right  side through the same skin incision.  I incised the fascia overlying the PSIS and elecated it sub-periosteally down the outer table of the ilium for a distance of approximately 5cm.  A Liane retractor was then placed over the outer table to maintain visualization.  An osteotome was used to make two vertical cuts 3 cm apart, followed by  a transverse cut that split the innner and outer tables of the ilium.  This removed the outer cortex, which was saved.  I then used a combination of currettes to harvest the needed amount of cancellous autograft.  Hemostasis was achieved, the wound was thoroughly irrigated, and I then closed the fascia using vicryl sutures.    We then turned our attention to the placement of pedicle screws.  The reference frame was attached to the spinous process at L5.  We then performed our O-Arm spin.  After appropriate verification, the screws were placed at each level bilaterally from L4 through L5 in the following fashion: We used the clawfoot to identify the starting point.  A high-speed bur was used to penetrate the dorsal cortex.  We then used the gold handle pedicle probe to cannulate the pedicle while referencing the navigation screen for guidance.  The navigation system was used to measure the pedicle diameter and length, and an appropriate [employer] Solera screw size was chosen.  We under tapped 1mm with the same diameter tap.  This continued bilaterally at each level until all screw tracts were prepared.      The transverse processes of L4 through L5 on the right were then thoroughly decorticated with a bur, and packed with Allograft and Iliac Crest Autograft, thereby, completing the posterolateral fusion.  The screws were then placed at each level bilaterally.     The O-Arm was brought back in and we performed a verification spin.  The resultant CT showed the instrumentation to be completely intra-osseous with no obvious breaches or malposition.  This also verified that the correct level had been operated upon.     I then turned my attention to the Smith-Dooley osteotomy.  The patient had a previous decompression at L4-5.  I used a large curette to remove the scar overlying the dura and work into the lateral recess.  I was able to undercut the bilateral facet joints and detach it from the scar underneath.  I then  used an osteotome to take down the inferior articular process bilaterally.  I then used the curettes again to detach the remaining scar from the medial facet joint.  I then worked in an outside in an inside-out fashion to remove the superior articular process flush with the caudal pedicle.  This was done bilaterally to complete the removal to bilateral facet joints, which is a Smith-Dooley osteotomy, which allowed for regional deformity correction.    I then worked into his left-sided disc.  I was careful to elevate the dura and try to identify any remaining disc material.  I thoroughly cleaned out the disc and inserted turn and rotate distractors.      I then did the same on the right side, again carefully elevating the dura and identifying any remaining disc material.  I thoroughly cleaned out the disc on the right side and then inserted serial trials.      Of note on his right side there was clearly dense adherent scar and disc material ventral to the traversing L5 nerve root.  I spent quite a bit of time dissecting this out.  There really was no plane in this area.  I was able to get a little more lateral, and I used an osteotome to take down the medial 2 to 3 mm of the pedicle itself and come down onto the vertebral body and identified the nerve distally and then traced it up to the scarred area.  I was able to elevate the scar off of the bone itself and I worked to fail the remaining annulus and disc down into the disc space below.  I was able to pass a [place] all the way across from the right side to the left sided turn and rotate distractor which was still in place.  There was still some scar in the lateral edge of the dura, but at this point I felt there was nothing further that could be done to safely decompress the nerve root.  I had removed all of the lateral bone down below the L5 pedicle and all of the ventral soft tissue that was not scarred to the area.  Because of the increased complexity of this  portion of the decompression I did add a 22 modifier onto the procedure.    We selected 12 x 22 mm 12 degree lordotic cages.  I filled the disc space with about 8 cc of local autograft, impacted the cages under fluoroscopic guidance, and this completed the interbody fusion at this level.    We then thoroughly irrigated.  I inserted the bilateral rods and setscrews and these were broken off to their final torque.  We took our final images and I was satisfied with the alignment.    I then decorticated the remaining midline laminar bone between L4 and L5 and packed this with the remaining local autograft and allograft.  This completed the posterior fusion between L4 and L5.    We then achieved hemostasis.  A ELSI was placed deep and a hemovac drain was placed above the fascia and 1 gram of vancomycin powder was placed into the wound.  We closed the wound in multiple layers of interrupted Vicryl, with monocryl and dermabond for the skin.  Upon closure, the patient was then transferred to a hospital bed and taken to recovery after extubation in stable condition.    I was present and scrubbed for the entire procedure.    Juvenal Dexter MD

## 2020-09-10 NOTE — CONSULTS
[facility], [place]    Hospitalist Consultation    Date of Admission:  9/10/2020  Date of Consult (When I saw the patient): 09/10/20    Assessment & Plan      Jas Patel is a 50 year old male who was admitted on 9/10/2020.     He is a 50 year old male with Hx of GERD, benign essential tremor, BPH, anxiety and depression. He had prior L4-L5 decompression 1/23/20, s/p revision 3/23/20 and hx C5-C6 herniation. Patient underwent L4-5 lumbar fusion, use of allograft, local autograft, and right iliac crest autograft. EBL was 150 ml.     # SP L4-5 Lumbar fusion with local autograft, allograft and right iliac crest autograft.  ml.   -per primary team  -hydrate well  -good IS  -ankle pumps  -pain and DVT prophylaxis management per orthopedics  - ml. Monitor for anemia.     # GERD: Controlled. Ct PTA Omeprazole 20 mg every day   # Benign essential tremor:   -Ct PTA propranolol 10 mg BID with hold parameters  # BPH: Watch for urine retention.   -Ct PTA Flomax 0.8 mg every day   # Anxiety and depression. Controlled.   -Ct PTA Elavil 25 mg hs and Citalopram 40 mg every day   #  DVT Prophylaxis: Per primary team  Code Status: Full Code    Disposition: Per primary team.    Jn Chavez MD     Reason for Consult   Reason for consult: SP Spine Sx, medical comanagement    Primary Care Physician   Lola Perez    Chief Complaint   SP Spine Sx    History of Present Illness      Jas Patel is a 50 year old male with Hx of GERD, benign essential tremor, BPH, anxiety and depression, hx C5-C6 herniation. He had prior L4-L5 decompression 1/23/20, s/p revision 3/23/20. Patient underwent Lumbar 4 to 5 instrumented posterior interbody fusion and mata tabor osteotomy with O-Arm/Stealth Navigation, use of allograft, local autograft, and right iliac crest autograft. EBL was 150 ml. Post op medicine consulted for medical co management.     Denies any chest pain or sob. Pain is  controlled.     Past Medical History      I have reviewed this patient's medical history and updated it with pertinent information if needed.     Past Medical History:   Diagnosis Date     Benign essential tremor 11/2/2016     BPH (benign prostatic hyperplasia) 10/28/2015     BPH (benign prostatic hyperplasia)      Gastroesophageal reflux disease without esophagitis 10/28/2015     Generalized anxiety disorder 10/26/2011     GERD (gastroesophageal reflux disease)      Herniated disc     in neck     Lumbar radiculopathy 12/20/2019     Moderate major depression (H) 11/22/2010       Past Surgical History      I have reviewed this patient's surgical history and updated it with pertinent information if needed.    Past Surgical History:   Procedure Laterality Date     DECOMPRESSION LUMBAR ONE LEVEL N/A 1/23/2020    Procedure: Lumbar 4 to 5 Decompression and Discectomy;  Surgeon: Juvenal Dexter MD;  Location: [facility] OR     DECOMPRESSION LUMBAR ONE LEVEL N/A 3/23/2020    Procedure: Revision Lumbar 4 to 5 decompression;  Surgeon: Juvenal Dexter MD;  Location: [facility] OR     DRAIN SKIN ABSCESS COMPLIC      left thigh I and D x 4 in the 1990's     TONSILLECTOMY         Prior to Admission Medications   Prior to Admission Medications   Prescriptions Last Dose Informant Patient Reported? Taking?   Cholecalciferol (VITAMIN D3 PO) Past Week at Unknown time  Yes Yes   Sig: Take 4,000 Units by mouth every morning    HEMP OIL OR EXTRACT OR OTHER CBD CANNABINOID, NOT MEDICAL CANNABIS, 9/9/2020 at Unknown time  Yes Yes   Sig: every evening   HYDROcodone-acetaminophen (NORCO) 5-325 MG tablet 9/9/2020 at 1700  No Yes   Sig: Take 1 tablet by mouth daily as needed for severe pain #28 tabs to last 4 weeks   amitriptyline (ELAVIL) 25 MG tablet 9/9/2020 at 2300  No Yes   Sig: TAKE ONE TABLET BY MOUTH AT BEDTIME   Patient taking differently: Take 25 mg by mouth At Bedtime TAKE ONE TABLET BY MOUTH AT BEDTIME   citalopram (CELEXA)  40 MG tablet 9/10/2020 at 0800  No Yes   Sig: Take 1 tablet (40 mg) by mouth daily   Patient taking differently: Take 40 mg by mouth every morning    loratadine (CLARITIN) 10 MG tablet 9/10/2020 at 0800  Yes Yes   Sig: Take 10 mg by mouth every morning 1 tab daily   omeprazole (PRILOSEC) 20 MG DR capsule 9/10/2020 at 0800  No Yes   Sig: Take 1 capsule (20 mg) by mouth daily   Patient taking differently: Take 20 mg by mouth every morning    propranolol (INDERAL) 10 MG tablet 9/10/2020 at 0800  No Yes   Sig: TAKE ONE TABLET BY MOUTH TWICE A DAY AS NEEDED FOR TREMOR   Patient taking differently: Take 10 mg by mouth 2 times daily TAKE ONE TABLET BY MOUTH TWICE A DAY AS NEEDED FOR TREMOR   tamsulosin (FLOMAX) 0.4 MG capsule 9/9/2020 at 2300  No Yes   Sig: Take 2 capsules (0.8 mg) by mouth daily   Patient taking differently: Take 0.8 mg by mouth daily (with dinner)    traMADol (ULTRAM) 50 MG tablet 9/9/2020 at 2300  No Yes   Sig: Take 1 tablet (50 mg) by mouth 2 times daily as needed for moderate to severe pain #56 tabs to last 4 weeks      Facility-Administered Medications: None     Allergies   Allergies   Allergen Reactions     No Known Drug Allergies        Social History      I have reviewed this patient's social history and updated it with pertinent information if needed. Jas Patel  reports that he quit smoking about 3 years ago. His smoking use included cigarettes. He started smoking about 15 years ago. He has a 2.00 pack-year smoking history. He has never used smokeless tobacco. He reports current alcohol use. He reports that he does not use drugs.    Family History      I have reviewed this patient's family history and updated it with pertinent information if needed.     Family History   Problem Relation Age of Onset     No Known Problems Mother      Hypertension Father      Valvular heart disease Father         aortic valve replacement     C.A.D. Maternal Grandfather      Hypertension Maternal Grandfather       No Known Problems Brother      No Known Problems Sister      Other - See Comments Sister         adopted     Cerebrovascular Disease Other         great uncle     Diabetes Paternal Uncle         type 2     Diabetes Other         great aunt     Prostate Cancer Other         great uncle moms side     Alzheimer Disease Other         great uncle moms side     Anesthesia Reaction No family hx of      Deep Vein Thrombosis (DVT) No family hx of        Review of Systems      The 10 point Review of Systems is negative other than noted in the HPI or here.     Physical Exam      Temp: 97.7  F (36.5  C) Temp src: Oral BP: 128/62 Pulse: 77   Resp: 10 SpO2: 100 % O2 Device: None (Room air) Oxygen Delivery: 6 LPM  Vital Signs with Ranges  Temp:  [97.7  F (36.5  C)-98.2  F (36.8  C)] 97.7  F (36.5  C)  Pulse:  [] 77  Resp:  [6-19] 10  BP: (122-139)/() 128/62  SpO2:  [97 %-100 %] 100 %  187 lbs 9.78 oz    Constitutional: Awake, alert, cooperative, no apparent distress.  Eyes: Conjunctiva and pupils examined and normal.  HEENT: Moist mucous membranes, normal dentition.  Respiratory: Clear to auscultation bilaterally, no crackles or wheezing.  Cardiovascular: Regular rate and rhythm, normal S1 and S2, and no murmur noted.  GI: Soft, non-distended, non-tender, normal bowel sounds.  Lymph/Hematologic: No anterior cervical or supraclavicular adenopathy.  Skin: No rashes, no cyanosis, no edema.  Musculoskeletal: .  Neurologic: Cranial nerves 2-12 intact, normal strength and sensation.  Psychiatric: Alert, oriented to person, place and time, no obvious anxiety or depression.    Data      -Data reviewed today: All pertinent laboratory and imaging results from this encounter were reviewed.    Recent Labs   Lab 09/10/20  0924   WBC 8.5   HGB 15.1   MCV 94         POTASSIUM 4.0   CHLORIDE 105   CO2 28   BUN 13   CR 0.94   ANIONGAP 4   HIWOT 8.6   *       Recent Results (from the past 24 hour(s))   XR Surgery  HONG L/T 5 Min Fluoro    Narrative    This exam was marked as non-reportable because it will not be read by a   radiologist or a [place] non-radiologist provider.

## 2020-09-10 NOTE — OR NURSING
PACU to Inpatient Nursing Handoff    Patient Jas Patel is a 50 year old male who speaks English.   Procedure Procedure(s):  Lumbar 4 to 5 instrumented posterior interbody fusion and mata tabor osteotomy with O-Arm/Stealth Navigation, use of allograft, local autograft,  and right iliac crest autograft   Surgeon(s) Primary: Juvenal Dexter MD     Allergies   Allergen Reactions     No Known Drug Allergies        Isolation  [unfilled]     Past Medical History   has a past medical history of Benign essential tremor (11/2/2016), BPH (benign prostatic hyperplasia) (10/28/2015), BPH (benign prostatic hyperplasia), Gastroesophageal reflux disease without esophagitis (10/28/2015), Generalized anxiety disorder (10/26/2011), GERD (gastroesophageal reflux disease), Herniated disc, Lumbar radiculopathy (12/20/2019), and Moderate major depression (H) (11/22/2010).    Anesthesia General   Dermatome Level     Preop Meds acetaminophen (Tylenol) - time given: 1012   Nerve block Not applicable   Intraop Meds fentanyl (Sublimaze): 200 mcg total  hydromorphone (Dilaudid): 1 mg total  ondansetron (Zofran): last given at 1515   Local Meds No   Antibiotics cefazolin (Ancef) - last given at 1401     Pain Patient Currently in Pain: yes  Comfort: intolerable   PACU meds  fentanyl (Sublimaze): 100 mcg (total dose) last given at 1617   hydromorphone (Dilaudid): 1 mg (total dose) last given at 1727    PCA / epidural Yes. PCA - hydromorphone (Dilaudid)   Capnography Respiratory Monitoring (EtCO2): 34 mmHg   Telemetry ECG Rhythm: Normal sinus rhythm   Inpatient Telemetry Monitor Ordered? No        Labs Glucose Lab Results   Component Value Date     09/10/2020       Hgb Lab Results   Component Value Date    HGB 15.1 09/10/2020       INR No results found for: INR   PACU Imaging Not applicable     Wound/Incision Incision/Surgical Site 01/23/20 Posterior;Bilateral Back (Active)   Number of days: 231       Incision/Surgical  Site 03/23/20 Lower Back (Active)   Number of days: 171       Incision/Surgical Site 09/10/20 Midline;Lower Back (Active)   Incision Assessment UT 09/10/20 1700   Closure JOSH 09/10/20 1700   Incision [facility] 09/10/20 1700   Dressing Intervention Clean, dry, intact 09/10/20 1700   Number of days: 0      CMS        Equipment ice pack   Other LDA       IV Access Peripheral IV 10/09/19 Distal;Left Upper arm (Active)   Number of days: 337       Peripheral IV 09/10/20 Left Hand (Active)   Site Assessment [facility] 09/10/20 1748   Line Status Infusing 09/10/20 1748   Phlebitis Scale 0-->no symptoms 09/10/20 1748   Infiltration Scale 0 09/10/20 1748   Infiltration Site Treatment Method  None 09/10/20 1748   Extravasation? No 09/10/20 1748   Number of days: 0       Peripheral IV 09/10/20 Right Hand (Active)   Site Assessment [facility] 09/10/20 1748   Line Status Saline locked 09/10/20 1748   Phlebitis Scale 0-->no symptoms 09/10/20 1748   Infiltration Scale 0 09/10/20 1748   Infiltration Site Treatment Method  None 09/10/20 1748   Extravasation? No 09/10/20 1748   Number of days: 0      Blood Products Albumin    mL   Intake/Output Date 09/10/20 0700 - 09/11/20 0659   Shift 1444-5070 3230-4388 0879-7568 24 Hour Total   INTAKE   I.V. 1000 700  1700   Colloid 250   250   Shift Total(mL/kg) 1250(14.69) 700(8.23)  1950(22.91)   OUTPUT   Urine  250  250   Drains  70  70   Blood  150  150   Shift Total(mL/kg)  470(5.52)  470(5.52)   Weight (kg) 85.1 85.1 85.1 85.1      Drains / Patino Closed/Suction Drain 1 Back Bulb 15 [place]vor[place] deep (Active)   Site Description [facility] 09/10/20 1748   Dressing Status Normal: Clean, Dry & Intact 09/10/20 1748   Status To bulb suction 09/10/20 1748   Output (ml) 50 ml 09/10/20 1748   Number of days: 0       Closed/Suction Drain 2 Back Accord[place] 10 Ivorian superficial (Active)   [facility] 09/10/20 1748   Dressing Status Normal: Clean, Dry & Intact 09/10/20 1748   Status Other (Comment)  09/10/20 1748   Output (ml) 0 ml 09/10/20 1748   Number of days: 0       Urethral Catheter Latex 16 fr (Active)   Collection Container Standard 09/10/20 1748   Securement Method Securing device (Describe) 09/10/20 1748   Rationale for Continued Use Anesthesia 09/10/20 1748   Number of days: 0      Time of void PreOp Void Prior to Procedure: 0955 (09/10/20 0951)    PostOp      Diapered? No   Bladder Scan     PO    tolerating sips     Vitals    B/P: 123/82  T: 97.9  F (36.6  C)    Temp src: Oral  P:  Pulse: 83 (09/10/20 1730)          R: 10  O2:  SpO2: 100 %    O2 Device: None (Room air) (09/10/20 1630)    Oxygen Delivery: 6 LPM (09/10/20 1608)         Family/support present significant other   Patient belongings     Patient transported on bed   DC meds/scripts (obs/outpt) Not applicable   Inpatient Pain Meds Released? Yes       Special needs/considerations None   Tasks needing completion None       Claudine Spencer, RN  ASCOM [id number]

## 2020-09-11 ENCOUNTER — APPOINTMENT (OUTPATIENT)
Dept: OCCUPATIONAL THERAPY | Facility: CLINIC | Age: 50
DRG: 454 | End: 2020-09-11
Attending: ORTHOPAEDIC SURGERY
Payer: COMMERCIAL

## 2020-09-11 ENCOUNTER — APPOINTMENT (OUTPATIENT)
Dept: PHYSICAL THERAPY | Facility: CLINIC | Age: 50
DRG: 454 | End: 2020-09-11
Attending: ORTHOPAEDIC SURGERY
Payer: COMMERCIAL

## 2020-09-11 LAB — HGB BLD-MCNC: 12.1 G/DL (ref 13.3–17.7)

## 2020-09-11 PROCEDURE — 25000128 H RX IP 250 OP 636

## 2020-09-11 PROCEDURE — 25000132 ZZH RX MED GY IP 250 OP 250 PS 637: Performed by: HOSPITALIST

## 2020-09-11 PROCEDURE — 25000128 H RX IP 250 OP 636: Performed by: CLINICAL NURSE SPECIALIST

## 2020-09-11 PROCEDURE — 97530 THERAPEUTIC ACTIVITIES: CPT | Mod: GP

## 2020-09-11 PROCEDURE — 97165 OT EVAL LOW COMPLEX 30 MIN: CPT | Mod: GO | Performed by: OCCUPATIONAL THERAPIST

## 2020-09-11 PROCEDURE — 97161 PT EVAL LOW COMPLEX 20 MIN: CPT | Mod: GP

## 2020-09-11 PROCEDURE — 85018 HEMOGLOBIN: CPT

## 2020-09-11 PROCEDURE — 97535 SELF CARE MNGMENT TRAINING: CPT | Mod: GO | Performed by: OCCUPATIONAL THERAPIST

## 2020-09-11 PROCEDURE — 36415 COLL VENOUS BLD VENIPUNCTURE: CPT

## 2020-09-11 PROCEDURE — 25000128 H RX IP 250 OP 636: Performed by: STUDENT IN AN ORGANIZED HEALTH CARE EDUCATION/TRAINING PROGRAM

## 2020-09-11 PROCEDURE — 97116 GAIT TRAINING THERAPY: CPT | Mod: GP

## 2020-09-11 PROCEDURE — 25000132 ZZH RX MED GY IP 250 OP 250 PS 637

## 2020-09-11 PROCEDURE — 12000001 ZZH R&B MED SURG/OB UMMC

## 2020-09-11 PROCEDURE — 99232 SBSQ HOSP IP/OBS MODERATE 35: CPT | Performed by: INTERNAL MEDICINE

## 2020-09-11 RX ORDER — METHOCARBAMOL 500 MG/1
500 TABLET, FILM COATED ORAL 4 TIMES DAILY
Qty: 30 TABLET | Refills: 0 | Status: SHIPPED | OUTPATIENT
Start: 2020-09-11 | End: 2020-09-22

## 2020-09-11 RX ORDER — HYDROMORPHONE HCL/0.9% NACL/PF 0.2MG/0.2
.2-.5 SYRINGE (ML) INTRAVENOUS
Status: DISCONTINUED | OUTPATIENT
Start: 2020-09-11 | End: 2020-09-11

## 2020-09-11 RX ORDER — HYDROXYZINE HYDROCHLORIDE 25 MG/1
25 TABLET, FILM COATED ORAL EVERY 6 HOURS PRN
Qty: 20 TABLET | Refills: 0 | Status: SHIPPED | OUTPATIENT
Start: 2020-09-11 | End: 2020-09-22

## 2020-09-11 RX ORDER — AMOXICILLIN 250 MG
1 CAPSULE ORAL 2 TIMES DAILY
Qty: 30 TABLET | Refills: 0 | Status: SHIPPED | OUTPATIENT
Start: 2020-09-11 | End: 2021-06-11

## 2020-09-11 RX ORDER — HYDROMORPHONE HYDROCHLORIDE 2 MG/1
2-6 TABLET ORAL
Status: DISCONTINUED | OUTPATIENT
Start: 2020-09-11 | End: 2020-09-11

## 2020-09-11 RX ORDER — HYDROMORPHONE HYDROCHLORIDE 2 MG/1
2-4 TABLET ORAL
Status: DISCONTINUED | OUTPATIENT
Start: 2020-09-11 | End: 2020-09-11

## 2020-09-11 RX ORDER — METHOCARBAMOL 500 MG/1
500 TABLET, FILM COATED ORAL 4 TIMES DAILY
Status: DISCONTINUED | OUTPATIENT
Start: 2020-09-11 | End: 2020-09-13 | Stop reason: HOSPADM

## 2020-09-11 RX ORDER — HYDROMORPHONE HYDROCHLORIDE 1 MG/ML
0.3 INJECTION, SOLUTION INTRAMUSCULAR; INTRAVENOUS; SUBCUTANEOUS
Status: DISCONTINUED | OUTPATIENT
Start: 2020-09-11 | End: 2020-09-12

## 2020-09-11 RX ORDER — HYDROMORPHONE HYDROCHLORIDE 2 MG/1
2-4 TABLET ORAL EVERY 4 HOURS PRN
Qty: 40 TABLET | Refills: 0 | Status: SHIPPED | OUTPATIENT
Start: 2020-09-11 | End: 2020-09-12

## 2020-09-11 RX ORDER — ACETAMINOPHEN 325 MG/1
650 TABLET ORAL EVERY 4 HOURS PRN
Qty: 1 BOTTLE | Refills: 0 | Status: SHIPPED | OUTPATIENT
Start: 2020-09-13 | End: 2021-06-11

## 2020-09-11 RX ORDER — HYDROMORPHONE HYDROCHLORIDE 2 MG/1
4-6 TABLET ORAL
Status: DISCONTINUED | OUTPATIENT
Start: 2020-09-11 | End: 2020-09-12

## 2020-09-11 RX ORDER — POLYETHYLENE GLYCOL 3350 17 G/17G
17 POWDER, FOR SOLUTION ORAL 2 TIMES DAILY
Qty: 7 PACKET | Refills: 0 | Status: SHIPPED | OUTPATIENT
Start: 2020-09-11 | End: 2021-06-11

## 2020-09-11 RX ADMIN — DOCUSATE SODIUM AND SENNOSIDES 2 TABLET: 8.6; 5 TABLET, FILM COATED ORAL at 07:59

## 2020-09-11 RX ADMIN — HYDROMORPHONE HYDROCHLORIDE 6 MG: 2 TABLET ORAL at 19:20

## 2020-09-11 RX ADMIN — LIDOCAINE 1 PATCH: 560 PATCH PERCUTANEOUS; TOPICAL; TRANSDERMAL at 08:10

## 2020-09-11 RX ADMIN — OXYCODONE HYDROCHLORIDE 10 MG: 5 TABLET ORAL at 01:06

## 2020-09-11 RX ADMIN — OXYCODONE HYDROCHLORIDE 10 MG: 5 TABLET ORAL at 03:43

## 2020-09-11 RX ADMIN — PROPRANOLOL HYDROCHLORIDE 10 MG: 10 TABLET ORAL at 20:19

## 2020-09-11 RX ADMIN — ACETAMINOPHEN 975 MG: 325 TABLET, FILM COATED ORAL at 08:01

## 2020-09-11 RX ADMIN — PROPRANOLOL HYDROCHLORIDE 10 MG: 10 TABLET ORAL at 07:59

## 2020-09-11 RX ADMIN — CEFAZOLIN 1 G: 1 INJECTION, POWDER, FOR SOLUTION INTRAMUSCULAR; INTRAVENOUS at 05:22

## 2020-09-11 RX ADMIN — CITALOPRAM HYDROBROMIDE 40 MG: 40 TABLET ORAL at 07:59

## 2020-09-11 RX ADMIN — ACETAMINOPHEN 975 MG: 325 TABLET, FILM COATED ORAL at 01:06

## 2020-09-11 RX ADMIN — HYDROMORPHONE HYDROCHLORIDE 0.3 MG: 1 INJECTION, SOLUTION INTRAMUSCULAR; INTRAVENOUS; SUBCUTANEOUS at 20:55

## 2020-09-11 RX ADMIN — LORATADINE 10 MG: 10 TABLET ORAL at 07:59

## 2020-09-11 RX ADMIN — HYDROXYZINE HYDROCHLORIDE 25 MG: 25 TABLET, FILM COATED ORAL at 06:28

## 2020-09-11 RX ADMIN — METHOCARBAMOL 500 MG: 500 TABLET ORAL at 12:29

## 2020-09-11 RX ADMIN — DOCUSATE SODIUM AND SENNOSIDES 2 TABLET: 8.6; 5 TABLET, FILM COATED ORAL at 20:19

## 2020-09-11 RX ADMIN — HYDROMORPHONE HYDROCHLORIDE 4 MG: 2 TABLET ORAL at 13:07

## 2020-09-11 RX ADMIN — AMITRIPTYLINE HYDROCHLORIDE 25 MG: 25 TABLET, FILM COATED ORAL at 22:14

## 2020-09-11 RX ADMIN — METHOCARBAMOL 500 MG: 500 TABLET ORAL at 20:19

## 2020-09-11 RX ADMIN — HYDROMORPHONE HYDROCHLORIDE 0.3 MG: 1 INJECTION, SOLUTION INTRAMUSCULAR; INTRAVENOUS; SUBCUTANEOUS at 12:29

## 2020-09-11 RX ADMIN — Medication 0.4 MG: at 05:22

## 2020-09-11 RX ADMIN — HYDROMORPHONE HYDROCHLORIDE 6 MG: 2 TABLET ORAL at 15:48

## 2020-09-11 RX ADMIN — HYDROMORPHONE HYDROCHLORIDE 0.3 MG: 1 INJECTION, SOLUTION INTRAMUSCULAR; INTRAVENOUS; SUBCUTANEOUS at 17:21

## 2020-09-11 RX ADMIN — HYDROMORPHONE HYDROCHLORIDE 0.3 MG: 1 INJECTION, SOLUTION INTRAMUSCULAR; INTRAVENOUS; SUBCUTANEOUS at 10:37

## 2020-09-11 RX ADMIN — HYDROXYZINE HYDROCHLORIDE 25 MG: 25 TABLET, FILM COATED ORAL at 01:06

## 2020-09-11 RX ADMIN — METHOCARBAMOL 500 MG: 500 TABLET ORAL at 07:28

## 2020-09-11 RX ADMIN — ACETAMINOPHEN 975 MG: 325 TABLET, FILM COATED ORAL at 15:51

## 2020-09-11 RX ADMIN — CEFAZOLIN 1 G: 1 INJECTION, POWDER, FOR SOLUTION INTRAMUSCULAR; INTRAVENOUS at 20:19

## 2020-09-11 RX ADMIN — CEFAZOLIN 1 G: 1 INJECTION, POWDER, FOR SOLUTION INTRAMUSCULAR; INTRAVENOUS at 12:35

## 2020-09-11 RX ADMIN — HYDROMORPHONE HYDROCHLORIDE 4 MG: 2 TABLET ORAL at 09:20

## 2020-09-11 RX ADMIN — POLYETHYLENE GLYCOL 3350 17 G: 17 POWDER, FOR SOLUTION ORAL at 20:19

## 2020-09-11 RX ADMIN — HYDROMORPHONE HYDROCHLORIDE 0.3 MG: 1 INJECTION, SOLUTION INTRAMUSCULAR; INTRAVENOUS; SUBCUTANEOUS at 14:44

## 2020-09-11 RX ADMIN — TAMSULOSIN HYDROCHLORIDE 0.8 MG: 0.4 CAPSULE ORAL at 19:20

## 2020-09-11 RX ADMIN — HYDROMORPHONE HYDROCHLORIDE 6 MG: 2 TABLET ORAL at 22:14

## 2020-09-11 RX ADMIN — METHOCARBAMOL 500 MG: 500 TABLET ORAL at 16:31

## 2020-09-11 RX ADMIN — Medication 0.4 MG: at 08:06

## 2020-09-11 RX ADMIN — OXYCODONE HYDROCHLORIDE 10 MG: 5 TABLET ORAL at 06:28

## 2020-09-11 RX ADMIN — HYDROMORPHONE HYDROCHLORIDE 0.3 MG: 1 INJECTION, SOLUTION INTRAMUSCULAR; INTRAVENOUS; SUBCUTANEOUS at 23:01

## 2020-09-11 RX ADMIN — OMEPRAZOLE 20 MG: 20 CAPSULE, DELAYED RELEASE ORAL at 07:59

## 2020-09-11 ASSESSMENT — ACTIVITIES OF DAILY LIVING (ADL): PREVIOUS_RESPONSIBILITIES: MEAL PREP;HOUSEKEEPING;LAUNDRY;SHOPPING;YARDWORK

## 2020-09-11 NOTE — PLAN OF CARE
Discharge Planner OT   Patient plan for discharge: Home with assist  Current status: Pt reporting pain and refusing movement. Pt reporting completing of STS with nursing using FWW. Therapy provided education on OT role, POC, precautions, AE, and completing daily activities within precautions. Pt verbalized understanding of this education.   Barriers to return to prior living situation: Post op precautions, pain, medical status  Recommendations for discharge: Home with assist  Rationale for recommendations: Pt will likely benefit from assist at home to ensure safety with ADL/IADL       Entered by: Cris Garcia 09/11/2020 2:49 PM

## 2020-09-11 NOTE — PLAN OF CARE
VS: VSS.    O2: 97% O2 on room air.    Output: Patino catheter removed 0949. Patient voiding spontaneously. PVR 1 ml with void trial.    Last BM: LBM 9/10. Scheduled stool softener given. Declined scheduled miralax.    Activity: Pt was up in chair as tolerated. Repositioning in bed with pillows. Patient able to participate in therapy - see note.    Up for meals? Up as tolerated.    Skin: Intact.    Pain: Pt reporting sharp;cramping low back pain. Scheduled robaxin given. PRN oxycodone discontinued d/t ineffectiveness per Medical Team. Oral dilaudid and dilaudid IV Q 2 hours. Patient requesting IV dilaudid consistently. Educated patient on page regime. Paged Orthopedics team regarding pain regime.    CMS: Intake. Denies numbness or tingling.    Dressing: Incisional dressing CDI, per orders.    Diet: Regular thin. Adequate PO intake.    LDA: 1 hemovac, 1 ELSI drain.    Equipment: Gait belt and walker for stand pivot transfers AO1.    Plan: Continue void trial. Plan for Orthopedics team to increase PO dilaudid to better control and use IV dilaudid for breakthrough pain. Educated patient on pain regime. Continue education and managing pain.    Additional Info:

## 2020-09-11 NOTE — PLAN OF CARE
Discharge Planner PT   Patient plan for discharge: Home with spouse  Current status: Educated on spinal precautions. SBA for bed mobility with rail and transfers. Ambulating 150' CGA without AD. Pain up to 8/10 with activity.    PM: Pt thriving with breakthrough pain. Working with nursing on pain control. Cancel.  Barriers to return to prior living situation: Medical, pain, stairs  Recommendations for discharge: Home with assist as needed  Rationale for recommendations: Anticipate progression to IND or mod-I mobility during LOS.       Entered by: Dinesh Luo 09/11/2020 2:20 PM

## 2020-09-11 NOTE — PHARMACY-ADMISSION MEDICATION HISTORY
Admission medication history interview status for the 9/10/2020 admission is complete. See Epic admission navigator for allergy information, pharmacy, prior to admission medications and immunization status.     Medication history interview sources:  Patient via phone    Changes made to PTA medication list (reason)  Added: Multivitamin w/ minerals  Deleted: None  Changed: CBD sig, Vitamin D3 4000 units >> 2000 units po daily    Additional medication history information (including reliability of information, actions taken by pharmacist): Patient know his medication very well.      Prior to Admission medications    Medication Sig Last Dose Taking? Auth Provider   amitriptyline (ELAVIL) 25 MG tablet TAKE ONE TABLET BY MOUTH AT BEDTIME  Patient taking differently: Take 25 mg by mouth At Bedtime TAKE ONE TABLET BY MOUTH AT BEDTIME 9/9/2020 at 2300 Yes Lola Perez APRN CNP   Cholecalciferol (VITAMIN D3 PO) Take 2,000 Units by mouth every morning  Past Week at Unknown time Yes Reported, Patient   citalopram (CELEXA) 40 MG tablet Take 1 tablet (40 mg) by mouth daily  Patient taking differently: Take 40 mg by mouth every morning  9/10/2020 at 0800 Yes Lola Perez APRN CNP   HEMP OIL OR EXTRACT OR OTHER CBD CANNABINOID, NOT MEDICAL CANNABIS, Take 1 chew tab by mouth every evening as needed 1 gummy po prn in the evening 9/9/2020 at Unknown time Yes Reported, Patient   HYDROcodone-acetaminophen (NORCO) 5-325 MG tablet Take 1 tablet by mouth daily as needed for severe pain #28 tabs to last 4 weeks 9/9/2020 at 1700 Yes Lola Perez APRN CNP   loratadine (CLARITIN) 10 MG tablet Take 10 mg by mouth every morning 1 tab daily 9/10/2020 at 0800 Yes Reported, Patient   multivitamin w/minerals (THERA-VIT-M) tablet Take 1 tablet by mouth daily Past Month at Unknown time Yes Unknown, Entered By History   omeprazole (PRILOSEC) 20 MG DR capsule Take 1 capsule (20 mg) by mouth daily  Patient taking  differently: Take 20 mg by mouth every morning  9/10/2020 at 0800 Yes Lola Perez APRN CNP   propranolol (INDERAL) 10 MG tablet TAKE ONE TABLET BY MOUTH TWICE A DAY AS NEEDED FOR TREMOR  Patient taking differently: Take 10 mg by mouth 2 times daily TAKE ONE TABLET BY MOUTH TWICE A DAY AS NEEDED FOR TREMOR 9/10/2020 at 0800 Yes Sondra Barrera MD   tamsulosin (FLOMAX) 0.4 MG capsule Take 2 capsules (0.8 mg) by mouth daily  Patient taking differently: Take 0.8 mg by mouth daily (with dinner)  9/9/2020 at 2300 Yes Lola Perez APRN CNP   traMADol (ULTRAM) 50 MG tablet Take 1 tablet (50 mg) by mouth 2 times daily as needed for moderate to severe pain #56 tabs to last 4 weeks 9/9/2020 at 2300 Yes Lola Perez APRN CNP         Medication history completed by: Tangela Case PharmD, BCPS September 10, 2020

## 2020-09-11 NOTE — PLAN OF CARE
"      VS:   /83 (BP Location: Right arm)   Pulse 85   Temp 97.7  F (36.5  C) (Oral)   Resp 12   Ht 1.88 m (6' 2\")   Wt 85.1 kg (187 lb 9.8 oz)   SpO2 98%   BMI 24.09 kg/m    VSS. RA .Capnography. No shortness of breath, chest pain or dizziness. Afebrile   Output:   Urinary catheter. Output adequate. LBM 9/10 this morning prior to surgery   Activity:   Pt not OOB. Turned every 2 hrs with assistance.   Skin: Ex incision.   Pain:   PCA pump tolerating with discomfort.   Neuro/CMS:   Alert and orientedx4 CMS intact. Baseline tingling and numbness in right lower extremity below the knee.   Dressing(s):   CDI.    Diet:   Regular. tolerating   LDA:   PIV infusing TKO and PCA. Intermittent antibiotics.   Equipment:   IV pump. PCA. PCDs. Call light   Plan:   Continue to monitor.   Additional Info:   Pending progress with therapies, pain control on orals, and medical stability.       "

## 2020-09-11 NOTE — PROGRESS NOTES
09/11/20 1400   Quick Adds   Type of Visit Initial Occupational Therapy Evaluation   Living Environment   Lives With spouse;child(isai), dependent   Living Arrangements house   Home Accessibility stairs to enter home;stairs within home   Number of Stairs, Main Entrance 5;2   Stair Railings, Main Entrance railing on right side (ascending)   Number of Stairs, Within Home, Primary 10   Stair Railings, Within Home, Primary railing on right side (ascending);railing on left side (ascending)   Transportation Anticipated family or friend will provide   Living Environment Comment Bed/bath on 2nd floor, walk in shower   Self-Care   Usual Activity Tolerance moderate   Current Activity Tolerance moderate   Regular Exercise No   Equipment Currently Used at Home   (has walker from previoous surgery)   Activity/Exercise/Self-Care Comment 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    Functional Level   Ambulation 0-->independent   Transferring 0-->independent   Toileting 0-->independent   Bathing 0-->independent   Dressing 0-->independent   Eating 0-->independent   Communication 0-->understands/communicates without difficulty   Swallowing 0-->swallows foods/liquids without difficulty   Cognition 0 - no cognition issues reported   Fall history within last six months no   Which of the above functional risks had a recent onset or change? ambulation;transferring;toileting;bathing;dressing   Prior Functional Level Comment Pt reports independent at baseline, worked as a nurse prior to surgeries   General Information   Onset of Illness/Injury or Date of Surgery - Date 09/10/20   Referring Physician Jasen Galvan   Patient/Family Goals Statement To ensure toileting independently   Additional Occupational Profile Info/Pertinent History of Current Problem Jas Patel is a 50 year old male now s/p L4-5 TLIF/PSF on 9/10 with Dr. Dexter   Precautions/Limitations spinal precautions   Weight-Bearing Status - LUE partial  weight-bearing (% in comments)  (10 lbs)   Weight-Bearing Status - RUE partial weight-bearing (% in comments)  (10 lbs)   Weight-Bearing Status - LLE full weight-bearing   Weight-Bearing Status - RLE full weight-bearing   Cognitive Status Examination   Orientation orientation to person, place and time   Level of Consciousness alert   Follows Commands (Cognition) WNL   Memory intact   Attention No deficits were identified   Organization/Problem Solving No deficits were identified   Executive Function No deficits were identified   Visual Perception   Visual Perception No deficits were identified   Pain Assessment   Patient Currently in Pain Yes, see Vital Sign flowsheet   Integumentary/Edema   Integumentary/Edema no deficits were identifed   Instrumental Activities of Daily Living (IADL)   Previous Responsibilities meal prep;housekeeping;laundry;shopping;yardwork   Activities of Daily Living Analysis   Impairments Contributing to Impaired Activities of Daily Living pain;post surgical precautions   General Therapy Interventions   Planned Therapy Interventions ADL retraining;IADL retraining;ROM;strengthening;transfer training;home program guidelines;progressive activity/exercise   Clinical Impression   Criteria for Skilled Therapeutic Interventions Met yes, treatment indicated   OT Diagnosis Decreased ADL/IADL independence   Influenced by the following impairments Post op precautions, pain   Assessment of Occupational Performance 3-5 Performance Deficits   Identified Performance Deficits Dressing, bathing, housekeeping, meal prep   Clinical Decision Making (Complexity) Low complexity   Therapy Frequency Daily   Predicted Duration of Therapy Intervention (days/wks) 5 days   Anticipated Equipment Needs at Discharge raised toilet seat;reacher;toileting equipment   Anticipated Discharge Disposition Home with Assist   Risks and Benefits of Treatment have been explained. Yes   Patient, Family & other staff in agreement with  "plan of care Yes   Clinical Impression Comments Pt presents post op day one with pain and concerns about completing ADL post surgery. Pt will likely benefit from skilled OT services to increase safety and independence with ADL   [facility] AM-PAC  \"6 Clicks\" Daily Activity Inpatient Short Form   1. Putting on and taking off regular lower body clothing? 2 - A Lot   2. Bathing (including washing, rinsing, drying)? 2 - A Lot   3. Toileting, which includes using toilet, bedpan or urinal? 2 - A Lot   4. Putting on and taking off regular upper body clothing? 3 - A Little   5. Taking care of personal grooming such as brushing teeth? 3 - A Little   6. Eating meals? 4 - None   Daily Activity Raw Score (Score out of 24.Lower scores equate to lower levels of function) 16   Total Evaluation Time   Total Evaluation Time (Minutes) 5     "

## 2020-09-11 NOTE — PROGRESS NOTES
09/11/20 1100   Quick Adds   Type of Visit Initial PT Evaluation   Living Environment   Lives With spouse;child(isai), dependent   Living Arrangements house   Home Accessibility stairs to enter home;stairs within home   Number of Stairs, Main Entrance 5;2   Stair Railings, Main Entrance railing on right side (ascending)  (2 steps have no rail)   Number of Stairs, Within Home, Primary 10   Stair Railings, Within Home, Primary railing on right side (ascending)   Transportation Anticipated family or friend will provide   Living Environment Comment Bed/bath on 2nd floor. 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    Functional Level Prior   Ambulation 0-->independent   Transferring 0-->independent   Toileting 0-->independent   Bathing 0-->independent   Communication 0-->understands/communicates without difficulty   Swallowing 0-->swallows foods/liquids without difficulty   Cognition 0 - no cognition issues reported   Fall history within last six months no   Which of the above functional risks had a recent onset or change? ambulation;transferring;toileting;bathing;dressing   Prior Functional Level Comment IND at baseline. 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 Previous decompressions earlier this year at that level   Precautions/Limitations fall precautions;spinal precautions   Cognitive Status Examination   Orientation orientation to person, place and time  "  Level of Consciousness alert   Follows Commands and Answers Questions 100% of the time   Pain Assessment   Patient Currently in Pain Yes, see Vital Sign flowsheet   Integumentary/Edema   Integumentary/Edema no deficits were identifed   Posture    Posture Not impaired   Range of Motion (ROM)   ROM Comment WFL   Strength   Strength Comments NT due to pain, BLE >3/5 functionally   Bed Mobility   Bed Mobility Comments SBA with bed rail   Transfer Skills   Transfer Comments SBA sit<>stand   Gait   Gait Comments CGA without AD, slow pace with slightly wide EILEEN   Balance   Balance Comments Fair dynamic balance, anticipate just due to pain   Sensory Examination   Sensory Perception no deficits were identified   Sensory Perception Comments R calf tingling prior to surgery   Modality Interventions   Planned Modality Interventions Cryotherapy   General Therapy Interventions   Planned Therapy Interventions balance training;bed mobility training;gait training;strengthening;risk factor education;home program guidelines;progressive activity/exercise   Clinical Impression   Criteria for Skilled Therapeutic Intervention yes, treatment indicated   PT Diagnosis Impaired functional mobility   Influenced by the following impairments Pain, spinal precautions   Functional limitations due to impairments Decreased IND with bed mobility, gait, stairs   Clinical Presentation Stable/Uncomplicated   Clinical Presentation Rationale Medically stable   Clinical Decision Making (Complexity) Low complexity   Therapy Frequency 2x/day   Predicted Duration of Therapy Intervention (days/wks) 3 days   Anticipated Equipment Needs at Discharge   (has a FWW at home if needed)   Anticipated Discharge Disposition Home with Assist   Risk & Benefits of therapy have been explained Yes   Patient, Family & other staff in agreement with plan of care Yes   Clinical Impression Comments see CP note   [facility] AM-PAC TM \"6 Clicks\"   2016, [employer] [place] " "[place], under license to [employer].  All rights reserved.   6 Clicks Short Forms Basic Mobility Inpatient Short Form   [facility] AM-PAC  \"6 Clicks\" V.2 Basic Mobility Inpatient Short Form   1. Turning from your back to your side while in a flat bed without using bedrails? 3 - A Little   2. Moving from lying on your back to sitting on the side of a flat bed without using bedrails? 3 - A Little   3. Moving to and from a bed to a chair (including a wheelchair)? 3 - A Little   4. Standing up from a chair using your arms (e.g., wheelchair, or bedside chair)? 3 - A Little   5. To walk in hospital room? 3 - A Little   6. Climbing 3-5 steps with a railing? 3 - A Little   Basic Mobility Raw Score (Score out of 24.Lower scores equate to lower levels of function) 18   Total Evaluation Time   Total Evaluation Time (Minutes) 8     "

## 2020-09-11 NOTE — PROGRESS NOTES
[facility]    Medicine Progress Note - Hospitalist Service       Date of Admission:  9/10/2020  Assessment & Plan      Jas Patel is a 50 year old male who was admitted on 9/10/2020.      He is a 50 year old male with Hx of GERD, benign essential tremor, BPH, anxiety and depression. He had prior L4-L5 decompression 1/23/20, s/p revision 3/23/20 and hx C5-C6 herniation. Patient underwent L4-5 lumbar fusion, use of allograft, local autograft, and right iliac crest autograft. EBL was 150 ml.      # SP L4-5 Lumbar fusion :  Post op Mx per primary team  -ivf until adequate po.   - Encourage IS  - Pain mx and DVT prophylaxis, activity, antibiotics, drain/wound care - per orthopedics  - PT/OT    Follow-up hgb for anemia of ABL, transfuse for <7.0       # GERD: Controlled.   Ct PTA Omeprazole 20 mg every day   # Benign essential tremor:   -Ct PTA propranolol 10 mg BID with hold parameters  # BPH: Watch for urine retention.   -Ct PTA Flomax 0.8 mg every day   # Anxiety and depression. Controlled.   -Ct PTA Elavil 25 mg hs and Citalopram 40 mg every day      Disposition: Per primary team.          The patient's care was discussed with the Bedside Nurse, Care Coordinator/ and Patient.    Momo Conrad MD  Hospitalist Service  [facility]    ______________________________________________________________________    Interval History   States doing okay  Some incisional pain+  Denies fever or chills.   No cough or cp or sob.   No LH or dizziness.   No NV   No new sensory or motor complaint.   No new rash.    No other new or acute medical concern      Data reviewed today: I reviewed all medications, new labs and imaging results over the last 24 hours. I personally reviewed no images or EKG's today.    Physical Exam   Vital Signs: Temp: 97.9  F (36.6  C) Temp src: Oral BP: 116/79 Pulse: 76   Resp: 12 SpO2: 99 % O2 Device: None (Room air)  Oxygen Delivery: 6 LPM  Weight: 187 lbs 9.78 oz  General Appearance: Alert. nad  Respiratory: Non labored. CTA BL  Cardiovascular: s1s2 regular.   GI: soft. NT. ND  Skin: no new rash  Other: Distally wwp. Grossly non focal.     Data   Recent Labs   Lab 09/11/20  0516 09/10/20  0924   WBC  --  8.5   HGB 12.1* 15.1   MCV  --  94   PLT  --  294   NA  --  137   POTASSIUM  --  4.0   CHLORIDE  --  105   CO2  --  28   BUN  --  13   CR  --  0.94   ANIONGAP  --  4   HIWOT  --  8.6   GLC  --  142*     No results found for this or any previous visit (from the past 24 hour(s)).  Medications       acetaminophen  975 mg Oral Q8H     amitriptyline  25 mg Oral At Bedtime     ceFAZolin  1 g Intravenous Q8H     citalopram  40 mg Oral Daily     lidocaine  1 patch Transdermal Q24H     lidocaine   Transdermal Q8H     loratadine  10 mg Oral QAM     menthol   Transdermal Q8H     methocarbamol  500 mg Oral 4x Daily     omeprazole  20 mg Oral QAM     polyethylene glycol  17 g Oral BID     propranolol  10 mg Oral BID     senna-docusate  1 tablet Oral BID    Or     senna-docusate  2 tablet Oral BID     sodium chloride (PF)  3 mL Intracatheter Q8H     tamsulosin  0.8 mg Oral Daily at 8 pm

## 2020-09-11 NOTE — PLAN OF CARE
Pain management a challenge, continues to c/o back pain, radiating to both hips, especially R  hip area, groin area, also incisional area and from drains.  Neuro's intact. Po dilaudid increased to 6mg q3h, pt getting IV dilaudid for breakthrough, pt is on scheduled robaxin, tylenol.  Given aromotherapy and ice packs. Repositioned q2h and prn.  MD notified re: pain management, no new orders. Continue POC.    1840  Pt states he is doing better, pain has lessened and he is more comfortable now.

## 2020-09-11 NOTE — PROGRESS NOTES
"Orthopaedic Surgery Progress Note:  09/11/2020     Subjective:   Patient with pain overnight. PCA was subsequently discontinued and switched to orals with IV dilaudid for breakthrough pain. Patient states better control with this regimen. Otherwise, patient doing well. No new numbness or tingling. Tolerating diet- no nausea or vomiting. Denies CP/SOB, fevers or chills. Patino in place. +flatus. Will work with PT today.     Objective:   BP 93/57 (BP Location: Right arm)   Pulse 72   Temp 97.9  F (36.6  C) (Oral)   Resp 12   Ht 1.88 m (6' 2\")   Wt 85.1 kg (187 lb 9.8 oz)   SpO2 91%   BMI 24.09 kg/m    No intake/output data recorded.  Gen: NAD. Resting comfortably in bed  Resp: Breathing comfortably on RA  Drain:   Deep drain (ELSI) output of 205/135 at 24/7 hours, respectively.  Superficial drain output of 0/0 at 24/7 hours, respectively.  Musculoskeletal: dressing c/d/I.      Lower extremities:  Motor Strength Right Left   Hip flexion: L1, L2, L3 5/5 5/5   Hip adduction: L2, L3 5/5 5/5   Knee flexion: S1 5/5 5/5   Knee extension: L3, L4 5/5 5/5   Ankle dosiflexion: L4, L5 5/5 5/5   EHL: L5 5/5 5/5   Ankle plantarflexion: S1 5/5 5/5     Sensation from L1-S2 is preserved.    Labs:  Lab Results   Component Value Date    WBC 8.5 09/10/2020    HGB 12.1 (L) 09/11/2020     09/10/2020        Assessment & Plan:   Jas Patel is a 50 year old male now s/p L4-5 TLIF/PSF on 9/10 with Dr. Dexter.     Goals for today:  - PT/OT  -Pain control  -Patino out    Ortho Primary  Activity: Up with assist until independent. No excessive bending or twisting. No lifting >10 lbs x 6 weeks. No Brooke lift for transfers.   Weight bearing status: WBAT.  Pain management: Transition from IV to PO as tolerated. No NSAIDs. Dilaudid PCA discontinued. Oral dilaudid and robaxin.   Antibiotics: Ancef until deep drain (ELSI) removed  Diet: Begin with clear fluids and progress diet as tolerated.   DVT prophylaxis: SCDs only. No chemical DVT ppx " needed.  Imaging: XR upright scoli PTDC - ordered.  Labs: labs PRN  Bracing/Splinting: None  Dressings: Keep 4x4s and tegaderm c/d/i x 7 days.  Drains: Document output per shift, will be discontinued at Orthopedic Surgery discretion.  Patino catheter: Remove POD#1.   Physical Therapy/Occupational Therapy: Eval and treat.  Consults: Hospitalist.  Follow-up: Clinic with Dr. Dexter in 6 weeks with repeat x-rays.   Disposition: Pending progress with therapies, pain control on orals, and medical stability.    Orthopaedics surgery staff for this patient is Dr. Dexter.    ------------------------------------------------------------------------------------------    [ x ] Discontinue PCA  [   ] Drains removed.  [   ] Post xrays done.  [   ] Discharge orders done.  [   ] Discharge summary started.    Hudson Galvan MD  Orthopedic Surgery, PGY-1  Pager: [phone]  7:07 AM  September 11, 2020       Please page me directly with any questions/concerns during regular weekday hours before 5pm.     If there is no response, if it is a weekend, or if it is during evening hours then please page the orthopaedic surgery resident on call as listed on [facility] call schedule under the orthopaedics tab.        FOLLOWUP:    Future Appointments   Date Time Provider Department [place]   9/11/2020 10:15 AM Dinesh Luo PT [facility]   9/11/2020  4:30 PM Dinesh Luo PT [facility]

## 2020-09-11 NOTE — PLAN OF CARE
VSS, afebrile, A&Ox4, on RA, PCA removed @ 0100, oxycodone given, pt had pain spike this morning- MD notified and IV dilaudid for breakthrough pain ordered, 0.4mg given, atarax and scheduled tylenol given as well, dressing-CDI, HV has zero output, ELSI had 45 output for this shift, rdz output good- yellow clear, tolerating oral fluids and no swallowing difficulty noted, denies nausea, dizziness, SOB or CP, CMS- baseline - denies numbness or tingling, great strength, able to move all extremities without difficulty, PIV infusing, pt resting on/off, rounded per unit routine, able to make needs known, call light in reach length, will continue to monitor and assist.

## 2020-09-11 NOTE — DISCHARGE SUMMARY
ORTHOPAEDIC SURGERY DISCHARGE SUMMARY     Date of Admission: 9/10/2020  Date of Discharge: 9/13/2020  Disposition: Home  Staff Physician: Juvenal Dexter  Primary Care Provider: Lola Perez    DISCHARGE DIAGNOSIS:  Recurrent herniation of lumbar disc [M51.26]  Spinal stenosis of lumbar region with neurogenic claudication [M48.062]    PROCEDURES: Procedure(s):  Lumbar 4 to 5 instrumented posterior interbody fusion and mata tabor osteotomy with O-Arm/Stealth Navigation, use of allograft, local autograft,  and right iliac crest autograft on 9/10/2020    BRIEF HISTORY:  Jas Patel is a 50 year old male who returns again today in follow-up.  He has had 2 previous lumbar decompression and discectomy surgeries, each of which provided him significant relief for a number of weeks and he then returned to work.  Each time returning to work he seemed to have a recurrence of symptoms and he has had a number of MRIs showing recurrent disc herniations over time.  I last spoke with him July 28, and he was having again disabling symptoms at that time and I sent him for an MRI with and without contrast to further work-up the symptoms.  We also ordered a return to physical therapy and refilled his oral medication regimen.     Today he reports persistent right lower extremity pain located to his right buttocks with radiation to his right groin, lateral thigh + leg, and dorsal + plantar foot.  Endorses associated tingling radiating down the lateral aspect of his right lower extremity and plantar foot.  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  He states his symptoms are intolerable and frequently wake him up at night.  He recently saw his chiropractor and did not experience any relief.  Denies bowel or bladder  dysfunction.     At this point he has had 3 recurrent disc herniations.  Each of his 2 previous lumbar decompression surgeries helped him for a number of months, and then he had recurrent symptoms, so I think his clinical history is most consistent with serially recurrent disc herniations rather than a residual disc fragment that was left behind.  Nonetheless, regardless of the etiology, I think at this point the only remaining surgical option for him would be an L4-5 fusion type surgery.  Patient was in agreement.  Consent was obtained and surgical intervention proceeded.  (copied from Dr. Dexter's 8/11/2020 clinic note)    HOSPITAL COURSE:    The patient was admitted following the above listed procedures for pain control and rehabilitation. Jas Patel did well post-operatively. Medicine was consulted post operatively to aid in management of medical co-morbidities. The patient received routine nursing cares and at the time of discharge was medically stable. Vital signs were stable throughout admission. The patient is tolerating a regular diet and is voiding spontaneously. All PT/OT goals have been met for safe mobility. Pain is now controlled on oral medications which will be available on discharge. Stool softeners have been used while taking pain medications to help prevent constipation. Jas Patel is deemed medically safe to discharge.     Antibiotics:  Ancef given periop and continued until his deep drain was removed  DVT prophylaxis:  mechanical  PT Progress:  Has met PT/OT goals for safe mobility.    Pain Meds:  Weaned off all IV pain meds by discharge.  Inpatient Events: No significant events or complications.     PHYSICAL EXAM:    Gen: NAD. Resting comfortably in bed  Resp: Breathing comfortably on RA    Musculoskeletal: dressing c/d/I.  Lower extremities:  Motor Strength Right Left   Hip flexion: L1, L2, L3 5/5 5/5   Hip adduction: L2, L3 5/5 5/5   Knee flexion: S1 5/5 5/5   Knee extension: L3, L4  5/5 5/5   Ankle dosiflexion: L4, L5 5/5 5/5   EHL: L5 5/5 5/5   Ankle plantarflexion: S1 5/5 5/5      Sensation from L1-S2 is preserved.      FOLLOWUP:    Follow up with Dr. Dexter at 6 weeks postoperatively.    Future Appointments   Date Time Provider Department Center   9/11/2020  4:30 PM Dinesh Luo, PT [facility] [place]   9/12/2020  8:00 AM China Asencio, PT [facility] [place]   9/12/2020 10:45 AM Jose Maria Matamoros, OT [facility] [place]   9/12/2020  6:30 PM UR PT OVERFLOW [facility] [place]       Orthopaedic Surgery appointments are at the [facility] [place] ([address] [place] [address]). Call [phone] to schedule a follow-up appointment at this location with your provider.     PLANNED DISCHARGE ORDERS:      Current Discharge Medication List      START taking these medications    Details   acetaminophen (TYLENOL) 325 MG tablet Take 2 tablets (650 mg) by mouth every 4 hours as needed for other  Qty: 1 Bottle, Refills: 0    Associated Diagnoses: Recurrent herniation of lumbar disc      hydrOXYzine (ATARAX) 25 MG tablet Take 1 tablet (25 mg) by mouth every 6 hours as needed for itching  Qty: 20 tablet, Refills: 0    Associated Diagnoses: Recurrent herniation of lumbar disc      methocarbamol (ROBAXIN) 500 MG tablet Take 1 tablet (500 mg) by mouth 4 times daily  Qty: 30 tablet, Refills: 0    Associated Diagnoses: Recurrent herniation of lumbar disc      polyethylene glycol (MIRALAX) 17 g packet Take 17 g by mouth 2 times daily  Qty: 7 packet, Refills: 0    Associated Diagnoses: Recurrent herniation of lumbar disc      senna-docusate (SENOKOT-S/PERICOLACE) 8.6-50 MG tablet Take 1 tablet by mouth 2 times daily  Qty: 30 tablet, Refills: 0    Associated Diagnoses: Recurrent herniation of lumbar disc         CONTINUE these medications which have NOT CHANGED    Details   Cholecalciferol (VITAMIN D3 PO) Take 2,000 Units by mouth every morning       citalopram (CELEXA) 40 MG tablet Take 1 tablet (40  mg) by mouth daily  Qty: 90 tablet, Refills: 3    Associated Diagnoses: Generalized anxiety disorder      HEMP OIL OR EXTRACT OR OTHER CBD CANNABINOID, NOT MEDICAL CANNABIS, Take 1 chew tab by mouth every evening as needed 1 gummy po prn in the evening      loratadine (CLARITIN) 10 MG tablet Take 10 mg by mouth every morning 1 tab daily    Associated Diagnoses: Generalized anxiety disorder      multivitamin w/minerals (THERA-VIT-M) tablet Take 1 tablet by mouth daily      omeprazole (PRILOSEC) 20 MG DR capsule Take 1 capsule (20 mg) by mouth daily  Qty: 90 capsule, Refills: 3    Associated Diagnoses: Gastroesophageal reflux disease with esophagitis      propranolol (INDERAL) 10 MG tablet TAKE ONE TABLET BY MOUTH TWICE A DAY AS NEEDED FOR TREMOR  Qty: 180 tablet, Refills: 1    Associated Diagnoses: Generalized anxiety disorder; Benign essential tremor      tamsulosin (FLOMAX) 0.4 MG capsule Take 2 capsules (0.8 mg) by mouth daily  Qty: 180 capsule, Refills: 3    Associated Diagnoses: Benign prostatic hyperplasia with urinary frequency         STOP taking these medications       amitriptyline (ELAVIL) 25 MG tablet Comments:   Reason for Stopping:         HYDROcodone-acetaminophen (NORCO) 5-325 MG tablet Comments:   Reason for Stopping:         traMADol (ULTRAM) 50 MG tablet Comments:   Reason for Stopping:                 Discharge Procedure Orders   Reason for your hospital stay   Order Comments: Postoperative care for lumbar spine surgery.     Adult [facility] Follow-up and recommended labs and tests   Order Comments: Follow-up with Dr. Dexter in 6 weeks as previously scheduled.     Activity   Order Comments: You will be seen in the clinic at 6 weeks following surgery. You will not need to attend physical therapy during this time. You can focus on cardiovascular fitness by walking as much as you can tolerate. Avoid bending, lifting, and twisting. Your weight lifting restriction is 10 pounds until your first follow-up  "appointment.     Order Specific Question Answer Comments   Is discharge order? Yes      When to contact your care team   Order Comments: Please call or return if you experience the following:  - Fevers (temperature greater than 100.4 degrees Fahrenheit)  - Pain that is getting worse or does not respond to pain medications  - Drainage from your wound  - Increasing redness around the wound  - Any other worrisome symptoms    You may reach the clinic by dialing [phone]. After hours, you may reach the resident on call by dialing [phone].     Wound care and dressings   Order Comments: You have had a surgery involving your Lumbar spine. You have a dressing consisting of4x4s and tegaderm which is a clear adhesive on your incision which can be worn in the shower. You may leave this dressing in place for 7-10 days following surgery. After the dressing has been removed, you do not need a dressing. Often times there is \"surgical glue\" directly over the incision. This will fall off on its own, which can take up to 2 weeks. It is okay to shower and wash gently with soap and water. Do not soak in the bath. No pools, hot tubs, or lakes for 6 weeks.    After surgery, you may have a sensitive scar. When the dressing has been removed, you may massage the scar to decrease sensitivity and help break down scar tissue. Do this up to 4 times per day.     Full Code     Order Specific Question Answer Comments   Code status determined by: Discussion with patient/legal decision maker    Code status determined by: Discussion with patient/ legal decision maker      Diet   Order Comments: When you get home, you may resume your normal diet as tolerated. You may not be very hungry but try to eat small healthy meals to help you heal. Remember to drink plenty of water/fluids to help keep you hydrated.     Order Specific Question Answer Comments   Is discharge order? Yes        Hudson Galvan MD  Orthopedic Surgery, PGY-1  "

## 2020-09-12 ENCOUNTER — APPOINTMENT (OUTPATIENT)
Dept: PHYSICAL THERAPY | Facility: CLINIC | Age: 50
DRG: 454 | End: 2020-09-12
Attending: ORTHOPAEDIC SURGERY
Payer: COMMERCIAL

## 2020-09-12 ENCOUNTER — APPOINTMENT (OUTPATIENT)
Dept: GENERAL RADIOLOGY | Facility: CLINIC | Age: 50
DRG: 454 | End: 2020-09-12
Attending: ORTHOPAEDIC SURGERY
Payer: COMMERCIAL

## 2020-09-12 ENCOUNTER — APPOINTMENT (OUTPATIENT)
Dept: OCCUPATIONAL THERAPY | Facility: CLINIC | Age: 50
DRG: 454 | End: 2020-09-12
Attending: ORTHOPAEDIC SURGERY
Payer: COMMERCIAL

## 2020-09-12 LAB
GLUCOSE SERPL-MCNC: 109 MG/DL (ref 70–99)
HGB BLD-MCNC: 13.5 G/DL (ref 13.3–17.7)

## 2020-09-12 PROCEDURE — 25000128 H RX IP 250 OP 636: Performed by: CLINICAL NURSE SPECIALIST

## 2020-09-12 PROCEDURE — 82947 ASSAY GLUCOSE BLOOD QUANT: CPT

## 2020-09-12 PROCEDURE — 97530 THERAPEUTIC ACTIVITIES: CPT | Mod: GP | Performed by: CLINIC/CENTER

## 2020-09-12 PROCEDURE — 36415 COLL VENOUS BLD VENIPUNCTURE: CPT

## 2020-09-12 PROCEDURE — 99232 SBSQ HOSP IP/OBS MODERATE 35: CPT | Performed by: INTERNAL MEDICINE

## 2020-09-12 PROCEDURE — 85018 HEMOGLOBIN: CPT

## 2020-09-12 PROCEDURE — 25000132 ZZH RX MED GY IP 250 OP 250 PS 637: Performed by: STUDENT IN AN ORGANIZED HEALTH CARE EDUCATION/TRAINING PROGRAM

## 2020-09-12 PROCEDURE — 25000132 ZZH RX MED GY IP 250 OP 250 PS 637

## 2020-09-12 PROCEDURE — 40000986 XR THORACIC LUMBAR STANDING 2 VW

## 2020-09-12 PROCEDURE — 97116 GAIT TRAINING THERAPY: CPT | Mod: GP | Performed by: CLINIC/CENTER

## 2020-09-12 PROCEDURE — 97535 SELF CARE MNGMENT TRAINING: CPT | Mod: GO

## 2020-09-12 PROCEDURE — 25800030 ZZH RX IP 258 OP 636

## 2020-09-12 PROCEDURE — 12000001 ZZH R&B MED SURG/OB UMMC

## 2020-09-12 PROCEDURE — 25000132 ZZH RX MED GY IP 250 OP 250 PS 637: Performed by: HOSPITALIST

## 2020-09-12 PROCEDURE — 25000128 H RX IP 250 OP 636

## 2020-09-12 RX ORDER — SODIUM CHLORIDE 9 MG/ML
INJECTION, SOLUTION INTRAVENOUS
Status: COMPLETED
Start: 2020-09-12 | End: 2020-09-12

## 2020-09-12 RX ORDER — HYDROMORPHONE HYDROCHLORIDE 4 MG/1
4-6 TABLET ORAL
Qty: 50 TABLET | Refills: 0 | Status: SHIPPED | OUTPATIENT
Start: 2020-09-12 | End: 2020-09-13

## 2020-09-12 RX ORDER — OXYCODONE HYDROCHLORIDE 5 MG/1
5-10 TABLET ORAL
Status: DISCONTINUED | OUTPATIENT
Start: 2020-09-12 | End: 2020-09-13 | Stop reason: HOSPADM

## 2020-09-12 RX ORDER — CYCLOBENZAPRINE HCL 10 MG
10 TABLET ORAL EVERY 6 HOURS PRN
Status: DISCONTINUED | OUTPATIENT
Start: 2020-09-12 | End: 2020-09-12

## 2020-09-12 RX ORDER — GABAPENTIN 300 MG/1
300 CAPSULE ORAL 3 TIMES DAILY
Status: DISCONTINUED | OUTPATIENT
Start: 2020-09-12 | End: 2020-09-12

## 2020-09-12 RX ADMIN — POLYETHYLENE GLYCOL 3350 17 G: 17 POWDER, FOR SOLUTION ORAL at 20:01

## 2020-09-12 RX ADMIN — PROPRANOLOL HYDROCHLORIDE 10 MG: 10 TABLET ORAL at 08:11

## 2020-09-12 RX ADMIN — TAMSULOSIN HYDROCHLORIDE 0.8 MG: 0.4 CAPSULE ORAL at 20:01

## 2020-09-12 RX ADMIN — LIDOCAINE 1 PATCH: 560 PATCH PERCUTANEOUS; TOPICAL; TRANSDERMAL at 08:12

## 2020-09-12 RX ADMIN — OMEPRAZOLE 20 MG: 20 CAPSULE, DELAYED RELEASE ORAL at 08:11

## 2020-09-12 RX ADMIN — ACETAMINOPHEN 975 MG: 325 TABLET, FILM COATED ORAL at 08:11

## 2020-09-12 RX ADMIN — OXYCODONE HYDROCHLORIDE 10 MG: 5 TABLET ORAL at 20:01

## 2020-09-12 RX ADMIN — AMITRIPTYLINE HYDROCHLORIDE 25 MG: 25 TABLET, FILM COATED ORAL at 21:39

## 2020-09-12 RX ADMIN — CEFAZOLIN 1 G: 1 INJECTION, POWDER, FOR SOLUTION INTRAMUSCULAR; INTRAVENOUS at 12:35

## 2020-09-12 RX ADMIN — ACETAMINOPHEN 975 MG: 325 TABLET, FILM COATED ORAL at 01:04

## 2020-09-12 RX ADMIN — HYDROMORPHONE HYDROCHLORIDE 0.3 MG: 1 INJECTION, SOLUTION INTRAMUSCULAR; INTRAVENOUS; SUBCUTANEOUS at 08:07

## 2020-09-12 RX ADMIN — DOCUSATE SODIUM AND SENNOSIDES 2 TABLET: 8.6; 5 TABLET, FILM COATED ORAL at 20:00

## 2020-09-12 RX ADMIN — POLYETHYLENE GLYCOL 3350 17 G: 17 POWDER, FOR SOLUTION ORAL at 08:11

## 2020-09-12 RX ADMIN — CEFAZOLIN 1 G: 1 INJECTION, POWDER, FOR SOLUTION INTRAMUSCULAR; INTRAVENOUS at 03:58

## 2020-09-12 RX ADMIN — METHOCARBAMOL 500 MG: 500 TABLET ORAL at 17:07

## 2020-09-12 RX ADMIN — LORATADINE 10 MG: 10 TABLET ORAL at 08:12

## 2020-09-12 RX ADMIN — DOCUSATE SODIUM AND SENNOSIDES 2 TABLET: 8.6; 5 TABLET, FILM COATED ORAL at 08:11

## 2020-09-12 RX ADMIN — CITALOPRAM HYDROBROMIDE 40 MG: 40 TABLET ORAL at 08:11

## 2020-09-12 RX ADMIN — HYDROMORPHONE HYDROCHLORIDE 6 MG: 2 TABLET ORAL at 09:51

## 2020-09-12 RX ADMIN — HYDROMORPHONE HYDROCHLORIDE 6 MG: 2 TABLET ORAL at 03:57

## 2020-09-12 RX ADMIN — HYDROXYZINE HYDROCHLORIDE 25 MG: 25 TABLET, FILM COATED ORAL at 21:39

## 2020-09-12 RX ADMIN — HYDROMORPHONE HYDROCHLORIDE 6 MG: 2 TABLET ORAL at 16:24

## 2020-09-12 RX ADMIN — METHOCARBAMOL 500 MG: 500 TABLET ORAL at 13:15

## 2020-09-12 RX ADMIN — SODIUM CHLORIDE: 9 INJECTION, SOLUTION INTRAVENOUS at 12:35

## 2020-09-12 RX ADMIN — HYDROMORPHONE HYDROCHLORIDE 0.3 MG: 1 INJECTION, SOLUTION INTRAMUSCULAR; INTRAVENOUS; SUBCUTANEOUS at 05:12

## 2020-09-12 RX ADMIN — HYDROMORPHONE HYDROCHLORIDE 6 MG: 2 TABLET ORAL at 01:04

## 2020-09-12 RX ADMIN — PROPRANOLOL HYDROCHLORIDE 10 MG: 10 TABLET ORAL at 20:00

## 2020-09-12 RX ADMIN — HYDROMORPHONE HYDROCHLORIDE 6 MG: 2 TABLET ORAL at 13:15

## 2020-09-12 RX ADMIN — METHOCARBAMOL 500 MG: 500 TABLET ORAL at 07:00

## 2020-09-12 RX ADMIN — HYDROMORPHONE HYDROCHLORIDE 0.3 MG: 1 INJECTION, SOLUTION INTRAMUSCULAR; INTRAVENOUS; SUBCUTANEOUS at 12:31

## 2020-09-12 RX ADMIN — CEFAZOLIN 1 G: 1 INJECTION, POWDER, FOR SOLUTION INTRAMUSCULAR; INTRAVENOUS at 20:01

## 2020-09-12 RX ADMIN — HYDROMORPHONE HYDROCHLORIDE 0.3 MG: 1 INJECTION, SOLUTION INTRAMUSCULAR; INTRAVENOUS; SUBCUTANEOUS at 10:31

## 2020-09-12 RX ADMIN — ACETAMINOPHEN 975 MG: 325 TABLET, FILM COATED ORAL at 16:24

## 2020-09-12 RX ADMIN — HYDROMORPHONE HYDROCHLORIDE 6 MG: 2 TABLET ORAL at 07:00

## 2020-09-12 RX ADMIN — METHOCARBAMOL 500 MG: 500 TABLET ORAL at 21:38

## 2020-09-12 NOTE — PLAN OF CARE
Discharge Planner OT   Patient plan for discharge: Home with assist  Current status:  Patient log rolls from supine > sit and sit<> stand IND. Ambulates in room to bathrrom without FWW, OT reccommends walker, patient reports he's doing well without. Completes toilet transfer IND and tootie cares remaining within precautions. Stands at sink to complete simple g/h tasks for ~ 4 min with good tolerance. Educated on DME of reacher and sock aid for LB dressing, patient demonstrates understanding. Discussed shower chair for safety, patient reports he will talk to wife. Left supine in bed with all needs within reach.  Barriers to return to prior living situation: No OT barriers  Recommendations for discharge: home with assist  Rationale for recommendations: Patient completing functional mobility with supervision to IND. Completing simple self-cares IND with AE.       Entered by: Jose Maria Matamoros 09/12/2020 11:48 AM     Occupational Therapy Discharge Summary    Reason for therapy discharge:    All goals and outcomes met, no further needs identified.    Progress towards therapy goal(s). [facility] for goal details.  Goals met    Therapy recommendation(s):    No further therapy is recommended.

## 2020-09-12 NOTE — PLAN OF CARE
"      VS:   /60 (BP Location: Left arm)   Pulse 78   Temp 97.6  F (36.4  C) (Oral)   Resp 16   Ht 1.88 m (6' 2\")   Wt 85.1 kg (187 lb 9.8 oz)   SpO2 98%   BMI 24.09 kg/m    LS clear   Output:   Pt voiding good amounts w/o difficulty, +gas   Activity:   Up in room independently with walker, steady on feet   Skin: Intact with exception of posterior incision.    Pain:   Pt still requesting IV dilaudid for breakthrough pain, PO dilaudid administered Q 4 hrs.   Neuro/CMS:   A&Ox4, CMS/Neuros intact, pt denies any numbness/tingling.    Dressing(s):   Posterior dressing changed- CDI.    Diet:   Regular diet, tolerating well- fair appetite.    LDA:   ELSI, SUKHI- CHRISTOPHER.    Equipment:   Walker, PCDs, IV Pole.    Plan:   Transition pt off of IV dilaudid, possibly discharge tomorrow. Pt is able to make needs known, wife, Emerald is at bedside- call light within reach, continue with POC.    Additional Info:   Standing X-rays completed.        "

## 2020-09-12 NOTE — PROGRESS NOTES
[facility]    Medicine Progress Note - Hospitalist Service       Date of Admission:  9/10/2020  Assessment & Plan      Jas Patel is a 50 year old male who was admitted on 9/10/2020.      He is a 50 year old male with Hx of GERD, benign essential tremor, BPH, anxiety and depression. He had prior L4-L5 decompression 1/23/20, s/p revision 3/23/20 and hx C5-C6 herniation. Patient underwent L4-5 lumbar fusion, use of allograft, local autograft, and right iliac crest autograft. EBL was 150 ml.      # SP L4-5 Lumbar fusion :  Post op Mx per primary team  -ivf until adequate po.   -Encourage IS  -Pain mx and DVT prophylaxis, activity, antibiotics, drain/wound care - per orthopedics  -PT/OT    Follow-up hgb for anemia of ABL, transfuse for <7.0       # GERD: Controlled.   Ct PTA Omeprazole 20 mg every day     # Benign essential tremor:   -Ct PTA propranolol 10 mg BID with hold parameters    # BPH: Watch for urine retention.   -Ct PTA Flomax 0.8 mg every day     # Anxiety and depression. Controlled.   -Ct PTA Elavil 25 mg hs and Citalopram 40 mg every day      Disposition: Per primary team.       The patient's care was discussed with the Bedside Nurse and Patient.    Momo Conrad MD  Hospitalist Service  [facility]    ______________________________________________________________________    Interval History   States doing okay  Some incisional pain+  Denies fever or chills.   No cough or cp or sob.   No LH or dizziness.   No NV   No new sensory or motor complaint.   No new rash.    No other new or acute medical concern      Data reviewed today: I reviewed all medications, new labs and imaging results over the last 24 hours. I personally reviewed no images or EKG's today.    Physical Exam   Vital Signs: Temp: 97.6  F (36.4  C) Temp src: Oral BP: 109/60 Pulse: 78   Resp: 16 SpO2: 98 % O2 Device: None (Room air)    Weight: 187 lbs 9.78  oz  General Appearance: Alert. nad  Respiratory: Non labored.   Cardiovascular: s1s2 regular.   GI: soft. NT. ND  Skin: no new rash  Other: Distally wwp. Grossly non focal.     Data   Recent Labs   Lab 09/12/20  0619 09/11/20  0516 09/10/20  0924   WBC  --   --  8.5   HGB 13.5 12.1* 15.1   MCV  --   --  94   PLT  --   --  294   NA  --   --  137   POTASSIUM  --   --  4.0   CHLORIDE  --   --  105   CO2  --   --  28   BUN  --   --  13   CR  --   --  0.94   ANIONGAP  --   --  4   HIWOT  --   --  8.6   *  --  142*     No results found for this or any previous visit (from the past 24 hour(s)).  Medications       acetaminophen  975 mg Oral Q8H     amitriptyline  25 mg Oral At Bedtime     ceFAZolin  1 g Intravenous Q8H     citalopram  40 mg Oral Daily     lidocaine  1 patch Transdermal Q24H     lidocaine   Transdermal Q8H     loratadine  10 mg Oral QAM     menthol   Transdermal Q8H     methocarbamol  500 mg Oral 4x Daily     omeprazole  20 mg Oral QAM     polyethylene glycol  17 g Oral BID     propranolol  10 mg Oral BID     senna-docusate  1 tablet Oral BID    Or     senna-docusate  2 tablet Oral BID     sodium chloride (PF)  3 mL Intracatheter Q8H     tamsulosin  0.8 mg Oral Daily at 8 pm

## 2020-09-12 NOTE — PROGRESS NOTES
"Orthopaedic Surgery Progress Note:  09/12/2020     Subjective:   Pain better controlled today. Slept well overnight. No new numbness or tingling. Tolerating diet- no nausea or vomiting. Denies CP/SOB, fevers or chills. Voiding independently. +flatus.    Objective:   /68 (BP Location: Left arm)   Pulse 79   Temp 99.1  F (37.3  C) (Oral)   Resp 16   Ht 1.88 m (6' 2\")   Wt 85.1 kg (187 lb 9.8 oz)   SpO2 97%   BMI 24.09 kg/m    No intake/output data recorded.  Gen: NAD. Resting comfortably in bed  Resp: Breathing comfortably on RA  Drain:   Deep drain (ELSI) output of 135/75/82 last 3 shifts.  Superficial drain: 15mL last 24 hours    Musculoskeletal: dressing c/d/I.  Lower extremities:  Motor Strength Right Left   Hip flexion: L1, L2, L3 5/5 5/5   Hip adduction: L2, L3 5/5 5/5   Knee flexion: S1 5/5 5/5   Knee extension: L3, L4 5/5 5/5   Ankle dosiflexion: L4, L5 5/5 5/5   EHL: L5 5/5 5/5   Ankle plantarflexion: S1 5/5 5/5     Sensation from L1-S2 is preserved.    Labs:  Lab Results   Component Value Date    WBC 8.5 09/10/2020    HGB 12.1 (L) 09/11/2020     09/10/2020        Assessment & Plan:   Jas Patel is a 50 year old male now s/p L4-5 TLIF/PSF on 9/10 with Dr. Dexter.     Goals for today:  - PT/OT  - Pain control  - remove superficial drain  - XRs today    Ortho Primary  Activity: Up with assist until independent. No excessive bending or twisting. No lifting >10 lbs x 6 weeks. No Brooke lift for transfers.   Weight bearing status: WBAT.  Pain management: Transition from IV to PO as tolerated. No NSAIDs. Oral dilaudid and robaxin.   Antibiotics: Ancef until deep drain (ELSI) removed  Diet: regular  DVT prophylaxis: SCDs only. No chemical DVT ppx needed.  Imaging: XR upright scoli PTDC - obtain today.  Labs: labs PRN  Bracing/Splinting: None  Dressings: Keep 4x4s and tegaderm c/d/i x 7 days.  Drains: Document output per shift, will be discontinued at Orthopedic Surgery discretion.  Patino catheter: " Removed 9/11/20  Physical Therapy/Occupational Therapy: Eval and treat.  Consults: Hospitalist.  Follow-up: Clinic with Dr. Dexter in 6 weeks with repeat x-rays.   Disposition: Pending progress with therapies, pain control on orals, and medical stability.    Orthopaedics surgery staff for this patient is Dr. Dexter.    ------------------------------------------------------------------------------------------    [ x ] Discontinue PCA  [   ] Drains removed.  [   ] Post xrays done.  [   ] Discharge orders done.  [   ] Discharge summary started.    Matt Mcginnis MD  PGY-4, Orthopaedic Surgery  Pager: [phone]      FOLLOWUP:    Future Appointments   Date Time Provider Department [place]   9/11/2020 10:15 AM Dinesh Luo PT [facility] [place]   9/11/2020  4:30 PM Dinesh Luo PT [facility] [place]

## 2020-09-12 NOTE — PLAN OF CARE
"      VS:   /68 (BP Location: Left arm)   Pulse 79   Temp 99.1  F (37.3  C) (Oral)   Resp 16   Ht 1.88 m (6' 2\")   Wt 85.1 kg (187 lb 9.8 oz)   SpO2 97%   BMI 24.09 kg/m       Output:   Voiding spontaneously without difficulty, LBM 9/10 +gas   Lungs CTA   Activity:   Up with SBA and walker   Skin: Intact ex incision and drains   Pain:   Tolerable with discomfort. Pt wanting to be woken up for meds, and requests IV dilaudid to be given when available (45min) after PO meds   Neuro/CMS:   A&Ox3, denies numbness and tingling   Dressing(s):   CDI   Diet:   Regular-tolerating with no nausea   LDA:   R PIV SL, ELSI, Hemo   Equipment:   Walker, IS   Plan:   Continue to monitor and follow plan of care. Able to make needs known and call light within reach.    Additional Info:          "

## 2020-09-12 NOTE — PLAN OF CARE
Discharge Planner PT   Patient plan for discharge: Home with assist  Current status: Pt with good recall of spinal precautions and log roll technique. Pt completes sidelying to sit transfer with SBA, HOB slightly elevated and use of UEs to push through bed rail. Pt reports initial dizziness lasting less than one minute upon sitting. Completes sit<>stand transfers with SBA and no AD. Pt reports no dizziness in standing. Pt able to ambulate in room and 50ft in hallway without AD and CGA. Pt reports increased pain in hip so continued ambulation 100ft with FWW and CGA. Pt reports minor dizziness during turns requiring standing rest break. Pt gait speed is slow but steady without AD, improved gait speed and decreased pain with FWW.  Transferred back to supine with SBA, bed flat and good log roll technique. Ended session in supine with all needs in reach.   Barriers to return to prior living situation: pain, stairs, medical  Recommendations for discharge: Home with assist  Rationale for recommendations: Anticipate pt will improve independence with functional mobility during LOS       Entered by: China Asencio 09/12/2020 8:35 AM

## 2020-09-13 ENCOUNTER — PATIENT OUTREACH (OUTPATIENT)
Dept: CARE COORDINATION | Facility: CLINIC | Age: 50
End: 2020-09-13

## 2020-09-13 ENCOUNTER — APPOINTMENT (OUTPATIENT)
Dept: PHYSICAL THERAPY | Facility: CLINIC | Age: 50
DRG: 454 | End: 2020-09-13
Attending: ORTHOPAEDIC SURGERY
Payer: COMMERCIAL

## 2020-09-13 VITALS
HEART RATE: 72 BPM | OXYGEN SATURATION: 98 % | TEMPERATURE: 98 F | HEIGHT: 74 IN | WEIGHT: 187.61 LBS | DIASTOLIC BLOOD PRESSURE: 67 MMHG | BODY MASS INDEX: 24.08 KG/M2 | RESPIRATION RATE: 16 BRPM | SYSTOLIC BLOOD PRESSURE: 102 MMHG

## 2020-09-13 PROCEDURE — 25000128 H RX IP 250 OP 636

## 2020-09-13 PROCEDURE — 25000132 ZZH RX MED GY IP 250 OP 250 PS 637: Performed by: HOSPITALIST

## 2020-09-13 PROCEDURE — 97530 THERAPEUTIC ACTIVITIES: CPT | Mod: GP | Performed by: CLINIC/CENTER

## 2020-09-13 PROCEDURE — 25000132 ZZH RX MED GY IP 250 OP 250 PS 637: Performed by: STUDENT IN AN ORGANIZED HEALTH CARE EDUCATION/TRAINING PROGRAM

## 2020-09-13 PROCEDURE — 99232 SBSQ HOSP IP/OBS MODERATE 35: CPT | Performed by: INTERNAL MEDICINE

## 2020-09-13 PROCEDURE — 25000132 ZZH RX MED GY IP 250 OP 250 PS 637

## 2020-09-13 PROCEDURE — 97116 GAIT TRAINING THERAPY: CPT | Mod: GP | Performed by: CLINIC/CENTER

## 2020-09-13 RX ORDER — OXYCODONE HYDROCHLORIDE 5 MG/1
5-10 TABLET ORAL EVERY 4 HOURS PRN
Qty: 30 TABLET | Refills: 0 | Status: SHIPPED | OUTPATIENT
Start: 2020-09-13 | End: 2020-09-15

## 2020-09-13 RX ADMIN — ACETAMINOPHEN 975 MG: 325 TABLET, FILM COATED ORAL at 09:44

## 2020-09-13 RX ADMIN — OMEPRAZOLE 20 MG: 20 CAPSULE, DELAYED RELEASE ORAL at 07:39

## 2020-09-13 RX ADMIN — OXYCODONE HYDROCHLORIDE 10 MG: 5 TABLET ORAL at 03:23

## 2020-09-13 RX ADMIN — OXYCODONE HYDROCHLORIDE 10 MG: 5 TABLET ORAL at 09:44

## 2020-09-13 RX ADMIN — OXYCODONE HYDROCHLORIDE 10 MG: 5 TABLET ORAL at 06:40

## 2020-09-13 RX ADMIN — METHOCARBAMOL 500 MG: 500 TABLET ORAL at 06:41

## 2020-09-13 RX ADMIN — METHOCARBAMOL 500 MG: 500 TABLET ORAL at 12:00

## 2020-09-13 RX ADMIN — DOCUSATE SODIUM AND SENNOSIDES 2 TABLET: 8.6; 5 TABLET, FILM COATED ORAL at 07:38

## 2020-09-13 RX ADMIN — LORATADINE 10 MG: 10 TABLET ORAL at 07:49

## 2020-09-13 RX ADMIN — POLYETHYLENE GLYCOL 3350 17 G: 17 POWDER, FOR SOLUTION ORAL at 07:38

## 2020-09-13 RX ADMIN — OXYCODONE HYDROCHLORIDE 10 MG: 5 TABLET ORAL at 00:14

## 2020-09-13 RX ADMIN — ACETAMINOPHEN 975 MG: 325 TABLET, FILM COATED ORAL at 00:15

## 2020-09-13 RX ADMIN — CITALOPRAM HYDROBROMIDE 40 MG: 40 TABLET ORAL at 07:39

## 2020-09-13 RX ADMIN — CEFAZOLIN 1 G: 1 INJECTION, POWDER, FOR SOLUTION INTRAMUSCULAR; INTRAVENOUS at 03:54

## 2020-09-13 NOTE — PROGRESS NOTES
"Orthopaedic Surgery Progress Note:  09/13/2020     Subjective:   Some pain control issues yesterday evening. Slept well overnight. No new numbness or tingling. Tolerating diet- no nausea or vomiting. Denies CP/SOB, fevers or chills. Voiding independently. +flatus.    Objective:   /67 (BP Location: Left arm)   Pulse 72   Temp 98  F (36.7  C) (Oral)   Resp 16   Ht 1.88 m (6' 2\")   Wt 85.1 kg (187 lb 9.8 oz)   SpO2 98%   BMI 24.09 kg/m    No intake/output data recorded.  Gen: NAD. Resting comfortably in bed  Resp: Breathing comfortably on RA  Drain:   Deep drain (ELSI) output of 45/20/15 last 3 shifts.    Musculoskeletal: dressing c/d/I.  Lower extremities:  Motor Strength Right Left   Hip flexion: L1, L2, L3 5/5 5/5   Hip adduction: L2, L3 5/5 5/5   Knee flexion: S1 5/5 5/5   Knee extension: L3, L4 5/5 5/5   Ankle dosiflexion: L4, L5 5/5 5/5   EHL: L5 5/5 5/5   Ankle plantarflexion: S1 5/5 5/5     Sensation from L1-S2 is preserved.    Labs:  Lab Results   Component Value Date    WBC 8.5 09/10/2020    HGB 13.5 09/12/2020     09/10/2020        Assessment & Plan:   Jas Patel is a 50 year old male now s/p L4-5 TLIF/PSF on 9/10 with Dr. Dexter.     Goals for today:  - discharge home    Ortho Primary  Activity: Up with assist until independent. No excessive bending or twisting. No lifting >10 lbs x 6 weeks. No Brooke lift for transfers.   Weight bearing status: WBAT.  Pain management: PO meds. No NSAIDs.  Antibiotics: discontinue ancef after deep drain removed today  Diet: regular  DVT prophylaxis: SCDs only. No chemical DVT ppx needed.  Imaging: XR upright scoli completed   Labs: labs PRN  Bracing/Splinting: None  Dressings: Keep 4x4s and tegaderm c/d/i x 7 days.  Drains: deep drain removed today. hemovac removed 9/12  Patino catheter: Removed 9/11/20  Physical Therapy/Occupational Therapy: Eval and treat.  Consults: Hospitalist.  Follow-up: Clinic with Dr. Dexter in 6 weeks with repeat x-rays. "   Disposition: discharge home today    Orthopaedics surgery staff for this patient is Dr. Dexter.    ------------------------------------------------------------------------------------------      Matt Mcginnis MD  PGY-4, Orthopaedic Surgery  Pager: [phone]      FOLLOWUP:    Future Appointments   Date Time Provider Department [place]   9/11/2020 10:15 AM Dinesh Luo PT [facility]   9/11/2020  4:30 PM Dinesh Luo PT [facility] [place]

## 2020-09-13 NOTE — PLAN OF CARE
"      VS:   /71 (BP Location: Left arm)   Pulse 81   Temp 99.1  F (37.3  C) (Oral)   Resp 16   Ht 1.88 m (6' 2\")   Wt 85.1 kg (187 lb 9.8 oz)   SpO2 96%   BMI 24.09 kg/m    Room air   Output:   Voiding spontaneously without difficulty, LBM 9/10 +gas   Lungs CTA   Activity:   Up IND    Skin: Intact ex incision and drain   Pain:   Tolerable with discomfort PRN oxy given Q3   Neuro/CMS:   A&Ox3, denies numbness and tingling   Dressing(s):   CDI   Diet:   Regular-tolerating   LDA:   R PIV SL, ELSI 25ml   Equipment:   none   Plan:   Continue to monitor and follow plan of care. Able to make needs known and call light within reach.  Possible discharge today if pain is controlled and drain can be removed.   Additional Info:          "

## 2020-09-13 NOTE — PLAN OF CARE
"      VS:   /67 (BP Location: Left arm)   Pulse 72   Temp 98  F (36.7  C) (Oral)   Resp 16   Ht 1.88 m (6' 2\")   Wt 85.1 kg (187 lb 9.8 oz)   SpO2 98%   BMI 24.09 kg/m      LS clear   Output:   Pt voiding good amounts w/o difficulty, + gas, last BM 9/10, suppository offered, pt refused.    Activity:   Up independently with walker, steady on feet- passed by both PT and OT.   Skin: Intact with exception of posterior incision.    Pain:   Pt reports pain is much more tolerable since switched to Oxycodone, pain managed with Oxycodone, ice packs and scheduled Robaxin.    Neuro/CMS:   A&Ox4, CMS/Neuros intact, pt denies any numbness/tingling.    Dressing(s):   Posterior dressing CDI.    Diet:   Regular diet, pt tolerating well.    LDA:   PIV and ELSI removed this morning.    Equipment:   Walker, PCDs.    Plan:   Discharge home this afternoon. Pt is able to make needs known, call light within reach- continue with POC.    Additional Info:   All discharge paperwork reviewed with patient, all discharge meds given and questions answered. Pt d/adis unit via wheelchair around 1315       "

## 2020-09-13 NOTE — PLAN OF CARE
VSS. A/Ox's 4. Pain rated as tolerable. Oxycodone 10mg  given for pain control. CMS intact. Tolerated regular diet. Denied any nausea, CP, SOB, lightheadedness or dizziness. Voiding without pain or difficulty. Passing flatus. No BM yet, but Senna and Miralax given. Pt can move independently in room but is calling staff for assistance.  Resting in bed at this time with call light in reach and able to make needs known.

## 2020-09-13 NOTE — PLAN OF CARE
Discharge Planner PT   Patient plan for discharge: home with assist  Current status: Pt supine in bed upon arrival, agreeable to PT this AM. Pt demonstrates good recall of all spinal precautions and log roll technique. Pt completes supine to sit transfer from flat bed with log roll technique and supervision. Sat EOB with good tolerance and no reports of dizziness. Completes sit to stand transfer with SBA and no AD. Pt able to navigate room and bathroom safely with no AD and supervision. Pt ambulates safely with no AD and SBA. Improved gait speed noted today, steady throughout with no balance concerns. In PT gym, pt ascended and descended 3 stairs with B handrails, SBA and step to pattern. After ambulation, rates pain in R hip 6/10. Ended session seated EOB per pt request with all needs in reach.   Barriers to return to prior living situation: no PT barriers  Recommendations for discharge: home with assist  Rationale for recommendations: pt is mobilizing appropriately to safely return home with assist from wife       Entered by: China Asencio 09/13/2020 8:21 AM     Physical Therapy Discharge Summary    Reason for therapy discharge:    All goals and outcomes met, no further needs identified.    Progress towards therapy goal(s). See goals on Care Plan in [facility] electronic health record for goal details.  Goals met    Therapy recommendation(s):    No further therapy is recommended.

## 2020-09-13 NOTE — PROGRESS NOTES
[facility]    Medicine Progress Note - Hospitalist Service       Date of Admission:  9/10/2020  Assessment & Plan      Jas Patel is a 50 year old male who was admitted on 9/10/2020.      He is a 50 year old male with Hx of GERD, benign essential tremor, BPH, anxiety and depression. He had prior L4-L5 decompression 1/23/20, s/p revision 3/23/20 and hx C5-C6 herniation. Patient underwent L4-5 lumbar fusion, use of allograft, local autograft, and right iliac crest autograft. EBL was 150 ml.      # SP L4-5 Lumbar fusion :  Post op Mx per primary team  - hemodynamic stable.   -Encourage IS  -Pain mx and DVT prophylaxis, activity, antibiotics, drain/wound care - per orthopedics  -PT/OT- progressing well.     Follow-up hgb for anemia of ABL, transfuse for <7.0  Hgb 13.5       # GERD: Controlled.   Ct PTA Omeprazole 20 mg every day     # Benign essential tremor:   -Ct PTA propranolol 10 mg BID with hold parameters    # BPH: Watch for urine retention.   -Ct PTA Flomax 0.8 mg every day     # Anxiety and depression. Controlled.   -Ct PTA Elavil 25 mg hs and Citalopram 40 mg every day      Disposition: Per primary team.       The patient's care was discussed with the Bedside Nurse and Patient.   Karen Conrad MD  Hospitalist Service  [facility]    ______________________________________________________________________    Interval History   States doing okay  Pain controlled.   Denies fever or chills.   No cough or cp or sob.   No LH or dizziness.   No NV       No other new or acute medical concern      Data reviewed today: I reviewed all medications, new labs and imaging results over the last 24 hours. I personally reviewed no images or EKG's today.    Physical Exam   Vital Signs: Temp: 98  F (36.7  C) Temp src: Oral BP: 102/67 Pulse: 72   Resp: 16 SpO2: 98 % O2 Device: None (Room air)    Weight: 187 lbs 9.78 oz  General  Appearance: Alert. nad  Respiratory: Non labored.   Cardiovascular: s1s2 regular.   GI: soft. NT. ND  Skin: no new rash  Other: Distally wwp. Grossly non focal.     Data   Recent Labs   Lab 09/12/20  0619 09/11/20  0516 09/10/20  0924   WBC  --   --  8.5   HGB 13.5 12.1* 15.1   MCV  --   --  94   PLT  --   --  294   NA  --   --  137   POTASSIUM  --   --  4.0   CHLORIDE  --   --  105   CO2  --   --  28   BUN  --   --  13   CR  --   --  0.94   ANIONGAP  --   --  4   HIWOT  --   --  8.6   *  --  142*     Recent Results (from the past 24 hour(s))   XR Thor/Lumb Standing 2 Views (Scoli)    Narrative    EXAM: XR THORACIC LUMBAR STANDING 2 VW  LOCATION: [facility]  DATE/TIME: 9/12/2020 1:41 PM    INDICATION: Postop.  COMPARISON: Lumbar spine CT scan 8/17/2020.      Impression    IMPRESSION:  Postoperative pedicle screw and interbody fusion at L4-5. Surgical drain in place. Mild DDD change with loss of disc height at the L5-S1 level.    Measurements:     Leftward scoliotic curvature of the thoracic spine as measured superiorly from the T2 vertebral body inferiorly to the T11 vertebral body measures 9.5 degrees. Subtle rightward scoliotic curvature of the lumbar spine as measured from T11 superiorly to   the L3 level inferiorly measures 6.2 degrees.           Medications       acetaminophen  975 mg Oral Q8H     amitriptyline  25 mg Oral At Bedtime     ceFAZolin  1 g Intravenous Q8H     citalopram  40 mg Oral Daily     lidocaine  1 patch Transdermal Q24H     lidocaine   Transdermal Q8H     loratadine  10 mg Oral QAM     menthol   Transdermal Q8H     methocarbamol  500 mg Oral 4x Daily     omeprazole  20 mg Oral QAM     polyethylene glycol  17 g Oral BID     propranolol  10 mg Oral BID     senna-docusate  1 tablet Oral BID    Or     senna-docusate  2 tablet Oral BID     sodium chloride (PF)  3 mL Intracatheter Q8H     tamsulosin  0.8 mg Oral Daily at 8 pm

## 2020-09-15 DIAGNOSIS — M48.062 SPINAL STENOSIS OF LUMBAR REGION WITH NEUROGENIC CLAUDICATION: ICD-10-CM

## 2020-09-15 RX ORDER — OXYCODONE HYDROCHLORIDE 5 MG/1
5-10 TABLET ORAL EVERY 4 HOURS PRN
Qty: 80 TABLET | Refills: 0 | Status: SHIPPED | OUTPATIENT
Start: 2020-09-15 | End: 2020-09-22

## 2020-09-15 NOTE — PROGRESS NOTES
Patient was called three times and no answer so post 24 hr DC follow up calls will be closed out, message was left with contact number for department seen by or following up     Please call or return if you experience the following:  - Fevers (temperature greater than 100.4 degrees Fahrenheit)  - Pain that is getting worse or does not respond to pain medications  - Drainage from your wound  - Increasing redness around the wound  - Any other worrisome symptoms     You may reach the clinic by dialing [phone]. After hours, you may reach the resident on call by dialing [phone].

## 2020-09-22 ENCOUNTER — MYC REFILL (OUTPATIENT)
Dept: ORTHOPEDICS | Facility: CLINIC | Age: 50
End: 2020-09-22

## 2020-09-22 ENCOUNTER — MYC REFILL (OUTPATIENT)
Dept: OTHER | Age: 50
End: 2020-09-22

## 2020-09-22 DIAGNOSIS — M48.062 SPINAL STENOSIS OF LUMBAR REGION WITH NEUROGENIC CLAUDICATION: ICD-10-CM

## 2020-09-22 DIAGNOSIS — M51.26 RECURRENT HERNIATION OF LUMBAR DISC: ICD-10-CM

## 2020-09-22 RX ORDER — METHOCARBAMOL 500 MG/1
500 TABLET, FILM COATED ORAL 4 TIMES DAILY
Qty: 30 TABLET | Refills: 0 | Status: SHIPPED | OUTPATIENT
Start: 2020-09-22 | End: 2020-09-24

## 2020-09-22 RX ORDER — HYDROXYZINE HYDROCHLORIDE 25 MG/1
25 TABLET, FILM COATED ORAL EVERY 6 HOURS PRN
Qty: 20 TABLET | Refills: 0 | Status: SHIPPED | OUTPATIENT
Start: 2020-09-22 | End: 2020-09-24

## 2020-09-22 RX ORDER — OXYCODONE HYDROCHLORIDE 5 MG/1
5-10 TABLET ORAL EVERY 4 HOURS PRN
Qty: 80 TABLET | Refills: 0 | Status: SHIPPED | OUTPATIENT
Start: 2020-09-22 | End: 2020-09-30

## 2020-09-23 ENCOUNTER — TELEPHONE (OUTPATIENT)
Dept: FAMILY MEDICINE | Facility: CLINIC | Age: 50
End: 2020-09-23

## 2020-09-23 NOTE — TELEPHONE ENCOUNTER
Patient says he was told 2 rx's where sent 9- to [facility] for Hydroxyzine and Robaxin. Looks like they where done as local print. Can someone please resend electronic to us. Thanks so much    Tyson Warren  Pharmacy [employer]  On Behalf of [facility]

## 2020-09-24 DIAGNOSIS — M51.26 RECURRENT HERNIATION OF LUMBAR DISC: ICD-10-CM

## 2020-09-24 RX ORDER — HYDROXYZINE HYDROCHLORIDE 25 MG/1
25 TABLET, FILM COATED ORAL EVERY 6 HOURS PRN
Qty: 20 TABLET | Refills: 0 | Status: SHIPPED | OUTPATIENT
Start: 2020-09-24 | End: 2021-06-11

## 2020-09-24 RX ORDER — METHOCARBAMOL 500 MG/1
500 TABLET, FILM COATED ORAL 4 TIMES DAILY
Qty: 30 TABLET | Refills: 0 | Status: SHIPPED | OUTPATIENT
Start: 2020-09-24 | End: 2020-10-09

## 2020-09-30 ENCOUNTER — MYC REFILL (OUTPATIENT)
Dept: ORTHOPEDICS | Facility: CLINIC | Age: 50
End: 2020-09-30

## 2020-09-30 DIAGNOSIS — M48.062 SPINAL STENOSIS OF LUMBAR REGION WITH NEUROGENIC CLAUDICATION: ICD-10-CM

## 2020-09-30 RX ORDER — OXYCODONE HYDROCHLORIDE 5 MG/1
5-10 TABLET ORAL EVERY 4 HOURS PRN
Qty: 80 TABLET | Refills: 0 | Status: SHIPPED | OUTPATIENT
Start: 2020-09-30 | End: 2020-10-09

## 2020-10-09 ENCOUNTER — MYC REFILL (OUTPATIENT)
Dept: ORTHOPEDICS | Facility: CLINIC | Age: 50
End: 2020-10-09

## 2020-10-09 DIAGNOSIS — M48.062 SPINAL STENOSIS OF LUMBAR REGION WITH NEUROGENIC CLAUDICATION: ICD-10-CM

## 2020-10-09 DIAGNOSIS — M51.26 RECURRENT HERNIATION OF LUMBAR DISC: ICD-10-CM

## 2020-10-09 RX ORDER — METHOCARBAMOL 500 MG/1
500 TABLET, FILM COATED ORAL 4 TIMES DAILY
Qty: 30 TABLET | Refills: 0 | Status: SHIPPED | OUTPATIENT
Start: 2020-10-09 | End: 2020-11-19

## 2020-10-09 RX ORDER — OXYCODONE HYDROCHLORIDE 5 MG/1
5-10 TABLET ORAL EVERY 4 HOURS PRN
Qty: 80 TABLET | Refills: 0 | Status: SHIPPED | OUTPATIENT
Start: 2020-10-09 | End: 2020-10-20

## 2020-10-09 RX ORDER — OXYCODONE HYDROCHLORIDE 5 MG/1
TABLET ORAL
Qty: 80 TABLET | Refills: 0 | OUTPATIENT
Start: 2020-10-09

## 2020-10-19 DIAGNOSIS — M48.062 SPINAL STENOSIS OF LUMBAR REGION WITH NEUROGENIC CLAUDICATION: Primary | ICD-10-CM

## 2020-10-20 ENCOUNTER — MYC REFILL (OUTPATIENT)
Dept: ORTHOPEDICS | Facility: CLINIC | Age: 50
End: 2020-10-20

## 2020-10-20 DIAGNOSIS — M48.062 SPINAL STENOSIS OF LUMBAR REGION WITH NEUROGENIC CLAUDICATION: ICD-10-CM

## 2020-10-20 RX ORDER — OXYCODONE HYDROCHLORIDE 5 MG/1
5-10 TABLET ORAL EVERY 4 HOURS PRN
Qty: 80 TABLET | Refills: 0 | Status: SHIPPED | OUTPATIENT
Start: 2020-10-20 | End: 2020-12-16

## 2020-10-26 ENCOUNTER — ANCILLARY PROCEDURE (OUTPATIENT)
Dept: GENERAL RADIOLOGY | Facility: CLINIC | Age: 50
End: 2020-10-26
Attending: ORTHOPAEDIC SURGERY

## 2020-10-26 PROCEDURE — 72100 X-RAY EXAM L-S SPINE 2/3 VWS: CPT | Performed by: RADIOLOGY

## 2020-10-27 ENCOUNTER — VIRTUAL VISIT (OUTPATIENT)
Dept: ORTHOPEDICS | Facility: CLINIC | Age: 50
End: 2020-10-27

## 2020-10-27 DIAGNOSIS — G89.18 POST-OPERATIVE PAIN: ICD-10-CM

## 2020-10-27 DIAGNOSIS — M48.062 SPINAL STENOSIS OF LUMBAR REGION WITH NEUROGENIC CLAUDICATION: ICD-10-CM

## 2020-10-27 DIAGNOSIS — M51.26 RECURRENT HERNIATION OF LUMBAR DISC: Primary | ICD-10-CM

## 2020-10-27 PROCEDURE — 99024 POSTOP FOLLOW-UP VISIT: CPT | Mod: 95 | Performed by: ORTHOPAEDIC SURGERY

## 2020-10-27 RX ORDER — TRAMADOL HYDROCHLORIDE 50 MG/1
50 TABLET ORAL EVERY 6 HOURS PRN
Qty: 40 TABLET | Refills: 0 | Status: SHIPPED | OUTPATIENT
Start: 2020-10-27 | End: 2020-11-09

## 2020-10-27 NOTE — NURSING NOTE
Reason For Visit:   Chief Complaint   Patient presents with     RECHECK     6 week follow up L4-5 fusion DOS 9/10/20       There were no vitals taken for this visit.         Deejay Pierce, ATC

## 2020-10-27 NOTE — LETTER
"    10/27/2020         RE: Jas Patel  [address]  [place] [address]        Dear Colleague,    Thank you for referring your patient, Jas Patel, to the [facility] [place]. Please see a copy of my visit note below.    Jas Patel is a 50 year old male who is being evaluated via a billable telephone visit.      The patient has been notified of following:     \"This telephone visit will be conducted via a call between you and your physician/provider. We have found that certain health care needs can be provided without the need for a physical exam.  This service lets us provide the care you need with a short phone conversation.  If a prescription is necessary we can send it directly to your pharmacy.  If lab work is needed we can place an order for that and you can then stop by our lab to have the test done at a later time.    Telephone visits are billed at different rates depending on your insurance coverage. During this emergency period, for some insurers they may be billed the same as an in-person visit.  Please reach out to your insurance provider with any questions.    If during the course of the call the physician/provider feels a telephone visit is not appropriate, you will not be charged for this service.\"    Patient has given verbal consent for Telephone visit?  Yes    What phone number would you like to be contacted at? Home    How would you like to obtain your AVS? [facility]    Phone call duration: 9 minutes    Juvenal Dexter MD    Patient is 6 weeks s/p L4-5 TLIF for recurrent disc herniation.      He is having some right buttock pain, but is weaning down on his narcotics.  He says his current buttock and leg pain is much less than before surgery.  He is pleased with this.      Radiographs today show well positioned implants.      We discussed transitioning to tramadol for pain and he agreed with this plan.    I recommended he start low impact cardio " for endurance and rehab.      I would like him to repeat radiographs in 3 months.        Again, thank you for allowing me to participate in the care of your patient.        Sincerely,        Juvenal Dexter MD

## 2020-10-27 NOTE — TELEPHONE ENCOUNTER
Per Dr. Dexter's request, RN placed tramadol script and sent to Gosia MANTILLA to sign and authorize.  RN notified patient about this.    Murray Parker RN

## 2020-10-27 NOTE — PROGRESS NOTES
"Jas Patel is a 50 year old male who is being evaluated via a billable telephone visit.      The patient has been notified of following:     \"This telephone visit will be conducted via a call between you and your physician/provider. We have found that certain health care needs can be provided without the need for a physical exam.  This service lets us provide the care you need with a short phone conversation.  If a prescription is necessary we can send it directly to your pharmacy.  If lab work is needed we can place an order for that and you can then stop by our lab to have the test done at a later time.    Telephone visits are billed at different rates depending on your insurance coverage. During this emergency period, for some insurers they may be billed the same as an in-person visit.  Please reach out to your insurance provider with any questions.    If during the course of the call the physician/provider feels a telephone visit is not appropriate, you will not be charged for this service.\"    Patient has given verbal consent for Telephone visit?  Yes    What phone number would you like to be contacted at? Home    How would you like to obtain your AVS? [place]    Phone call duration: 9 minutes    Juvenal Dexter MD    Patient is 6 weeks s/p L4-5 TLIF for recurrent disc herniation.      He is having some right buttock pain, but is weaning down on his narcotics.  He says his current buttock and leg pain is much less than before surgery.  He is pleased with this.      Radiographs today show well positioned implants.      We discussed transitioning to tramadol for pain and he agreed with this plan.    I recommended he start low impact cardio for endurance and rehab.      I would like him to repeat radiographs in 3 months.    "

## 2020-11-09 ENCOUNTER — MYC REFILL (OUTPATIENT)
Dept: ORTHOPEDICS | Facility: CLINIC | Age: 50
End: 2020-11-09

## 2020-11-09 DIAGNOSIS — G89.18 POST-OPERATIVE PAIN: ICD-10-CM

## 2020-11-09 DIAGNOSIS — M48.062 SPINAL STENOSIS OF LUMBAR REGION WITH NEUROGENIC CLAUDICATION: ICD-10-CM

## 2020-11-09 DIAGNOSIS — M51.26 RECURRENT HERNIATION OF LUMBAR DISC: ICD-10-CM

## 2020-11-09 RX ORDER — TRAMADOL HYDROCHLORIDE 50 MG/1
50 TABLET ORAL EVERY 6 HOURS PRN
Qty: 40 TABLET | Refills: 0 | Status: SHIPPED | OUTPATIENT
Start: 2020-11-09 | End: 2020-11-19

## 2020-11-11 NOTE — OR NURSING
"Arrived PACU per bed.  Skin warm and diaphoretic.  Moves all fours and states \"having pain in back that is not tolerable.  Back dressing and drains checked and ice pack placed.  Medicated for pain.  Breath sounds clear to bases heard anterior chest.  " Pain is doing ok at this time.  Will continue current care approach.

## 2020-11-17 DIAGNOSIS — G25.0 BENIGN ESSENTIAL TREMOR: ICD-10-CM

## 2020-11-17 DIAGNOSIS — F41.1 GENERALIZED ANXIETY DISORDER: ICD-10-CM

## 2020-11-19 RX ORDER — PROPRANOLOL HYDROCHLORIDE 10 MG/1
TABLET ORAL
Qty: 180 TABLET | Refills: 1 | Status: SHIPPED | OUTPATIENT
Start: 2020-11-19 | End: 2021-06-11

## 2020-11-27 DIAGNOSIS — R35.0 BENIGN PROSTATIC HYPERPLASIA WITH URINARY FREQUENCY: ICD-10-CM

## 2020-11-27 DIAGNOSIS — N40.1 BENIGN PROSTATIC HYPERPLASIA WITH URINARY FREQUENCY: ICD-10-CM

## 2020-11-27 RX ORDER — TAMSULOSIN HYDROCHLORIDE 0.4 MG/1
CAPSULE ORAL
Qty: 180 CAPSULE | Refills: 0 | Status: SHIPPED | OUTPATIENT
Start: 2020-11-27 | End: 2021-03-03

## 2020-12-14 ENCOUNTER — MYC MEDICAL ADVICE (OUTPATIENT)
Dept: FAMILY MEDICINE | Facility: CLINIC | Age: 50
End: 2020-12-14

## 2020-12-15 NOTE — PROGRESS NOTES
"Jas Patel is a 50 year old male who is being evaluated via a billable video visit.      The patient has been notified of following:     \"This video visit will be conducted via a call between you and your physician/provider. We have found that certain health care needs can be provided without the need for an in-person physical exam.  This service lets us provide the care you need with a video conversation.  If a prescription is necessary we can send it directly to your pharmacy.  If lab work is needed we can place an order for that and you can then stop by our lab to have the test done at a later time.    Video visits are billed at different rates depending on your insurance coverage.  Please reach out to your insurance provider with any questions.    If during the course of the call the physician/provider feels a video visit is not appropriate, you will not be charged for this service.\"    Patient has given verbal consent for Video visit? Yes  How would you like to obtain your AVS? MyChart  If you are dropped from the video visit, the video invite should be resent to: Text to cell phone: [phone]  Will anyone else be joining your video visit? No      Subjective     Jas Patel is a 50 year old male who presents today via video visit for the following health issues:    HPI     Chronic Pain Follow-Up    Where in your body do you have pain? Neck pain and lower back pain  How has your pain affected your ability to work? Unable to work  Which of these pain treatments have you tried since your last clinic visit? none  How well are you sleeping? Fair  How has your mood been since your last visit? About the same  Have you had a significant life event? Health Concerns      PHQ-9 SCORE 8/30/2019 3/19/2020 6/19/2020   PHQ-9 Total Score - - -   PHQ-9 Total Score MyChart 2 (Minimal depression) - -   PHQ-9 Total Score 2 2 2     DONTE-7 SCORE 3/5/2019 8/30/2019 6/19/2020   Total Score - - -   Total Score - 3 (minimal " anxiety) -   Total Score 8 3 0     No flowsheet data found.  Encounter-Level CSA - 03/28/2018:    Controlled Substance Agreement - Scan on 4/12/2018  2:57 PM: CONTROLLED SUBSTANCE AGREEMENT     Encounter-Level CSA - 10/28/2015:    Controlled Substance Agreement - Scan on 10/29/2015 11:24 AM: CONTROLLED SUBSTANCE AGREEMENT     Patient-Level CSA:    Controlled Substance Agreement - Opioid - Scan on 9/3/2019  6:46 AM         How many servings of fruits and vegetables do you eat daily?  2-3    On average, how many sweetened beverages do you drink each day (Examples: soda, juice, sweet tea, etc.  Do NOT count diet or artificially sweetened beverages)?   3    How many days per week do you exercise enough to make your heart beat faster? 4    How many minutes a day do you exercise enough to make your heart beat faster? 30 - 60    How many days per week do you miss taking your medication? 0      Interval history:  S/p lumbar L4-L5 fusion 9/2020.  has had 2 previous lumbar decompression and discectomy surgeries (3/2020 and 1/2020), each of which provided him significant relief for a number of weeks.  Each time returning to work, he seemed to have a recurrence of symptoms. He has had a number of MRIs showing recurrent disc herniations over time.    In his postop course he has been followed by ortho who has managed his pain meds.  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  Ready to transfer pain management back to myself.  previously on norco #28/4 weeks and tramadol #56/4 weeks.    Update today:   Back is okay, no longer having radiculopathy or leg pain.  Still has a lot of muscle soreness and stiffness.  He is using tread climber again 30min/day 4 times a week.  Massage helps.  Not back at work.  Seeing ortho spine end of jan, will determine ability to return to work again.  Was light duty June and July.    Has not been taking ibuprofen since surgery.  No longer  using oxycodone.  Has just been on the tramdol 50mg twice a day for the last month, weaned down from q6h 2 weeks ago.  Neck has been an issue for him this last month.  Just took last tramadol this morning.      Figuring out insurance coverage with not working.      Chronic pain history:  Chronic neck pain related to work injury as above.  previously on regimen of 800mg ibuprofen TID, tramadol 50mg BID, and norco 5-325mg usually about 1/d. Has done PT in the past, still doing exercise at home which are helpful. Also seeing chiropractor. Has been told he is not a surgical candidate. Working currently in day surgery pre/postop as a nurse.  Had trigger point injections and BOY injection which helped with numbness/tingling.  Last MRI done 2011 [facility].    Video Start Time: 10:15am        Review of Systems   Constitutional, HEENT, cardiovascular, pulmonary, gi and gu systems are negative, except as otherwise noted.      Objective           Vitals:  No vitals were obtained today due to virtual visit.    Physical Exam     GENERAL: Healthy, alert and no distress  EYES: Eyes grossly normal to inspection.  No discharge or erythema, or obvious scleral/conjunctival abnormalities.  RESP: No audible wheeze, cough, or visible cyanosis.  No visible retractions or increased work of breathing.    SKIN: Visible skin clear. No significant rash, abnormal pigmentation or lesions.  NEURO: Cranial nerves grossly intact.  Mentation and speech appropriate for age.  PSYCH: Mentation appears normal, affect normal/bright, judgement and insight intact, normal speech and appearance well-groomed.              Assessment & Plan     (G89.4) Chronic pain syndrome  (primary encounter diagnosis)  (M54.2,  G89.29) Chronic neck pain  Comment: recent lumbar fusion with postop pain meds managed by ortho, now returning to me for management of chronic neck pain, previously on ibuprofen, tramadol, and norco as above   Plan: HYDROcodone-acetaminophen (NORCO)  5-325 MG         tablet, DISCONTINUED: HYDROcodone-acetaminophen        (NORCO) 5-325 MG tablet        We did have a discussion to reevaluate chronic opioid use.  He notes his neck pain has been worse with not being on the norco the past month.  He denies side effects of the tramadol and norco and does not feel it is impacting his mood or ability to function in his roles in life.  He wishes to continue the prior regimen of tramadol 50mg twice a day and norco daily as needed for pain.  He has been engaged in alternative therapies including physical therapy and continues home exercises.  Joint decision to continue chronic opioid therapy.  I have scheduled a staff message to myself for feb refill, follow up with codey in 3 months.            See Patient Instructions    No follow-ups on file.    RAFAEL Fournier CNP  [facility]      Video-Visit Details    Type of service:  Video Visit    Video End Time:10:30 AM    Originating Location (pt. Location): Home    Distant Location (provider location):  [facility]     Platform used for Video Visit: Sridevi

## 2020-12-16 ENCOUNTER — VIRTUAL VISIT (OUTPATIENT)
Dept: FAMILY MEDICINE | Facility: CLINIC | Age: 50
End: 2020-12-16
Payer: OTHER MISCELLANEOUS

## 2020-12-16 DIAGNOSIS — G89.29 CHRONIC NECK PAIN: ICD-10-CM

## 2020-12-16 DIAGNOSIS — G89.4 CHRONIC PAIN SYNDROME: Primary | ICD-10-CM

## 2020-12-16 DIAGNOSIS — M54.2 CHRONIC NECK PAIN: ICD-10-CM

## 2020-12-16 DIAGNOSIS — G47.00 INSOMNIA, UNSPECIFIED TYPE: ICD-10-CM

## 2020-12-16 PROCEDURE — 99213 OFFICE O/P EST LOW 20 MIN: CPT | Mod: 95 | Performed by: NURSE PRACTITIONER

## 2020-12-16 RX ORDER — TRAMADOL HYDROCHLORIDE 50 MG/1
TABLET ORAL
Qty: 28 TABLET | Refills: 0 | Status: SHIPPED | OUTPATIENT
Start: 2020-12-16 | End: 2020-12-16

## 2020-12-16 RX ORDER — HYDROCODONE BITARTRATE AND ACETAMINOPHEN 5; 325 MG/1; MG/1
1 TABLET ORAL DAILY PRN
Qty: 28 TABLET | Refills: 0 | Status: SHIPPED | OUTPATIENT
Start: 2020-12-16 | End: 2020-12-16

## 2020-12-16 RX ORDER — TRAMADOL HYDROCHLORIDE 50 MG/1
TABLET ORAL
Qty: 28 TABLET | Refills: 0 | Status: SHIPPED | OUTPATIENT
Start: 2021-01-13 | End: 2020-12-30

## 2020-12-16 RX ORDER — HYDROCODONE BITARTRATE AND ACETAMINOPHEN 5; 325 MG/1; MG/1
1 TABLET ORAL DAILY PRN
Qty: 28 TABLET | Refills: 0 | Status: SHIPPED | OUTPATIENT
Start: 2021-01-13 | End: 2021-01-15

## 2020-12-30 ENCOUNTER — MYC MEDICAL ADVICE (OUTPATIENT)
Dept: FAMILY MEDICINE | Facility: CLINIC | Age: 50
End: 2020-12-30

## 2020-12-30 DIAGNOSIS — G89.4 CHRONIC PAIN SYNDROME: ICD-10-CM

## 2020-12-30 DIAGNOSIS — G89.29 CHRONIC NECK PAIN: ICD-10-CM

## 2020-12-30 DIAGNOSIS — M54.2 CHRONIC NECK PAIN: ICD-10-CM

## 2020-12-30 RX ORDER — TRAMADOL HYDROCHLORIDE 50 MG/1
TABLET ORAL
Qty: 28 TABLET | Refills: 0 | Status: SHIPPED | OUTPATIENT
Start: 2020-12-30 | End: 2021-03-12

## 2020-12-30 RX ORDER — TRAMADOL HYDROCHLORIDE 50 MG/1
TABLET ORAL
Qty: 56 TABLET | Refills: 0 | Status: SHIPPED | OUTPATIENT
Start: 2021-01-13 | End: 2021-01-15

## 2020-12-30 NOTE — TELEPHONE ENCOUNTER
I have updated Rxs and responded to pt.    Triage can you please communicate these changes to [facility] pharmacy?  Tramadol 50mg #28 to fill today, quantity is supposed to be #56/mo and I sent #28 pills in 12/16.    I sent in new replacement Rx for tramadol 50mg tabs #56 pills to be filled 1/13/21.  This should replace prior Rx for #28 pills that was sent to be filled 1/13/21.    Thanks.    Lola Perez, CNP

## 2020-12-30 NOTE — TELEPHONE ENCOUNTER
Lola-please review and advise/sign if agree.  New RX pended.    Thank you!  KATHY PetersenN, RN  [facility]

## 2021-01-14 DIAGNOSIS — M54.2 CHRONIC NECK PAIN: ICD-10-CM

## 2021-01-14 DIAGNOSIS — G89.29 CHRONIC NECK PAIN: ICD-10-CM

## 2021-01-14 DIAGNOSIS — G89.4 CHRONIC PAIN SYNDROME: ICD-10-CM

## 2021-01-15 ENCOUNTER — HEALTH MAINTENANCE LETTER (OUTPATIENT)
Age: 51
End: 2021-01-15

## 2021-01-15 RX ORDER — HYDROCODONE BITARTRATE AND ACETAMINOPHEN 5; 325 MG/1; MG/1
TABLET ORAL
Qty: 28 TABLET | Refills: 0 | Status: SHIPPED | OUTPATIENT
Start: 2021-02-10 | End: 2021-03-12

## 2021-01-15 RX ORDER — TRAMADOL HYDROCHLORIDE 50 MG/1
TABLET ORAL
Qty: 56 TABLET | Refills: 0 | Status: SHIPPED | OUTPATIENT
Start: 2021-02-10 | End: 2021-03-12

## 2021-01-15 RX ORDER — TRAMADOL HYDROCHLORIDE 50 MG/1
TABLET ORAL
Qty: 28 TABLET | Refills: 0 | OUTPATIENT
Start: 2021-01-15

## 2021-01-15 NOTE — TELEPHONE ENCOUNTER
Tramadol    Last Written Prescription Date:  12/30/20  Last Fill Quantity: 28,   # refills: 0  Last Office Visit: 12/16/20  Future Office visit:       Routing refill request to provider for review/approval because:  Drug not on the [facility], [facility] or [facility] refill protocol or controlled substance    Norco  Last Written Prescription Date:  12/16/20  Last Fill Quantity: 28,   # refills: 0  Last Office Visit: 12/16/20  Future Office visit:       Routing refill request to provider for review/approval because:  Drug not on the [facility], [facility] or [facility] refill protocol or controlled substance

## 2021-01-18 ENCOUNTER — TELEPHONE (OUTPATIENT)
Dept: PHARMACY | Facility: OTHER | Age: 51
End: 2021-01-18

## 2021-01-18 NOTE — TELEPHONE ENCOUNTER
Prior Authorization Approval    Authorization Effective Date: 12/19/2020  Authorization Expiration Date: 1/18/2022  Medication: traMADol (ULTRAM) 50 MG tablet  Approved Dose/Quantity: 56  Reference #: [id number]   Insurance Company: [employer] - Phone [phone] Fax [phone]  Which Pharmacy is filling the prescription (Not needed for infusion/clinic administered): [facility] - [place], [place] - [facility] [address]  Pharmacy Notified: Yes- pt already picked these up  Patient Notified: Yes

## 2021-01-18 NOTE — TELEPHONE ENCOUNTER
PA Initiation    Medication: traMADol (ULTRAM) 50 MG tablet  Insurance Company: [employer] - Phone [phone] Fax [phone]  Pharmacy Filling the Rx: [facility] - [place], [place] - [facility] [address]  Filling Pharmacy Phone: [phone]  Filling Pharmacy Fax:    Start Date: 1/18/2021    Central Prior Authorization Team   Phone: [phone]

## 2021-02-01 ENCOUNTER — OFFICE VISIT (OUTPATIENT)
Dept: ORTHOPEDICS | Facility: CLINIC | Age: 51
End: 2021-02-01
Payer: COMMERCIAL

## 2021-02-01 ENCOUNTER — ANCILLARY PROCEDURE (OUTPATIENT)
Dept: GENERAL RADIOLOGY | Facility: CLINIC | Age: 51
End: 2021-02-01
Attending: ORTHOPAEDIC SURGERY
Payer: COMMERCIAL

## 2021-02-01 VITALS — BODY MASS INDEX: 24.26 KG/M2 | WEIGHT: 189 LBS | HEIGHT: 74 IN

## 2021-02-01 DIAGNOSIS — M51.26 RECURRENT HERNIATION OF LUMBAR DISC: Primary | ICD-10-CM

## 2021-02-01 DIAGNOSIS — M48.062 SPINAL STENOSIS OF LUMBAR REGION WITH NEUROGENIC CLAUDICATION: Primary | ICD-10-CM

## 2021-02-01 PROCEDURE — 99213 OFFICE O/P EST LOW 20 MIN: CPT | Performed by: ORTHOPAEDIC SURGERY

## 2021-02-01 PROCEDURE — 72100 X-RAY EXAM L-S SPINE 2/3 VWS: CPT | Performed by: RADIOLOGY

## 2021-02-01 RX ORDER — TIZANIDINE HYDROCHLORIDE 4 MG/1
4 CAPSULE, GELATIN COATED ORAL 3 TIMES DAILY
Qty: 30 CAPSULE | Refills: 0 | Status: SHIPPED | OUTPATIENT
Start: 2021-02-01 | End: 2021-02-11

## 2021-02-01 ASSESSMENT — ENCOUNTER SYMPTOMS
DEPRESSION: 1
MUSCLE CRAMPS: 1
NECK PAIN: 1
PANIC: 0
MYALGIAS: 1
MUSCLE WEAKNESS: 1
NERVOUS/ANXIOUS: 1
DECREASED CONCENTRATION: 0
INSOMNIA: 1
JOINT SWELLING: 0
BACK PAIN: 1
STIFFNESS: 1
ARTHRALGIAS: 0

## 2021-02-01 ASSESSMENT — PATIENT HEALTH QUESTIONNAIRE - PHQ9
SUM OF ALL RESPONSES TO PHQ QUESTIONS 1-9: 4
SUM OF ALL RESPONSES TO PHQ QUESTIONS 1-9: 4

## 2021-02-01 ASSESSMENT — MIFFLIN-ST. JEOR: SCORE: 1777.05

## 2021-02-01 NOTE — PROGRESS NOTES
Spine Surgery Return Clinic Visit      Chief Complaint:   RECHECK (follow up L4-5 decompression and discectomy DOS 1/23/20)      Interval HPI:  Symptom Profile Including: location of symptoms, onset, severity, exacerbating/alleviating factors, previous treatments:        Jas Patel is a 50 year old male who returns today about 3-1/2 months status post L4-5 interbody fusion.  He says his back is feeling pretty good overall, but he did have a fall recently on his left side he feels like he pulled a muscle.  Is kind of spasming and aching there.  This was a couple of weeks ago.  His leg symptoms are resolved.  He is interested in returning to work in March.            Past Medical History:     Past Medical History:   Diagnosis Date     Benign essential tremor 11/2/2016     BPH (benign prostatic hyperplasia) 10/28/2015     BPH (benign prostatic hyperplasia)      Gastroesophageal reflux disease without esophagitis 10/28/2015     Generalized anxiety disorder 10/26/2011     GERD (gastroesophageal reflux disease)      Herniated disc     in neck     Lumbar radiculopathy 12/20/2019     Moderate major depression (H) 11/22/2010            Past Surgical History:     Past Surgical History:   Procedure Laterality Date     DECOMPRESSION LUMBAR ONE LEVEL N/A 1/23/2020    Procedure: Lumbar 4 to 5 Decompression and Discectomy;  Surgeon: Juvenal Dexter MD;  [facility]     DECOMPRESSION LUMBAR ONE LEVEL N/A 3/23/2020    Procedure: Revision Lumbar 4 to 5 decompression;  Surgeon: Juvenal Dexter MD;  [facility]     DRAIN SKIN ABSCESS COMPLIC      left thigh I and D x 4 in the 1990's     GRAFT BONE FROM ILIAC CREST Right 9/10/2020    Procedure: and right iliac crest autograft;  Surgeon: Juvenal Dexter MD;  [facility]     OPTICAL TRACKING SYSTEM FUSION POSTERIOR SPINE LUMBAR N/A 9/10/2020    Procedure: Lumbar 4 to 5 instrumented posterior interbody fusion and mata tabor osteotomy  with O-Arm/Stealth Navigation, use of allograft, local autograft,;  Surgeon: Juvenal Dexter MD;  [facility]     TONSILLECTOMY              Social History:     Social History     Tobacco Use     Smoking status: Former Smoker     Packs/day: 0.20     Years: 10.00     Pack years: 2.00     Types: Cigarettes     Start date:      Quit date:      Years since quittin.0     Smokeless tobacco: Never Used   Substance Use Topics     Alcohol use: Yes     Alcohol/week: 0.0 - 7.0 standard drinks            Family History:     Family History   Problem Relation Age of Onset     No Known Problems Mother      Hypertension Father      Valvular heart disease Father         aortic valve replacement     C.A.D. Maternal Grandfather      Hypertension Maternal Grandfather      No Known Problems Brother      No Known Problems Sister      Other - See Comments Sister         adopted     Cerebrovascular Disease Other         great uncle     Diabetes Paternal Uncle         type 2     Diabetes Other         great aunt     Prostate Cancer Other         great uncle moms side     Alzheimer Disease Other         great uncle moms side     Anesthesia Reaction No family hx of      Deep Vein Thrombosis (DVT) No family hx of             Allergies:     Allergies   Allergen Reactions     No Known Drug Allergies             Medications:     Current Outpatient Medications   Medication     acetaminophen (TYLENOL) 325 MG tablet     amitriptyline (ELAVIL) 25 MG tablet     Cholecalciferol (VITAMIN D3 PO)     citalopram (CELEXA) 40 MG tablet     HEMP OIL OR EXTRACT OR OTHER CBD CANNABINOID, NOT MEDICAL CANNABIS,     [START ON 2/10/2021] HYDROcodone-acetaminophen (NORCO) 5-325 MG tablet     hydrOXYzine (ATARAX) 25 MG tablet     loratadine (CLARITIN) 10 MG tablet     methocarbamol (ROBAXIN) 500 MG tablet     multivitamin w/minerals (THERA-VIT-M) tablet     omeprazole (PRILOSEC) 20 MG DR capsule     polyethylene glycol (MIRALAX) 17 g  "packet     propranolol (INDERAL) 10 MG tablet     propranolol (INDERAL) 10 MG tablet     senna-docusate (SENOKOT-S/PERICOLACE) 8.6-50 MG tablet     tamsulosin (FLOMAX) 0.4 MG capsule     [START ON 2/10/2021] traMADol (ULTRAM) 50 MG tablet     traMADol (ULTRAM) 50 MG tablet     No current facility-administered medications for this visit.              Review of Systems:   A focused musculoskeletal and neurologic ROS was performed with pertinent positives and negatives noted in the HPI.  Additional systems were also reviewed and are documented at the bottom of the note.         Physical Exam:   Vitals: Ht 1.88 m (6' 2\")   Wt 85.7 kg (189 lb)   BMI 24.27 kg/m    Musculoskeletal, Neurologic, and Spine:            Lumbar Spine:    Appearance - No gross stepoffs or deformities    Motor -     L2-3: Hip flexion 5/5 R and 5/5 L strength          L3/4:  Knee extension R 5/5 and L 5/5 strength         L4/5:  Foot dorsiflexion R 5/5 L 5/5 and       EHL dorsiflexion R 5/5 L 5/5 strength         S1:  Plantarflexion/Peroneal Muscles  R 5/5 and L 5/5 strength    Sensation: intact to light touch L3-S1 distribution BLE      Neurologic:      REFLEXES Left Right                  Patella 1+ 1+   Ankle jerk 1+ 1+   Babinski No upgoing great toe No upgoing great toe   Clonus 0 beats 0 beats     Hip Exam:  No pain with hip log roll and no tenderness over the greater trochanters.    Alignment:  Patient stands with a neutral standing sagittal balance.           Imaging:   We ordered and independently reviewed new radiographs at this clinic visit. The results were discussed with the patient. Findings include:     AP and lateral radiographs today show well-positioned implants with no evidence of interval complication       Assessment and Plan:     50 year old male with L4-5 interbody fusion.  He did have a fall a few weeks ago and is dealing with some left-sided back pain.  Hopeful this is just a muscle strain and I reassured him I do not see " any major implant displacement on his recent radiographs.  We are going to try a muscle relaxant.    I did also offer a referral to physical therapy, but he is between insurances right now and is worried there would be a high cost so we agreed to hold off on this.  If his insurance situation changes or if he would like to try therapy he will call and let us know and I would be happy to order this for him.  From an imaging standpoint I would like to see him again in 6 months with a lumbar CT scan at the time of that visit.    Respectfully,  Juvenal Dexter MD  Spine Surgery  [facility]    Answers for HPI/ROS submitted by the patient on 2/1/2021   General Symptoms: No  Skin Symptoms: No  HENT Symptoms: No  EYE SYMPTOMS: No  HEART SYMPTOMS: No  LUNG SYMPTOMS: No  INTESTINAL SYMPTOMS: No  URINARY SYMPTOMS: No  REPRODUCTIVE SYMPTOMS: No  SKELETAL SYMPTOMS: Yes  BLOOD SYMPTOMS: No  NERVOUS SYSTEM SYMPTOMS: No  MENTAL HEALTH SYMPTOMS: Yes  Back pain: Yes  Muscle aches: Yes  Neck pain: Yes  Swollen joints: No  Joint pain: No  Bone pain: No  Muscle cramps: Yes  Muscle weakness: Yes  Joint stiffness: Yes  Bone fracture: No  Nervous or Anxious: Yes  Depression: Yes  Trouble sleeping: Yes  Trouble thinking or concentrating: No  Mood changes: No  Panic attacks: No  PHQ9 TOTAL SCORE: 4

## 2021-02-01 NOTE — NURSING NOTE
"Reason For Visit:   Chief Complaint   Patient presents with     RECHECK     follow up L4-5 decompression and discectomy DOS 1/23/20       Primary MD: Lola Perez  Ref. MD: Moses     ?  No  Date of surgery: Dr. Dexter   Type of surgery: Dr. Dexter .  Smoker: No  Request smoking cessation information: No    Ht 1.88 m (6' 2\")   Wt 85.7 kg (189 lb)   BMI 24.27 kg/m           Oswestry (LI) Questionnaire    OSWESTRY DISABILITY INDEX 2/1/2021   Count 9   Sum 19   Oswestry Score (%) 42.22   Some recent data might be hidden            Neck Disability Index (NDI) Questionnaire    No flowsheet data found.                Promis 10 Assessment    PROMIS 10 2/1/2021   In general, would you say your health is: Good   In general, would you say your quality of life is: Fair   In general, how would you rate your physical health? Fair   In general, how would you rate your mental health, including your mood and your ability to think? Fair   In general, how would you rate your satisfaction with your social activities and relationships? Fair   In general, please rate how well you carry out your usual social activities and roles Good   To what extent are you able to carry out your everyday physical activities such as walking, climbing stairs, carrying groceries, or moving a chair? Moderately   How often have you been bothered by emotional problems such as feeling anxious, depressed or irritable? Often   How would you rate your fatigue on average? Mild   How would you rate your pain on average?   0 = No Pain  to  10 = Worst Imaginable Pain 5   In general, would you say your health is: 3   In general, would you say your quality of life is: 2   In general, how would you rate your physical health? 2   In general, how would you rate your mental health, including your mood and your ability to think? 2   In general, how would you rate your satisfaction with your social activities and relationships? 2   In general, please " rate how well you carry out your usual social activities and roles. (This includes activities at home, at work and in your community, and responsibilities as a parent, child, spouse, employee, friend, etc.) 3   To what extent are you able to carry out your everyday physical activities such as walking, climbing stairs, carrying groceries, or moving a chair? 3   In the past 7 days, how often have you been bothered by emotional problems such as feeling anxious, depressed, or irritable? 4   In the past 7 days, how would you rate your fatigue on average? 2   In the past 7 days, how would you rate your pain on average, where 0 means no pain, and 10 means worst imaginable pain? 5   Global Mental Health Score 8   Global Physical Health Score 12   PROMIS TOTAL - SUBSCORES 20   Some recent data might be hidden                Deejay Pierce ATC

## 2021-02-01 NOTE — LETTER
2/1/2021         RE: Jas Patel  [address]  [facility] [address]        Dear Colleague,    Thank you for referring your patient, Jas Patel, to the [facility] [place]. Please see a copy of my visit note below.    Spine Surgery Return Clinic Visit      Chief Complaint:   RECHECK (follow up L4-5 decompression and discectomy DOS 1/23/20)      Interval HPI:  Symptom Profile Including: location of symptoms, onset, severity, exacerbating/alleviating factors, previous treatments:        Jas Patel is a 50 year old male who returns today about 3-1/2 months status post L4-5 interbody fusion.  He says his back is feeling pretty good overall, but he did have a fall recently on his left side he feels like he pulled a muscle.  Is kind of spasming and aching there.  This was a couple of weeks ago.  His leg symptoms are resolved.  He is interested in returning to work in March.            Past Medical History:     Past Medical History:   Diagnosis Date     Benign essential tremor 11/2/2016     BPH (benign prostatic hyperplasia) 10/28/2015     BPH (benign prostatic hyperplasia)      Gastroesophageal reflux disease without esophagitis 10/28/2015     Generalized anxiety disorder 10/26/2011     GERD (gastroesophageal reflux disease)      Herniated disc     in neck     Lumbar radiculopathy 12/20/2019     Moderate major depression (H) 11/22/2010            Past Surgical History:     Past Surgical History:   Procedure Laterality Date     DECOMPRESSION LUMBAR ONE LEVEL N/A 1/23/2020    Procedure: Lumbar 4 to 5 Decompression and Discectomy;  Surgeon: Juvenal Dexter MD;  [facility]     DECOMPRESSION LUMBAR ONE LEVEL N/A 3/23/2020    Procedure: Revision Lumbar 4 to 5 decompression;  Surgeon: Juvenal Dexter MD;  [facility]     DRAIN SKIN ABSCESS COMPLIC      left thigh I and D x 4 in the 1990's     GRAFT BONE FROM ILIAC CREST Right 9/10/2020     Procedure: and right iliac crest autograft;  Surgeon: Juvenal Dexter MD;  [facility]     OPTICAL TRACKING SYSTEM FUSION POSTERIOR SPINE LUMBAR N/A 9/10/2020    Procedure: Lumbar 4 to 5 instrumented posterior interbody fusion and mata tabor osteotomy with O-Arm/Stealth Navigation, use of allograft, local autograft,;  Surgeon: Juvenal Dexter MD;  [facility]     TONSILLECTOMY              Social History:     Social History     Tobacco Use     Smoking status: Former Smoker     Packs/day: 0.20     Years: 10.00     Pack years: 2.00     Types: Cigarettes     Start date:      Quit date:      Years since quittin.0     Smokeless tobacco: Never Used   Substance Use Topics     Alcohol use: Yes     Alcohol/week: 0.0 - 7.0 standard drinks            Family History:     Family History   Problem Relation Age of Onset     No Known Problems Mother      Hypertension Father      Valvular heart disease Father         aortic valve replacement     C.A.D. Maternal Grandfather      Hypertension Maternal Grandfather      No Known Problems Brother      No Known Problems Sister      Other - See Comments Sister         adopted     Cerebrovascular Disease Other         great uncle     Diabetes Paternal Uncle         type 2     Diabetes Other         great aunt     Prostate Cancer Other         great uncle moms side     Alzheimer Disease Other         great uncle moms side     Anesthesia Reaction No family hx of      Deep Vein Thrombosis (DVT) No family hx of             Allergies:     Allergies   Allergen Reactions     No Known Drug Allergies             Medications:     Current Outpatient Medications   Medication     acetaminophen (TYLENOL) 325 MG tablet     amitriptyline (ELAVIL) 25 MG tablet     Cholecalciferol (VITAMIN D3 PO)     citalopram (CELEXA) 40 MG tablet     HEMP OIL OR EXTRACT OR OTHER CBD CANNABINOID, NOT MEDICAL CANNABIS,     [START ON 2/10/2021] HYDROcodone-acetaminophen (NORCO)  "5-325 MG tablet     hydrOXYzine (ATARAX) 25 MG tablet     loratadine (CLARITIN) 10 MG tablet     methocarbamol (ROBAXIN) 500 MG tablet     multivitamin w/minerals (THERA-VIT-M) tablet     omeprazole (PRILOSEC) 20 MG DR capsule     polyethylene glycol (MIRALAX) 17 g packet     propranolol (INDERAL) 10 MG tablet     propranolol (INDERAL) 10 MG tablet     senna-docusate (SENOKOT-S/PERICOLACE) 8.6-50 MG tablet     tamsulosin (FLOMAX) 0.4 MG capsule     [START ON 2/10/2021] traMADol (ULTRAM) 50 MG tablet     traMADol (ULTRAM) 50 MG tablet     No current facility-administered medications for this visit.              Review of Systems:   A focused musculoskeletal and neurologic ROS was performed with pertinent positives and negatives noted in the HPI.  Additional systems were also reviewed and are documented at the bottom of the note.         Physical Exam:   Vitals: Ht 1.88 m (6' 2\")   Wt 85.7 kg (189 lb)   BMI 24.27 kg/m    Musculoskeletal, Neurologic, and Spine:            Lumbar Spine:    Appearance - No gross stepoffs or deformities    Motor -     L2-3: Hip flexion 5/5 R and 5/5 L strength          L3/4:  Knee extension R 5/5 and L 5/5 strength         L4/5:  Foot dorsiflexion R 5/5 L 5/5 and       EHL dorsiflexion R 5/5 L 5/5 strength         S1:  Plantarflexion/Peroneal Muscles  R 5/5 and L 5/5 strength    Sensation: intact to light touch L3-S1 distribution BLE      Neurologic:      REFLEXES Left Right                  Patella 1+ 1+   Ankle jerk 1+ 1+   Babinski No upgoing great toe No upgoing great toe   Clonus 0 beats 0 beats     Hip Exam:  No pain with hip log roll and no tenderness over the greater trochanters.    Alignment:  Patient stands with a neutral standing sagittal balance.           Imaging:   We ordered and independently reviewed new radiographs at this clinic visit. The results were discussed with the patient. Findings include:     AP and lateral radiographs today show well-positioned implants with " no evidence of interval complication       Assessment and Plan:     50 year old male with L4-5 interbody fusion.  He did have a fall a few weeks ago and is dealing with some left-sided back pain.  Hopeful this is just a muscle strain and I reassured him I do not see any major implant displacement on his recent radiographs.  We are going to try a muscle relaxant.    I did also offer a referral to physical therapy, but he is between insurances right now and is worried there would be a high cost so we agreed to hold off on this.  If his insurance situation changes or if he would like to try therapy he will call and let us know and I would be happy to order this for him.  From an imaging standpoint I would like to see him again in 6 months with a lumbar CT scan at the time of that visit.    Respectfully,  Juvenal Dexter MD  Spine Surgery  [facility]    Answers for HPI/ROS submitted by the patient on 2/1/2021   General Symptoms: No  Skin Symptoms: No  HENT Symptoms: No  EYE SYMPTOMS: No  HEART SYMPTOMS: No  LUNG SYMPTOMS: No  INTESTINAL SYMPTOMS: No  URINARY SYMPTOMS: No  REPRODUCTIVE SYMPTOMS: No  SKELETAL SYMPTOMS: Yes  BLOOD SYMPTOMS: No  NERVOUS SYSTEM SYMPTOMS: No  MENTAL HEALTH SYMPTOMS: Yes  Back pain: Yes  Muscle aches: Yes  Neck pain: Yes  Swollen joints: No  Joint pain: No  Bone pain: No  Muscle cramps: Yes  Muscle weakness: Yes  Joint stiffness: Yes  Bone fracture: No  Nervous or Anxious: Yes  Depression: Yes  Trouble sleeping: Yes  Trouble thinking or concentrating: No  Mood changes: No  Panic attacks: No  PHQ9 TOTAL SCORE: 4

## 2021-02-02 ASSESSMENT — PATIENT HEALTH QUESTIONNAIRE - PHQ9: SUM OF ALL RESPONSES TO PHQ QUESTIONS 1-9: 4

## 2021-02-09 ENCOUNTER — TELEPHONE (OUTPATIENT)
Dept: ORTHOPEDICS | Facility: CLINIC | Age: 51
End: 2021-02-09

## 2021-02-09 NOTE — TELEPHONE ENCOUNTER
RN called and spoke with [employer] representative Leonie.  Told Leonie the work release and restrictions per Dr. Dexter's notes.  20-25lbs restriction. No repetitive bending or twisting. Can work unlimited hours. Can do unlimited desk and seated work.  Also fax Dr. Dexter's notes and XR report to Leonie.  Fax number [phone]    Murray Parker RN      [facility]    Phone Message    May a detailed message be left on voicemail: yes     Reason for Call: Other: Please call [facility] back     Action Taken: Message routed to:  [facility] oro    Travel Screening: Not Applicable     Luna from [employer] [facility] disability is calling to get a call back from team -- she would like to know if pt was released back to work and if so what are his restrictions ?    Luna said she does have a secure VM and to leave detailed message if needed

## 2021-02-11 ENCOUNTER — MYC REFILL (OUTPATIENT)
Dept: ORTHOPEDICS | Facility: CLINIC | Age: 51
End: 2021-02-11

## 2021-02-11 DIAGNOSIS — M48.062 SPINAL STENOSIS OF LUMBAR REGION WITH NEUROGENIC CLAUDICATION: ICD-10-CM

## 2021-02-11 RX ORDER — TIZANIDINE HYDROCHLORIDE 4 MG/1
4 CAPSULE, GELATIN COATED ORAL 3 TIMES DAILY
Qty: 30 CAPSULE | Refills: 0 | Status: SHIPPED | OUTPATIENT
Start: 2021-02-11 | End: 2021-03-19

## 2021-03-12 ENCOUNTER — E-VISIT (OUTPATIENT)
Dept: FAMILY MEDICINE | Facility: CLINIC | Age: 51
End: 2021-03-12
Payer: COMMERCIAL

## 2021-03-12 DIAGNOSIS — G89.4 CHRONIC PAIN SYNDROME: ICD-10-CM

## 2021-03-12 DIAGNOSIS — M54.2 CHRONIC NECK PAIN: ICD-10-CM

## 2021-03-12 DIAGNOSIS — G89.29 CHRONIC NECK PAIN: ICD-10-CM

## 2021-03-12 PROCEDURE — 99421 OL DIG E/M SVC 5-10 MIN: CPT | Performed by: NURSE PRACTITIONER

## 2021-03-12 RX ORDER — TRAMADOL HYDROCHLORIDE 50 MG/1
TABLET ORAL
Qty: 56 TABLET | Refills: 0 | Status: SHIPPED | OUTPATIENT
Start: 2021-03-12 | End: 2021-03-12

## 2021-03-12 RX ORDER — HYDROCODONE BITARTRATE AND ACETAMINOPHEN 5; 325 MG/1; MG/1
TABLET ORAL
Qty: 28 TABLET | Refills: 0 | Status: SHIPPED | OUTPATIENT
Start: 2021-04-09 | End: 2021-04-08

## 2021-03-12 RX ORDER — TRAMADOL HYDROCHLORIDE 50 MG/1
TABLET ORAL
Qty: 56 TABLET | Refills: 0 | Status: SHIPPED | OUTPATIENT
Start: 2021-04-09 | End: 2021-04-08

## 2021-03-12 RX ORDER — HYDROCODONE BITARTRATE AND ACETAMINOPHEN 5; 325 MG/1; MG/1
TABLET ORAL
Qty: 28 TABLET | Refills: 0 | Status: SHIPPED | OUTPATIENT
Start: 2021-03-12 | End: 2021-03-12

## 2021-04-08 DIAGNOSIS — M54.2 CHRONIC NECK PAIN: ICD-10-CM

## 2021-04-08 DIAGNOSIS — G89.4 CHRONIC PAIN SYNDROME: ICD-10-CM

## 2021-04-08 DIAGNOSIS — G89.29 CHRONIC NECK PAIN: ICD-10-CM

## 2021-04-08 RX ORDER — TRAMADOL HYDROCHLORIDE 50 MG/1
TABLET ORAL
Qty: 56 TABLET | Refills: 0 | Status: SHIPPED | OUTPATIENT
Start: 2021-05-07 | End: 2021-06-11

## 2021-04-08 RX ORDER — HYDROCODONE BITARTRATE AND ACETAMINOPHEN 5; 325 MG/1; MG/1
TABLET ORAL
Qty: 28 TABLET | Refills: 0 | Status: SHIPPED | OUTPATIENT
Start: 2021-05-07 | End: 2021-06-11

## 2021-05-11 DIAGNOSIS — M54.50 LOW BACK PAIN: ICD-10-CM

## 2021-05-11 DIAGNOSIS — M48.062 SPINAL STENOSIS OF LUMBAR REGION WITH NEUROGENIC CLAUDICATION: Primary | ICD-10-CM

## 2021-05-21 ENCOUNTER — OFFICE VISIT (OUTPATIENT)
Dept: ORTHOPEDICS | Facility: CLINIC | Age: 51
End: 2021-05-21
Payer: COMMERCIAL

## 2021-05-21 ENCOUNTER — ANCILLARY PROCEDURE (OUTPATIENT)
Dept: GENERAL RADIOLOGY | Facility: CLINIC | Age: 51
End: 2021-05-21
Attending: ORTHOPAEDIC SURGERY
Payer: COMMERCIAL

## 2021-05-21 VITALS — HEIGHT: 74 IN | BODY MASS INDEX: 24.26 KG/M2 | WEIGHT: 189 LBS

## 2021-05-21 DIAGNOSIS — M54.50 LOW BACK PAIN: ICD-10-CM

## 2021-05-21 DIAGNOSIS — M48.062 SPINAL STENOSIS OF LUMBAR REGION WITH NEUROGENIC CLAUDICATION: ICD-10-CM

## 2021-05-21 DIAGNOSIS — Z98.1 H/O SPINAL FUSION: Primary | ICD-10-CM

## 2021-05-21 PROCEDURE — 99213 OFFICE O/P EST LOW 20 MIN: CPT | Mod: GC | Performed by: ORTHOPAEDIC SURGERY

## 2021-05-21 PROCEDURE — 72100 X-RAY EXAM L-S SPINE 2/3 VWS: CPT | Performed by: RADIOLOGY

## 2021-05-21 ASSESSMENT — MIFFLIN-ST. JEOR: SCORE: 1772.05

## 2021-05-21 NOTE — LETTER
5/21/2021         RE: Jas Patel  [address]  [facility] [address]        Dear Colleague,    Thank you for referring your patient, Jas Patel, to the [facility] [place]. Please see a copy of my visit note below.    Spine Surgery Return Clinic Visit      Chief Complaint:   RECHECK (follow up L4-5 decompression and discectomy DOS 1/23/20 left side worse than right side. pain on the bottom of his foot. )      Interval HPI:  Symptom Profile Including: location of symptoms, onset, severity, exacerbating/alleviating factors, previous treatments:        Jas Patel is a 51 year old male who underwent multiple decompressions for a right L4-5 disc herniation.  Due to recurrent herniations, he underwent a right L4-5 TLIF on 9/10/2020.  Postoperatively, the patient has been doing quite well.  Intermittent muscle spasms which have been controlled with muscle relaxants.  He is using the medication sparingly as it seems to affect his mentation.  He feels he has less dependence on the medication as he is continue to heal.  Complains of left foot pain as well as lower back pain that is below his incision into the left side.  Denies radicular pain, numbness, weakness in the legs.  He is overall quite pleased with the outcome of his surgery.          Past Medical History:     Past Medical History:   Diagnosis Date     Benign essential tremor 11/2/2016     BPH (benign prostatic hyperplasia) 10/28/2015     BPH (benign prostatic hyperplasia)      Gastroesophageal reflux disease without esophagitis 10/28/2015     Generalized anxiety disorder 10/26/2011     GERD (gastroesophageal reflux disease)      Herniated disc     in neck     Lumbar radiculopathy 12/20/2019     Moderate major depression (H) 11/22/2010            Past Surgical History:     Past Surgical History:   Procedure Laterality Date     DECOMPRESSION LUMBAR ONE LEVEL N/A 1/23/2020    Procedure: Lumbar 4 to 5 Decompression  and Discectomy;  Surgeon: Juvenal Dexter MD;  [facility]     DECOMPRESSION LUMBAR ONE LEVEL N/A 3/23/2020    Procedure: Revision Lumbar 4 to 5 decompression;  Surgeon: Juvenal Dexter MD;  [facility]     DRAIN SKIN ABSCESS COMPLIC      left thigh I and D x 4 in the      GRAFT BONE FROM ILIAC CREST Right 9/10/2020    Procedure: and right iliac crest autograft;  Surgeon: Juvenal Dexter MD;  [facility]     OPTICAL TRACKING SYSTEM FUSION POSTERIOR SPINE LUMBAR N/A 9/10/2020    Procedure: Lumbar 4 to 5 instrumented posterior interbody fusion and mata tabor osteotomy with O-Arm/Stealth Navigation, use of allograft, local autograft,;  Surgeon: Juvenal Dexter MD;  [facility]     TONSILLECTOMY              Social History:     Social History     Tobacco Use     Smoking status: Former Smoker     Packs/day: 0.20     Years: 10.00     Pack years: 2.00     Types: Cigarettes     Start date:      Quit date:      Years since quittin.3     Smokeless tobacco: Never Used   Substance Use Topics     Alcohol use: Yes     Alcohol/week: 0.0 - 7.0 standard drinks            Family History:     Family History   Problem Relation Age of Onset     No Known Problems Mother      Hypertension Father      Valvular heart disease Father         aortic valve replacement     C.A.D. Maternal Grandfather      Hypertension Maternal Grandfather      No Known Problems Brother      No Known Problems Sister      Other - See Comments Sister         adopted     Cerebrovascular Disease Other         great uncle     Diabetes Paternal Uncle         type 2     Diabetes Other         great aunt     Prostate Cancer Other         great uncle moms side     Alzheimer Disease Other         great uncle moms side     Anesthesia Reaction No family hx of      Deep Vein Thrombosis (DVT) No family hx of             Allergies:     Allergies   Allergen Reactions     No Known Drug Allergies  "            Medications:     Current Outpatient Medications   Medication     acetaminophen (TYLENOL) 325 MG tablet     amitriptyline (ELAVIL) 25 MG tablet     Cholecalciferol (VITAMIN D3 PO)     citalopram (CELEXA) 40 MG tablet     HEMP OIL OR EXTRACT OR OTHER CBD CANNABINOID, NOT MEDICAL CANNABIS,     HYDROcodone-acetaminophen (NORCO) 5-325 MG tablet     hydrOXYzine (ATARAX) 25 MG tablet     loratadine (CLARITIN) 10 MG tablet     methocarbamol (ROBAXIN) 500 MG tablet     multivitamin w/minerals (THERA-VIT-M) tablet     omeprazole (PRILOSEC) 20 MG DR capsule     polyethylene glycol (MIRALAX) 17 g packet     propranolol (INDERAL) 10 MG tablet     propranolol (INDERAL) 10 MG tablet     senna-docusate (SENOKOT-S/PERICOLACE) 8.6-50 MG tablet     tamsulosin (FLOMAX) 0.4 MG capsule     tiZANidine (ZANAFLEX) 4 MG capsule     traMADol (ULTRAM) 50 MG tablet     No current facility-administered medications for this visit.              Review of Systems:   A focused musculoskeletal and neurologic ROS was performed with pertinent positives and negatives noted in the HPI.  Additional systems were also reviewed and are documented at the bottom of the note.         Physical Exam:   Vitals: Ht 1.88 m (6' 2\")   Wt 85.7 kg (189 lb)   BMI 24.27 kg/m    Musculoskeletal, Neurologic, and Spine:   The patient is alert.  Strength and sensation intact in bilateral lower extremities.  Incision is well-healed without surrounding erythema or tenderness.  He is slightly tender to palpation at the left L5-S1 facet.  No resultant radicular symptoms.  No pain with left-sided facet loading maneuver.  The patient's left foot pain is between the first and second digits at the ball of the foot.  His pain was elicited with direct palpation.         Imaging:   We ordered and independently reviewed new radiographs at this clinic visit. The results were discussed with the patient. Findings include: Stable instrumentation without concern for loosening or " fracture.       Assessment and Plan:     51 year old male with multiple recurrent disc herniations, who eventually underwent L4-5 TLIF for definitive management.  Continuing to heal and do well postoperatively.  Patient is interested in adding physical therapy given the ongoing lower back pain.  He would also like to pursue an injection.  We discussed, if ongoing pain, we would then also order an MRI.  He can call or send me a [employer] message to have this ordered at a future date.  Regarding his left foot pain, I recommended he discuss with his primary physician and potentially be referred to podiatry.  This does not seem to be due to his spine as pain was localized to his foot.  He is cleared from my perspective for chiropractic manipulation.    Plan:  - Physical therapy  - Left L5/S1 facet injection for diagnostic and therapeutic purposes  - Call/message if ongoing symptoms for lumbar MRI    Attending MD (Dr. Juvenal Dexter) :  I reviewed and verified the history and physical exam of the patient and discussed the patient's management with the other clinical providers involved in this patient's care including any involved residents or physicians assistants. I reviewed the above note and agree with the documented findings and plan of care, which were communicated to the patient.      Juvenal Dexter MD      Respectfully,  Juvenal Dexter MD  Spine Surgery  [facility]

## 2021-05-21 NOTE — NURSING NOTE
"Reason For Visit:   Chief Complaint   Patient presents with     RECHECK     follow up L4-5 decompression and discectomy DOS 1/23/20 left side worse than right side. pain on the bottom of his foot.        Primary MD: Lola Perez  Ref. MD: Moses     ?  No  Date of surgery: Dr. Dexter   Type of surgery: Dr. Dexter .  Smoker: No  Request smoking cessation information: No    Ht 1.88 m (6' 2\")   Wt 85.7 kg (189 lb)   BMI 24.27 kg/m           Oswestry (LI) Questionnaire    OSWESTRY DISABILITY INDEX 2/1/2021   Count 9   Sum 19   Oswestry Score (%) 42.22   Some recent data might be hidden            Neck Disability Index (NDI) Questionnaire    No flowsheet data found.                Promis 10 Assessment    PROMIS 10 2/1/2021   In general, would you say your health is: Good   In general, would you say your quality of life is: Fair   In general, how would you rate your physical health? Fair   In general, how would you rate your mental health, including your mood and your ability to think? Fair   In general, how would you rate your satisfaction with your social activities and relationships? Fair   In general, please rate how well you carry out your usual social activities and roles Good   To what extent are you able to carry out your everyday physical activities such as walking, climbing stairs, carrying groceries, or moving a chair? Moderately   How often have you been bothered by emotional problems such as feeling anxious, depressed or irritable? Often   How would you rate your fatigue on average? Mild   How would you rate your pain on average?   0 = No Pain  to  10 = Worst Imaginable Pain 5   In general, would you say your health is: 3   In general, would you say your quality of life is: 2   In general, how would you rate your physical health? 2   In general, how would you rate your mental health, including your mood and your ability to think? 2   In general, how would you rate your satisfaction with " your social activities and relationships? 2   In general, please rate how well you carry out your usual social activities and roles. (This includes activities at home, at work and in your community, and responsibilities as a parent, child, spouse, employee, friend, etc.) 3   To what extent are you able to carry out your everyday physical activities such as walking, climbing stairs, carrying groceries, or moving a chair? 3   In the past 7 days, how often have you been bothered by emotional problems such as feeling anxious, depressed, or irritable? 4   In the past 7 days, how would you rate your fatigue on average? 2   In the past 7 days, how would you rate your pain on average, where 0 means no pain, and 10 means worst imaginable pain? 5   Global Mental Health Score 8   Global Physical Health Score 12   PROMIS TOTAL - SUBSCORES 20   Some recent data might be hidden                Deejay Pierce ATC

## 2021-05-21 NOTE — PROGRESS NOTES
Spine Surgery Return Clinic Visit      Chief Complaint:   RECHECK (follow up L4-5 decompression and discectomy DOS 1/23/20 left side worse than right side. pain on the bottom of his foot. )      Interval HPI:  Symptom Profile Including: location of symptoms, onset, severity, exacerbating/alleviating factors, previous treatments:        Jas Patel is a 51 year old male who underwent multiple decompressions for a right L4-5 disc herniation.  Due to recurrent herniations, he underwent a right L4-5 TLIF on 9/10/2020.  Postoperatively, the patient has been doing quite well.  Intermittent muscle spasms which have been controlled with muscle relaxants.  He is using the medication sparingly as it seems to affect his mentation.  He feels he has less dependence on the medication as he is continue to heal.  Complains of left foot pain as well as lower back pain that is below his incision into the left side.  Denies radicular pain, numbness, weakness in the legs.  He is overall quite pleased with the outcome of his surgery.          Past Medical History:     Past Medical History:   Diagnosis Date     Benign essential tremor 11/2/2016     BPH (benign prostatic hyperplasia) 10/28/2015     BPH (benign prostatic hyperplasia)      Gastroesophageal reflux disease without esophagitis 10/28/2015     Generalized anxiety disorder 10/26/2011     GERD (gastroesophageal reflux disease)      Herniated disc     in neck     Lumbar radiculopathy 12/20/2019     Moderate major depression (H) 11/22/2010            Past Surgical History:     Past Surgical History:   Procedure Laterality Date     DECOMPRESSION LUMBAR ONE LEVEL N/A 1/23/2020    Procedure: Lumbar 4 to 5 Decompression and Discectomy;  Surgeon: Juvenal Dexter MD;  [facility]     DECOMPRESSION LUMBAR ONE LEVEL N/A 3/23/2020    Procedure: Revision Lumbar 4 to 5 decompression;  Surgeon: Juvenal Dexter MD;  [facility]     DRAIN SKIN ABSCESS COMPLIC       left thigh I and D x 4 in the      GRAFT BONE FROM ILIAC CREST Right 9/10/2020    Procedure: and right iliac crest autograft;  Surgeon: Juvenal Dexter MD;  [facility]     OPTICAL TRACKING SYSTEM FUSION POSTERIOR SPINE LUMBAR N/A 9/10/2020    Procedure: Lumbar 4 to 5 instrumented posterior interbody fusion and mata tabor osteotomy with O-Arm/Stealth Navigation, use of allograft, local autograft,;  Surgeon: Juvenal Dexter MD;  [facility]     TONSILLECTOMY              Social History:     Social History     Tobacco Use     Smoking status: Former Smoker     Packs/day: 0.20     Years: 10.00     Pack years: 2.00     Types: Cigarettes     Start date:      Quit date:      Years since quittin.3     Smokeless tobacco: Never Used   Substance Use Topics     Alcohol use: Yes     Alcohol/week: 0.0 - 7.0 standard drinks            Family History:     Family History   Problem Relation Age of Onset     No Known Problems Mother      Hypertension Father      Valvular heart disease Father         aortic valve replacement     C.A.D. Maternal Grandfather      Hypertension Maternal Grandfather      No Known Problems Brother      No Known Problems Sister      Other - See Comments Sister         adopted     Cerebrovascular Disease Other         great uncle     Diabetes Paternal Uncle         type 2     Diabetes Other         great aunt     Prostate Cancer Other         great uncle moms side     Alzheimer Disease Other         great uncle moms side     Anesthesia Reaction No family hx of      Deep Vein Thrombosis (DVT) No family hx of             Allergies:     Allergies   Allergen Reactions     No Known Drug Allergies             Medications:     Current Outpatient Medications   Medication     acetaminophen (TYLENOL) 325 MG tablet     amitriptyline (ELAVIL) 25 MG tablet     Cholecalciferol (VITAMIN D3 PO)     citalopram (CELEXA) 40 MG tablet     HEMP OIL OR EXTRACT OR OTHER CBD  "CANNABINOID, NOT MEDICAL CANNABIS,     HYDROcodone-acetaminophen (NORCO) 5-325 MG tablet     hydrOXYzine (ATARAX) 25 MG tablet     loratadine (CLARITIN) 10 MG tablet     methocarbamol (ROBAXIN) 500 MG tablet     multivitamin w/minerals (THERA-VIT-M) tablet     omeprazole (PRILOSEC) 20 MG DR capsule     polyethylene glycol (MIRALAX) 17 g packet     propranolol (INDERAL) 10 MG tablet     propranolol (INDERAL) 10 MG tablet     senna-docusate (SENOKOT-S/PERICOLACE) 8.6-50 MG tablet     tamsulosin (FLOMAX) 0.4 MG capsule     tiZANidine (ZANAFLEX) 4 MG capsule     traMADol (ULTRAM) 50 MG tablet     No current facility-administered medications for this visit.              Review of Systems:   A focused musculoskeletal and neurologic ROS was performed with pertinent positives and negatives noted in the HPI.  Additional systems were also reviewed and are documented at the bottom of the note.         Physical Exam:   Vitals: Ht 1.88 m (6' 2\")   Wt 85.7 kg (189 lb)   BMI 24.27 kg/m    Musculoskeletal, Neurologic, and Spine:   The patient is alert.  Strength and sensation intact in bilateral lower extremities.  Incision is well-healed without surrounding erythema or tenderness.  He is slightly tender to palpation at the left L5-S1 facet.  No resultant radicular symptoms.  No pain with left-sided facet loading maneuver.  The patient's left foot pain is between the first and second digits at the ball of the foot.  His pain was elicited with direct palpation.         Imaging:   We ordered and independently reviewed new radiographs at this clinic visit. The results were discussed with the patient. Findings include: Stable instrumentation without concern for loosening or fracture.       Assessment and Plan:     51 year old male with multiple recurrent disc herniations, who eventually underwent L4-5 TLIF for definitive management.  Continuing to heal and do well postoperatively.  Patient is interested in adding physical therapy " given the ongoing lower back pain.  He would also like to pursue an injection.  We discussed, if ongoing pain, we would then also order an MRI.  He can call or send me a [employer] message to have this ordered at a future date.  Regarding his left foot pain, I recommended he discuss with his primary physician and potentially be referred to podiatry.  This does not seem to be due to his spine as pain was localized to his foot.  He is cleared from my perspective for chiropractic manipulation.    Plan:  - Physical therapy  - Left L5/S1 facet injection for diagnostic and therapeutic purposes  - Call/message if ongoing symptoms for lumbar MRI    Attending MD (Dr. Juvenal Dexter) :  I reviewed and verified the history and physical exam of the patient and discussed the patient's management with the other clinical providers involved in this patient's care including any involved residents or physicians assistants. I reviewed the above note and agree with the documented findings and plan of care, which were communicated to the patient.      Juvenal Dexter MD      Respectfully,  Juvenal Dexter MD  Spine Surgery  [facility]

## 2021-06-11 ENCOUNTER — OFFICE VISIT (OUTPATIENT)
Dept: FAMILY MEDICINE | Facility: CLINIC | Age: 51
End: 2021-06-11
Payer: COMMERCIAL

## 2021-06-11 VITALS
WEIGHT: 187 LBS | HEIGHT: 74 IN | OXYGEN SATURATION: 97 % | RESPIRATION RATE: 16 BRPM | DIASTOLIC BLOOD PRESSURE: 84 MMHG | HEART RATE: 85 BPM | TEMPERATURE: 98 F | BODY MASS INDEX: 24 KG/M2 | SYSTOLIC BLOOD PRESSURE: 126 MMHG

## 2021-06-11 DIAGNOSIS — G47.00 INSOMNIA, UNSPECIFIED TYPE: ICD-10-CM

## 2021-06-11 DIAGNOSIS — Z11.59 NEED FOR HEPATITIS C SCREENING TEST: ICD-10-CM

## 2021-06-11 DIAGNOSIS — N40.1 BENIGN PROSTATIC HYPERPLASIA WITH URINARY FREQUENCY: ICD-10-CM

## 2021-06-11 DIAGNOSIS — G89.29 CHRONIC NECK PAIN: Primary | ICD-10-CM

## 2021-06-11 DIAGNOSIS — Z13.6 CARDIOVASCULAR SCREENING; LDL GOAL LESS THAN 130: ICD-10-CM

## 2021-06-11 DIAGNOSIS — M54.2 CHRONIC NECK PAIN: Primary | ICD-10-CM

## 2021-06-11 DIAGNOSIS — R35.0 BENIGN PROSTATIC HYPERPLASIA WITH URINARY FREQUENCY: ICD-10-CM

## 2021-06-11 DIAGNOSIS — G25.0 BENIGN ESSENTIAL TREMOR: ICD-10-CM

## 2021-06-11 DIAGNOSIS — Z11.4 ENCOUNTER FOR SCREENING FOR HIV: ICD-10-CM

## 2021-06-11 DIAGNOSIS — G89.4 CHRONIC PAIN SYNDROME: ICD-10-CM

## 2021-06-11 DIAGNOSIS — F41.1 GENERALIZED ANXIETY DISORDER: ICD-10-CM

## 2021-06-11 DIAGNOSIS — Z51.81 ENCOUNTER FOR THERAPEUTIC DRUG MONITORING: ICD-10-CM

## 2021-06-11 DIAGNOSIS — R73.01 IFG (IMPAIRED FASTING GLUCOSE): ICD-10-CM

## 2021-06-11 DIAGNOSIS — Z23 NEED FOR VACCINATION: ICD-10-CM

## 2021-06-11 PROCEDURE — 90750 HZV VACC RECOMBINANT IM: CPT | Performed by: NURSE PRACTITIONER

## 2021-06-11 PROCEDURE — 36415 COLL VENOUS BLD VENIPUNCTURE: CPT | Performed by: NURSE PRACTITIONER

## 2021-06-11 PROCEDURE — 99214 OFFICE O/P EST MOD 30 MIN: CPT | Mod: 25 | Performed by: NURSE PRACTITIONER

## 2021-06-11 PROCEDURE — 80048 BASIC METABOLIC PNL TOTAL CA: CPT | Performed by: NURSE PRACTITIONER

## 2021-06-11 PROCEDURE — 87389 HIV-1 AG W/HIV-1&-2 AB AG IA: CPT | Performed by: NURSE PRACTITIONER

## 2021-06-11 PROCEDURE — 96127 BRIEF EMOTIONAL/BEHAV ASSMT: CPT | Performed by: NURSE PRACTITIONER

## 2021-06-11 PROCEDURE — 99207 PR NO CHARGE LOS: CPT | Performed by: NURSE PRACTITIONER

## 2021-06-11 PROCEDURE — 90471 IMMUNIZATION ADMIN: CPT | Performed by: NURSE PRACTITIONER

## 2021-06-11 PROCEDURE — 86803 HEPATITIS C AB TEST: CPT | Performed by: NURSE PRACTITIONER

## 2021-06-11 PROCEDURE — 80061 LIPID PANEL: CPT | Performed by: NURSE PRACTITIONER

## 2021-06-11 RX ORDER — TRAMADOL HYDROCHLORIDE 50 MG/1
TABLET ORAL
Qty: 56 TABLET | Refills: 0 | Status: SHIPPED | OUTPATIENT
Start: 2021-06-11 | End: 2021-06-11

## 2021-06-11 RX ORDER — TAMSULOSIN HYDROCHLORIDE 0.4 MG/1
0.8 CAPSULE ORAL DAILY
Qty: 180 CAPSULE | Refills: 3 | Status: SHIPPED | OUTPATIENT
Start: 2021-06-11 | End: 2022-08-26

## 2021-06-11 RX ORDER — HYDROCODONE BITARTRATE AND ACETAMINOPHEN 5; 325 MG/1; MG/1
TABLET ORAL
Qty: 28 TABLET | Refills: 0 | Status: SHIPPED | OUTPATIENT
Start: 2021-06-11 | End: 2021-06-11

## 2021-06-11 RX ORDER — PROPRANOLOL HYDROCHLORIDE 10 MG/1
TABLET ORAL
Qty: 180 TABLET | Refills: 3 | Status: SHIPPED | OUTPATIENT
Start: 2021-06-11 | End: 2022-06-14

## 2021-06-11 RX ORDER — HYDROCODONE BITARTRATE AND ACETAMINOPHEN 5; 325 MG/1; MG/1
TABLET ORAL
Qty: 28 TABLET | Refills: 0 | Status: SHIPPED | OUTPATIENT
Start: 2021-07-09 | End: 2021-06-11

## 2021-06-11 RX ORDER — HYDROCODONE BITARTRATE AND ACETAMINOPHEN 5; 325 MG/1; MG/1
TABLET ORAL
Qty: 28 TABLET | Refills: 0 | Status: SHIPPED | OUTPATIENT
Start: 2021-08-06 | End: 2021-06-11

## 2021-06-11 RX ORDER — TRAMADOL HYDROCHLORIDE 50 MG/1
TABLET ORAL
Qty: 56 TABLET | Refills: 0 | Status: SHIPPED | OUTPATIENT
Start: 2021-07-09 | End: 2021-06-11

## 2021-06-11 RX ORDER — TRAMADOL HYDROCHLORIDE 50 MG/1
TABLET ORAL
Qty: 56 TABLET | Refills: 0 | Status: SHIPPED | OUTPATIENT
Start: 2021-08-06 | End: 2021-06-11

## 2021-06-11 RX ORDER — HYDROCODONE BITARTRATE AND ACETAMINOPHEN 5; 325 MG/1; MG/1
TABLET ORAL
Qty: 28 TABLET | Refills: 0 | COMMUNITY
Start: 2021-07-09 | End: 2021-07-12

## 2021-06-11 RX ORDER — TRAMADOL HYDROCHLORIDE 50 MG/1
TABLET ORAL
Qty: 56 TABLET | Refills: 0 | COMMUNITY
Start: 2021-07-09 | End: 2021-07-12

## 2021-06-11 RX ORDER — CITALOPRAM HYDROBROMIDE 40 MG/1
40 TABLET ORAL DAILY
Qty: 90 TABLET | Refills: 3 | Status: SHIPPED | OUTPATIENT
Start: 2021-06-11 | End: 2022-07-28

## 2021-06-11 ASSESSMENT — ANXIETY QUESTIONNAIRES
7. FEELING AFRAID AS IF SOMETHING AWFUL MIGHT HAPPEN: NOT AT ALL
IF YOU CHECKED OFF ANY PROBLEMS ON THIS QUESTIONNAIRE, HOW DIFFICULT HAVE THESE PROBLEMS MADE IT FOR YOU TO DO YOUR WORK, TAKE CARE OF THINGS AT HOME, OR GET ALONG WITH OTHER PEOPLE: NOT DIFFICULT AT ALL
3. WORRYING TOO MUCH ABOUT DIFFERENT THINGS: NOT AT ALL
5. BEING SO RESTLESS THAT IT IS HARD TO SIT STILL: NOT AT ALL
GAD7 TOTAL SCORE: 0
1. FEELING NERVOUS, ANXIOUS, OR ON EDGE: NOT AT ALL
6. BECOMING EASILY ANNOYED OR IRRITABLE: NOT AT ALL
2. NOT BEING ABLE TO STOP OR CONTROL WORRYING: NOT AT ALL

## 2021-06-11 ASSESSMENT — MIFFLIN-ST. JEOR: SCORE: 1762.98

## 2021-06-11 ASSESSMENT — PATIENT HEALTH QUESTIONNAIRE - PHQ9
SUM OF ALL RESPONSES TO PHQ QUESTIONS 1-9: 0
5. POOR APPETITE OR OVEREATING: NOT AT ALL

## 2021-06-11 NOTE — NURSING NOTE
Prior to immunization administration, verified patients identity using patient s name and date of birth. Please see Immunization Activity for additional information.     Screening Questionnaire for Adult Immunization    Are you sick today?   No   Do you have allergies to medications, food, a vaccine component or latex?   No   Have you ever had a serious reaction after receiving a vaccination?   No   Do you have a long-term health problem with heart, lung, kidney, or metabolic disease (e.g., diabetes), asthma, a blood disorder, no spleen, complement component deficiency, a cochlear implant, or a spinal fluid leak?  Are you on long-term aspirin therapy?   No   Do you have cancer, leukemia, HIV/AIDS, or any other immune system problem?   No   Do you have a parent, brother, or sister with an immune system problem?   No   In the past 3 months, have you taken medications that affect  your immune system, such as prednisone, other steroids, or anticancer drugs; drugs for the treatment of rheumatoid arthritis, Crohn s disease, or psoriasis; or have you had radiation treatments?   No   Have you had a seizure, or a brain or other nervous system problem?   No   During the past year, have you received a transfusion of blood or blood    products, or been given immune (gamma) globulin or antiviral drug?   No   For women: Are you pregnant or is there a chance you could become       pregnant during the next month?   No   Have you received any vaccinations in the past 4 weeks?   No     Immunization questionnaire answers were all negative.        Per orders of Lola Santos, injection of  shingrix given by Juanito Ortiz MA. Patient instructed to remain in clinic for 15 minutes afterwards, and to report any adverse reaction to me immediately.       Screening performed by Juanito Ortiz MA on 6/11/2021 at 3:39 PM.

## 2021-06-11 NOTE — PROGRESS NOTES
Assessment & Plan     (M54.2,  G89.29) Chronic neck pain  (primary encounter diagnosis)  (G89.4) Chronic pain syndrome  Comment: on stable dose opioids.  Medication improves function, no side effects.   reviewed without suspicious activity  Plan: HYDROcodone-acetaminophen (NORCO) 5-325 MG         tablet, traMADol (ULTRAM) 50 MG tablet,         DISCONTINUED: HYDROcodone-acetaminophen (NORCO)        5-325 MG tablet, DISCONTINUED: traMADol         (ULTRAM) 50 MG tablet, DISCONTINUED: traMADol         (ULTRAM) 50 MG tablet, DISCONTINUED:         HYDROcodone-acetaminophen (NORCO) 5-325 MG         tablet        Renewed x 2mo.  mychart reminder sent to myself for aug refill.  CSA updated today.  Plan for follow up evisit in 3mo.    (F41.1) Generalized anxiety disorder  Comment: controlled  Plan: citalopram (CELEXA) 40 MG tablet, propranolol         (INDERAL) 10 MG tablet        Discussed option of med wean, he prefers to cont current dose    (G25.0) Benign essential tremor  Comment:   Plan: propranolol (INDERAL) 10 MG tablet        Propranolol is effective, cont    (N40.1,  R35.0) Benign prostatic hyperplasia with urinary frequency  Comment: stable  Plan: tamsulosin (FLOMAX) 0.4 MG capsule        refilled    (G47.00) Insomnia, unspecified type  Comment:   Plan: cont amitriptyline     (Z13.6) CARDIOVASCULAR SCREENING; LDL GOAL LESS THAN 130  Comment:   Plan: Lipid panel reflex to direct LDL Fasting            (Z51.81) Encounter for therapeutic drug monitoring  Comment:   Plan: Basic metabolic panel            (Z11.59) Need for hepatitis C screening test  Comment:   Plan: Hepatitis C Screen Reflex to HCV RNA Quant and         Genotype            (Z11.4) Encounter for screening for HIV  Comment:   Plan: HIV Antigen Antibody Combo            (Z23) Need for vaccination  Comment:   Plan: SHINGRIX [[id number]], NO CHARGE LOS                No follow-ups on file.    RAFAEL Fournier [facility]  JAZMINE Mark is a 51 year old who presents for the following health issues     HPI     Medication Followup of Norco and Tramodol    Taking Medication as prescribed: yes    Side Effects:  None    Medication Helping Symptoms:  yes         Review of Systems         Objective    There were no vitals taken for this visit.  There is no height or weight on file to calculate BMI.  Physical Exam                       Chronic pain history:    Chronic neck pain related to work injury lifting a patient 9/2011.  previously on regimen of 800mg ibuprofen TID, tramadol 50mg BID, and norco 5-325mg usually about 1/d. Has done PT in the past, still doing exercise at home which are helpful. Also seeing chiropractor. Has been told he is not a surgical candidate. Working currently in day surgery pre/postop as a nurse.  Had trigger point injections and BOY injection which helped with numbness/tingling.  Last MRI done 2011 at [facility].    Hx chronic back pain, s/p lumbar spine fusion L4-L5 9/2020.   2 previous lumbar decompression and discectomy surgeries (3/2020 and 1/2    Back to work full time as of march.    Other:  MMD/DONTE-  On celexa for anxiety/depression, feels mood is well controlled, denies side effects.  on prozac [id number], lexapro 2010.      Mood has been great.  Getting back to work has been good.  Working 10h d 5:30-4 4d 1 week and 3d next week.  No longer having variable hours.     BPH- Taking flomax for BPH.  was on oxybutynin in the past which was not helpful and dried him out.    Does cont to have some urgency and frequency.       Essential tremor-  using propranolol twice a day for tremor, finds before bed dose is effective at preventing waking up with hear pounding.  Also helping with anxiety.  no associated dizziness or lightheadedness.       GERD- controlled on PPI, has tried stepping down to H2 blocker with worsening symptoms.  Gets omeprazole over the counter.       Takes amitriptyline for sleep.    Tried going without it and noticed he didn't sleep as well.    Mom dx dementia, she lives in [place] with her  and daughter.  Not a good situation, her  doesn't have compassion, her daughter doesn't treat her well.    scotty will be starting high school, he is interested in being a .  Dyana is moving to [place].      To do:  [place]  [place]   ID screen  CSA

## 2021-06-11 NOTE — PATIENT INSTRUCTIONS
Call Central Scheduling [phone] to schedule colonoscopy    Refilled all meds    Shingles shot today    Update labs

## 2021-06-11 NOTE — LETTER
[facility]  06/11/21  Patient: Jas Patel  YOB: 1970  Medical Record Number: [id number]                                                                                  Non-Opioid Controlled Substance Agreement    This is an agreement between you and your provider regarding safe and appropriate use of controlled substances prescribed by your care team. Controlled substances are?medicines that can cause physical and mental dependence (abuse).     There are strict laws about having and using these medicines. We here at [facility] are  committed to working with you in your efforts to get better. To support you in this work, we'll help you schedule regular office appointments for medicine refills. If we must cancel or change your appointment for any reason, we'll make sure you have enough medicine to last until your next appointment.     As a Provider, I will:     Listen carefully to your concerns while treating you with respect.     Recommend a treatment plan that I believe is in your best interest and may involve therapies other than medicine.      Talk with you often about the possible benefits and the risk of harm of any medicine that we prescribe for you.    Assess the safety of this medicine and check how well it works.      Provide a plan on how to taper (discontinue or go off) using this medicine if the decision is made to stop its use.      ::  As a Patient, I understand controlled substances:       Are prescribed by my care provider to help me function or work and manage my condition(s).?    Are strong medicines and can cause serious side effects.       Need to be taken exactly as prescribed.?Combining controlled substances with certain medicines or chemicals (such as illegal drugs, alcohol, sedatives, sleeping pills, and benzodiazepines) can be dangerous or even fatal.? If I stop taking my medicines suddenly, I may have severe withdrawal symptoms.     The  risks, benefits, and side effects of these medicine(s) were explained to me. I agree that:    1. I will take part in other treatments as advised by my care team. This may be psychiatry or counseling, physical therapy, behavioral therapy, group treatment or a referral to specialist.    2. I will keep all my appointments and understand this is part of the monitoring of controlled substances.?My care team may require an office visit for EVERY controlled substance refill. If I miss appointments or don t follow instructions, my care team may stop my medicine    3. I will take my medicines as prescribed. I will not change the dose or schedule unless my care team tells me to. There will be no refills if I run out early.      4. I may be asked to come to the clinic and complete a urine drug test or complete a pill count. If I don t give a urine sample or participate in a pill count, the care team may stop my medicine.    5. I will only receive controlled substance prescriptions from this clinic. If I am treated by another provider, I will tell them that I am taking controlled substances and that I have a treatment agreement with this provider. I will inform my [facility] care team within one business day if I am given a prescription for any controlled substance by another healthcare provider. My [facility] care team can contact other providers and pharmacists about my use of any medicines.    6. It is up to me to make sure that I don't run out of my medicines on weekends or holidays.?If my care team is willing to refill my prescription without a visit, I must request refills only during office hours. Refills may take up to 3 business days to process. I will use one pharmacy to fill all my controlled substance prescriptions. I will notify the clinic about any changes to my insurance or medicine availability.    7. I am responsible for my prescriptions. If the medicine/prescription is lost, stolen or destroyed,  it will not be replaced.?I also agree not to share controlled substance medicines with anyone.     8. I am aware I should not use any illegal or recreational drugs. I agree not to drink alcohol unless my care team says I can.     9. If I enroll in the [place] Medical Cannabis program, I will tell my care team before my next refill.    10. I will tell my care team right away if I become pregnant, have a new medical problem treated outside of my regular clinic, or have a change in my medicines.     11. I understand that this medicine can affect my thinking, judgment and reaction time.? Alcohol and drugs affect the brain and body, which can affect the safety of my driving. Being under the influence of alcohol or drugs can affect my decision-making, behaviors, personal safety and the safety of others. Driving while impaired (DWI) can occur if a person is driving, operating or in physical control of a car, motorcycle, boat, snowmobile, ATV, motorbike, off-road vehicle or any other motor vehicle ([place] Statute 169A.20). I understand the risk if I choose to drive or operate any vehicle or machinery.    I understand that if I do not follow any of the conditions above, my prescriptions or treatment may be stopped or changed.   I agree that my provider, clinic care team and pharmacy may work with any city, state or federal law enforcement agency that investigates the misuse, sale or other diversion of my controlled medicine. I will allow my provider to discuss my care with, or share a copy of, this agreement with any other treating provider, pharmacy or emergency room where I receive care.     I have read this agreement and have asked questions about anything I did not understand.    ________________________________________________________  Patient Signature - Jas Patel     ___________________                   Date     ________________________________________________________  Provider Signature - Lola Perez  APRN CNP       ___________________                   Date     ________________________________________________________  Witness Signature (required if provider not present while patient signing)          ___________________                   Date

## 2021-06-12 LAB
ANION GAP SERPL CALCULATED.3IONS-SCNC: 7 MMOL/L (ref 3–14)
BUN SERPL-MCNC: 16 MG/DL (ref 7–30)
CALCIUM SERPL-MCNC: 8.8 MG/DL (ref 8.5–10.1)
CHLORIDE SERPL-SCNC: 104 MMOL/L (ref 94–109)
CHOLEST SERPL-MCNC: 175 MG/DL
CO2 SERPL-SCNC: 26 MMOL/L (ref 20–32)
CREAT SERPL-MCNC: 1 MG/DL (ref 0.66–1.25)
GFR SERPL CREATININE-BSD FRML MDRD: 85 ML/MIN/{1.73_M2}
GLUCOSE SERPL-MCNC: 114 MG/DL (ref 70–99)
HCV AB SERPL QL IA: NONREACTIVE
HDLC SERPL-MCNC: 50 MG/DL
HIV 1+2 AB+HIV1 P24 AG SERPL QL IA: NONREACTIVE
LDLC SERPL CALC-MCNC: 98 MG/DL
NONHDLC SERPL-MCNC: 125 MG/DL
POTASSIUM SERPL-SCNC: 3.9 MMOL/L (ref 3.4–5.3)
SODIUM SERPL-SCNC: 137 MMOL/L (ref 133–144)
TRIGL SERPL-MCNC: 134 MG/DL

## 2021-06-12 ASSESSMENT — ANXIETY QUESTIONNAIRES: GAD7 TOTAL SCORE: 0

## 2021-06-12 NOTE — RESULT ENCOUNTER NOTE
Jas,    Cholesterol looks great.    Blood sugar was a bit elevated which is normal as you weren't fasting.  I am going to see if I can add on an A1C test to screen for diabetes.    Normal kidney function and electrolytes.    Lola Perez, CNP

## 2021-07-12 RX ORDER — HYDROCODONE BITARTRATE AND ACETAMINOPHEN 5; 325 MG/1; MG/1
TABLET ORAL
Qty: 28 TABLET | Refills: 0 | Status: SHIPPED | OUTPATIENT
Start: 2021-08-06 | End: 2021-09-03

## 2021-07-12 RX ORDER — TRAMADOL HYDROCHLORIDE 50 MG/1
TABLET ORAL
Qty: 56 TABLET | Refills: 0 | Status: SHIPPED | OUTPATIENT
Start: 2021-08-06 | End: 2021-09-03

## 2021-08-26 ENCOUNTER — MYC MEDICAL ADVICE (OUTPATIENT)
Dept: FAMILY MEDICINE | Facility: CLINIC | Age: 51
End: 2021-08-26

## 2021-08-30 NOTE — TELEPHONE ENCOUNTER
Jas was a patient of Lola Murdock and is wonder if he can start seeing you for his pain meds. Last clinic visit was 6/11/2021 and is due for a e visit med check. Is this ok to do a e visit or would you like a different visit for this. He said he is seen in clinic 2 times a year and e visits 2 times a year.    thank you

## 2021-08-30 NOTE — TELEPHONE ENCOUNTER
Writer responded via [employer].    KATHY PetersenN, RN  [facility]

## 2021-09-03 ENCOUNTER — E-VISIT (OUTPATIENT)
Dept: FAMILY MEDICINE | Facility: CLINIC | Age: 51
End: 2021-09-03
Payer: COMMERCIAL

## 2021-09-03 DIAGNOSIS — G89.4 CHRONIC PAIN SYNDROME: ICD-10-CM

## 2021-09-03 DIAGNOSIS — M54.2 CHRONIC NECK PAIN: ICD-10-CM

## 2021-09-03 DIAGNOSIS — G89.29 CHRONIC NECK PAIN: ICD-10-CM

## 2021-09-03 PROCEDURE — 99421 OL DIG E/M SVC 5-10 MIN: CPT | Performed by: FAMILY MEDICINE

## 2021-09-03 RX ORDER — HYDROCODONE BITARTRATE AND ACETAMINOPHEN 5; 325 MG/1; MG/1
1 TABLET ORAL DAILY PRN
Qty: 28 TABLET | Refills: 0 | Status: SHIPPED | OUTPATIENT
Start: 2021-09-03 | End: 2021-10-03

## 2021-09-03 RX ORDER — TRAMADOL HYDROCHLORIDE 50 MG/1
50 TABLET ORAL 2 TIMES DAILY PRN
Qty: 56 TABLET | Refills: 0 | Status: SHIPPED | OUTPATIENT
Start: 2021-09-03 | End: 2021-10-03

## 2021-09-04 ENCOUNTER — HEALTH MAINTENANCE LETTER (OUTPATIENT)
Age: 51
End: 2021-09-04

## 2021-10-29 ENCOUNTER — TRANSFERRED RECORDS (OUTPATIENT)
Dept: HEALTH INFORMATION MANAGEMENT | Facility: CLINIC | Age: 51
End: 2021-10-29
Payer: COMMERCIAL

## 2021-10-30 ENCOUNTER — MYC MEDICAL ADVICE (OUTPATIENT)
Dept: FAMILY MEDICINE | Facility: CLINIC | Age: 51
End: 2021-10-30

## 2021-11-03 NOTE — TELEPHONE ENCOUNTER
Writer responded via [employer].    KATHY PetersenN, RN  [facility]

## 2021-12-03 ENCOUNTER — VIRTUAL VISIT (OUTPATIENT)
Dept: FAMILY MEDICINE | Facility: CLINIC | Age: 51
End: 2021-12-03
Payer: COMMERCIAL

## 2021-12-03 DIAGNOSIS — M54.16 LUMBAR RADICULOPATHY: ICD-10-CM

## 2021-12-03 DIAGNOSIS — F32.1 MODERATE MAJOR DEPRESSION (H): ICD-10-CM

## 2021-12-03 PROCEDURE — 99214 OFFICE O/P EST MOD 30 MIN: CPT | Mod: 95 | Performed by: FAMILY MEDICINE

## 2021-12-03 RX ORDER — HYDROCODONE BITARTRATE AND ACETAMINOPHEN 5; 325 MG/1; MG/1
1 TABLET ORAL DAILY PRN
Qty: 28 TABLET | Refills: 0 | Status: SHIPPED | OUTPATIENT
Start: 2021-12-03 | End: 2021-12-27

## 2021-12-03 RX ORDER — TRAMADOL HYDROCHLORIDE 50 MG/1
50 TABLET ORAL 2 TIMES DAILY PRN
Qty: 56 TABLET | Refills: 0 | Status: SHIPPED | OUTPATIENT
Start: 2021-12-03 | End: 2021-12-27

## 2021-12-03 NOTE — PROGRESS NOTES
Jas is a 51 year old who is being evaluated via a billable video visit.      How would you like to obtain your AVS? MyChart  If the video visit is dropped, the invitation should be resent by: Text to cell phone: [phone]  Will anyone else be joining your video visit? No      Video Start Time: 9:43 AM    Assessment & Plan     Moderate major depression (H)  Using celexa. Continue same.     Lumbar radiculopathy  Chronic. Multiple back surgeries.   - no pain clinic involvement in the past but no concerning activity. Getting 28 days supply. I will fill next 2 months rx besides today. He is planning to establish care with Dr Britton. Consider lyrica as he did not tolerate gabapentin in the past.   - traMADol (ULTRAM) 50 MG tablet; Take 1 tablet (50 mg) by mouth 2 times daily as needed for severe pain  - HYDROcodone-acetaminophen (NORCO) 5-325 MG tablet; Take 1 tablet by mouth daily as needed for severe pain 28 pills to last 28 days      Lars Beltran MD, MD  [facility]    Subjective   Jas is a 51 year old who presents for the following health issues     Medication Refill    History of Present Illness       Back Pain:  He presents for follow up of back pain. Patient's back pain is a chronic problem.  Location of back pain:  Right lower back, left lower back, left upper back, left side of neck and left shoulder  Description of back pain: other  Back pain spreads: nowhere    Since patient first noticed back pain, pain is: always present, but gets better and worse  Does back pain interfere with his job:  No      He eats 2-3 servings of fruits and vegetables daily.He consumes 2 sweetened beverage(s) daily.He exercises with enough effort to increase his heart rate 9 or less minutes per day.  He exercises with enough effort to increase his heart rate 3 or less days per week.   He is taking medications regularly.       Chronic/Recurring Back Pain Follow Up      Where is your back  pain located? (Select all that apply) shoulders left    How would you describe your back pain?  sharp    Where does your back pain spread? the left shoulder    Since your last clinic visit for back pain, how has your pain changed? unchanged    Does your back pain interfere with your job? No    Since your last visit, have you tried any new treatment? Yes -  chiropractor      How many servings of fruits and vegetables do you eat daily?  2-3    On average, how many sweetened beverages do you drink each day (Examples: soda, juice, sweet tea, etc.  Do NOT count diet or artificially sweetened beverages)?   3    How many days per week do you exercise enough to make your heart beat faster? 3 or less    How many minutes a day do you exercise enough to make your heart beat faster? 9 or less    How many days per week do you miss taking your medication? 0    History of multiple back surgeries.   Currently stable.   [place] [employer] [place] [facility].   Not on work restrictions.   Tramadol 1 tablet twice per day and norco as needed in the evening.   Taking same dose for 10 yrs. Working for him.     Getting 28 pills of norco and 56 of tramadol for 28 days.   Getting 3 months supply at a time.   Not been to pain specialist. Been to back specialist and PT multiple time.   Total of 3 back surgeries.   Gabapentin - had side effects with it.   Ibuprofen 800mg 3 times per day.   celexa - depression - mood is fine. No suicidal thoughts. Feeling fine with it.   Elavil for sleep.     Review of Systems         Objective           Vitals:  No vitals were obtained today due to virtual visit.    Physical Exam   GENERAL: Healthy, alert and no distress  EYES: Eyes grossly normal to inspection.  No discharge or erythema, or obvious scleral/conjunctival abnormalities.  RESP: No audible wheeze, cough, or visible cyanosis.  No visible retractions or increased work of breathing.    SKIN: Visible skin clear. No significant rash, abnormal  pigmentation or lesions.  NEURO: Cranial nerves grossly intact.  Mentation and speech appropriate for age.  PSYCH: Mentation appears normal, affect normal/bright, judgement and insight intact, normal speech and appearance well-groomed.                Video-Visit Details    Type of service:  Video Visit      Video visit did not go well - tried both amwell and doximity - audio issue. Phone visit was used to compete visit. Total phone time 13 minutes

## 2021-12-27 ENCOUNTER — TELEPHONE (OUTPATIENT)
Dept: FAMILY MEDICINE | Facility: CLINIC | Age: 51
End: 2021-12-27
Payer: COMMERCIAL

## 2021-12-27 DIAGNOSIS — M54.16 LUMBAR RADICULOPATHY: ICD-10-CM

## 2021-12-27 RX ORDER — HYDROCODONE BITARTRATE AND ACETAMINOPHEN 5; 325 MG/1; MG/1
1 TABLET ORAL DAILY PRN
Qty: 28 TABLET | Refills: 0 | Status: SHIPPED | OUTPATIENT
Start: 2021-12-31 | End: 2022-01-25

## 2021-12-27 RX ORDER — TRAMADOL HYDROCHLORIDE 50 MG/1
50 TABLET ORAL 2 TIMES DAILY PRN
Qty: 56 TABLET | Refills: 0 | Status: SHIPPED | OUTPATIENT
Start: 2021-12-31 | End: 2022-01-25

## 2022-01-24 ENCOUNTER — MYC MEDICAL ADVICE (OUTPATIENT)
Dept: FAMILY MEDICINE | Facility: CLINIC | Age: 52
End: 2022-01-24
Payer: COMMERCIAL

## 2022-01-25 ENCOUNTER — TELEPHONE (OUTPATIENT)
Dept: FAMILY MEDICINE | Facility: CLINIC | Age: 52
End: 2022-01-25
Payer: COMMERCIAL

## 2022-01-25 DIAGNOSIS — M54.16 LUMBAR RADICULOPATHY: ICD-10-CM

## 2022-01-25 RX ORDER — HYDROCODONE BITARTRATE AND ACETAMINOPHEN 5; 325 MG/1; MG/1
1 TABLET ORAL DAILY PRN
Qty: 28 TABLET | Refills: 0 | Status: SHIPPED | OUTPATIENT
Start: 2022-01-28 | End: 2022-02-25

## 2022-01-25 RX ORDER — TRAMADOL HYDROCHLORIDE 50 MG/1
50 TABLET ORAL 2 TIMES DAILY PRN
Qty: 56 TABLET | Refills: 0 | Status: SHIPPED | OUTPATIENT
Start: 2022-01-28 | End: 2022-02-25

## 2022-01-25 NOTE — TELEPHONE ENCOUNTER
Writer responded via [employer].    KATHY PetersenN, RN  [facility]

## 2022-02-19 ENCOUNTER — HEALTH MAINTENANCE LETTER (OUTPATIENT)
Age: 52
End: 2022-02-19

## 2022-02-25 ENCOUNTER — VIRTUAL VISIT (OUTPATIENT)
Dept: FAMILY MEDICINE | Facility: CLINIC | Age: 52
End: 2022-02-25
Payer: COMMERCIAL

## 2022-02-25 DIAGNOSIS — R73.01 ELEVATED FASTING BLOOD SUGAR: ICD-10-CM

## 2022-02-25 DIAGNOSIS — F32.1 MODERATE MAJOR DEPRESSION (H): ICD-10-CM

## 2022-02-25 DIAGNOSIS — Z12.11 SCREEN FOR COLON CANCER: ICD-10-CM

## 2022-02-25 DIAGNOSIS — M54.16 LUMBAR RADICULOPATHY: ICD-10-CM

## 2022-02-25 DIAGNOSIS — M48.062 SPINAL STENOSIS OF LUMBAR REGION WITH NEUROGENIC CLAUDICATION: ICD-10-CM

## 2022-02-25 DIAGNOSIS — F11.20 CONTINUOUS OPIOID DEPENDENCE (H): ICD-10-CM

## 2022-02-25 DIAGNOSIS — K21.00 GASTROESOPHAGEAL REFLUX DISEASE WITH ESOPHAGITIS, UNSPECIFIED WHETHER HEMORRHAGE: ICD-10-CM

## 2022-02-25 PROCEDURE — 99214 OFFICE O/P EST MOD 30 MIN: CPT | Mod: TEL | Performed by: FAMILY MEDICINE

## 2022-02-25 RX ORDER — TRAMADOL HYDROCHLORIDE 50 MG/1
50 TABLET ORAL 2 TIMES DAILY PRN
Qty: 56 TABLET | Refills: 0 | Status: SHIPPED | OUTPATIENT
Start: 2022-02-28 | End: 2022-03-28

## 2022-02-25 RX ORDER — HYDROCODONE BITARTRATE AND ACETAMINOPHEN 5; 325 MG/1; MG/1
1 TABLET ORAL DAILY PRN
Qty: 28 TABLET | Refills: 0 | Status: SHIPPED | OUTPATIENT
Start: 2022-02-28 | End: 2022-03-28

## 2022-02-25 RX ORDER — TIZANIDINE HYDROCHLORIDE 4 MG/1
4 CAPSULE, GELATIN COATED ORAL 3 TIMES DAILY
Qty: 90 CAPSULE | Refills: 0 | Status: SHIPPED | OUTPATIENT
Start: 2022-02-25 | End: 2022-08-26

## 2022-02-25 ASSESSMENT — PATIENT HEALTH QUESTIONNAIRE - PHQ9
SUM OF ALL RESPONSES TO PHQ QUESTIONS 1-9: 3
SUM OF ALL RESPONSES TO PHQ QUESTIONS 1-9: 3
10. IF YOU CHECKED OFF ANY PROBLEMS, HOW DIFFICULT HAVE THESE PROBLEMS MADE IT FOR YOU TO DO YOUR WORK, TAKE CARE OF THINGS AT HOME, OR GET ALONG WITH OTHER PEOPLE: NOT DIFFICULT AT ALL

## 2022-02-25 NOTE — LETTER
Opioid / Opioid Plus Controlled Substance Agreement    This is an agreement between you and your provider about the safe and appropriate use of controlled substance/opioids prescribed by your care team. Controlled substances are medicines that can cause physical and mental dependence (abuse).    There are strict laws about having and using these medicines. We here at [facility] are committing to working with you in your efforts to get better. To support you in this work, we ll help you schedule regular office appointments for medicine refills. If we must cancel or change your appointment for any reason, we ll make sure you have enough medicine to last until your next appointment.     As a Provider, I will:    Listen carefully to your concerns and treat you with respect.     Recommend a treatment plan that I believe is in your best interest. This plan may involve therapies other than opioid pain medication.     Talk with you often about the possible benefits, and the risk of harm of any medicine that we prescribe for you.     Provide a plan on how to taper (discontinue or go off) using this medicine if the decision is made to stop its use.    As a Patient, I understand that opioid(s):     Are a controlled substance prescribed by my care team to help me function or work and manage my condition(s).     Are strong medicines and can cause serious side effects such as:    Drowsiness, which can seriously affect my driving ability    A lower breathing rate, enough to cause death    Harm to my thinking ability     Depression     Abuse of and addiction to this medicine    Need to be taken exactly as prescribed. Combining opioids with certain medicines or chemicals (such as illegal drugs, sedatives, sleeping pills, and benzodiazepines) can be dangerous or even fatal. If I stop opioids suddenly, I may have severe withdrawal symptoms.    Do not work for all types of pain nor for all patients. If they re not helpful, I may  be asked to stop them.        The risks, benefits and side effects of these medicine(s) were explained to me. I agree that:  1. I will take part in other treatments as advised by my care team. This may be psychiatry or counseling, physical therapy, behavioral therapy, group treatment or a referral to a specialist.     2. I will keep all my appointments. I understand that this is part of the monitoring of opioids. My care team may require an office visit for EVERY opioid/controlled substance refill. If I miss appointments or don t follow instructions, my care team may stop my medicine.    3. I will take my medicines as prescribed. I will not change the dose or schedule unless my care team tells me to. There will be no refills if I run out early.     4. I may be asked to come to the clinic and complete a urine drug test or complete a pill count at any time. If I don t give a urine sample or participate in a pill count, the care team may stop my medicine.    5. I will only receive prescriptions from this clinic for chronic pain. If I am treated by another provider for acute pain issues, I will tell them that I am taking opioid pain medication for chronic pain and that I have a treatment agreement with this provider. I will inform my [facility] care team within one business day if I am given a prescription for any pain medication by another healthcare provider. My [facility] care team can contact other providers and pharmacists about my use of any medicines.    6. It is up to me to make sure that I don t run out of my medicines on weekends or holidays. If my care team is willing to refill my opioid prescription without a visit, I must request refills only during office hours. Refills may take up to 3 business days to process. I will use one pharmacy to fill all my opioid and other controlled substance prescriptions. I will notify the clinic about any changes to my insurance or medication  availability.    7. I am responsible for my prescriptions. If the medicine/prescription is lost, stolen or destroyed, it will not be replaced. I also agree not to share controlled substance medicines with anyone.    8. I am aware I should not use any illegal or recreational drugs. I agree not to drink alcohol unless my care team says I can.       9. If I enroll in the [place] Medical Cannabis program, I will tell my care team prior to my next refill.     10. I will tell my care team right away if I become pregnant, have a new medical problem treated outside of my regular clinic, or have a change in my medications.    11. I understand that this medicine can affect my thinking, judgment and reaction time. Alcohol and drugs affect the brain and body, which can affect the safety of my driving. Being under the influence of alcohol or drugs can affect my decision-making, behaviors, personal safety, and the safety of others. Driving while impaired (DWI) can occur if a person is driving, operating, or in physical control of a car, motorcycle, boat, snowmobile, ATV, motorbike, off-road vehicle, or any other motor vehicle ([place] Statute 169A.20). I understand the risk if I choose to drive or operate any vehicle or machinery.    I understand that if I do not follow any of the conditions above, my prescriptions or treatment may be stopped or changed.          Opioids  What You Need to Know    What are opioids?   Opioids are pain medicines that must be prescribed by a doctor. They are also known as narcotics.     Examples are:   1. morphine (MS Contin, Amy)  2. oxycodone (Oxycontin)  3. oxycodone and acetaminophen (Percocet)  4. hydrocodone and acetaminophen (Vicodin, Norco)   5. fentanyl patch (Duragesic)   6. hydromorphone (Dilaudid)   7. methadone  8. codeine (Tylenol #3)     What do opioids do well?   Opioids are best for severe short-term pain such as after a surgery or injury. They may work well for cancer pain. They may  help some people with long-lasting (chronic) pain.     What do opioids NOT do well?   Opioids never get rid of pain entirely, and they don t work well for most patients with chronic pain. Opioids don t reduce swelling, one of the causes of pain.                                    Other ways to manage chronic pain and improve function include:       Treat the health problem that may be causing pain    Anti-inflammation medicines, which reduce swelling and tenderness, such as ibuprofen (Advil, Motrin) or naproxen (Aleve)    Acetaminophen (Tylenol)    Antidepressants and anti-seizure medicines, especially for nerve pain    Topical treatments such as patches or creams    Injections or nerve blocks    Chiropractic or osteopathic treatment    Acupuncture, massage, deep breathing, meditation, visual imagery, aromatherapy    Use heat or ice at the pain site    Physical therapy     Exercise    Stop smoking    Take part in therapy       Risks and side effects     Talk to your doctor before you start or decide to keep taking opioids. Possible side effects include:      Lowering your breathing rate enough to cause death    Overdose, including death, especially if taking higher than prescribed doses    Worse depression symptoms; less pleasure in things you usually enjoy    Feeling tired or sluggish    Slower thoughts or cloudy thinking    Being more sensitive to pain over time; pain is harder to control    Trouble sleeping or restless sleep    Changes in hormone levels (for example, less testosterone)    Changes in sex drive or ability to have sex    Constipation    Unsafe driving    Itching and sweating    Dizziness    Nausea, throwing up and dry mouth    What else should I know about opioids?    Opioids may lead to dependence, tolerance, or addiction.      Dependence means that if you stop or reduce the medicine too quickly, you will have withdrawal symptoms. These include loose poop (diarrhea), jitters, flu-like symptoms,  nervousness and tremors. Dependence is not the same as addiction.                       Tolerance means needing higher doses over time to get the same effect. This may increase the chance of serious side effects.      Addiction is when people improperly use a substance that harms their body, their mind or their relations with others. Use of opiates can cause a relapse of addiction if you have a history of drug or alcohol abuse.      People who have used opioids for a long time may have a lower quality of life, worse depression, higher levels of pain and more visits to doctors.    You can overdose on opioids. Take these steps to lower your risk of overdose:    1. Recognize the signs:  Signs of overdose include decrease or loss of consciousness (blackout), slowed breathing, trouble waking up and blue lips. If someone is worried about overdose, they should call 911.    2. Talk to your doctor about Narcan (naloxone).   If you are at risk for overdose, you may be given a prescription for Narcan. This medicine very quickly reverses the effects of opioids.   If you overdose, a friend or family member can give you Narcan while waiting for the ambulance. They need to know the signs of overdose and how to give Narcan.     3. Don't use alcohol or street drugs.   Taking them with opioids can cause death.    4. Do not take any of these medicines unless your doctor says it s OK. Taking these with opioids can cause death:    Benzodiazepines, such as lorazepam (Ativan), alprazolam (Xanax) or diazepam (Valium)    Muscle relaxers, such as cyclobenzaprine (Flexeril)    Sleeping pills like zolpidem (Ambien)     Other opioids      How to keep you and other people safe while taking opioids:    1. Never share your opioids with others.  Opioid medicines are regulated by the Drug Enforcement Agency ([employer]AL[place]). Selling or sharing medications is a criminal act.    2. Be sure to store opioids in a secure place, locked up if possible. Young children  can easily swallow them and overdose.    3. When you are traveling with your medicines, keep them in the original bottles. If you use a pill box, be sure you also carry a copy of your medicine list from your clinic or pharmacy.    4. Safe disposal of opioids    Most pharmacies have places to get rid of medicine, called disposal kiosks. Medicine disposal options are also available in every [facility]. Search your county and  medication disposal  to find more options. You can find more details at:  [email or web address]://[email or web address]-[email or web address]-unwanted-medications     I agree that my provider, clinic care team, and pharmacy may work with any city, state or federal law enforcement agency that investigates the misuse, sale, or other diversion of my controlled medicine. I will allow my provider to discuss my care with, or share a copy of, this agreement with any other treating provider, pharmacy or emergency room where I receive care.    I have read this agreement and have asked questions about anything I did not understand.    _______________________________________________________  Patient Signature - Jas Patel _____________________                   Date     _______________________________________________________  Provider Signature - Yadiel Britton MD   _____________________                   Date     _______________________________________________________  Witness Signature (required if provider not present while patient signing)   _____________________                   Date

## 2022-02-25 NOTE — PROGRESS NOTES
Jas is a 51 year old who is being evaluated via a billable video visit.      How would you like to obtain your AVS? MyChart  If the video visit is dropped, the invitation should be resent by: Text to cell phone: [phone]  Will anyone else be joining your video visit? No        Assessment & Plan     Moderate major depression   PHQ 2/1/2021 6/11/2021 2/25/2022   PHQ-9 Total Score 4 0 3   Q9: Thoughts of better off dead/self-harm past 2 weeks Not at all Not at all Not at all       Continuous opioid dependence   Spinal stenosis of lumbar region with neurogenic claudication  Lumbar radiculopathy  - will update controlled substance agreement   - [place]   - [id number] - Urine Drug Confirmation Panel (Comprehensive); Future  - tiZANidine (ZANAFLEX) 4 MG capsule; Take 1 capsule (4 mg) by mouth 3 times daily  Dispense: 90 capsule; Refill: 0  - HYDROcodone-acetaminophen (NORCO) 5-325 MG tablet; Take 1 tablet by mouth daily as needed for severe pain 28 pills to last 28 days  Dispense: 28 tablet; Refill: 0  - traMADol (ULTRAM) 50 MG tablet; Take 1 tablet (50 mg) by mouth 2 times daily as needed for severe pain  Dispense: 56 tablet; Refill: 0  - refill for next 2 months  - e-visit in 3 months     Gastroesophageal reflux disease with esophagitis, unspecified whether hemorrhage  - not controlled, EGD  - Adult Gastro Ref - Procedure Only; Future  - Adult Gastro Ref - Procedure Only; Future    Screen for colon cancer  - Adult Gastro Ref - Procedure Only; Future  - Adult Gastro Ref - Procedure Only; Future    Elevated fasting blood sugar  - Hemoglobin A1c; Future        Deqa Bry Britton MD  [facility]    Louise Mark is a 51 year old who presents for the following health issues     History of Present Illness     Reason for visit:  Refill chronic pain Rx  Symptom onset:  More than a month  Symptoms include:  Left neck/shoulder pain. Low back pain  Symptom intensity:   Moderate  Symptom progression:  Staying the same  Had these symptoms before:  Yes  Has tried/received treatment for these symptoms:  Yes  Previous treatment was successful:  No  What makes it worse:  Strenuous activity  What makes it better:  Medication, chiropractic sometimes helps    He eats 2-3 servings of fruits and vegetables daily.He consumes 2 sweetened beverage(s) daily.He exercises with enough effort to increase his heart rate 9 or less minutes per day.  He exercises with enough effort to increase his heart rate 3 or less days per week.   He is taking medications regularly.      He has globus sensation and having bad heartburn. He notes that Prilosec doesn't help with globus sensation.     Review of Systems         Objective           Vitals:  No vitals were obtained today due to virtual visit.    Physical Exam   GENERAL: Healthy, alert and no distress  EYES: Eyes grossly normal to inspection.  No discharge or erythema, or obvious scleral/conjunctival abnormalities.  RESP: No audible wheeze, cough, or visible cyanosis.  No visible retractions or increased work of breathing.    SKIN: Visible skin clear. No significant rash, abnormal pigmentation or lesions.  NEURO: Cranial nerves grossly intact.  Mentation and speech appropriate for age.  PSYCH: Mentation appears normal, affect normal/bright, judgement and insight intact, normal speech and appearance well-groomed.                Telephone 7 minutes   Answers for HPI/ROS submitted by the patient on 2/25/2022  If you checked off any problems, how difficult have these problems made it for you to do your work, take care of things at home, or get along with other people?: Not difficult at all  PHQ9 TOTAL SCORE: 3

## 2022-02-26 ASSESSMENT — PATIENT HEALTH QUESTIONNAIRE - PHQ9: SUM OF ALL RESPONSES TO PHQ QUESTIONS 1-9: 3

## 2022-03-12 ENCOUNTER — LAB (OUTPATIENT)
Dept: LAB | Facility: CLINIC | Age: 52
End: 2022-03-12
Payer: COMMERCIAL

## 2022-03-12 DIAGNOSIS — R73.01 ELEVATED FASTING BLOOD SUGAR: ICD-10-CM

## 2022-03-12 DIAGNOSIS — M48.062 SPINAL STENOSIS OF LUMBAR REGION WITH NEUROGENIC CLAUDICATION: ICD-10-CM

## 2022-03-12 LAB
CREAT UR-MCNC: 94 MG/DL
HBA1C MFR BLD: 5.6 % (ref 0–5.6)

## 2022-03-12 PROCEDURE — 36415 COLL VENOUS BLD VENIPUNCTURE: CPT

## 2022-03-12 PROCEDURE — 83036 HEMOGLOBIN GLYCOSYLATED A1C: CPT

## 2022-03-12 PROCEDURE — 80307 DRUG TEST PRSMV CHEM ANLYZR: CPT

## 2022-03-15 LAB
DHC UR CFM-MCNC: 74 NG/ML
DHC/CREAT UR: 79 NG/MG {CREAT}
HYDROCODONE UR CFM-MCNC: 232 NG/ML
HYDROCODONE/CREAT UR: 247 NG/MG {CREAT}
N-NORTRAMADOL/CREAT UR CFM: 8787 NG/MG {CREAT}
O-NORTRAMADOL UR CFM-MCNC: 8260 NG/ML
TRAMADOL CTO UR CFM-MCNC: ABNORMAL NG/ML
TRAMADOL/CREAT UR: ABNORMAL

## 2022-04-04 DIAGNOSIS — G47.00 INSOMNIA, UNSPECIFIED TYPE: ICD-10-CM

## 2022-04-05 NOTE — TELEPHONE ENCOUNTER
Popping up drug-drug interaction between citalopram and amitriptyline,  And ultram and amitriptyline    Last filled:  amitriptyline (ELAVIL) 25 MG tablet 90 tablet 0 12/27/2021     Last visit: 2/25/2022 virtual visit with DM    Upcoming visit: none  thank you    Tricyclic Agents ( Annual appt and no PHQ9) Passed 04/04/2022 04:45 PM   Protocol Details  Blood Pressure under 140/90 in past 12 mos    Recent (12 mo) or future (30 days) visit within authorizing provider's specialty    Medication is active on med list    Patient is age 18 or older

## 2022-04-25 DIAGNOSIS — M54.16 LUMBAR RADICULOPATHY: ICD-10-CM

## 2022-04-25 RX ORDER — HYDROCODONE BITARTRATE AND ACETAMINOPHEN 5; 325 MG/1; MG/1
1 TABLET ORAL DAILY PRN
Qty: 28 TABLET | Refills: 0 | Status: SHIPPED | OUTPATIENT
Start: 2022-04-26 | End: 2022-04-26

## 2022-04-25 RX ORDER — TRAMADOL HYDROCHLORIDE 50 MG/1
TABLET ORAL
Qty: 56 TABLET | Refills: 0 | Status: SHIPPED | OUTPATIENT
Start: 2022-04-26 | End: 2022-04-26

## 2022-04-25 NOTE — TELEPHONE ENCOUNTER
HYDROCODONE  Routing refill request to provider for review/approval because:  Drug not on the [facility] refill protocol   Last Written Prescription Date:  3/31/22  Last Fill Quantity: 28,  # refills: 0   Last office visit: 6/11/2021 with prescribing provider:     Future Office Visit:    TRAMADOL  Routing refill request to provider for review/approval because:  Drug not on the [facility] refill protocol   Last Written Prescription Date:  3/31/22  Last Fill Quantity: 56,  # refills: 0   Last office visit: 6/11/2021 with prescribing provider:     Future Office Visit:    Miranda Altman RN  [facility]

## 2022-04-26 RX ORDER — HYDROCODONE BITARTRATE AND ACETAMINOPHEN 5; 325 MG/1; MG/1
1 TABLET ORAL DAILY PRN
Qty: 30 TABLET | Refills: 0 | Status: SHIPPED | OUTPATIENT
Start: 2022-04-26 | End: 2022-05-25

## 2022-04-26 RX ORDER — TRAMADOL HYDROCHLORIDE 50 MG/1
TABLET ORAL
Qty: 60 TABLET | Refills: 0 | Status: SHIPPED | OUTPATIENT
Start: 2022-04-26 | End: 2022-05-25

## 2022-05-25 ENCOUNTER — E-VISIT (OUTPATIENT)
Dept: FAMILY MEDICINE | Facility: CLINIC | Age: 52
End: 2022-05-25
Payer: COMMERCIAL

## 2022-05-25 DIAGNOSIS — M54.16 LUMBAR RADICULOPATHY: ICD-10-CM

## 2022-05-25 PROCEDURE — 99421 OL DIG E/M SVC 5-10 MIN: CPT | Performed by: FAMILY MEDICINE

## 2022-05-25 RX ORDER — HYDROCODONE BITARTRATE AND ACETAMINOPHEN 5; 325 MG/1; MG/1
1 TABLET ORAL DAILY PRN
Qty: 30 TABLET | Refills: 0 | Status: SHIPPED | OUTPATIENT
Start: 2022-05-27 | End: 2022-06-27

## 2022-05-25 RX ORDER — TRAMADOL HYDROCHLORIDE 50 MG/1
TABLET ORAL
Qty: 60 TABLET | Refills: 0 | Status: SHIPPED | OUTPATIENT
Start: 2022-05-27 | End: 2022-06-27

## 2022-06-30 ENCOUNTER — LAB (OUTPATIENT)
Dept: URGENT CARE | Facility: URGENT CARE | Age: 52
End: 2022-06-30
Attending: FAMILY MEDICINE
Payer: COMMERCIAL

## 2022-06-30 ENCOUNTER — MYC REFILL (OUTPATIENT)
Dept: FAMILY MEDICINE | Facility: CLINIC | Age: 52
End: 2022-06-30

## 2022-06-30 DIAGNOSIS — M54.16 LUMBAR RADICULOPATHY: ICD-10-CM

## 2022-06-30 DIAGNOSIS — Z20.822 SUSPECTED 2019 NOVEL CORONAVIRUS INFECTION: ICD-10-CM

## 2022-06-30 LAB — SARS-COV-2 RNA RESP QL NAA+PROBE: POSITIVE

## 2022-06-30 PROCEDURE — U0005 INFEC AGEN DETEC AMPLI PROBE: HCPCS

## 2022-06-30 PROCEDURE — U0003 INFECTIOUS AGENT DETECTION BY NUCLEIC ACID (DNA OR RNA); SEVERE ACUTE RESPIRATORY SYNDROME CORONAVIRUS 2 (SARS-COV-2) (CORONAVIRUS DISEASE [COVID-19]), AMPLIFIED PROBE TECHNIQUE, MAKING USE OF HIGH THROUGHPUT TECHNOLOGIES AS DESCRIBED BY CMS-2020-01-R: HCPCS

## 2022-06-30 RX ORDER — HYDROCODONE BITARTRATE AND ACETAMINOPHEN 5; 325 MG/1; MG/1
1 TABLET ORAL DAILY PRN
Qty: 30 TABLET | Refills: 0 | Status: CANCELLED | OUTPATIENT
Start: 2022-06-30

## 2022-06-30 RX ORDER — TRAMADOL HYDROCHLORIDE 50 MG/1
TABLET ORAL
Qty: 60 TABLET | Refills: 0 | Status: CANCELLED | OUTPATIENT
Start: 2022-06-30

## 2022-07-04 ENCOUNTER — TELEPHONE (OUTPATIENT)
Dept: NURSING | Facility: CLINIC | Age: 52
End: 2022-07-04

## 2022-07-04 NOTE — TELEPHONE ENCOUNTER
Patient classified as COVID treatment eligible by Epic high risk algorithm:  Yes    Coronavirus (COVID-19) Notification    Reason for call  Notify of POSITIVE COVID-19 lab result, assess symptoms,  review [facility] recommendations    Lab Result   Lab test for 2019-nCoV rRt-PCR or SARS-COV-2 PCR  Oropharyngeal AND/OR nasopharyngeal swabs were POSITIVE for 2019-nCoV RNA [OR] SARS-COV-2 RNA (COVID-19) RNA     We have been unable to reach patient by phone at this time to notify of their Positive COVID-19 result.    Left voicemail message requesting a call back to [phone] [facility] for results.        A Positive COVID-19 letter will be sent via [employer] or the mail. (Exception, no letters sent to Presurgerical/Preprocedure Patients)    Ayanna Deleon

## 2022-07-25 ENCOUNTER — MYC MEDICAL ADVICE (OUTPATIENT)
Dept: FAMILY MEDICINE | Facility: CLINIC | Age: 52
End: 2022-07-25

## 2022-07-28 DIAGNOSIS — F41.1 GENERALIZED ANXIETY DISORDER: ICD-10-CM

## 2022-07-28 RX ORDER — CITALOPRAM HYDROBROMIDE 40 MG/1
40 TABLET ORAL DAILY
Qty: 90 TABLET | Refills: 0 | Status: SHIPPED | OUTPATIENT
Start: 2022-07-28 | End: 2022-08-26

## 2022-07-28 NOTE — TELEPHONE ENCOUNTER
Prescription approved per [facility] Refill Protocol.    KATHY PetersenN, RN  [facility]    Warnings Override History for traMADol (ULTRAM) 50 MG tablet [[id number]]    Overridden by Yadiel Britton MD on Jun 30, 2022 4:45 PM   Drug-Drug   1. TRICYCLIC ANTIDEPRESSANTS / SEROTONERGIC OPIOIDS (HIGH RISK) [Level: Major] [Reason: Tolerated medication/side effects in past]   Other Orders: amitriptyline (ELAVIL) 25 MG tablet      2. TRAMADOL / SELECTIVE SEROTONIN REUPTAKE INHIBITORS [Level: Major] [Reason: Tolerated medication/side effects in past]   Other Orders: citalopram (CELEXA) 40 MG tablet

## 2022-07-28 NOTE — TELEPHONE ENCOUNTER
Would you want patient to schedule a visit to discuss THC as adjunct therapy with current pain meds?  Has CSA.  Citalopram was refilled in different encounter earlier today.  Please advise.  Miranda Altman RN  [facility]

## 2022-08-26 ENCOUNTER — OFFICE VISIT (OUTPATIENT)
Dept: FAMILY MEDICINE | Facility: CLINIC | Age: 52
End: 2022-08-26
Payer: COMMERCIAL

## 2022-08-26 VITALS
HEIGHT: 74 IN | SYSTOLIC BLOOD PRESSURE: 110 MMHG | DIASTOLIC BLOOD PRESSURE: 60 MMHG | RESPIRATION RATE: 20 BRPM | TEMPERATURE: 98.1 F | WEIGHT: 200.6 LBS | OXYGEN SATURATION: 98 % | BODY MASS INDEX: 25.74 KG/M2 | HEART RATE: 84 BPM

## 2022-08-26 DIAGNOSIS — R35.0 BENIGN PROSTATIC HYPERPLASIA WITH URINARY FREQUENCY: ICD-10-CM

## 2022-08-26 DIAGNOSIS — F11.90 CHRONIC, CONTINUOUS USE OF OPIOIDS: ICD-10-CM

## 2022-08-26 DIAGNOSIS — Z12.5 SCREENING FOR PROSTATE CANCER: ICD-10-CM

## 2022-08-26 DIAGNOSIS — N40.1 BENIGN PROSTATIC HYPERPLASIA WITH URINARY FREQUENCY: ICD-10-CM

## 2022-08-26 DIAGNOSIS — M48.062 SPINAL STENOSIS OF LUMBAR REGION WITH NEUROGENIC CLAUDICATION: ICD-10-CM

## 2022-08-26 DIAGNOSIS — F41.1 GENERALIZED ANXIETY DISORDER: ICD-10-CM

## 2022-08-26 DIAGNOSIS — F33.9 RECURRENT MAJOR DEPRESSIVE DISORDER, REMISSION STATUS UNSPECIFIED (H): ICD-10-CM

## 2022-08-26 DIAGNOSIS — Z13.220 LIPID SCREENING: ICD-10-CM

## 2022-08-26 DIAGNOSIS — K21.00 GASTROESOPHAGEAL REFLUX DISEASE WITH ESOPHAGITIS, UNSPECIFIED WHETHER HEMORRHAGE: ICD-10-CM

## 2022-08-26 DIAGNOSIS — G25.0 BENIGN ESSENTIAL TREMOR: ICD-10-CM

## 2022-08-26 DIAGNOSIS — Z12.11 COLON CANCER SCREENING: ICD-10-CM

## 2022-08-26 DIAGNOSIS — Z51.81 ENCOUNTER FOR THERAPEUTIC DRUG MONITORING: ICD-10-CM

## 2022-08-26 DIAGNOSIS — M54.16 LUMBAR RADICULOPATHY: ICD-10-CM

## 2022-08-26 LAB
ERYTHROCYTE [DISTWIDTH] IN BLOOD BY AUTOMATED COUNT: 13 % (ref 10–15)
HCT VFR BLD AUTO: 43.1 % (ref 40–53)
HGB BLD-MCNC: 14.7 G/DL (ref 13.3–17.7)
MCH RBC QN AUTO: 29.9 PG (ref 26.5–33)
MCHC RBC AUTO-ENTMCNC: 34.1 G/DL (ref 31.5–36.5)
MCV RBC AUTO: 88 FL (ref 78–100)
PLATELET # BLD AUTO: 291 10E3/UL (ref 150–450)
RBC # BLD AUTO: 4.91 10E6/UL (ref 4.4–5.9)
WBC # BLD AUTO: 7.5 10E3/UL (ref 4–11)

## 2022-08-26 PROCEDURE — 85027 COMPLETE CBC AUTOMATED: CPT | Performed by: FAMILY MEDICINE

## 2022-08-26 PROCEDURE — G0103 PSA SCREENING: HCPCS | Performed by: FAMILY MEDICINE

## 2022-08-26 PROCEDURE — 80053 COMPREHEN METABOLIC PANEL: CPT | Performed by: FAMILY MEDICINE

## 2022-08-26 PROCEDURE — 36415 COLL VENOUS BLD VENIPUNCTURE: CPT | Performed by: FAMILY MEDICINE

## 2022-08-26 PROCEDURE — 80061 LIPID PANEL: CPT | Performed by: FAMILY MEDICINE

## 2022-08-26 PROCEDURE — 99214 OFFICE O/P EST MOD 30 MIN: CPT | Performed by: FAMILY MEDICINE

## 2022-08-26 RX ORDER — TRAMADOL HYDROCHLORIDE 50 MG/1
TABLET ORAL
Qty: 60 TABLET | Refills: 2 | Status: SHIPPED | OUTPATIENT
Start: 2022-08-26 | End: 2022-11-30

## 2022-08-26 RX ORDER — HYDROCODONE BITARTRATE AND ACETAMINOPHEN 5; 325 MG/1; MG/1
1 TABLET ORAL DAILY PRN
Qty: 30 TABLET | Refills: 0 | Status: SHIPPED | OUTPATIENT
Start: 2022-08-29 | End: 2022-09-28

## 2022-08-26 RX ORDER — HYDROCODONE BITARTRATE AND ACETAMINOPHEN 5; 325 MG/1; MG/1
1 TABLET ORAL DAILY PRN
Qty: 30 TABLET | Refills: 0 | Status: SHIPPED | OUTPATIENT
Start: 2022-10-30 | End: 2022-11-29

## 2022-08-26 RX ORDER — HYDROCODONE BITARTRATE AND ACETAMINOPHEN 5; 325 MG/1; MG/1
1 TABLET ORAL DAILY PRN
Qty: 30 TABLET | Refills: 0 | Status: SHIPPED | OUTPATIENT
Start: 2022-09-29 | End: 2022-10-29

## 2022-08-26 RX ORDER — CITALOPRAM HYDROBROMIDE 40 MG/1
40 TABLET ORAL DAILY
Qty: 90 TABLET | Refills: 3 | Status: SHIPPED | OUTPATIENT
Start: 2022-08-26 | End: 2023-09-07

## 2022-08-26 RX ORDER — TAMSULOSIN HYDROCHLORIDE 0.4 MG/1
0.8 CAPSULE ORAL DAILY
Qty: 180 CAPSULE | Refills: 3 | Status: SHIPPED | OUTPATIENT
Start: 2022-08-26 | End: 2023-09-07

## 2022-08-26 RX ORDER — TIZANIDINE HYDROCHLORIDE 4 MG/1
4 CAPSULE, GELATIN COATED ORAL 3 TIMES DAILY
Qty: 90 CAPSULE | Refills: 3 | Status: SHIPPED | OUTPATIENT
Start: 2022-08-26 | End: 2023-06-08

## 2022-08-26 RX ORDER — PROPRANOLOL HYDROCHLORIDE 10 MG/1
TABLET ORAL
Qty: 180 TABLET | Refills: 3 | Status: SHIPPED | OUTPATIENT
Start: 2022-08-26 | End: 2023-09-07

## 2022-08-26 RX ORDER — OMEGA-3 FATTY ACIDS/FISH OIL 300-1000MG
CAPSULE ORAL
COMMUNITY

## 2022-08-26 RX ORDER — HYDROCODONE BITARTRATE AND ACETAMINOPHEN 5; 325 MG/1; MG/1
TABLET ORAL
Qty: 30 TABLET | Refills: 0 | Status: CANCELLED | OUTPATIENT
Start: 2022-08-26

## 2022-08-26 ASSESSMENT — PATIENT HEALTH QUESTIONNAIRE - PHQ9
SUM OF ALL RESPONSES TO PHQ QUESTIONS 1-9: 2
SUM OF ALL RESPONSES TO PHQ QUESTIONS 1-9: 2
10. IF YOU CHECKED OFF ANY PROBLEMS, HOW DIFFICULT HAVE THESE PROBLEMS MADE IT FOR YOU TO DO YOUR WORK, TAKE CARE OF THINGS AT HOME, OR GET ALONG WITH OTHER PEOPLE: NOT DIFFICULT AT ALL

## 2022-08-26 NOTE — PROGRESS NOTES
Assessment & Plan     Recurrent major depressive disorder, remission status unspecified (H)  Generalized anxiety disorder  - citalopram (CELEXA) 40 MG tablet; Take 1 tablet (40 mg) by mouth daily  Dispense: 90 tablet; Refill: 3    Spinal stenosis of lumbar region with neurogenic claudication  - tiZANidine (ZANAFLEX) 4 MG capsule; Take 1 capsule (4 mg) by mouth 3 times daily  Dispense: 90 capsule; Refill: 3    Lumbar radiculopathy  Chronic, continuous use of opioids  - [place]  [place] concerns   - traMADol (ULTRAM) 50 MG tablet; TAKE ONE TABLET BY MOUTH TWICE A DAY AS NEEDED FOR SEVERE PAIN  Dispense: 60 tablet; Refill: 2  - HYDROcodone-acetaminophen (NORCO) 5-325 MG tablet; Take 1 tablet by mouth daily as needed for pain  Dispense: 30 tablet; Refill: 0  - HYDROcodone-acetaminophen (NORCO) 5-325 MG tablet; Take 1 tablet by mouth daily as needed for pain  Dispense: 30 tablet; Refill: 0  - HYDROcodone-acetaminophen (NORCO) 5-325 MG tablet; Take 1 tablet by mouth daily as needed for pain  Dispense: 30 tablet; Refill: 0    Colon cancer screening  - Colonoscopy Screening  Referral; Future    Encounter for therapeutic drug monitoring  - CBC with platelets; Future  - Comprehensive metabolic panel (BMP + Alb, Alk Phos, ALT, AST, Total. Bili, TP); Future  - CBC with platelets  - Comprehensive metabolic panel (BMP + Alb, Alk Phos, ALT, AST, Total. Bili, TP)    Screening for prostate cancer  - PSA, screen; Future  - PSA, screen    Lipid screening  - Lipid Profile (Chol, Trig, HDL, LDL calc); Future  - Lipid Profile (Chol, Trig, HDL, LDL calc)    Gastroesophageal reflux disease with esophagitis, unspecified whether hemorrhage  - omeprazole (PRILOSEC) 20 MG DR capsule; Take 1 capsule (20 mg) by mouth daily  Dispense: 90 capsule; Refill: 3    Benign essential tremor  - propranolol (INDERAL) 10 MG tablet; TAKE ONE TABLET BY MOUTH TWICE A DAY AS NEEDED FOR TREMOR  Dispense: 180 tablet; Refill: 3    Benign prostatic  hyperplasia with urinary frequency  - tamsulosin (FLOMAX) 0.4 MG capsule; Take 2 capsules (0.8 mg) by mouth daily  Dispense: 180 capsule; Refill: 3        Deqa Bry Britton MD  [facility]    Louise Mark is a 52 year old accompanied by his alone, presenting for the following health issues:  Refill Request      History of Present Illness       Back Pain:  He presents for follow up of back pain. Patient's back pain is a chronic problem.  Location of back pain:  Right lower back, left lower back, left upper back, left side of neck and left shoulder  Description of back pain: sharp  Back pain spreads: left shoulder    Since patient first noticed back pain, pain is: always present, but gets better and worse  Does back pain interfere with his job:  No      He eats 2-3 servings of fruits and vegetables daily.He consumes 2 sweetened beverage(s) daily.He exercises with enough effort to increase his heart rate 30 to 60 minutes per day.  He exercises with enough effort to increase his heart rate 4 days per week.   He is taking medications regularly.    Today's PHQ-9         PHQ-9 Total Score: 2    PHQ-9 Q9 Thoughts of better off dead/self-harm past 2 weeks :   Not at all    How difficult have these problems made it for you to do your work, take care of things at home, or get along with other people: Not difficult at all       Medication Followup of tramadol/narco    Taking Medication as prescribed: yes    Side Effects:  None    Medication Helping Symptoms:  yes    Works as a nurse same day surgery at [employer]    Review of Systems         Objective    There were no vitals taken for this visit.  There is no height or weight on file to calculate BMI.  Physical Exam                       .  ..

## 2022-08-27 LAB
ALBUMIN SERPL-MCNC: 4 G/DL (ref 3.4–5)
ALP SERPL-CCNC: 67 U/L (ref 40–150)
ALT SERPL W P-5'-P-CCNC: 36 U/L (ref 0–70)
ANION GAP SERPL CALCULATED.3IONS-SCNC: 5 MMOL/L (ref 3–14)
AST SERPL W P-5'-P-CCNC: 22 U/L (ref 0–45)
BILIRUB SERPL-MCNC: 0.4 MG/DL (ref 0.2–1.3)
BUN SERPL-MCNC: 13 MG/DL (ref 7–30)
CALCIUM SERPL-MCNC: 9 MG/DL (ref 8.5–10.1)
CHLORIDE BLD-SCNC: 107 MMOL/L (ref 94–109)
CHOLEST SERPL-MCNC: 221 MG/DL
CO2 SERPL-SCNC: 25 MMOL/L (ref 20–32)
CREAT SERPL-MCNC: 1.02 MG/DL (ref 0.66–1.25)
FASTING STATUS PATIENT QL REPORTED: NO
GFR SERPL CREATININE-BSD FRML MDRD: 87 ML/MIN/1.73M2
GLUCOSE BLD-MCNC: 116 MG/DL (ref 70–99)
HDLC SERPL-MCNC: 50 MG/DL
LDLC SERPL CALC-MCNC: 134 MG/DL
NONHDLC SERPL-MCNC: 171 MG/DL
POTASSIUM BLD-SCNC: 5 MMOL/L (ref 3.4–5.3)
PROT SERPL-MCNC: 7.3 G/DL (ref 6.8–8.8)
PSA SERPL-MCNC: 2.2 UG/L (ref 0–4)
SODIUM SERPL-SCNC: 137 MMOL/L (ref 133–144)
TRIGL SERPL-MCNC: 183 MG/DL

## 2022-10-16 ENCOUNTER — HEALTH MAINTENANCE LETTER (OUTPATIENT)
Age: 52
End: 2022-10-16

## 2022-11-05 ENCOUNTER — OFFICE VISIT (OUTPATIENT)
Dept: URGENT CARE | Facility: URGENT CARE | Age: 52
End: 2022-11-05
Payer: COMMERCIAL

## 2022-11-05 VITALS
SYSTOLIC BLOOD PRESSURE: 114 MMHG | DIASTOLIC BLOOD PRESSURE: 72 MMHG | OXYGEN SATURATION: 95 % | HEART RATE: 71 BPM | WEIGHT: 200 LBS | BODY MASS INDEX: 26.03 KG/M2 | TEMPERATURE: 98.2 F | RESPIRATION RATE: 16 BRPM

## 2022-11-05 DIAGNOSIS — S61.217A LACERATION OF LEFT LITTLE FINGER WITHOUT FOREIGN BODY WITHOUT DAMAGE TO NAIL, INITIAL ENCOUNTER: Primary | ICD-10-CM

## 2022-11-05 PROCEDURE — 99213 OFFICE O/P EST LOW 20 MIN: CPT | Performed by: PHYSICIAN ASSISTANT

## 2022-11-05 NOTE — PROGRESS NOTES
URGENT CARE VISIT:    SUBJECTIVE:   Jas Patel is a 52 year old male who presents to the clinic with a laceration on the left pinky finger sustained today.  This is a non-work related injury.  Mechanism of injury: .    Associated symptoms: Denies numbness, weakness, or loss of function  Last tetanus booster within 10 years: yes    OBJECTIVE:  VITALS: /72 (BP Location: Right arm, Patient Position: Sitting, Cuff Size: Adult Large)   Pulse 71   Temp 98.2  F (36.8  C) (Tympanic)   Resp 16   Wt 90.7 kg (200 lb)   SpO2 95%   BMI 26.03 kg/m    General: WDWN in NAD  Size of laceration: 1 centimeters  Location of laceration: left pinky finger   Characteristics of the laceration: clean and jagged  Tendon function intact: yes  Sensation to light touch intact: yes  Pulses intact: yes    ASSESSMENT:    ICD-10-CM    1. Laceration of left little finger without foreign body without damage to nail, initial encounter  S61.217A           PLAN:  PROCEDURE NOTE:  Wound cleaned with HIBICLENS  Wound was dressed with gauze.     Patient Instructions   Patient was educated on the natural course of injury.  Not a candidate for sutures due to jaggedness. Keep wound dry and clean. Wash area with soap and water. Watch for signs of infection such as redness or purulent drainage. Conservative measures discussed including over-the-counter Tylenol as needed for pain. See your primary care provider in 5 days if there is no improvement or sooner as needed. Seek emergency care if you develop fever, streaking, severe pain or rapidly spreading redness.       Patient verbalized understanding and is agreeable to plan. The patient was discharged ambulatory and in stable condition.    Stephanie Adair PA-C ....................  11/5/2022   4:23 PM

## 2022-11-30 ENCOUNTER — E-VISIT (OUTPATIENT)
Dept: FAMILY MEDICINE | Facility: CLINIC | Age: 52
End: 2022-11-30
Payer: COMMERCIAL

## 2022-11-30 DIAGNOSIS — M54.16 LUMBAR RADICULOPATHY: ICD-10-CM

## 2022-11-30 PROCEDURE — 99421 OL DIG E/M SVC 5-10 MIN: CPT | Performed by: FAMILY MEDICINE

## 2022-11-30 RX ORDER — TRAMADOL HYDROCHLORIDE 50 MG/1
TABLET ORAL
Qty: 60 TABLET | Refills: 2 | Status: SHIPPED | OUTPATIENT
Start: 2022-11-30 | End: 2023-03-06

## 2022-11-30 RX ORDER — HYDROCODONE BITARTRATE AND ACETAMINOPHEN 5; 325 MG/1; MG/1
1 TABLET ORAL DAILY PRN
Qty: 30 TABLET | Refills: 0 | Status: SHIPPED | OUTPATIENT
Start: 2022-11-30 | End: 2022-12-30

## 2022-11-30 RX ORDER — HYDROCODONE BITARTRATE AND ACETAMINOPHEN 5; 325 MG/1; MG/1
1 TABLET ORAL DAILY PRN
Qty: 30 TABLET | Refills: 0 | Status: SHIPPED | OUTPATIENT
Start: 2022-12-31 | End: 2023-01-30

## 2022-11-30 RX ORDER — HYDROCODONE BITARTRATE AND ACETAMINOPHEN 5; 325 MG/1; MG/1
1 TABLET ORAL DAILY PRN
Qty: 30 TABLET | Refills: 0 | Status: SHIPPED | OUTPATIENT
Start: 2023-01-31 | End: 2023-03-02

## 2023-03-06 ENCOUNTER — VIRTUAL VISIT (OUTPATIENT)
Dept: FAMILY MEDICINE | Facility: CLINIC | Age: 53
End: 2023-03-06
Payer: COMMERCIAL

## 2023-03-06 DIAGNOSIS — F41.1 GAD (GENERALIZED ANXIETY DISORDER): ICD-10-CM

## 2023-03-06 DIAGNOSIS — M54.16 LUMBAR RADICULOPATHY: ICD-10-CM

## 2023-03-06 DIAGNOSIS — F11.20 CONTINUOUS OPIOID DEPENDENCE (H): ICD-10-CM

## 2023-03-06 PROCEDURE — 99214 OFFICE O/P EST MOD 30 MIN: CPT | Mod: VID | Performed by: FAMILY MEDICINE

## 2023-03-06 RX ORDER — HYDROCODONE BITARTRATE AND ACETAMINOPHEN 5; 325 MG/1; MG/1
1 TABLET ORAL DAILY PRN
Qty: 30 TABLET | Refills: 0 | Status: SHIPPED | OUTPATIENT
Start: 2023-05-07 | End: 2023-06-06

## 2023-03-06 RX ORDER — TRAMADOL HYDROCHLORIDE 50 MG/1
TABLET ORAL
Qty: 60 TABLET | Refills: 2 | Status: SHIPPED | OUTPATIENT
Start: 2023-03-06 | End: 2023-06-06

## 2023-03-06 RX ORDER — HYDROCODONE BITARTRATE AND ACETAMINOPHEN 5; 325 MG/1; MG/1
1 TABLET ORAL DAILY PRN
Qty: 30 TABLET | Refills: 0 | Status: SHIPPED | OUTPATIENT
Start: 2023-04-06 | End: 2023-05-06

## 2023-03-06 RX ORDER — HYDROCODONE BITARTRATE AND ACETAMINOPHEN 5; 325 MG/1; MG/1
1 TABLET ORAL DAILY PRN
Qty: 30 TABLET | Refills: 0 | Status: SHIPPED | OUTPATIENT
Start: 2023-03-06 | End: 2023-04-05

## 2023-03-06 RX ORDER — HYDROXYZINE HYDROCHLORIDE 25 MG/1
25 TABLET, FILM COATED ORAL DAILY PRN
Qty: 90 TABLET | Refills: 1 | Status: SHIPPED | OUTPATIENT
Start: 2023-03-06 | End: 2023-09-07

## 2023-03-06 NOTE — PROGRESS NOTES
Jas is a 52 year old who is being evaluated via a billable video visit.      How would you like to obtain your AVS? MyChart  If the video visit is dropped, the invitation should be resent by: Text to cell phone: [phone]  Will anyone else be joining your video visit? No          Assessment & Plan     Lumbar radiculopathy  Continuous opioid dependence   - [place] , [place]   - HYDROcodone-acetaminophen (NORCO) 5-325 MG tablet; Take 1 tablet by mouth daily as needed for pain  Dispense: 30 tablet; Refill: 0  - HYDROcodone-acetaminophen (NORCO) 5-325 MG tablet; Take 1 tablet by mouth daily as needed for pain  Dispense: 30 tablet; Refill: 0  - HYDROcodone-acetaminophen (NORCO) 5-325 MG tablet; Take 1 tablet by mouth daily as needed for pain  Dispense: 30 tablet; Refill: 0  - traMADol (ULTRAM) 50 MG tablet; TAKE ONE TABLET BY MOUTH TWICE A DAY AS NEEDED FOR SEVERE PAIN  Dispense: 60 tablet; Refill: 2    DONTE (generalized anxiety disorder)  - hydrOXYzine (ATARAX) 25 MG tablet; Take 1 tablet (25 mg) by mouth daily as needed for anxiety  Dispense: 90 tablet; Refill: 1    E-visit in 3 months       Yadiel Britton MD  [facility]   Jas is a 52 year old, presenting for the following health issues:  Recheck Medication      History of Present Illness       Back Pain:  He presents for follow up of back pain. Patient's back pain is a chronic problem.  Location of back pain:  Right lower back, left lower back, left upper back, left side of neck and left shoulder  Description of back pain: burning, cramping and gnawing  Back pain spreads: left shoulder and left side of neck    Since patient first noticed back pain, pain is: always present, but gets better and worse  Does back pain interfere with his job:  Yes      He eats 2-3 servings of fruits and vegetables daily.He consumes 3 sweetened beverage(s) daily.He exercises with enough effort to increase his heart rate 30 to  60 minutes per day.  He exercises with enough effort to increase his heart rate 3 or less days per week.   He is taking medications regularly.    Today's PHQ-9         PHQ-9 Total Score: 2    PHQ-9 Q9 Thoughts of better off dead/self-harm past 2 weeks :   Not at all    How difficult have these problems made it for you to do your work, take care of things at home, or get along with other people: Not difficult at all     Two to three times per month he has anxiety. He previously took atarax for an injury and it helped with anxiety.       Review of Systems         Objective    Vitals - Patient Reported  Weight (Patient Reported): 83.9 kg (185 lb)  Pain Score: Moderate Pain (5)  Pain Loc: Neck        Physical Exam   GENERAL: Healthy, alert and no distress  EYES: Eyes grossly normal to inspection.  No discharge or erythema, or obvious scleral/conjunctival abnormalities.  RESP: No audible wheeze, cough, or visible cyanosis.  No visible retractions or increased work of breathing.    SKIN: Visible skin clear. No significant rash, abnormal pigmentation or lesions.  NEURO: Cranial nerves grossly intact.  Mentation and speech appropriate for age.  PSYCH: Mentation appears normal, affect normal/bright, judgement and insight intact, normal speech and appearance well-groomed.                Video-Visit Details    Type of service:  Video Visit     Originating Location (pt. Location): Home    Distant Location (provider location):  On-site  Platform used for Video Visit: Brodie

## 2023-04-01 ENCOUNTER — HEALTH MAINTENANCE LETTER (OUTPATIENT)
Age: 53
End: 2023-04-01

## 2023-04-03 DIAGNOSIS — G47.00 INSOMNIA, UNSPECIFIED TYPE: ICD-10-CM

## 2023-04-05 NOTE — TELEPHONE ENCOUNTER
---Prescription approved per [facility] Refill Protocol.       Emerald Higgins RN BSN     [facility]      --Last visit:  3/6/2023     --Future Visit: none.      Warnings Override History for amitriptyline (ELAVIL) 25 MG tablet [[id number]]    Overridden by Yadiel Britton MD on Mar 6, 2023 8:04 AM   Drug-Drug   1. TRICYCLIC ANTIDEPRESSANTS / SEROTONERGIC OPIOIDS (HIGH RISK) [Level: Major] [Reason: Tolerated medication/side effects in past]   Other Orders: traMADol (ULTRAM) 50 MG tablet         Overridden by Yadiel Britton MD on Nov 30, 2022 5:59 PM   Drug-Drug   1. TRICYCLIC ANTIDEPRESSANTS / SEROTONERGIC OPIOIDS (HIGH RISK) [Level: Major] [Reason: Tolerated medication/side effects in past]   Other Orders: traMADol (ULTRAM) 50 MG tablet         Overridden by Yadiel Britton MD on Aug 26, 2022 1:57 PM   Drug-Drug   1. TRICYCLIC ANTIDEPRESSANTS / SEROTONERGIC OPIOIDS (HIGH RISK) [Level: Major] [Reason: Tolerated medication/side effects in past]   Other Orders: traMADol (ULTRAM) 50 MG tablet         Overridden by Yadiel Britton MD on Aug 26, 2022 1:42 PM   Drug-Drug   1. CITALOPRAM / TRICYCLIC ANTIDEPRESSANTS [Level: Major] [Reason: Tolerated medication/side effects in past]   Other Orders: citalopram (CELEXA) 40 MG tablet         Overridden by Yadiel Britton MD on Jul 29, 2022 5:02 PM   Drug-Drug   1. TRICYCLIC ANTIDEPRESSANTS / SEROTONERGIC OPIOIDS (HIGH RISK) [Level: Major] [Reason: Tolerated medication/side effects in past]   Other Orders: traMADol (ULTRAM) 50 MG tablet         Overridden by Kaya Downey RN on Jul 28, 2022 8:56 AM   Drug-Drug   1. CITALOPRAM / TRICYCLIC ANTIDEPRESSANTS [Level: Major]   Other Orders: citalopram (CELEXA) 40 MG tablet         Overridden by Yadiel Britton MD on Jun 30, 2022 4:45 PM   Drug-Drug   1. TRICYCLIC ANTIDEPRESSANTS / SEROTONERGIC OPIOIDS (HIGH RISK) [Level: Major] [Reason: Tolerated medication/side effects in past]   Other Orders: traMADol (ULTRAM)  50 MG tablet         Overridden by Yadiel Britton MD on May 25, 2022 8:00 PM   Drug-Drug   1. TRICYCLIC ANTIDEPRESSANTS / SEROTONERGIC OPIOIDS (HIGH RISK) [Level: Major] [Reason: Tolerated medication/side effects in past]   Other Orders: traMADol (ULTRAM) 50 MG tablet         Overridden by Yadiel Britton MD on Apr 26, 2022 8:13 AM   Drug-Drug   1. TRICYCLIC ANTIDEPRESSANTS / SEROTONERGIC OPIOIDS (HIGH RISK) [Level: Major] [Reason: Tolerated medication/side effects in past]   Other Orders: traMADol (ULTRAM) 50 MG tablet         Overridden by Yadiel Britton MD on Apr 25, 2022 6:49 PM   Drug-Drug   1. TRICYCLIC ANTIDEPRESSANTS / SEROTONERGIC OPIOIDS (HIGH RISK) [Level: Major] [Reason: Tolerated medication/side effects in past]   Other Orders: traMADol (ULTRAM) 50 MG tablet         Overridden by Yadiel Britton MD on Apr 5, 2022 3:57 PM   Drug-Drug   1. TRICYCLIC ANTIDEPRESSANTS / SEROTONERGIC OPIOIDS (HIGH RISK) [Level: Major] [Reason: Tolerated medication/side effects in past]   Other Orders: traMADol (ULTRAM) 50 MG tablet      2. CITALOPRAM / TRICYCLIC ANTIDEPRESSANTS [Level: Major] [Reason: Tolerated medication/side effects in past]   Other Orders: citalopram (CELEXA) 40 MG tablet

## 2023-07-06 DIAGNOSIS — G47.00 INSOMNIA, UNSPECIFIED TYPE: ICD-10-CM

## 2023-07-07 NOTE — TELEPHONE ENCOUNTER
"Last Written Prescription Date:  4/5/23  Last Fill Quantity: 90,  # refills: 0   Last office visit provider:  3/6/23     Requested Prescriptions   Pending Prescriptions Disp Refills     amitriptyline (ELAVIL) 25 MG tablet [Pharmacy Med Name: AMITRIPTYLINE HCL 25MG TABS] 90 tablet 0     Sig: TAKE ONE TABLET BY MOUTH AT BEDTIME       Tricyclic Agents ( Annual appt and no PHQ9) Passed - 7/6/2023  4:40 AM        Passed - Blood Pressure under 140/90 in past 12 mos     BP Readings from Last 3 Encounters:   11/05/22 114/72   08/26/22 110/60   06/11/21 126/84                 Passed - Recent (12 mo) or future (30 days) visit within authorizing provider's specialty     Patient has had an office visit with the authorizing provider or a provider within the authorizing providers department within the previous 12 mos or has a future within next 30 days. See \"Patient Info\" tab in inbasket, or \"Choose Columns\" in Meds & Orders section of the refill encounter.              Passed - Medication is active on med list        Passed - Patient is age 18 or older             Jen Hooper RN 07/07/23 12:54 PM  "

## 2023-09-06 ENCOUNTER — LAB (OUTPATIENT)
Dept: LAB | Facility: CLINIC | Age: 53
End: 2023-09-06
Payer: COMMERCIAL

## 2023-09-06 DIAGNOSIS — F11.20 CONTINUOUS OPIOID DEPENDENCE (H): ICD-10-CM

## 2023-09-06 LAB
AMPHETAMINES UR QL: NOT DETECTED
BARBITURATES UR QL SCN: NOT DETECTED
BENZODIAZ UR QL SCN: NOT DETECTED
BUPRENORPHINE UR QL: NOT DETECTED
CANNABINOIDS UR QL: DETECTED
COCAINE UR QL SCN: NOT DETECTED
D-METHAMPHET UR QL: NOT DETECTED
METHADONE UR QL SCN: NOT DETECTED
OPIATES UR QL SCN: NOT DETECTED
OXYCODONE UR QL SCN: NOT DETECTED
PCP UR QL SCN: NOT DETECTED
PROPOXYPH UR QL: NOT DETECTED
TRICYCLICS UR QL SCN: DETECTED

## 2023-09-06 PROCEDURE — 80306 DRUG TEST PRSMV INSTRMNT: CPT

## 2023-09-07 ENCOUNTER — VIRTUAL VISIT (OUTPATIENT)
Dept: FAMILY MEDICINE | Facility: CLINIC | Age: 53
End: 2023-09-07
Payer: COMMERCIAL

## 2023-09-07 DIAGNOSIS — G47.00 INSOMNIA, UNSPECIFIED TYPE: ICD-10-CM

## 2023-09-07 DIAGNOSIS — F41.1 GENERALIZED ANXIETY DISORDER: ICD-10-CM

## 2023-09-07 DIAGNOSIS — K21.00 GASTROESOPHAGEAL REFLUX DISEASE WITH ESOPHAGITIS, UNSPECIFIED WHETHER HEMORRHAGE: ICD-10-CM

## 2023-09-07 DIAGNOSIS — Z51.81 ENCOUNTER FOR THERAPEUTIC DRUG MONITORING: ICD-10-CM

## 2023-09-07 DIAGNOSIS — F11.20 CONTINUOUS OPIOID DEPENDENCE (H): ICD-10-CM

## 2023-09-07 DIAGNOSIS — G25.0 BENIGN ESSENTIAL TREMOR: ICD-10-CM

## 2023-09-07 DIAGNOSIS — N40.1 BENIGN PROSTATIC HYPERPLASIA WITH URINARY FREQUENCY: ICD-10-CM

## 2023-09-07 DIAGNOSIS — M54.16 LUMBAR RADICULOPATHY: ICD-10-CM

## 2023-09-07 DIAGNOSIS — M48.062 SPINAL STENOSIS OF LUMBAR REGION WITH NEUROGENIC CLAUDICATION: ICD-10-CM

## 2023-09-07 DIAGNOSIS — Z12.2 ENCOUNTER FOR SCREENING FOR LUNG CANCER: ICD-10-CM

## 2023-09-07 DIAGNOSIS — R35.0 BENIGN PROSTATIC HYPERPLASIA WITH URINARY FREQUENCY: ICD-10-CM

## 2023-09-07 DIAGNOSIS — F41.1 GAD (GENERALIZED ANXIETY DISORDER): ICD-10-CM

## 2023-09-07 DIAGNOSIS — Z12.11 SCREEN FOR COLON CANCER: ICD-10-CM

## 2023-09-07 DIAGNOSIS — Z13.220 LIPID SCREENING: ICD-10-CM

## 2023-09-07 PROCEDURE — 99214 OFFICE O/P EST MOD 30 MIN: CPT | Mod: VID | Performed by: FAMILY MEDICINE

## 2023-09-07 RX ORDER — HYDROCODONE BITARTRATE AND ACETAMINOPHEN 5; 325 MG/1; MG/1
1 TABLET ORAL DAILY PRN
Qty: 30 TABLET | Refills: 0 | Status: SHIPPED | OUTPATIENT
Start: 2023-09-07 | End: 2023-10-07

## 2023-09-07 RX ORDER — CITALOPRAM HYDROBROMIDE 40 MG/1
40 TABLET ORAL DAILY
Qty: 90 TABLET | Refills: 3 | Status: SHIPPED | OUTPATIENT
Start: 2023-09-07

## 2023-09-07 RX ORDER — TAMSULOSIN HYDROCHLORIDE 0.4 MG/1
0.8 CAPSULE ORAL DAILY
Qty: 180 CAPSULE | Refills: 3 | Status: SHIPPED | OUTPATIENT
Start: 2023-09-07 | End: 2024-09-06

## 2023-09-07 RX ORDER — HYDROCODONE BITARTRATE AND ACETAMINOPHEN 5; 325 MG/1; MG/1
1 TABLET ORAL DAILY PRN
Qty: 30 TABLET | Refills: 0 | Status: SHIPPED | OUTPATIENT
Start: 2023-10-08 | End: 2023-11-07

## 2023-09-07 RX ORDER — TRAMADOL HYDROCHLORIDE 50 MG/1
50 TABLET ORAL 2 TIMES DAILY PRN
Qty: 60 TABLET | Refills: 2 | Status: SHIPPED | OUTPATIENT
Start: 2023-09-07 | End: 2023-12-08

## 2023-09-07 RX ORDER — HYDROCODONE BITARTRATE AND ACETAMINOPHEN 5; 325 MG/1; MG/1
1 TABLET ORAL DAILY PRN
Qty: 30 TABLET | Refills: 0 | Status: SHIPPED | OUTPATIENT
Start: 2023-11-08 | End: 2023-12-08

## 2023-09-07 RX ORDER — PROPRANOLOL HYDROCHLORIDE 10 MG/1
TABLET ORAL
Qty: 180 TABLET | Refills: 3 | Status: SHIPPED | OUTPATIENT
Start: 2023-09-07

## 2023-09-07 RX ORDER — METHOCARBAMOL 500 MG/1
500 TABLET, FILM COATED ORAL
Qty: 30 TABLET | Refills: 0 | Status: SHIPPED | OUTPATIENT
Start: 2023-09-07

## 2023-09-07 RX ORDER — HYDROXYZINE HYDROCHLORIDE 25 MG/1
25 TABLET, FILM COATED ORAL DAILY PRN
Qty: 90 TABLET | Refills: 3 | Status: SHIPPED | OUTPATIENT
Start: 2023-09-07 | End: 2024-10-01

## 2023-09-07 ASSESSMENT — PATIENT HEALTH QUESTIONNAIRE - PHQ9
SUM OF ALL RESPONSES TO PHQ QUESTIONS 1-9: 3
10. IF YOU CHECKED OFF ANY PROBLEMS, HOW DIFFICULT HAVE THESE PROBLEMS MADE IT FOR YOU TO DO YOUR WORK, TAKE CARE OF THINGS AT HOME, OR GET ALONG WITH OTHER PEOPLE: SOMEWHAT DIFFICULT
SUM OF ALL RESPONSES TO PHQ QUESTIONS 1-9: 3

## 2023-09-07 NOTE — PROGRESS NOTES
Jas is a 53 year old who is being evaluated via a billable video visit.      How would you like to obtain your AVS? MyChart  If the video visit is dropped, the invitation should be resent by: Text to cell phone: [phone]  Will anyone else be joining your video visit? No      1. Continuous opioid dependence (H)  - CSA signed  - reviewed [place] , no concerns  - e-visit in 3 months  - traMADol (ULTRAM) 50 MG tablet; Take 1 tablet (50 mg) by mouth 2 times daily as needed for severe pain  Dispense: 60 tablet; Refill: 2  - HYDROcodone-acetaminophen (NORCO) 5-325 MG tablet; Take 1 tablet by mouth daily as needed for pain  Dispense: 30 tablet; Refill: 0  - methocarbamol (ROBAXIN) 500 MG tablet; Take 1 tablet (500 mg) by mouth nightly as needed for muscle spasms  Dispense: 30 tablet; Refill: 0  - HYDROcodone-acetaminophen (NORCO) 5-325 MG tablet; Take 1 tablet by mouth daily as needed for pain  Dispense: 30 tablet; Refill: 0  - HYDROcodone-acetaminophen (NORCO) 5-325 MG tablet; Take 1 tablet by mouth daily as needed for pain  Dispense: 30 tablet; Refill: 0    2. Spinal stenosis of lumbar region with neurogenic claudication  - He wishes to try Robaxin as Tizanidine was too drowsy, sent one month script and will do further if tolerating medication   - traMADol (ULTRAM) 50 MG tablet; Take 1 tablet (50 mg) by mouth 2 times daily as needed for severe pain  Dispense: 60 tablet; Refill: 2  - HYDROcodone-acetaminophen (NORCO) 5-325 MG tablet; Take 1 tablet by mouth daily as needed for pain  Dispense: 30 tablet; Refill: 0  - methocarbamol (ROBAXIN) 500 MG tablet; Take 1 tablet (500 mg) by mouth nightly as needed for muscle spasms  Dispense: 30 tablet; Refill: 0  - HYDROcodone-acetaminophen (NORCO) 5-325 MG tablet; Take 1 tablet by mouth daily as needed for pain  Dispense: 30 tablet; Refill: 0  - HYDROcodone-acetaminophen (NORCO) 5-325 MG tablet; Take 1 tablet by mouth daily as needed for pain  Dispense: 30 tablet; Refill:  0    3. Lumbar radiculopathy  - traMADol (ULTRAM) 50 MG tablet; Take 1 tablet (50 mg) by mouth 2 times daily as needed for severe pain  Dispense: 60 tablet; Refill: 2  - HYDROcodone-acetaminophen (NORCO) 5-325 MG tablet; Take 1 tablet by mouth daily as needed for pain  Dispense: 30 tablet; Refill: 0  - methocarbamol (ROBAXIN) 500 MG tablet; Take 1 tablet (500 mg) by mouth nightly as needed for muscle spasms  Dispense: 30 tablet; Refill: 0  - HYDROcodone-acetaminophen (NORCO) 5-325 MG tablet; Take 1 tablet by mouth daily as needed for pain  Dispense: 30 tablet; Refill: 0  - HYDROcodone-acetaminophen (NORCO) 5-325 MG tablet; Take 1 tablet by mouth daily as needed for pain  Dispense: 30 tablet; Refill: 0    4. Insomnia, unspecified type  - amitriptyline (ELAVIL) 25 MG tablet; TAKE ONE TABLET BY MOUTH AT BEDTIME  Dispense: 90 tablet; Refill: 3    5. Screen for colon cancer  - Colonoscopy Screening [facility] Referral; Future    6. Generalized anxiety disorder  - citalopram (CELEXA) 40 MG tablet; Take 1 tablet (40 mg) by mouth daily  Dispense: 90 tablet; Refill: 3  - propranolol (INDERAL) 10 MG tablet; TAKE ONE TABLET BY MOUTH TWICE A DAY AS NEEDED FOR TREMOR  Dispense: 180 tablet; Refill: 3    7. DONTE (generalized anxiety disorder)  - hydrOXYzine (ATARAX) 25 MG tablet; Take 1 tablet (25 mg) by mouth daily as needed for anxiety  Dispense: 90 tablet; Refill: 3    8. Gastroesophageal reflux disease with esophagitis, unspecified whether hemorrhage  - omeprazole (PRILOSEC) 20 MG DR capsule; Take 1 capsule (20 mg) by mouth daily  Dispense: 90 capsule; Refill: 3    9. Benign essential tremor  - propranolol (INDERAL) 10 MG tablet; TAKE ONE TABLET BY MOUTH TWICE A DAY AS NEEDED FOR TREMOR  Dispense: 180 tablet; Refill: 3    10. Benign prostatic hyperplasia with urinary frequency  - tamsulosin (FLOMAX) 0.4 MG capsule; Take 2 capsules (0.8 mg) by mouth daily  Dispense: 180 capsule; Refill: 3  - PSA, screen; Future    11. Encounter  for therapeutic drug monitoring  - CBC with platelets; Future  - Comprehensive metabolic panel (BMP + Alb, Alk Phos, ALT, AST, Total. Bili, TP); Future    12. Lipid screening  - Lipid panel reflex to direct LDL Fasting; Future    13. Encounter for screening for lung cancer  - not indicated     Advised to schedule MA only visit for vaccinations     Louise Mark is a 53 year old, presenting for the following health issues:  Recheck Medication      9/7/2023     4:47 PM   Additional Questions   Roomed by yarely   Accompanied by self       History of Present Illness       Reason for visit:  Refill medication    He eats 2-3 servings of fruits and vegetables daily.He consumes 3 sweetened beverage(s) daily.He exercises with enough effort to increase his heart rate 30 to 60 minutes per day.  He exercises with enough effort to increase his heart rate 4 days per week.   He is taking medications regularly.     He gets drowsy with Tizanidine and was hoping to switch back to robaxin. He didn't get sleepy from robaxin. Flexeril didn't work for him.       Review of Systems         Objective           Vitals:  No vitals were obtained today due to virtual visit.    Physical Exam               Telephone 12 minutes

## 2023-09-18 ENCOUNTER — OFFICE VISIT (OUTPATIENT)
Dept: URGENT CARE | Facility: URGENT CARE | Age: 53
End: 2023-09-18
Payer: COMMERCIAL

## 2023-09-18 VITALS
WEIGHT: 190 LBS | HEART RATE: 73 BPM | BODY MASS INDEX: 24.38 KG/M2 | DIASTOLIC BLOOD PRESSURE: 86 MMHG | HEIGHT: 74 IN | SYSTOLIC BLOOD PRESSURE: 120 MMHG | OXYGEN SATURATION: 99 % | TEMPERATURE: 98.4 F

## 2023-09-18 DIAGNOSIS — R35.0 BENIGN PROSTATIC HYPERPLASIA WITH URINARY FREQUENCY: ICD-10-CM

## 2023-09-18 DIAGNOSIS — H81.10 BENIGN PAROXYSMAL POSITIONAL VERTIGO, UNSPECIFIED LATERALITY: ICD-10-CM

## 2023-09-18 DIAGNOSIS — Z51.81 ENCOUNTER FOR THERAPEUTIC DRUG MONITORING: ICD-10-CM

## 2023-09-18 DIAGNOSIS — Z13.220 LIPID SCREENING: ICD-10-CM

## 2023-09-18 DIAGNOSIS — H61.21 IMPACTED CERUMEN OF RIGHT EAR: Primary | ICD-10-CM

## 2023-09-18 DIAGNOSIS — N40.1 BENIGN PROSTATIC HYPERPLASIA WITH URINARY FREQUENCY: ICD-10-CM

## 2023-09-18 LAB
ALBUMIN SERPL BCG-MCNC: 4.3 G/DL (ref 3.5–5.2)
ALP SERPL-CCNC: 60 U/L (ref 40–129)
ALT SERPL W P-5'-P-CCNC: 22 U/L (ref 0–70)
ANION GAP SERPL CALCULATED.3IONS-SCNC: 11 MMOL/L (ref 7–15)
AST SERPL W P-5'-P-CCNC: 25 U/L (ref 0–45)
BASOPHILS # BLD AUTO: 0 10E3/UL (ref 0–0.2)
BASOPHILS NFR BLD AUTO: 0 %
BILIRUB SERPL-MCNC: 0.3 MG/DL
BUN SERPL-MCNC: 11.2 MG/DL (ref 6–20)
CALCIUM SERPL-MCNC: 9 MG/DL (ref 8.6–10)
CHLORIDE SERPL-SCNC: 105 MMOL/L (ref 98–107)
CHOLEST SERPL-MCNC: 180 MG/DL
CREAT SERPL-MCNC: 0.89 MG/DL (ref 0.67–1.17)
DEPRECATED HCO3 PLAS-SCNC: 25 MMOL/L (ref 22–29)
EGFRCR SERPLBLD CKD-EPI 2021: >90 ML/MIN/1.73M2
EOSINOPHIL # BLD AUTO: 0 10E3/UL (ref 0–0.7)
EOSINOPHIL NFR BLD AUTO: 1 %
ERYTHROCYTE [DISTWIDTH] IN BLOOD BY AUTOMATED COUNT: 11.9 % (ref 10–15)
GLUCOSE SERPL-MCNC: 95 MG/DL (ref 70–99)
HCT VFR BLD AUTO: 42 % (ref 40–53)
HDLC SERPL-MCNC: 42 MG/DL
HGB BLD-MCNC: 14.5 G/DL (ref 13.3–17.7)
IMM GRANULOCYTES # BLD: 0 10E3/UL
IMM GRANULOCYTES NFR BLD: 0 %
LDLC SERPL CALC-MCNC: 117 MG/DL
LYMPHOCYTES # BLD AUTO: 1.8 10E3/UL (ref 0.8–5.3)
LYMPHOCYTES NFR BLD AUTO: 27 %
MCH RBC QN AUTO: 32.2 PG (ref 26.5–33)
MCHC RBC AUTO-ENTMCNC: 34.5 G/DL (ref 31.5–36.5)
MCV RBC AUTO: 93 FL (ref 78–100)
MONOCYTES # BLD AUTO: 0.4 10E3/UL (ref 0–1.3)
MONOCYTES NFR BLD AUTO: 6 %
NEUTROPHILS # BLD AUTO: 4.6 10E3/UL (ref 1.6–8.3)
NEUTROPHILS NFR BLD AUTO: 67 %
NONHDLC SERPL-MCNC: 138 MG/DL
PLATELET # BLD AUTO: 290 10E3/UL (ref 150–450)
POTASSIUM SERPL-SCNC: 4.9 MMOL/L (ref 3.4–5.3)
PROT SERPL-MCNC: 6.7 G/DL (ref 6.4–8.3)
PSA SERPL DL<=0.01 NG/ML-MCNC: 2.41 NG/ML (ref 0–3.5)
RBC # BLD AUTO: 4.5 10E6/UL (ref 4.4–5.9)
SODIUM SERPL-SCNC: 141 MMOL/L (ref 136–145)
TRIGL SERPL-MCNC: 103 MG/DL
WBC # BLD AUTO: 6.9 10E3/UL (ref 4–11)

## 2023-09-18 PROCEDURE — G0103 PSA SCREENING: HCPCS | Performed by: PHYSICIAN ASSISTANT

## 2023-09-18 PROCEDURE — 85025 COMPLETE CBC W/AUTO DIFF WBC: CPT | Performed by: PHYSICIAN ASSISTANT

## 2023-09-18 PROCEDURE — 99214 OFFICE O/P EST MOD 30 MIN: CPT | Mod: 25 | Performed by: PHYSICIAN ASSISTANT

## 2023-09-18 PROCEDURE — 80053 COMPREHEN METABOLIC PANEL: CPT | Performed by: PHYSICIAN ASSISTANT

## 2023-09-18 PROCEDURE — 69209 REMOVE IMPACTED EAR WAX UNI: CPT | Mod: RT | Performed by: PHYSICIAN ASSISTANT

## 2023-09-18 PROCEDURE — 80061 LIPID PANEL: CPT | Performed by: PHYSICIAN ASSISTANT

## 2023-09-18 PROCEDURE — 36415 COLL VENOUS BLD VENIPUNCTURE: CPT | Performed by: PHYSICIAN ASSISTANT

## 2023-09-18 RX ORDER — MECLIZINE HYDROCHLORIDE 25 MG/1
50 TABLET ORAL 3 TIMES DAILY PRN
Qty: 21 TABLET | Refills: 0 | Status: SHIPPED | OUTPATIENT
Start: 2023-09-18 | End: 2023-09-25

## 2023-09-18 RX ORDER — ONDANSETRON 8 MG/1
8 TABLET, ORALLY DISINTEGRATING ORAL EVERY 8 HOURS PRN
Qty: 21 TABLET | Refills: 0 | Status: SHIPPED | OUTPATIENT
Start: 2023-09-18 | End: 2024-06-12

## 2023-09-18 NOTE — PROGRESS NOTES
Impacted cerumen of right ear  - VT REMOVAL IMPACTED CERUMEN IRRIGATION/LVG UNILAT  - CBC with platelets and differential; Future  - Comprehensive metabolic panel (BMP + Alb, Alk Phos, ALT, AST, Total. Bili, TP); Future  - CBC with platelets and differential    right ear/s were irrigated with a warm water/hydrogen peroxide solution. Cerumen was removed without complication. Ear/s were re-examined and found to be normal    Benign paroxysmal positional vertigo, unspecified laterality  - meclizine (ANTIVERT) 25 MG tablet; Take 2 tablets (50 mg) by mouth 3 times daily as needed for dizziness  - ondansetron (ZOFRAN ODT) 8 MG ODT tab; Take 1 tablet (8 mg) by mouth every 8 hours as needed for nausea  - CBC with platelets and differential; Future  - Comprehensive metabolic panel (BMP + Alb, Alk Phos, ALT, AST, Total. Bili, TP); Future  - CBC with platelets and differential        Vertigo: The Epley Maneuver (01:48)  Your health professional recommends that you watch this short online health video.  Learn how the Epley maneuver can help you get rid of your vertigo.  Purpose:  Shows the Epley maneuver and explains how canaliths in the inner ear can cause vertigo if they are out of place.  Goal:  The user will learn what the Epley maneuver is and how it is done.      How to watch the video     Scan the QR code   OR Visit the website    [email or web address]   Current as of: March 1, 2023               Content Version: 13.7    2724-6669 [employer].   Care instructions adapted under license by your healthcare professional. If you have questions about a medical condition or this instruction, always ask your healthcare professional. [employer] disclaims any warranty or liability for your use of this information.              Benign Paroxysmal Positional Vertigo (BPPV): Care Instructions  Overview     Benign paroxysmal positional vertigo, also called BPPV, is an inner ear problem. It causes a  "spinning or whirling sensation when you move your head. This sensation is called vertigo.  The vertigo usually lasts for less than a minute. People often have vertigo spells for a few days or weeks. Then the vertigo goes away. But it may come back again. The vertigo may also cause unsteadiness, nausea, and vomiting. You may be at risk for falls.  When you move, your inner ear sends messages to the brain. This helps you keep your balance. Vertigo can happen when tiny calcium \"stones\" move into an area of your inner ear called the semicircular canal. This can cause the inner ear to send the wrong message to the brain.  Your doctor may move you in different positions to help your vertigo get better faster. This is called the Epley maneuver. Your doctor may also prescribe exercises for you to do on your own.  Follow-up care is a key part of your treatment and safety. Be sure to make and go to all appointments, and call your doctor if you are having problems. It's also a good idea to know your test results and keep a list of the medicines you take.  How can you care for yourself at home?  Vertigo causes loss of balance and puts you at risk for falling. Be extra careful so that you don't hurt yourself or someone else if you have a sudden attack of vertigo.  Don't drive or ride a bike if you are having vertigo.  Keep floors and walkways free of clutter so you don't trip.  Avoid heights.  Your doctor may suggest that you do the Epley maneuver at home. Here's how:  Sit on the edge of a bed. Turn your head [facility] between looking straight ahead and looking to the side that causes the worst vertigo (45 degrees).  Tilt yourself backward until you are lying on your back. Your head should stay at the 45-degree turn. Hold for 30 seconds. If you have vertigo, stay in this position until it stops.  Turn your head all the way to the other side without lifting it. Your chin should be raised and over your shoulder. Hold for 30 seconds " "or until your symptoms stop.  Keeping your head in the same position, roll your body the same direction you are facing. You should now be on your side and looking down. Hold for 30 seconds or until your symptoms stop.  Slowly push yourself up to a sitting position.  When should you call for help?   Call 911 anytime you think you may need emergency care. For example, call if:    You passed out (lost consciousness).     You have sudden dizziness that doesn't get better.     You have dizziness along with symptoms of a heart attack. These may include:  Chest pain or pressure, or a strange feeling in the chest.  Sweating.  Shortness of breath.  Nausea or vomiting.  Pain, pressure, or a strange feeling in the back, neck, jaw, or upper belly or in one or both shoulders or arms.  Lightheadedness or sudden weakness.  A fast or irregular heartbeat.     You have symptoms of a stroke. These may include:  Sudden numbness, tingling, weakness, or loss of movement in your face, arm, or leg, especially on only one side of your body.  Sudden vision changes.  Sudden trouble speaking.  Sudden confusion or trouble understanding simple statements.  Sudden problems with walking or balance.  A sudden, severe headache that is different from past headaches.   Call your doctor now or seek immediate medical care if:    You have new or worse nausea and vomiting.     You have new symptoms such as a fever, a headache, hearing loss, or ringing in your ears.   Watch closely for changes in your health, and be sure to contact your doctor if:    You are not getting better as expected.   Where can you learn more?  Go to https://www.[employer].net/patiented  Enter P372 in the search box to learn more about \"Benign Paroxysmal Positional Vertigo (BPPV): Care Instructions.\"  Current as of: March 1, 2023               Content Version: 13.7    0207-2823 [employer].   Care instructions adapted under license by your healthcare professional. If " you have questions about a medical condition or this instruction, always ask your healthcare professional. [employer], [employer] disclaims any warranty or liability for your use of this information.             Patient was advised to return to clinic for reevaluation (either UC or PCP) if symptoms do not improve in 3 days. Patient educated on red flag symptoms and asked to go directly to the ED if these symptoms present themselves.       [facility]    Subjective   53 year old who presents to clinic today for the following health issues:    Urgent Care, Dizziness, and Ear Problem       HPI     Dizziness  Onset/Duration: Right sided ear fullness and vertigo- sat   Description:   Do you feel faint: No  Does it feel like the surroundings (bed, room) are moving: YES  Unsteady/off balance: YES  Have you passed out or fallen: No  Mild headache on the right side.   Intensity: moderate  Progression of Symptoms: improving  Accompanying Signs & Symptoms:  Heart palpitations or chest pain: No  Nausea, vomiting: YES  Weakness or lack of coordination in arms or legs: No  Vision or speech changes: No  Numbness or tingling: No  Ringing in ears (Tinnitus): Yes but this is ongoing   Hearing Loss: YES- mild on the right side   History:   Head trauma/concussion history: In February   Previous similar symptoms: No  Recent bleeding history: No- in February but not in the last few weeks   Any new medications (BP?): No  Precipitating factors:   Worse with activity: YES-patient states that the vertigo was particularly bad when he was laying on his side.  Worse with head movement: YES  Alleviating factors:   Does staying in a fixed position give relief: YES  Therapies tried and outcome: None    Review of Systems   Review of Systems   See HPI    Objective    Temp: 98.4  F (36.9  C) Temp src: Temporal BP: 120/86 Pulse: 73     SpO2: 99 %       Physical Exam   Physical Exam  Constitutional:        General: He is not in acute distress.     Appearance: Normal appearance. He is normal weight. He is not ill-appearing, toxic-appearing or diaphoretic.   HENT:      Head: Normocephalic and atraumatic.      Right Ear: Tympanic membrane, ear canal and external ear normal. There is impacted cerumen.      Left Ear: Tympanic membrane, ear canal and external ear normal. There is no impacted cerumen.   Cardiovascular:      Rate and Rhythm: Normal rate.      Pulses: Normal pulses.   Pulmonary:      Effort: Pulmonary effort is normal. No respiratory distress.   Neurological:      General: No focal deficit present.      Mental Status: He is alert and oriented to person, place, and time. Mental status is at baseline.      Gait: Gait normal.      Comments: The [place]-Hallpike maneuver was negative bilaterally.   Psychiatric:         Mood and Affect: Mood normal.         Behavior: Behavior normal.         Thought Content: Thought content normal.         Judgment: Judgment normal.          Results for orders placed or performed in visit on 09/18/23 (from the past 24 hour(s))   CBC with platelets and differential    Narrative    The following orders were created for panel order CBC with platelets and differential.  Procedure                               Abnormality         Status                     ---------                               -----------         ------                     CBC with platelets and d...[[id number]]                      Final result                 Please view results for these tests on the individual orders.   CBC with platelets and differential   Result Value Ref Range    WBC Count 6.9 4.0 - 11.0 10e3/uL    RBC Count 4.50 4.40 - 5.90 10e6/uL    Hemoglobin 14.5 13.3 - 17.7 g/dL    Hematocrit 42.0 40.0 - 53.0 %    MCV 93 78 - 100 fL    MCH 32.2 26.5 - 33.0 pg    MCHC 34.5 31.5 - 36.5 g/dL    RDW 11.9 10.0 - 15.0 %    Platelet Count 290 150 - 450 10e3/uL    % Neutrophils 67 %    % Lymphocytes 27 %    %  Monocytes 6 %    % Eosinophils 1 %    % Basophils 0 %    % Immature Granulocytes 0 %    Absolute Neutrophils 4.6 1.6 - 8.3 10e3/uL    Absolute Lymphocytes 1.8 0.8 - 5.3 10e3/uL    Absolute Monocytes 0.4 0.0 - 1.3 10e3/uL    Absolute Eosinophils 0.0 0.0 - 0.7 10e3/uL    Absolute Basophils 0.0 0.0 - 0.2 10e3/uL    Absolute Immature Granulocytes 0.0 <=0.4 10e3/uL

## 2023-09-18 NOTE — NURSING NOTE
Patient identified using two patient identifiers.  Ear exam showing wax occlusion completed by provider.  Solution: warm water was placed in the right ear(s) via irrigation tool: elephant ear  Lisa Tompkins CMA  .

## 2023-12-06 ENCOUNTER — E-VISIT (OUTPATIENT)
Dept: FAMILY MEDICINE | Facility: CLINIC | Age: 53
End: 2023-12-06
Payer: COMMERCIAL

## 2023-12-06 DIAGNOSIS — M54.16 LUMBAR RADICULOPATHY: ICD-10-CM

## 2023-12-06 DIAGNOSIS — M48.062 SPINAL STENOSIS OF LUMBAR REGION WITH NEUROGENIC CLAUDICATION: ICD-10-CM

## 2023-12-06 DIAGNOSIS — F11.20 CONTINUOUS OPIOID DEPENDENCE (H): ICD-10-CM

## 2023-12-06 PROCEDURE — 99421 OL DIG E/M SVC 5-10 MIN: CPT | Performed by: FAMILY MEDICINE

## 2023-12-08 RX ORDER — TRAMADOL HYDROCHLORIDE 50 MG/1
50 TABLET ORAL 2 TIMES DAILY PRN
Qty: 60 TABLET | Refills: 2 | Status: SHIPPED | OUTPATIENT
Start: 2023-12-08 | End: 2024-03-11

## 2023-12-08 RX ORDER — HYDROCODONE BITARTRATE AND ACETAMINOPHEN 5; 325 MG/1; MG/1
1 TABLET ORAL 2 TIMES DAILY PRN
Qty: 60 TABLET | Refills: 0 | Status: SHIPPED | OUTPATIENT
Start: 2024-01-08 | End: 2024-03-11

## 2023-12-08 RX ORDER — HYDROCODONE BITARTRATE AND ACETAMINOPHEN 5; 325 MG/1; MG/1
1 TABLET ORAL 2 TIMES DAILY PRN
Qty: 60 TABLET | Refills: 0 | Status: SHIPPED | OUTPATIENT
Start: 2024-02-08 | End: 2024-03-09

## 2023-12-08 RX ORDER — HYDROCODONE BITARTRATE AND ACETAMINOPHEN 5; 325 MG/1; MG/1
1 TABLET ORAL 2 TIMES DAILY PRN
Qty: 60 TABLET | Refills: 0 | Status: SHIPPED | OUTPATIENT
Start: 2023-12-08 | End: 2024-01-07

## 2024-03-11 ENCOUNTER — VIRTUAL VISIT (OUTPATIENT)
Dept: FAMILY MEDICINE | Facility: CLINIC | Age: 54
End: 2024-03-11
Payer: COMMERCIAL

## 2024-03-11 DIAGNOSIS — M48.062 SPINAL STENOSIS OF LUMBAR REGION WITH NEUROGENIC CLAUDICATION: ICD-10-CM

## 2024-03-11 DIAGNOSIS — M54.16 LUMBAR RADICULOPATHY: ICD-10-CM

## 2024-03-11 DIAGNOSIS — F11.20 CONTINUOUS OPIOID DEPENDENCE (H): ICD-10-CM

## 2024-03-11 PROCEDURE — 99213 OFFICE O/P EST LOW 20 MIN: CPT | Mod: 95 | Performed by: FAMILY MEDICINE

## 2024-03-11 RX ORDER — HYDROCODONE BITARTRATE AND ACETAMINOPHEN 5; 325 MG/1; MG/1
1 TABLET ORAL 2 TIMES DAILY PRN
Qty: 60 TABLET | Refills: 0 | Status: SHIPPED | OUTPATIENT
Start: 2024-03-11 | End: 2024-04-10

## 2024-03-11 RX ORDER — HYDROCODONE BITARTRATE AND ACETAMINOPHEN 5; 325 MG/1; MG/1
1 TABLET ORAL 2 TIMES DAILY PRN
Qty: 60 TABLET | Refills: 0 | Status: SHIPPED | OUTPATIENT
Start: 2024-04-11 | End: 2024-05-11

## 2024-03-11 RX ORDER — HYDROCODONE BITARTRATE AND ACETAMINOPHEN 5; 325 MG/1; MG/1
1 TABLET ORAL 2 TIMES DAILY PRN
Qty: 60 TABLET | Refills: 0 | Status: SHIPPED | OUTPATIENT
Start: 2024-05-12 | End: 2024-06-12

## 2024-03-11 RX ORDER — TRAMADOL HYDROCHLORIDE 50 MG/1
50 TABLET ORAL 2 TIMES DAILY PRN
Qty: 60 TABLET | Refills: 2 | Status: SHIPPED | OUTPATIENT
Start: 2024-03-11 | End: 2024-06-12

## 2024-03-11 NOTE — PROGRESS NOTES
Jas is a 53 year old who is being evaluated via a billable video visit.      How would you like to obtain your AVS? MyChart  If the video visit is dropped, the invitation should be resent by: Text to cell phone: [phone]  Will anyone else be joining your video visit? No          Assessment & Plan     Continuous opioid dependence (H)  - reviewed [place] , no concerns   - he will schedule physical in three months   - traMADol (ULTRAM) 50 MG tablet; Take 1 tablet (50 mg) by mouth 2 times daily as needed for severe pain  - HYDROcodone-acetaminophen (NORCO) 5-325 MG tablet; Take 1 tablet by mouth 2 times daily as needed for pain  - HYDROcodone-acetaminophen (NORCO) 5-325 MG tablet; Take 1 tablet by mouth 2 times daily as needed for pain  - HYDROcodone-acetaminophen (NORCO) 5-325 MG tablet; Take 1 tablet by mouth 2 times daily as needed for pain    Spinal stenosis of lumbar region with neurogenic claudication  - traMADol (ULTRAM) 50 MG tablet; Take 1 tablet (50 mg) by mouth 2 times daily as needed for severe pain  - HYDROcodone-acetaminophen (NORCO) 5-325 MG tablet; Take 1 tablet by mouth 2 times daily as needed for pain  - HYDROcodone-acetaminophen (NORCO) 5-325 MG tablet; Take 1 tablet by mouth 2 times daily as needed for pain  - HYDROcodone-acetaminophen (NORCO) 5-325 MG tablet; Take 1 tablet by mouth 2 times daily as needed for pain    Lumbar radiculopathy  - traMADol (ULTRAM) 50 MG tablet; Take 1 tablet (50 mg) by mouth 2 times daily as needed for severe pain  - HYDROcodone-acetaminophen (NORCO) 5-325 MG tablet; Take 1 tablet by mouth 2 times daily as needed for pain  - HYDROcodone-acetaminophen (NORCO) 5-325 MG tablet; Take 1 tablet by mouth 2 times daily as needed for pain  - HYDROcodone-acetaminophen (NORCO) 5-325 MG tablet; Take 1 tablet by mouth 2 times daily as needed for pain                  Subjective   Jas is a 53 year old, presenting for the following health issues:  Recheck Medication      3/11/2024      4:49 PM   Additional Questions   Roomed by Earlene MANTILLA     History of Present Illness       Back Pain:  He presents for follow up of back pain. Patient's back pain is a chronic problem.  Location of back pain:  Right lower back, left lower back, left upper back, left side of neck and left shoulder  Description of back pain: gnawing  Back pain spreads: left foot    Since patient first noticed back pain, pain is: gradually improving  Does back pain interfere with his job:  Yes       He eats 2-3 servings of fruits and vegetables daily.He consumes 2 sweetened beverage(s) daily.He exercises with enough effort to increase his heart rate 9 or less minutes per day.  He exercises with enough effort to increase his heart rate 3 or less days per week.   He is taking medications regularly.  Increased Norco dose helpful and more active. No constipation.             Objective           Vitals:  No vitals were obtained today due to virtual visit.    Physical Exam   GENERAL: alert and no distress  EYES: Eyes grossly normal to inspection.  No discharge or erythema, or obvious scleral/conjunctival abnormalities.  RESP: No audible wheeze, cough, or visible cyanosis.    SKIN: Visible skin clear. No significant rash, abnormal pigmentation or lesions.  NEURO: Cranial nerves grossly intact.  Mentation and speech appropriate for age.  PSYCH: Appropriate affect, tone, and pace of words          Video-Visit Details    Type of service:  Video Visit     Originating Location (pt. Location): Home    Distant Location (provider location):  On-site  Platform used for Video Visit: Brodie  Signed Electronically by: Yadiel Britton MD

## 2024-04-24 ENCOUNTER — HOSPITAL ENCOUNTER (EMERGENCY)
Facility: CLINIC | Age: 54
Discharge: HOME | End: 2024-04-24
Attending: FAMILY MEDICINE | Admitting: FAMILY MEDICINE
Payer: COMMERCIAL

## 2024-04-24 VITALS
SYSTOLIC BLOOD PRESSURE: 111 MMHG | OXYGEN SATURATION: 98 % | TEMPERATURE: 98.9 F | HEART RATE: 76 BPM | DIASTOLIC BLOOD PRESSURE: 77 MMHG | RESPIRATION RATE: 16 BRPM
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
## 2024-04-24 DIAGNOSIS — S39.012A STRAIN OF LUMBAR REGION, INITIAL ENCOUNTER: ICD-10-CM

## 2024-04-24 PROCEDURE — 99283 EMERGENCY DEPT VISIT LOW MDM: CPT | Performed by: FAMILY MEDICINE

## 2024-04-24 PROCEDURE — 250N000013 HC RX MED GY IP 250 OP 250 PS 637: Performed by: FAMILY MEDICINE

## 2024-04-24 RX ORDER — ACETAMINOPHEN 500 MG
1000 TABLET ORAL ONCE
Status: COMPLETED | OUTPATIENT
Start: 2024-04-24 | End: 2024-04-24

## 2024-04-24 RX ADMIN — ACETAMINOPHEN 1000 MG: 500 TABLET ORAL at 08:19

## 2024-04-24 ASSESSMENT — ACTIVITIES OF DAILY LIVING (ADL): ADLS_ACUITY_SCORE: 35

## 2024-04-24 NOTE — DISCHARGE INSTRUCTIONS
Thank you for choosing [facility].     Please closely monitor for further symptoms. Return to the Emergency Department if you develop any new or worsening signs or symptoms.    If you received any opiate pain medications or sedatives during your visit, please do not drive for at least 8 hours.     Labs, cultures or final xray interpretations may still need to be reviewed.  We will call you if your plan of care needs to be changed.    Please follow up with your primary care physician or clinic 7 to 10 days if you are still experiencing symptoms.
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
## 2024-06-01 ENCOUNTER — HEALTH MAINTENANCE LETTER (OUTPATIENT)
Age: 54
End: 2024-06-01

## 2024-06-12 ENCOUNTER — OFFICE VISIT (OUTPATIENT)
Dept: FAMILY MEDICINE | Facility: CLINIC | Age: 54
End: 2024-06-12
Payer: COMMERCIAL

## 2024-06-12 VITALS
OXYGEN SATURATION: 97 % | SYSTOLIC BLOOD PRESSURE: 112 MMHG | TEMPERATURE: 97.8 F | RESPIRATION RATE: 16 BRPM | HEIGHT: 73 IN | DIASTOLIC BLOOD PRESSURE: 76 MMHG | WEIGHT: 183 LBS | BODY MASS INDEX: 24.25 KG/M2 | HEART RATE: 64 BPM

## 2024-06-12 DIAGNOSIS — L82.1 SEBORRHEIC KERATOSES: ICD-10-CM

## 2024-06-12 DIAGNOSIS — F11.20 CONTINUOUS OPIOID DEPENDENCE (H): ICD-10-CM

## 2024-06-12 DIAGNOSIS — Z23 NEED FOR VACCINATION: ICD-10-CM

## 2024-06-12 DIAGNOSIS — H81.10 BENIGN PAROXYSMAL POSITIONAL VERTIGO, UNSPECIFIED LATERALITY: ICD-10-CM

## 2024-06-12 DIAGNOSIS — M48.062 SPINAL STENOSIS OF LUMBAR REGION WITH NEUROGENIC CLAUDICATION: ICD-10-CM

## 2024-06-12 DIAGNOSIS — M54.16 LUMBAR RADICULOPATHY: ICD-10-CM

## 2024-06-12 DIAGNOSIS — Z12.11 SCREEN FOR COLON CANCER: ICD-10-CM

## 2024-06-12 PROCEDURE — 90471 IMMUNIZATION ADMIN: CPT | Performed by: FAMILY MEDICINE

## 2024-06-12 PROCEDURE — 90472 IMMUNIZATION ADMIN EACH ADD: CPT | Performed by: FAMILY MEDICINE

## 2024-06-12 PROCEDURE — 90715 TDAP VACCINE 7 YRS/> IM: CPT | Performed by: FAMILY MEDICINE

## 2024-06-12 PROCEDURE — 99213 OFFICE O/P EST LOW 20 MIN: CPT | Mod: 25 | Performed by: FAMILY MEDICINE

## 2024-06-12 PROCEDURE — 90750 HZV VACC RECOMBINANT IM: CPT | Performed by: FAMILY MEDICINE

## 2024-06-12 RX ORDER — CALCIUM CARBONATE/VITAMIN D3 500-10/5ML
LIQUID (ML) ORAL
COMMUNITY

## 2024-06-12 RX ORDER — TRAMADOL HYDROCHLORIDE 50 MG/1
50 TABLET ORAL 2 TIMES DAILY PRN
Qty: 60 TABLET | Refills: 2 | Status: SHIPPED | OUTPATIENT
Start: 2024-06-12 | End: 2024-09-19

## 2024-06-12 RX ORDER — HYDROCODONE BITARTRATE AND ACETAMINOPHEN 5; 325 MG/1; MG/1
1 TABLET ORAL 2 TIMES DAILY PRN
Qty: 60 TABLET | Refills: 0 | Status: SHIPPED | OUTPATIENT
Start: 2024-06-12 | End: 2024-07-12

## 2024-06-12 RX ORDER — HYDROCODONE BITARTRATE AND ACETAMINOPHEN 5; 325 MG/1; MG/1
1 TABLET ORAL 2 TIMES DAILY PRN
Qty: 60 TABLET | Refills: 0 | Status: SHIPPED | OUTPATIENT
Start: 2024-07-13 | End: 2024-08-12

## 2024-06-12 RX ORDER — ONDANSETRON 8 MG/1
8 TABLET, ORALLY DISINTEGRATING ORAL EVERY 8 HOURS PRN
Qty: 21 TABLET | Refills: 0 | Status: CANCELLED | OUTPATIENT
Start: 2024-06-12

## 2024-06-12 RX ORDER — HYDROCODONE BITARTRATE AND ACETAMINOPHEN 5; 325 MG/1; MG/1
1 TABLET ORAL 2 TIMES DAILY PRN
Qty: 60 TABLET | Refills: 0 | Status: SHIPPED | OUTPATIENT
Start: 2024-08-13 | End: 2024-09-12

## 2024-06-12 RX ORDER — ONDANSETRON 8 MG/1
8 TABLET, ORALLY DISINTEGRATING ORAL EVERY 8 HOURS PRN
Qty: 21 TABLET | Refills: 0 | Status: SHIPPED | OUTPATIENT
Start: 2024-06-12 | End: 2024-10-04

## 2024-06-12 SDOH — HEALTH STABILITY: PHYSICAL HEALTH: ON AVERAGE, HOW MANY MINUTES DO YOU ENGAGE IN EXERCISE AT THIS LEVEL?: 40 MIN

## 2024-06-12 SDOH — HEALTH STABILITY: PHYSICAL HEALTH: ON AVERAGE, HOW MANY DAYS PER WEEK DO YOU ENGAGE IN MODERATE TO STRENUOUS EXERCISE (LIKE A BRISK WALK)?: 4 DAYS

## 2024-06-12 ASSESSMENT — PATIENT HEALTH QUESTIONNAIRE - PHQ9
SUM OF ALL RESPONSES TO PHQ QUESTIONS 1-9: 2
SUM OF ALL RESPONSES TO PHQ QUESTIONS 1-9: 2

## 2024-06-12 ASSESSMENT — SOCIAL DETERMINANTS OF HEALTH (SDOH): HOW OFTEN DO YOU GET TOGETHER WITH FRIENDS OR RELATIVES?: PATIENT DECLINED
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
## 2024-06-12 NOTE — LETTER

## 2024-08-29 DIAGNOSIS — K21.00 GASTROESOPHAGEAL REFLUX DISEASE WITH ESOPHAGITIS, UNSPECIFIED WHETHER HEMORRHAGE: ICD-10-CM

## 2024-09-06 DIAGNOSIS — N40.1 BENIGN PROSTATIC HYPERPLASIA WITH URINARY FREQUENCY: ICD-10-CM

## 2024-09-06 DIAGNOSIS — R35.0 BENIGN PROSTATIC HYPERPLASIA WITH URINARY FREQUENCY: ICD-10-CM

## 2024-09-06 RX ORDER — TAMSULOSIN HYDROCHLORIDE 0.4 MG/1
0.8 CAPSULE ORAL DAILY
Qty: 180 CAPSULE | Refills: 3 | Status: SHIPPED | OUTPATIENT
Start: 2024-09-06

## 2024-09-06 NOTE — TELEPHONE ENCOUNTER
Routing to refill pool.   Patient going out of town today so sending refill to [facility].   Last office visit: 6/12/2024.    Jen Betancourt, BSN RN  [facility]

## 2024-09-18 ENCOUNTER — E-VISIT (OUTPATIENT)
Dept: FAMILY MEDICINE | Facility: CLINIC | Age: 54
End: 2024-09-18
Payer: COMMERCIAL

## 2024-09-18 DIAGNOSIS — F11.20 CONTINUOUS OPIOID DEPENDENCE (H): ICD-10-CM

## 2024-09-18 DIAGNOSIS — M48.062 SPINAL STENOSIS OF LUMBAR REGION WITH NEUROGENIC CLAUDICATION: ICD-10-CM

## 2024-09-18 DIAGNOSIS — M54.16 LUMBAR RADICULOPATHY: ICD-10-CM

## 2024-09-18 PROCEDURE — 99421 OL DIG E/M SVC 5-10 MIN: CPT | Performed by: FAMILY MEDICINE

## 2024-09-19 RX ORDER — HYDROCODONE BITARTRATE AND ACETAMINOPHEN 5; 325 MG/1; MG/1
1 TABLET ORAL 2 TIMES DAILY PRN
Qty: 60 TABLET | Refills: 0 | Status: SHIPPED | OUTPATIENT
Start: 2024-11-20 | End: 2024-12-20

## 2024-09-19 RX ORDER — HYDROCODONE BITARTRATE AND ACETAMINOPHEN 5; 325 MG/1; MG/1
1 TABLET ORAL 2 TIMES DAILY PRN
Qty: 60 TABLET | Refills: 0 | Status: SHIPPED | OUTPATIENT
Start: 2024-09-19 | End: 2024-10-19

## 2024-09-19 RX ORDER — TRAMADOL HYDROCHLORIDE 50 MG/1
50 TABLET ORAL 2 TIMES DAILY PRN
Qty: 60 TABLET | Refills: 2 | Status: SHIPPED | OUTPATIENT
Start: 2024-09-19

## 2024-09-19 RX ORDER — HYDROCODONE BITARTRATE AND ACETAMINOPHEN 5; 325 MG/1; MG/1
1 TABLET ORAL 2 TIMES DAILY PRN
Qty: 60 TABLET | Refills: 0 | Status: SHIPPED | OUTPATIENT
Start: 2024-10-20 | End: 2024-11-19

## 2024-09-23 ENCOUNTER — TELEPHONE (OUTPATIENT)
Dept: FAMILY MEDICINE | Facility: CLINIC | Age: 54
End: 2024-09-23
Payer: COMMERCIAL

## 2024-09-23 NOTE — TELEPHONE ENCOUNTER
Narcan dispensed at pharmacy per Opiate Antagonist Protocol and Collaborative Practice Agreement.    Patient requested Narcan for:  own use in the event of an opioid overdose.    Screening Questions:  I currently use or have a history of using opioids.  Yes   I am in contact with a person who has a history of using opioids.  No  The person to whom Narcan will be administered has an allergy to naloxone.  No    Maria Fernanda Peters, PharmD.  [facility] [phone]

## 2024-10-01 ENCOUNTER — APPOINTMENT (OUTPATIENT)
Dept: GENERAL RADIOLOGY | Facility: CLINIC | Age: 54
End: 2024-10-01
Attending: EMERGENCY MEDICINE

## 2024-10-01 ENCOUNTER — HOSPITAL ENCOUNTER (EMERGENCY)
Facility: CLINIC | Age: 54
Discharge: HOME | End: 2024-10-01
Attending: EMERGENCY MEDICINE | Admitting: EMERGENCY MEDICINE

## 2024-10-01 ENCOUNTER — MYC REFILL (OUTPATIENT)
Dept: FAMILY MEDICINE | Facility: CLINIC | Age: 54
End: 2024-10-01
Payer: COMMERCIAL

## 2024-10-01 VITALS
TEMPERATURE: 98 F | RESPIRATION RATE: 16 BRPM | BODY MASS INDEX: 23.36 KG/M2 | DIASTOLIC BLOOD PRESSURE: 88 MMHG | WEIGHT: 182 LBS | HEART RATE: 69 BPM | SYSTOLIC BLOOD PRESSURE: 129 MMHG | HEIGHT: 74 IN

## 2024-10-01 DIAGNOSIS — F41.1 GAD (GENERALIZED ANXIETY DISORDER): ICD-10-CM

## 2024-10-01 DIAGNOSIS — S46.912A STRAIN OF LEFT SHOULDER, INITIAL ENCOUNTER: ICD-10-CM

## 2024-10-01 PROCEDURE — 73030 X-RAY EXAM OF SHOULDER: CPT | Mod: LT

## 2024-10-01 PROCEDURE — 99284 EMERGENCY DEPT VISIT MOD MDM: CPT | Performed by: EMERGENCY MEDICINE

## 2024-10-01 PROCEDURE — 99283 EMERGENCY DEPT VISIT LOW MDM: CPT | Performed by: EMERGENCY MEDICINE

## 2024-10-01 RX ORDER — HYDROXYZINE HYDROCHLORIDE 25 MG/1
25 TABLET, FILM COATED ORAL DAILY PRN
Qty: 90 TABLET | Refills: 2 | Status: SHIPPED | OUTPATIENT
Start: 2024-10-01

## 2024-10-01 ASSESSMENT — ACTIVITIES OF DAILY LIVING (ADL)
ADLS_ACUITY_SCORE: 37
ADLS_ACUITY_SCORE: 37

## 2024-10-01 ASSESSMENT — COLUMBIA-SUICIDE SEVERITY RATING SCALE - C-SSRS
2. HAVE YOU ACTUALLY HAD ANY THOUGHTS OF KILLING YOURSELF IN THE PAST MONTH?: NO
6. HAVE YOU EVER DONE ANYTHING, STARTED TO DO ANYTHING, OR PREPARED TO DO ANYTHING TO END YOUR LIFE?: NO
1. IN THE PAST MONTH, HAVE YOU WISHED YOU WERE DEAD OR WISHED YOU COULD GO TO SLEEP AND NOT WAKE UP?: NO

## 2024-10-01 NOTE — ED PROVIDER NOTES
[facility] EMERGENCY DEPARTMENT ([facility])    10/01/24      ED PROVIDER NOTE   ED 2  History     Chief Complaint   Patient presents with    Shoulder Pain     Moved some furniture and hurt his left shoulder and left side of neck. Hx of C 5-6 herniation 12 yrs ago.     The history is provided by the patient and medical records.     Jas Patel is a 54 year old right-hand-dominant male with prior history of C5-C6 disc herniation  who presents to the emergency department for left-shoulder pain due to an injury that occurred at work.  The patient states he was pushing in a chair that was stuck on the floor.  Patient states that he pushed up quickly and developed some pain in his left shoulder.  It is primarily in the posterior aspect of his shoulder posterior to the humeral head but also up into the left upper trapezius.  He denies any midline neck pain or pain in the anterolateral aspect of his neck.  Patient denies any numbness.  Patient states he feels that his  of his left hand may be somewhat weak.  He denies any chest pain or dyspnea.  He did not hear or feel a pop.    Patient is prescribed Norco and tramadol by his primary care doctor, Dr. Ydaiel Britton.    Past Medical History  Past Medical History:   Diagnosis Date    Benign essential tremor 11/2/2016    BPH (benign prostatic hyperplasia) 10/28/2015    BPH (benign prostatic hyperplasia)     Gastroesophageal reflux disease without esophagitis 10/28/2015    Generalized anxiety disorder 10/26/2011    GERD (gastroesophageal reflux disease)     Herniated disc     in neck    Lumbar radiculopathy 12/20/2019    Moderate major depression (H) 11/22/2010     Past Surgical History:   Procedure Laterality Date    DECOMPRESSION LUMBAR ONE LEVEL N/A 1/23/2020    Procedure: Lumbar 4 to 5 Decompression and Discectomy;  Surgeon: Juvenal Dexter MD;  Location: [facility] OR    DECOMPRESSION LUMBAR ONE LEVEL N/A 3/23/2020    Procedure: Revision Lumbar 4 to 5  decompression;  Surgeon: Juvenal Dexter MD;  [facility]: [facility] OR    DRAIN SKIN ABSCESS COMPLIC      left thigh I and D x 4 in the 1990's    GRAFT BONE FROM ILIAC CREST Right 9/10/2020    Procedure: and right iliac crest autograft;  Surgeon: Juvenal Dexter MD;  Location: [facility] OR    OPTICAL TRACKING SYSTEM FUSION POSTERIOR SPINE LUMBAR N/A 9/10/2020    Procedure: Lumbar 4 to 5 instrumented posterior interbody fusion and mata tabor osteotomy with O-Arm/Stealth Navigation, use of allograft, local autograft,;  Surgeon: Juvenal Dexter MD;  Location: [facility] OR    SOFT TISSUE SURGERY      I&D x4    TONSILLECTOMY       amitriptyline (ELAVIL) 25 MG tablet  Cholecalciferol (VITAMIN D3 PO)  citalopram (CELEXA) 40 MG tablet  [START ON 11/20/2024] HYDROcodone-acetaminophen (NORCO) 5-325 MG tablet  hydrOXYzine HCl (ATARAX) 25 MG tablet  ibuprofen (ADVIL/MOTRIN) 200 MG capsule  loratadine (CLARITIN) 10 MG tablet  magnesium oxide 400 MG CAPS  methocarbamol (ROBAXIN) 500 MG tablet  multivitamin w/minerals (THERA-VIT-M) tablet  omeprazole (PRILOSEC) 20 MG DR capsule  ondansetron (ZOFRAN ODT) 8 MG ODT tab  propranolol (INDERAL) 10 MG tablet  tamsulosin (FLOMAX) 0.4 MG capsule  traMADol (ULTRAM) 50 MG tablet  [START ON 10/20/2024] HYDROcodone-acetaminophen (NORCO) 5-325 MG tablet  HYDROcodone-acetaminophen (NORCO) 5-325 MG tablet  naloxone (NARCAN) 4 MG/0.1ML nasal spray  naloxone (NARCAN) 4 MG/0.1ML nasal spray  tamsulosin (FLOMAX) 0.4 MG capsule      Allergies   Allergen Reactions    No Known Drug Allergy      Family History  Family History   Problem Relation Age of Onset    Depression Mother     Anxiety Disorder Mother     Thyroid Disease Mother     Hypertension Father     Valvular heart disease Father         aortic valve replacement    C.A.D. Maternal Grandfather     Hypertension Maternal Grandfather     No Known Problems Brother     No Known Problems Sister     Other - See Comments Sister          "adopted    Cerebrovascular Disease Other         great uncle    Diabetes Paternal Uncle         type 2    Diabetes Other         great aunt    Prostate Cancer Other         great uncle moms side    Alzheimer Disease Other         great uncle moms side    Anesthesia Reaction No family hx of     Deep Vein Thrombosis (DVT) No family hx of      Social History   Social History     Tobacco Use    Smoking status: Former     Current packs/day: 0.00     Average packs/day: 0.2 packs/day for 12.0 years (2.4 ttl pk-yrs)     Types: Cigarettes, Other     Start date:      Quit date: 2017     Years since quittin.7    Smokeless tobacco: Never   Vaping Use    Vaping status: Former   Substance Use Topics    Alcohol use: Yes     Alcohol/week: 0.0 - 7.0 standard drinks of alcohol    Drug use: No      A medically appropriate review of systems was performed with pertinent positives and negatives noted in the HPI, and all other systems negative.    Physical Exam   BP: 129/88  Pulse: 69  Temp: 98  F (36.7  C)  Resp: 16  Height: 188 cm (6' 2\")  Weight: 82.6 kg (182 lb)  Physical Exam  Vitals and nursing note reviewed.   Constitutional:       General: He is not in acute distress.     Appearance: He is not diaphoretic.   HENT:      Head: Atraumatic.   Eyes:      General: No scleral icterus.     Pupils: Pupils are equal, round, and reactive to light.   Neck:      Comments: No symptoms reproduction with axial load  Cardiovascular:      Rate and Rhythm: Normal rate.      Pulses: Normal pulses.   Pulmonary:      Effort: No respiratory distress.   Musculoskeletal:         General: Normal range of motion.      Left shoulder: Tenderness present. No swelling, deformity or bony tenderness. Normal range of motion. Normal strength. Normal pulse.      Left upper arm: Normal.      Left elbow: Normal.      Left forearm: Normal.      Left wrist: Normal.      Left hand: Normal.        Arms:       Cervical back: Normal range of motion.      Comments: " Empty can strong and painful.  Fowler test negative   Skin:     General: Skin is warm and dry.      Findings: No rash.   Neurological:      General: No focal deficit present.      Sensory: Sensation is intact.      Motor: No weakness.      Coordination: Coordination normal.   Psychiatric:         Mood and Affect: Mood normal.           ED Course, Procedures, & Data      Procedures             Results for orders placed or performed during the hospital encounter of 10/01/24   XR Shoulder Left 3 Views     Status: None    Narrative    XR SHOULDER LEFT G/E 3 VIEWS   10/1/2024 11:10 AM     HISTORY: pain, trauma  COMPARISON: None.       Impression    IMPRESSION:     Negative for acute left shoulder fracture or dislocation. Minimal  degenerative changes at the acromioclavicular joint.    ZULY ERAZO MD         SYSTEM ID:  [id number]         Results for orders placed or performed during the hospital encounter of 10/01/24   XR Shoulder Left 3 Views     Status: None    Narrative    XR SHOULDER LEFT G/E 3 VIEWS   10/1/2024 11:10 AM     HISTORY: pain, trauma  COMPARISON: None.       Impression    IMPRESSION:     Negative for acute left shoulder fracture or dislocation. Minimal  degenerative changes at the acromioclavicular joint.    ZULY ERAZO MD         SYSTEM ID:  [id number]     Medications - No data to display  Labs Ordered and Resulted from Time of ED Arrival to Time of ED Departure - No data to display  XR Shoulder Left 3 Views   Final Result   IMPRESSION:       Negative for acute left shoulder fracture or dislocation. Minimal   degenerative changes at the acromioclavicular joint.      ZULY ERAZO MD            SYSTEM ID:  [id number]             Critical care was not performed.     Medical Decision Making  The patient's presentation was of moderate complexity (an acute complicated injury).    The patient's evaluation involved:  review of 1 test result(s) ordered prior to this encounter (cervical spine CT  from 2/15/2023)  ordering and/or review of 1 test(s) in this encounter (see separate area of note for details)    The patient's management necessitated only low risk treatment.    Assessment & Plan    54 year old right-hand-dominant male to the emergency department with left shoulder injury at work when pushing a chair that was stuck to the floor.  Patient's symptoms appear most consistent with a muscle strain although a rotator cuff injury is difficult to rule out at this point.  Radiograph does not reveal any bony abnormality.  Will be discharged to home.  Ice and ibuprofen recommended.  Return to work tomorrow with lifting no more than 10 pounds with his left upper extremity for the next week.  Occupational health follow-up recommended.  Return precautions provided.    I have reviewed the nursing notes. I have reviewed the findings, diagnosis, plan and need for follow up with the patient.    Discharge Medication List as of 10/1/2024 12:03 PM        START taking these medications    Details   hydrOXYzine HCl (ATARAX) 25 MG tablet Take 1 tablet (25 mg) by mouth daily as needed for anxiety., Disp-90 tablet, R-2, E-Prescribe             Final diagnoses:   Strain of left shoulder, initial encounter     Chart documentation was completed with Dragon voice-recognition software. Even though reviewed, this chart may still contain some grammatical, spelling, and word errors.     Kemar Flynn Md    [facility] EMERGENCY DEPARTMENT  10/1/2024     Kemar Flynn MD  10/01/24 2255

## 2024-10-01 NOTE — DISCHARGE INSTRUCTIONS
Take ibuprofen 600 mg every 6 hours with food as needed for pain.  Apply ice for 20 minutes at a time, 3-4 times per day.  Place a towel between the ice bag and your skin.  Avoid heavy lifting.    Follow-up with your employee health    Return if worse, weakness, or other concerns.

## 2024-10-01 NOTE — LETTER
[facility] EMERGENCY DEPARTMENT  [address]  [facility] [address]  [phone]      2024    Jas Patel  [address]  [facility] [id number] [phone] (home)     : 1970      To Whom it may concern:    Jas Patel was seen in our Emergency Department today, 2024 for an injury that was reported to be work related.      For the next 1 days he should not work      After returning to work the following restrictions apply for 7 days:   No lifting more than 10 pounds.  No pushing or pulling.        Sincerely,      ROMIE HICKMAN MD, MD

## 2024-10-01 NOTE — ED TRIAGE NOTES
Moved some furniture and hurt his left shoulder and left side of neck. Hx of C 5-6 herniation 12 yrs ago      Triage Assessment (Adult)       Row Name 10/01/24 1006          Triage Assessment    Airway WDL WDL        Respiratory WDL    Respiratory WDL WDL        Skin Circulation/Temperature WDL    Skin Circulation/Temperature WDL WDL        Cardiac WDL    Cardiac WDL WDL        Peripheral/Neurovascular WDL    Peripheral Neurovascular WDL WDL        Cognitive/Neuro/Behavioral WDL    Cognitive/Neuro/Behavioral WDL WDL

## 2024-10-18 ENCOUNTER — ANCILLARY PROCEDURE (OUTPATIENT)
Dept: GENERAL RADIOLOGY | Facility: CLINIC | Age: 54
End: 2024-10-18
Attending: FAMILY MEDICINE
Payer: COMMERCIAL

## 2024-10-18 ENCOUNTER — OFFICE VISIT (OUTPATIENT)
Dept: URGENT CARE | Facility: URGENT CARE | Age: 54
End: 2024-10-18
Payer: COMMERCIAL

## 2024-10-18 VITALS
OXYGEN SATURATION: 98 % | SYSTOLIC BLOOD PRESSURE: 112 MMHG | RESPIRATION RATE: 14 BRPM | HEART RATE: 71 BPM | TEMPERATURE: 97.3 F | DIASTOLIC BLOOD PRESSURE: 72 MMHG

## 2024-10-18 DIAGNOSIS — M25.562 ACUTE PAIN OF LEFT KNEE: ICD-10-CM

## 2024-10-18 DIAGNOSIS — M25.562 ACUTE PAIN OF LEFT KNEE: Primary | ICD-10-CM

## 2024-10-18 PROCEDURE — 99214 OFFICE O/P EST MOD 30 MIN: CPT | Performed by: FAMILY MEDICINE

## 2024-10-18 PROCEDURE — 73562 X-RAY EXAM OF KNEE 3: CPT | Mod: TC | Performed by: RADIOLOGY

## 2024-10-18 ASSESSMENT — PAIN SCALES - GENERAL: PAINLEVEL: MODERATE PAIN (4)

## 2024-10-18 NOTE — PROGRESS NOTES
Assessment & Plan     Acute pain of left knee  - XR Knee Left 3 Views  - Orthopedic  Referral     Referral to sports medicine provided should the swelling increase we may consider aspiration but appears based on his x-ray imaging per my read showing multicompartment arthritis that he may benefit from steroid injections.    Advised ice/rest and a knee brace until appointment with sports med    See AVS summary for additional recommendations reviewed with patient during this visit.     Irvin Tilley MD   [place] [facility]    Louise Mark is a 54 year old male who presents to clinic today for the following health issues:  Chief Complaint   Patient presents with    Urgent Care    Knee Pain     Patient presents with left knee pain and swelling.     HPI  Patient reports with acute swelling of the left knee without any known trauma or twisting.  He has a history of a tibial plateau fracture on the medial side which was nonsurgically healed.  No history of ligament tear.  Has been told in the past that he would potentially will need left knee replacement in the future.  No recent history of x-rays of the knee to suggest arthritis.  Certain motions will cause him to feel like it locks up or loses stability especially with twisting.  He does not currently follow with orthopedics.    Using ice and ibuprofen along with prescribed tramadol    Patient Active Problem List    Diagnosis Date Noted    S/P spinal surgery 09/10/2020     Priority: Medium    Recurrent herniation of lumbar disc 08/11/2020     Priority: Medium     Added automatically from request for surgery [id number]      Spinal stenosis of lumbar region with neurogenic claudication 03/18/2020     Priority: Medium     Added automatically from request for surgery [id number]      Lumbar radiculopathy 12/20/2019     Priority: Medium     Added automatically from request for surgery [id number]      Lumbago 12/22/2017     Priority: Medium    Benign essential  tremor 11/02/2016     Priority: Medium    Chronic pain syndrome 06/15/2016     Priority: Medium     Patient is followed by Lola Lamb CNP for ongoing prescription of pain medication.  All refills should be approved by this provider, or covering partner.    Medication(s): tramadol 50mg tabs, norco 5-325mg.   Maximum quantity per month: #56 tramadol/4 weeks, #28 norco/4 weeks  Clinic visit frequency required: Q 3 months     Controlled substance agreement on file: Yes       Date(s): 10/28/2015    Pain Clinic evaluation in the past: No    DIRE Total Score(s):  No flowsheet data found.    Last [facility] website verification:  done on 6/15/16   [email or web address]            Gastroesophageal reflux disease without esophagitis 10/28/2015     Priority: Medium    BPH (benign prostatic hyperplasia) 10/28/2015     Priority: Medium    Generalized anxiety disorder 10/26/2011     Priority: Medium    Moderate major depression (H) 11/22/2010     Priority: Medium    CARDIOVASCULAR SCREENING; LDL GOAL LESS THAN 160 05/09/2010     Priority: Medium       Current Outpatient Medications   Medication Sig Dispense Refill    amitriptyline (ELAVIL) 25 MG tablet TAKE ONE TABLET BY MOUTH AT BEDTIME 90 tablet 3    Cholecalciferol (VITAMIN D3 PO) Take 2,000 Units by mouth every morning       citalopram (CELEXA) 40 MG tablet Take 1 tablet (40 mg) by mouth daily 90 tablet 3    [START ON 11/20/2024] HYDROcodone-acetaminophen (NORCO) 5-325 MG tablet Take 1 tablet by mouth 2 times daily as needed for pain. 60 tablet 0    [START ON 10/20/2024] HYDROcodone-acetaminophen (NORCO) 5-325 MG tablet Take 1 tablet by mouth 2 times daily as needed for pain. 60 tablet 0    HYDROcodone-acetaminophen (NORCO) 5-325 MG tablet Take 1 tablet by mouth 2 times daily as needed for pain. 60 tablet 0    hydrOXYzine HCl (ATARAX) 25 MG tablet Take 1 tablet (25 mg) by mouth daily as needed for anxiety. 90 tablet 2    ibuprofen (ADVIL/MOTRIN) 200 MG capsule        loratadine (CLARITIN) 10 MG tablet Take 10 mg by mouth every morning 1 tab daily      magnesium oxide 400 MG CAPS       methocarbamol (ROBAXIN) 500 MG tablet Take 1 tablet (500 mg) by mouth nightly as needed for muscle spasms 30 tablet 0    multivitamin w/minerals (THERA-VIT-M) tablet Take 1 tablet by mouth daily      naloxone (NARCAN) 4 MG/0.1ML nasal spray Spray 1 spray (4 mg) into one nostril alternating nostrils as needed for opioid reversal. every 2-3 minutes until assistance arrives  0    naloxone (NARCAN) 4 MG/0.1ML nasal spray Spray 1 spray (4 mg) into one nostril alternating nostrils once as needed for opioid reversal every 2-3 minutes until assistance arrives 0.2 mL 0    omeprazole (PRILOSEC) 20 MG DR capsule TAKE ONE CAPSULE BY MOUTH ONCE DAILY 90 capsule 2    ondansetron (ZOFRAN ODT) 8 MG ODT tab Take 1 tablet (8 mg) by mouth every 8 hours as needed for nausea. 21 tablet 0    propranolol (INDERAL) 10 MG tablet TAKE ONE TABLET BY MOUTH TWICE A DAY AS NEEDED FOR TREMOR 180 tablet 3    tamsulosin (FLOMAX) 0.4 MG capsule TAKE TWO CAPSULES BY MOUTH ONCE DAILY 180 capsule 3    tamsulosin (FLOMAX) 0.4 MG capsule Take 2 capsules (0.8 mg) by mouth daily. 180 capsule 3    traMADol (ULTRAM) 50 MG tablet Take 1 tablet (50 mg) by mouth 2 times daily as needed for severe pain. 60 tablet 2     No current facility-administered medications for this visit.           Objective    /72 (BP Location: Right arm)   Pulse 71   Temp 97.3  F (36.3  C) (Temporal)   Resp 14   SpO2 98%   Physical Exam       L knee: extension to 170, flexion to 45, superolateral knee swelling  Gait: normal, stable  Results for orders placed or performed in visit on 10/18/24   XR Knee Left 3 Views     Status: None    Narrative    EXAM: XR KNEE LEFT 3 VIEWS  DATE/TIME: 10/18/2024 1:00 PM    INDICATION: hx of knee medial tibial fx nonsurg repair, pain for many  months progressive.; Acute pain of left knee  COMPARISON: 9/14/2005      Impression     IMPRESSION: Tricompartmental degenerative arthrosis with prominent  marginal osteophytes. Advanced narrowing of the patellofemoral  compartment. No acute fracture or joint effusion.       PASQUALE CAVANAUGH DO         SYSTEM ID:  [id number]                     The use of Dragon/Supernova dictation services may have been used to construct the content in this note; any grammatical or spelling errors are non-intentional. Please contact the author of this note directly if you are in need of any clarification.

## 2024-10-18 NOTE — PATIENT INSTRUCTIONS
Continue the ibuprofen for the pain      Knee brace ( sleeve or velcro)  to help with pain/stability for the next 1-2 weeks      Ice as needed for the swelling

## 2024-10-21 ENCOUNTER — TELEPHONE (OUTPATIENT)
Dept: FAMILY MEDICINE | Facility: CLINIC | Age: 54
End: 2024-10-21
Payer: COMMERCIAL

## 2024-10-21 NOTE — TELEPHONE ENCOUNTER
Retail Pharmacy Prior Authorization Team   Phone: [phone]      Prior Authorization Approval    Medication: TRAMADOL HCL 50 MG PO TABS  Authorization Effective Date: 9/21/2024  Authorization Expiration Date: 9/21/2025  Approved Dose/Quantity: UP TO 8 TABLETS PER DAY  Reference #:     Insurance Company: [employer] [place] - Phone [phone] Fax [phone]  Expected CoPay: $    CoPay Card Available: No    Financial Assistance Needed:   Which Pharmacy is filling the prescription: [facility] - [place], [place] - [address] [place] [address]  Pharmacy Notified: YES  Patient Notified: **Instructed pharmacy to notify patient when script is ready to /ship.**

## 2024-10-21 NOTE — TELEPHONE ENCOUNTER
Retail Pharmacy Prior Authorization Team   Phone: [phone]        PA Initiation    Medication: TRAMADOL HCL 50 MG PO TABS  Insurance Company: [facility] - Phone [phone] Fax [phone]  Pharmacy Filling the Rx: [facility] - [place], [place] - [address]  Filling Pharmacy Phone:    Filling Pharmacy Fax:    Start Date: 10/21/2024

## 2024-10-21 NOTE — TELEPHONE ENCOUNTER
Retail Pharmacy Prior Authorization Team   Phone: [phone]        PA Initiation    Medication: HYDROCODONE-ACETAMINOPHEN 5-325 MG PO TABS  Insurance Company: [employer] [place] - Phone [phone] Fax [phone]  Pharmacy Filling the Rx: [facility] - [place], [place] - [address] [facility]  Filling Pharmacy Phone:    Filling Pharmacy Fax:    Start Date: 10/21/2024

## 2024-10-21 NOTE — TELEPHONE ENCOUNTER
Prior Authorization Retail Medication Request    Medication/Dose: Tramadol HCL 50mg tabs an  Hydrocodone-acetaminophen 5-325mg requires prior authorization  Diagnosis and ICD code (if different than what is on RX):    New/renewal/insurance change PA/secondary ins. PA:  Previously Tried and Failed:    Rationale:      Insurance   Primary: [employer] [place] [employer]  Insurance ID:  [id number]     Secondary (if applicable):  Insurance ID:      Pharmacy Information (if different than what is on RX)  Name:  [facility]  Phone:  [phone]  Fax:    Clinic Information  Preferred routing pool for dept communication:           Liliana Graff  Pharmacy Technician   [facility]  [phone]

## 2024-10-21 NOTE — TELEPHONE ENCOUNTER
Retail Pharmacy Prior Authorization Team   Phone: [phone]      Prior Authorization Approval    Medication: HYDROCODONE-ACETAMINOPHEN 5-325 MG PO TABS  Authorization Effective Date: 8/21/2024  Authorization Expiration Date: 4/21/2025  Approved Dose/Quantity: UP TO 8 TABLET PER DAY  Reference #:     Insurance Company: [employer] [place] - Phone [phone] Fax [phone]  Expected CoPay: $    CoPay Card Available: No    Financial Assistance Needed:   Which Pharmacy is filling the prescription: [facility] - [place], [place] - [address] [place] [address]  Pharmacy Notified: Yes  Patient Notified: **Instructed pharmacy to notify patient when script is ready to /ship.**

## 2024-10-28 ENCOUNTER — OFFICE VISIT (OUTPATIENT)
Dept: ORTHOPEDICS | Facility: CLINIC | Age: 54
End: 2024-10-28
Attending: FAMILY MEDICINE
Payer: COMMERCIAL

## 2024-10-28 DIAGNOSIS — M17.12 OSTEOARTHRITIS OF LEFT PATELLOFEMORAL JOINT: ICD-10-CM

## 2024-10-28 DIAGNOSIS — Q68.2 PATELLA ALTA: Primary | ICD-10-CM

## 2024-10-28 DIAGNOSIS — M25.562 ACUTE PAIN OF LEFT KNEE: ICD-10-CM

## 2024-10-28 PROCEDURE — 99203 OFFICE O/P NEW LOW 30 MIN: CPT | Performed by: STUDENT IN AN ORGANIZED HEALTH CARE EDUCATION/TRAINING PROGRAM

## 2024-10-28 NOTE — LETTER
10/28/2024      Jas Patel  [address]  [facility] [id number]      Dear Colleague,    Thank you for referring your patient, Jas Patel, to the [facility]. Please see a copy of my visit note below.    ASSESSMENT & PLAN    Jas was seen today for pain.    Diagnoses and all orders for this visit:    Patella laura  -     Physical Therapy  Referral; Future    Acute pain of left knee  -     Orthopedic  Referral  -     Physical Therapy  Referral; Future    Osteoarthritis of left patellofemoral joint  -     Physical Therapy  Referral; Future      Patient has history of medial tibial plateau fracture from decades ago which also resulted in quadricep injury. Based on his imaging and exam, he has significant patellar dysfunction. The xray shows significant hypertrophy of the patella and associated maltracking. Recommend starting with PT and seeing how much function and pain improves. If no significant improvement then will refer to surgeon to discuss different surgical options.               Rolo Cottrell MD  [facility]    -----------------------------------------------------------------------------------------      SUBJECTIVE  Jas Patel is a/an 54 year old who is experiencing chronic knee pain. He has had a previous medial tibial plateau fracture which he treated conservatively. He reports pain daily but is still able to perform HEP and walking to help with strength.     Reason for Visit:  Injured/painful/body part: Left knee  What are your symptoms: Radiating pain  Date of injury/Onset: Chronic  Cause: Reports insidious onset without acute precipitating event.  History of similar pain: Tibial Plateau Fx  What makes it better: Strengthening  What makes it worse: Stairs, walking  What have you done for this problem: MSK Treatments Tried : OTC meds (tylenol, ibuprofen), Topicals  (ice/heat, voltaren), and Home exercises    Previous surgeries: Discectomy, Spinal Effusions  Social History/Occupation: Nurse      REVIEW OF SYSTEMS:  Positive ROS was noted in the HPI, otherwise negative.       OBJECTIVE:  Gen: no acute distress  Knee Exam:  -Examination limited to the L knee    Inspection:  -Knee appeared grossly normal  -No open wounds or rashes appreciated    TTP:  -Fibular head: negative  -Patellar poles: positive  -Patellar facets: positive  -Tibial tuberosity: negative  -Pes Anserine: negative  -Medial joint line: positive  -Lateral joint line: negative  -MCL: positive  -LCL: negative  -IT band insertion: negative  -Medial and lateral hamstring muscle insertions: negative    ROM:   Active  -Flexion: 120  -Extension: 0  Crepitus: present    Strength:  Knee flexion: 5/5  Knee extension: 5/5  Sensation:  Sensation throughout the knee was grossly normal    Special Testing:  Lachman: negative  Anterior drawer: negative  Posterior drawer: negative  MCL testing at 0 : positive; At 30: negative  LCL testing at 0: negative; At 30: negative  Jamel: positive  Patellar Grind: positive  Apprehension: positive        RADIOLOGY:  Previous imaging reviewed and listed are the impressions: XR Left knee 10/18.24 IMPRESSION: Tricompartmental degenerative arthrosis with prominent  marginal osteophytes. Advanced narrowing of the patellofemoral  compartment. No acute fracture or joint effusion.      Again, thank you for allowing me to participate in the care of your patient.        Sincerely,        Rolo Cottrell MD

## 2024-10-28 NOTE — PROGRESS NOTES
ASSESSMENT & PLAN    Jas was seen today for pain.    Diagnoses and all orders for this visit:    Patella laura  -     Physical Therapy  Referral; Future    Acute pain of left knee  -     Orthopedic  Referral  -     Physical Therapy  Referral; Future    Osteoarthritis of left patellofemoral joint  -     Physical Therapy  Referral; Future      Patient has history of medial tibial plateau fracture from decades ago which also resulted in quadricep injury. Based on his imaging and exam, he has significant patellar dysfunction. The xray shows significant hypertrophy of the patella and associated maltracking. Recommend starting with PT and seeing how much function and pain improves. If no significant improvement then will refer to surgeon to discuss different surgical options.               Rolo Cottrell MD  [facility]IC[facility]    -----------------------------------------------------------------------------------------      SUBJECTIVE  Jas Patel is a/an 54 year old who is experiencing chronic knee pain. He has had a previous medial tibial plateau fracture which he treated conservatively. He reports pain daily but is still able to perform HEP and walking to help with strength.     Reason for Visit:  Injured/painful/body part: Left knee  What are your symptoms: Radiating pain  Date of injury/Onset: Chronic  Cause: Reports insidious onset without acute precipitating event.  History of similar pain: Tibial Plateau Fx  What makes it better: Strengthening  What makes it worse: Stairs, walking  What have you done for this problem: MSK Treatments Tried : OTC meds (tylenol, ibuprofen), Topicals (ice/heat, voltaren), and Home exercises    Previous surgeries: Discectomy, Spinal Effusions  Social History/Occupation: Nurse      REVIEW OF SYSTEMS:  Positive ROS was noted in the HPI, otherwise negative.       OBJECTIVE:  Gen: no acute distress  Knee  Exam:  -Examination limited to the L knee    Inspection:  -Knee appeared grossly normal  -No open wounds or rashes appreciated    TTP:  -Fibular head: negative  -Patellar poles: positive  -Patellar facets: positive  -Tibial tuberosity: negative  -Pes Anserine: negative  -Medial joint line: positive  -Lateral joint line: negative  -MCL: positive  -LCL: negative  -IT band insertion: negative  -Medial and lateral hamstring muscle insertions: negative    ROM:   Active  -Flexion: 120  -Extension: 0  Crepitus: present    Strength:  Knee flexion: 5/5  Knee extension: 5/5  Sensation:  Sensation throughout the knee was grossly normal    Special Testing:  Lachman: negative  Anterior drawer: negative  Posterior drawer: negative  MCL testing at 0 : positive; At 30: negative  LCL testing at 0: negative; At 30: negative  Jamel: positive  Patellar Grind: positive  Apprehension: positive        RADIOLOGY:  Previous imaging reviewed and listed are the impressions: XR Left knee 10/18.24 IMPRESSION: Tricompartmental degenerative arthrosis with prominent  marginal osteophytes. Advanced narrowing of the patellofemoral  compartment. No acute fracture or joint effusion.

## 2024-10-28 NOTE — PATIENT INSTRUCTIONS
Thanks for coming in today!     We talked about your knee arthritis and the changes in your knee cap (patella).     Use brace as needed    Work on PHYSICAL THERAPY and strengthening your quad and working on patellar tracking!     Let me know when you want to talk with a surgeon and we can put the referral in. Also if you want the more sturdy brace then let me know and I can put in the order for PHYSICAL THERAPY to fit you for that.

## 2024-11-26 ENCOUNTER — DOCUMENTATION ONLY (OUTPATIENT)
Dept: FAMILY MEDICINE | Facility: CLINIC | Age: 54
End: 2024-11-26
Payer: COMMERCIAL

## 2024-11-26 DIAGNOSIS — M17.12 UNILATERAL PRIMARY OSTEOARTHRITIS, LEFT KNEE: ICD-10-CM

## 2024-11-26 DIAGNOSIS — Q68.2 CONGENITAL DEFORMITY OF KNEE: Primary | ICD-10-CM

## 2024-12-14 DIAGNOSIS — F41.1 GENERALIZED ANXIETY DISORDER: ICD-10-CM

## 2024-12-14 DIAGNOSIS — G25.0 BENIGN ESSENTIAL TREMOR: ICD-10-CM

## 2024-12-15 RX ORDER — PROPRANOLOL HYDROCHLORIDE 10 MG/1
TABLET ORAL
Qty: 180 TABLET | Refills: 0 | Status: SHIPPED | OUTPATIENT
Start: 2024-12-15

## 2024-12-27 ENCOUNTER — MYC MEDICAL ADVICE (OUTPATIENT)
Dept: FAMILY MEDICINE | Facility: CLINIC | Age: 54
End: 2024-12-27
Payer: COMMERCIAL

## 2024-12-27 DIAGNOSIS — F11.20 CONTINUOUS OPIOID DEPENDENCE (H): ICD-10-CM

## 2024-12-27 DIAGNOSIS — M48.062 SPINAL STENOSIS OF LUMBAR REGION WITH NEUROGENIC CLAUDICATION: ICD-10-CM

## 2024-12-27 DIAGNOSIS — M54.16 LUMBAR RADICULOPATHY: ICD-10-CM

## 2024-12-28 ENCOUNTER — E-VISIT (OUTPATIENT)
Dept: FAMILY MEDICINE | Facility: CLINIC | Age: 54
End: 2024-12-28
Payer: COMMERCIAL

## 2024-12-28 DIAGNOSIS — M54.16 LUMBAR RADICULOPATHY: ICD-10-CM

## 2024-12-28 DIAGNOSIS — F11.20 CONTINUOUS OPIOID DEPENDENCE (H): ICD-10-CM

## 2024-12-28 DIAGNOSIS — M48.062 SPINAL STENOSIS OF LUMBAR REGION WITH NEUROGENIC CLAUDICATION: ICD-10-CM

## 2024-12-30 RX ORDER — TRAMADOL HYDROCHLORIDE 50 MG/1
50 TABLET ORAL 2 TIMES DAILY PRN
Qty: 60 TABLET | Refills: 2 | Status: CANCELLED | OUTPATIENT
Start: 2024-12-30

## 2024-12-30 RX ORDER — HYDROCODONE BITARTRATE AND ACETAMINOPHEN 5; 325 MG/1; MG/1
1 TABLET ORAL 2 TIMES DAILY PRN
Qty: 60 TABLET | Refills: 0 | Status: CANCELLED | OUTPATIENT
Start: 2024-12-30

## 2024-12-30 NOTE — TELEPHONE ENCOUNTER
Dr. Britton-Please review and sign if agree.    Thank you!  KATHY PetersenN, RN-[facility]

## 2024-12-31 RX ORDER — HYDROCODONE BITARTRATE AND ACETAMINOPHEN 5; 325 MG/1; MG/1
1 TABLET ORAL 2 TIMES DAILY PRN
Qty: 60 TABLET | Refills: 0 | Status: SHIPPED | OUTPATIENT
Start: 2025-03-04 | End: 2025-04-03

## 2024-12-31 RX ORDER — HYDROCODONE BITARTRATE AND ACETAMINOPHEN 5; 325 MG/1; MG/1
1 TABLET ORAL 2 TIMES DAILY PRN
Qty: 60 TABLET | Refills: 0 | Status: SHIPPED | OUTPATIENT
Start: 2025-01-31 | End: 2025-03-02

## 2024-12-31 RX ORDER — TRAMADOL HYDROCHLORIDE 50 MG/1
50 TABLET ORAL 2 TIMES DAILY PRN
Qty: 60 TABLET | Refills: 2 | Status: SHIPPED | OUTPATIENT
Start: 2024-12-31

## 2024-12-31 RX ORDER — HYDROCODONE BITARTRATE AND ACETAMINOPHEN 5; 325 MG/1; MG/1
1 TABLET ORAL 2 TIMES DAILY PRN
Qty: 60 TABLET | Refills: 0 | Status: SHIPPED | OUTPATIENT
Start: 2024-12-31 | End: 2025-01-30

## 2025-03-24 ENCOUNTER — MYC REFILL (OUTPATIENT)
Dept: FAMILY MEDICINE | Facility: CLINIC | Age: 55
End: 2025-03-24
Payer: COMMERCIAL

## 2025-03-24 DIAGNOSIS — G25.0 BENIGN ESSENTIAL TREMOR: ICD-10-CM

## 2025-03-24 DIAGNOSIS — F41.1 GENERALIZED ANXIETY DISORDER: ICD-10-CM

## 2025-03-24 RX ORDER — PROPRANOLOL HYDROCHLORIDE 10 MG/1
TABLET ORAL
Qty: 180 TABLET | Refills: 0 | Status: SHIPPED | OUTPATIENT
Start: 2025-03-24

## 2025-04-12 ENCOUNTER — MYC REFILL (OUTPATIENT)
Dept: FAMILY MEDICINE | Facility: CLINIC | Age: 55
End: 2025-04-12
Payer: COMMERCIAL

## 2025-04-12 DIAGNOSIS — M48.062 SPINAL STENOSIS OF LUMBAR REGION WITH NEUROGENIC CLAUDICATION: ICD-10-CM

## 2025-04-12 DIAGNOSIS — F11.20 CONTINUOUS OPIOID DEPENDENCE (H): ICD-10-CM

## 2025-04-12 DIAGNOSIS — M54.16 LUMBAR RADICULOPATHY: ICD-10-CM

## 2025-04-16 ENCOUNTER — MYC MEDICAL ADVICE (OUTPATIENT)
Dept: FAMILY MEDICINE | Facility: CLINIC | Age: 55
End: 2025-04-16
Payer: COMMERCIAL

## 2025-04-17 RX ORDER — HYDROCODONE BITARTRATE AND ACETAMINOPHEN 5; 325 MG/1; MG/1
1 TABLET ORAL 2 TIMES DAILY PRN
Qty: 60 TABLET | Refills: 0 | Status: SHIPPED | OUTPATIENT
Start: 2025-04-17

## 2025-04-17 RX ORDER — TRAMADOL HYDROCHLORIDE 50 MG/1
50 TABLET ORAL 2 TIMES DAILY PRN
Qty: 60 TABLET | Refills: 0 | Status: SHIPPED | OUTPATIENT
Start: 2025-04-17

## 2025-04-17 NOTE — TELEPHONE ENCOUNTER
Writer replied to patient via [employer].  NEREYDA Marshall BSN, PHN, AMB-BC (she/her)  [facility] RN

## 2025-04-17 NOTE — TELEPHONE ENCOUNTER
Responded to the patient through MyChart.     Sangeetha New RN  [facility]

## 2025-06-06 PROBLEM — F11.20 CONTINUOUS OPIOID DEPENDENCE (H): Status: ACTIVE | Noted: 2025-06-06

## 2025-06-14 ENCOUNTER — HEALTH MAINTENANCE LETTER (OUTPATIENT)
Age: 55
End: 2025-06-14

## 2025-07-08 ENCOUNTER — MYC MEDICAL ADVICE (OUTPATIENT)
Dept: FAMILY MEDICINE | Facility: CLINIC | Age: 55
End: 2025-07-08
Payer: COMMERCIAL

## 2025-07-08 DIAGNOSIS — M54.16 LUMBAR RADICULOPATHY: ICD-10-CM

## 2025-07-08 DIAGNOSIS — M48.062 SPINAL STENOSIS OF LUMBAR REGION WITH NEUROGENIC CLAUDICATION: ICD-10-CM

## 2025-07-08 DIAGNOSIS — F11.20 CONTINUOUS OPIOID DEPENDENCE (H): ICD-10-CM

## 2025-07-08 RX ORDER — HYDROCODONE BITARTRATE AND ACETAMINOPHEN 5; 325 MG/1; MG/1
1 TABLET ORAL 2 TIMES DAILY PRN
Qty: 60 TABLET | Refills: 0 | Status: SHIPPED | OUTPATIENT
Start: 2025-08-08 | End: 2025-09-07

## 2025-07-08 RX ORDER — TRAMADOL HYDROCHLORIDE 50 MG/1
50 TABLET ORAL 2 TIMES DAILY PRN
Qty: 60 TABLET | Refills: 0 | OUTPATIENT
Start: 2025-07-08

## 2025-07-08 RX ORDER — TRAMADOL HYDROCHLORIDE 50 MG/1
50 TABLET ORAL 2 TIMES DAILY PRN
Qty: 60 TABLET | Refills: 0 | Status: SHIPPED | OUTPATIENT
Start: 2025-08-08 | End: 2025-09-07

## 2025-07-08 RX ORDER — TRAMADOL HYDROCHLORIDE 50 MG/1
50 TABLET ORAL 2 TIMES DAILY PRN
Qty: 60 TABLET | Refills: 0 | Status: SHIPPED | OUTPATIENT
Start: 2025-07-08

## 2025-07-08 RX ORDER — HYDROCODONE BITARTRATE AND ACETAMINOPHEN 5; 325 MG/1; MG/1
1 TABLET ORAL 2 TIMES DAILY PRN
Qty: 60 TABLET | Refills: 0 | OUTPATIENT
Start: 2025-07-08

## 2025-07-08 RX ORDER — HYDROCODONE BITARTRATE AND ACETAMINOPHEN 5; 325 MG/1; MG/1
1 TABLET ORAL 2 TIMES DAILY PRN
Qty: 60 TABLET | Refills: 0 | Status: SHIPPED | OUTPATIENT
Start: 2025-07-08

## 2025-07-08 NOTE — TELEPHONE ENCOUNTER
Jasen -- per your visit it looks like you advised pt to follow-up in 3-months not 3-months of refills? Please clarify if you intended to send 3 future prescriptions    Sangeetha New RN  [facility]

## (undated) DEVICE — SYR 50ML LL W/O NDL 309653

## (undated) DEVICE — SPONGE COTTONOID 1X1" 80-1403

## (undated) DEVICE — NDL SPINAL 18GA 3.5" 405184

## (undated) DEVICE — ESU ELEC BLADE 2.75" COATED/INSULATED E1455

## (undated) DEVICE — DRSG TEGADERM 4X4 3/4" 1626W

## (undated) DEVICE — DRSG TEGADERM 4X10" 1627

## (undated) DEVICE — ESU CORD BIPOLAR GREEN 10-4000

## (undated) DEVICE — SU ETHILON 3-0 PS-1 18" 1663H

## (undated) DEVICE — DRAIN JACKSON PRATT RESERVOIR 100ML SU130-1305

## (undated) DEVICE — MIDAS REX DISSECTING TOOL 4MM BALL 140MM 14BA40

## (undated) DEVICE — GLOVE PROTEXIS W/NEU-THERA 7.5  2D73TE75

## (undated) DEVICE — SUCTION MINISQUAIR SMOKE EVAC CAPTURE DEVICE SQ20012-01

## (undated) DEVICE — KIT PATIENT CARE PROAXIS SYSTEM 6988-PV-ACP

## (undated) DEVICE — DRAPE POUCH INSTRUMENT 1018

## (undated) DEVICE — GOWN IMPERVIOUS SPECIALTY XLG/XLONG 32474

## (undated) DEVICE — BLADE CLIPPER SGL USE 9680

## (undated) DEVICE — SU MONOCRYL 3-0 PS-2 18" UND Y497G

## (undated) DEVICE — SOL WATER IRRIG 1000ML BOTTLE 2F7114

## (undated) DEVICE — SPONGE COTTONOID 1/2X1/2" 80-1400

## (undated) DEVICE — DRAPE IOBAN INCISE 23X17" 6650EZ

## (undated) DEVICE — SU VICRYL 1 CT-1 CR 8X18" J741D

## (undated) DEVICE — TUBING SUCTION MEDI-VAC SOFT 3/16"X20' N520A

## (undated) DEVICE — SU VICRYL 1 CT-1 36" J347H

## (undated) DEVICE — TAPE MEDIPORE 4"X2YD 2864

## (undated) DEVICE — SOL ISOPROPYL RUBBING ALCOHOL USP 70% 4OZ HDX-20 I0020

## (undated) DEVICE — SU VICRYL 2-0 CT-2 27" UND J269H

## (undated) DEVICE — DRAIN HEMOVAC RESERVOIR KIT 10FR 1/8" MED 00-2550-002-10

## (undated) DEVICE — LINEN TOWEL PACK X5 5464

## (undated) DEVICE — SU VICRYL 1 CT-1 27" UND J261H

## (undated) DEVICE — LINEN BACK PACK 5440

## (undated) DEVICE — DRAPE MAYO STAND 23X54 8337

## (undated) DEVICE — POSITIONER ARMBOARD FOAM 1PAIR LF FP-ARMB1

## (undated) DEVICE — LINEN GOWN X4 5410

## (undated) DEVICE — MIDAS REX DIFFUSER LUBRICANT LEGEND PA100-A

## (undated) DEVICE — ADH SKIN CLOSURE PREMIERPRO EXOFIN 1.0ML 3470

## (undated) DEVICE — ESU GROUND PAD UNIVERSAL W/O CORD

## (undated) DEVICE — GLOVE PROTEXIS W/NEU-THERA 8.5  2D73TE85

## (undated) DEVICE — WIPES FOLEY CARE SURESTEP PROVON DFC100

## (undated) DEVICE — BLADE BONE MILL STRK 5.0MM MED 5400-701-000

## (undated) DEVICE — DRSG KERLIX FLUFFS X5

## (undated) DEVICE — PREP CHLORAPREP 26ML TINTED ORANGE  260815

## (undated) DEVICE — STRAP KNEE/BODY 31143004

## (undated) DEVICE — Device

## (undated) DEVICE — GLOVE PROTEXIS BLUE W/NEU-THERA 8.0  2D73EB80

## (undated) DEVICE — RX SURGIFLO HEMOSTATIC MATRIX W/THROMBIN 8ML NEXTGEN 2993

## (undated) DEVICE — BONE WAX 2.5GM W31G

## (undated) DEVICE — SPONGE KITTNER 31001010

## (undated) DEVICE — SU VICRYL 2-0 CT-2 8X18" UND D8144

## (undated) DEVICE — DRAPE LAP W/ARMBOARD 29410

## (undated) DEVICE — GLOVE PROTEXIS W/NEU-THERA 6.5  2D73TE65

## (undated) DEVICE — DRSG GAUZE 4X8" NON21842

## (undated) DEVICE — BASIN SET MAJOR

## (undated) DEVICE — LINEN TOWEL PACK X30 5481

## (undated) DEVICE — DRSG DRAIN 4X4" 7086

## (undated) DEVICE — SUCTION MANIFOLD DORNOCH ULTRA CART UL-CL500

## (undated) DEVICE — NDL COUNTER 20CT 31142493

## (undated) DEVICE — BRUSH SURGICAL SCRUB W/4% CHLORHEXIDINE GLUCONATE SOL 4458A

## (undated) DEVICE — DRAPE O ARM TUBE 9732722

## (undated) DEVICE — CATH TRAY FOLEY SURESTEP 16FR WDRAIN BAG STLK LATEX A300316A

## (undated) DEVICE — SPECIMEN CONTAINER 5OZ STERILE 2600SA

## (undated) DEVICE — PACK SPINE INSTRUMENT DRAPE 76091-ACM7820

## (undated) DEVICE — DRAPE STERI TOWEL LG 1010

## (undated) DEVICE — SU ETHILON 3-0 PS-2 18" 1669H

## (undated) DEVICE — GLOVE PROTEXIS BLUE W/NEU-THERA 8.5  2D73EB85

## (undated) DEVICE — MARKER SPHERES PASSIVE MEDT PACK 5 8801075

## (undated) DEVICE — SPONGE LAP 18X18" X8435

## (undated) DEVICE — DRAIN JACKSON PRATT ROUND W/TROCAR 15FR LF JP-HUR151

## (undated) DEVICE — SOL NACL 0.9% IRRIG 1000ML BOTTLE 2F7124

## (undated) DEVICE — SPONGE COTTONOID 1X3" 80-1408

## (undated) RX ORDER — HYDROMORPHONE HYDROCHLORIDE 1 MG/ML
INJECTION, SOLUTION INTRAMUSCULAR; INTRAVENOUS; SUBCUTANEOUS
Status: DISPENSED
Start: 2020-09-10

## (undated) RX ORDER — FENTANYL CITRATE 50 UG/ML
INJECTION, SOLUTION INTRAMUSCULAR; INTRAVENOUS
Status: DISPENSED
Start: 2020-01-23

## (undated) RX ORDER — PHENYLEPHRINE HCL IN 0.9% NACL 1 MG/10 ML
SYRINGE (ML) INTRAVENOUS
Status: DISPENSED
Start: 2020-01-23

## (undated) RX ORDER — DEXAMETHASONE SODIUM PHOSPHATE 4 MG/ML
INJECTION, SOLUTION INTRA-ARTICULAR; INTRALESIONAL; INTRAMUSCULAR; INTRAVENOUS; SOFT TISSUE
Status: DISPENSED
Start: 2020-01-23

## (undated) RX ORDER — LIDOCAINE HYDROCHLORIDE 20 MG/ML
INJECTION, SOLUTION EPIDURAL; INFILTRATION; INTRACAUDAL; PERINEURAL
Status: DISPENSED
Start: 2020-03-23

## (undated) RX ORDER — LIDOCAINE HYDROCHLORIDE 20 MG/ML
INJECTION, SOLUTION EPIDURAL; INFILTRATION; INTRACAUDAL; PERINEURAL
Status: DISPENSED
Start: 2020-01-23

## (undated) RX ORDER — ACETAMINOPHEN 325 MG/1
TABLET ORAL
Status: DISPENSED
Start: 2020-09-10

## (undated) RX ORDER — FENTANYL CITRATE 50 UG/ML
INJECTION, SOLUTION INTRAMUSCULAR; INTRAVENOUS
Status: DISPENSED
Start: 2020-09-10

## (undated) RX ORDER — PROPOFOL 10 MG/ML
INJECTION, EMULSION INTRAVENOUS
Status: DISPENSED
Start: 2020-03-23

## (undated) RX ORDER — KETOROLAC TROMETHAMINE 30 MG/ML
INJECTION, SOLUTION INTRAMUSCULAR; INTRAVENOUS
Status: DISPENSED
Start: 2020-01-23

## (undated) RX ORDER — HYDROMORPHONE HYDROCHLORIDE 1 MG/ML
INJECTION, SOLUTION INTRAMUSCULAR; INTRAVENOUS; SUBCUTANEOUS
Status: DISPENSED
Start: 2020-01-23

## (undated) RX ORDER — PHENYLEPHRINE HCL IN 0.9% NACL 1 MG/10 ML
SYRINGE (ML) INTRAVENOUS
Status: DISPENSED
Start: 2020-03-23

## (undated) RX ORDER — HYDROMORPHONE HYDROCHLORIDE 1 MG/ML
INJECTION, SOLUTION INTRAMUSCULAR; INTRAVENOUS; SUBCUTANEOUS
Status: DISPENSED
Start: 2020-03-23

## (undated) RX ORDER — CEFAZOLIN SODIUM 2 G/100ML
INJECTION, SOLUTION INTRAVENOUS
Status: DISPENSED
Start: 2020-01-23

## (undated) RX ORDER — CEFAZOLIN SODIUM 2 G/100ML
INJECTION, SOLUTION INTRAVENOUS
Status: DISPENSED
Start: 2020-03-23

## (undated) RX ORDER — GABAPENTIN 300 MG/1
CAPSULE ORAL
Status: DISPENSED
Start: 2020-01-23

## (undated) RX ORDER — ACETAMINOPHEN 325 MG/1
TABLET ORAL
Status: DISPENSED
Start: 2020-01-23

## (undated) RX ORDER — ONDANSETRON 2 MG/ML
INJECTION INTRAMUSCULAR; INTRAVENOUS
Status: DISPENSED
Start: 2020-09-10

## (undated) RX ORDER — KETOROLAC TROMETHAMINE 30 MG/ML
INJECTION, SOLUTION INTRAMUSCULAR; INTRAVENOUS
Status: DISPENSED
Start: 2020-03-23

## (undated) RX ORDER — EPHEDRINE SULFATE 50 MG/ML
INJECTION, SOLUTION INTRAMUSCULAR; INTRAVENOUS; SUBCUTANEOUS
Status: DISPENSED
Start: 2020-01-23

## (undated) RX ORDER — ONDANSETRON 2 MG/ML
INJECTION INTRAMUSCULAR; INTRAVENOUS
Status: DISPENSED
Start: 2020-01-23

## (undated) RX ORDER — FENTANYL CITRATE 50 UG/ML
INJECTION, SOLUTION INTRAMUSCULAR; INTRAVENOUS
Status: DISPENSED
Start: 2020-03-23

## (undated) RX ORDER — CEFAZOLIN SODIUM 2 G/100ML
INJECTION, SOLUTION INTRAVENOUS
Status: DISPENSED
Start: 2020-09-10

## (undated) RX ORDER — OXYCODONE HYDROCHLORIDE 5 MG/1
TABLET ORAL
Status: DISPENSED
Start: 2020-03-23

## (undated) RX ORDER — ACETAMINOPHEN 325 MG/1
TABLET ORAL
Status: DISPENSED
Start: 2020-03-23

## (undated) RX ORDER — DEXAMETHASONE SODIUM PHOSPHATE 4 MG/ML
INJECTION, SOLUTION INTRA-ARTICULAR; INTRALESIONAL; INTRAMUSCULAR; INTRAVENOUS; SOFT TISSUE
Status: DISPENSED
Start: 2020-03-23

## (undated) RX ORDER — BUPIVACAINE HYDROCHLORIDE AND EPINEPHRINE 5; 5 MG/ML; UG/ML
INJECTION, SOLUTION PERINEURAL
Status: DISPENSED
Start: 2020-03-23

## (undated) RX ORDER — PROPOFOL 10 MG/ML
INJECTION, EMULSION INTRAVENOUS
Status: DISPENSED
Start: 2020-01-23

## (undated) RX ORDER — GABAPENTIN 300 MG/1
CAPSULE ORAL
Status: DISPENSED
Start: 2020-03-23

## (undated) RX ORDER — CEFAZOLIN SODIUM 1 G/3ML
INJECTION, POWDER, FOR SOLUTION INTRAMUSCULAR; INTRAVENOUS
Status: DISPENSED
Start: 2020-09-10

## (undated) RX ORDER — SODIUM CHLORIDE, SODIUM LACTATE, POTASSIUM CHLORIDE, CALCIUM CHLORIDE 600; 310; 30; 20 MG/100ML; MG/100ML; MG/100ML; MG/100ML
INJECTION, SOLUTION INTRAVENOUS
Status: DISPENSED
Start: 2020-03-23

## (undated) RX ORDER — EPHEDRINE SULFATE 50 MG/ML
INJECTION, SOLUTION INTRAMUSCULAR; INTRAVENOUS; SUBCUTANEOUS
Status: DISPENSED
Start: 2020-03-23

## (undated) RX ORDER — SODIUM CHLORIDE, SODIUM LACTATE, POTASSIUM CHLORIDE, CALCIUM CHLORIDE 600; 310; 30; 20 MG/100ML; MG/100ML; MG/100ML; MG/100ML
INJECTION, SOLUTION INTRAVENOUS
Status: DISPENSED
Start: 2020-09-10

## (undated) RX ORDER — KETAMINE HCL IN 0.9 % NACL 50 MG/5 ML
SYRINGE (ML) INTRAVENOUS
Status: DISPENSED
Start: 2020-01-23

## (undated) RX ORDER — ONDANSETRON 2 MG/ML
INJECTION INTRAMUSCULAR; INTRAVENOUS
Status: DISPENSED
Start: 2020-03-23

## (undated) RX ORDER — SODIUM CHLORIDE, SODIUM LACTATE, POTASSIUM CHLORIDE, CALCIUM CHLORIDE 600; 310; 30; 20 MG/100ML; MG/100ML; MG/100ML; MG/100ML
INJECTION, SOLUTION INTRAVENOUS
Status: DISPENSED
Start: 2020-01-23

## (undated) RX ORDER — GABAPENTIN 300 MG/1
CAPSULE ORAL
Status: DISPENSED
Start: 2020-09-10

## (undated) RX ORDER — VANCOMYCIN HYDROCHLORIDE 1 G/20ML
INJECTION, POWDER, LYOPHILIZED, FOR SOLUTION INTRAVENOUS
Status: DISPENSED
Start: 2020-03-23

## (undated) RX ORDER — VANCOMYCIN HYDROCHLORIDE 1 G/20ML
INJECTION, POWDER, LYOPHILIZED, FOR SOLUTION INTRAVENOUS
Status: DISPENSED
Start: 2020-01-23

## (undated) RX ORDER — OXYCODONE HYDROCHLORIDE 5 MG/1
TABLET ORAL
Status: DISPENSED
Start: 2020-01-23

## (undated) RX ORDER — BUPIVACAINE HYDROCHLORIDE AND EPINEPHRINE 2.5; 5 MG/ML; UG/ML
INJECTION, SOLUTION INFILTRATION; PERINEURAL
Status: DISPENSED
Start: 2020-01-23

## (undated) RX ORDER — VANCOMYCIN HYDROCHLORIDE 500 MG/10ML
INJECTION, POWDER, LYOPHILIZED, FOR SOLUTION INTRAVENOUS
Status: DISPENSED
Start: 2020-03-23